# Patient Record
Sex: MALE | Race: WHITE | Employment: FULL TIME | URBAN - METROPOLITAN AREA
[De-identification: names, ages, dates, MRNs, and addresses within clinical notes are randomized per-mention and may not be internally consistent; named-entity substitution may affect disease eponyms.]

---

## 2017-03-28 ENCOUNTER — OFFICE VISIT (OUTPATIENT)
Dept: ORTHOPEDIC SURGERY | Age: 62
End: 2017-03-28

## 2017-03-28 VITALS — BODY MASS INDEX: 28.49 KG/M2 | WEIGHT: 214.95 LBS | HEIGHT: 73 IN

## 2017-03-28 DIAGNOSIS — M19.072 OSTEOARTHRITIS OF SUBTALAR JOINT, LEFT: Primary | ICD-10-CM

## 2017-03-28 PROCEDURE — 99213 OFFICE O/P EST LOW 20 MIN: CPT | Performed by: ORTHOPAEDIC SURGERY

## 2017-03-28 PROCEDURE — 20605 DRAIN/INJ JOINT/BURSA W/O US: CPT | Performed by: ORTHOPAEDIC SURGERY

## 2017-03-28 PROCEDURE — 73610 X-RAY EXAM OF ANKLE: CPT | Performed by: ORTHOPAEDIC SURGERY

## 2018-04-24 ENCOUNTER — OFFICE VISIT (OUTPATIENT)
Dept: ORTHOPEDIC SURGERY | Age: 63
End: 2018-04-24

## 2018-04-24 ENCOUNTER — TELEPHONE (OUTPATIENT)
Dept: ORTHOPEDIC SURGERY | Age: 63
End: 2018-04-24

## 2018-04-24 VITALS
SYSTOLIC BLOOD PRESSURE: 197 MMHG | HEIGHT: 73 IN | HEART RATE: 86 BPM | BODY MASS INDEX: 28.49 KG/M2 | DIASTOLIC BLOOD PRESSURE: 120 MMHG | WEIGHT: 214.95 LBS

## 2018-04-24 DIAGNOSIS — M25.572 CHRONIC PAIN OF LEFT ANKLE: Primary | ICD-10-CM

## 2018-04-24 DIAGNOSIS — G89.29 CHRONIC PAIN OF LEFT ANKLE: Primary | ICD-10-CM

## 2018-04-24 DIAGNOSIS — M19.072 ARTHRITIS OF LEFT ANKLE: ICD-10-CM

## 2018-04-24 DIAGNOSIS — M19.072 OSTEOARTHRITIS OF LEFT SUBTALAR JOINT: ICD-10-CM

## 2018-04-24 PROCEDURE — 3017F COLORECTAL CA SCREEN DOC REV: CPT | Performed by: ORTHOPAEDIC SURGERY

## 2018-04-24 PROCEDURE — 1036F TOBACCO NON-USER: CPT | Performed by: ORTHOPAEDIC SURGERY

## 2018-04-24 PROCEDURE — G8427 DOCREV CUR MEDS BY ELIG CLIN: HCPCS | Performed by: ORTHOPAEDIC SURGERY

## 2018-04-24 PROCEDURE — 99213 OFFICE O/P EST LOW 20 MIN: CPT | Performed by: ORTHOPAEDIC SURGERY

## 2018-04-24 PROCEDURE — G8419 CALC BMI OUT NRM PARAM NOF/U: HCPCS | Performed by: ORTHOPAEDIC SURGERY

## 2018-04-24 RX ORDER — LABETALOL 100 MG/1
TABLET, FILM COATED ORAL
Refills: 5 | COMMUNITY
Start: 2018-03-27 | End: 2019-11-07 | Stop reason: DRUGHIGH

## 2018-08-20 ENCOUNTER — HOSPITAL ENCOUNTER (INPATIENT)
Age: 63
LOS: 5 days | Discharge: HOME | DRG: 390 | End: 2018-08-25
Attending: EMERGENCY MEDICINE | Admitting: INTERNAL MEDICINE
Payer: COMMERCIAL

## 2018-08-20 ENCOUNTER — APPOINTMENT (OUTPATIENT)
Dept: CT IMAGING | Age: 63
DRG: 390 | End: 2018-08-20
Payer: COMMERCIAL

## 2018-08-20 DIAGNOSIS — K56.600 PARTIAL INTESTINAL OBSTRUCTION, UNSPECIFIED CAUSE (HCC): Primary | ICD-10-CM

## 2018-08-20 DIAGNOSIS — R10.31 ABDOMINAL PAIN, RIGHT LOWER QUADRANT: ICD-10-CM

## 2018-08-20 LAB
A/G RATIO: 1.6 (ref 1.1–2.2)
ALBUMIN SERPL-MCNC: 4.4 G/DL (ref 3.4–5)
ALP BLD-CCNC: 78 U/L (ref 40–129)
ALT SERPL-CCNC: 24 U/L (ref 10–40)
ANION GAP SERPL CALCULATED.3IONS-SCNC: 12 MMOL/L (ref 3–16)
AST SERPL-CCNC: 21 U/L (ref 15–37)
BASOPHILS ABSOLUTE: 0.1 K/UL (ref 0–0.2)
BASOPHILS RELATIVE PERCENT: 0.7 %
BILIRUB SERPL-MCNC: 0.7 MG/DL (ref 0–1)
BILIRUBIN URINE: NEGATIVE
BLOOD, URINE: NEGATIVE
BUN BLDV-MCNC: 19 MG/DL (ref 7–20)
CALCIUM SERPL-MCNC: 9.6 MG/DL (ref 8.3–10.6)
CHLORIDE BLD-SCNC: 102 MMOL/L (ref 99–110)
CLARITY: CLEAR
CO2: 25 MMOL/L (ref 21–32)
COLOR: YELLOW
CREAT SERPL-MCNC: 0.8 MG/DL (ref 0.8–1.3)
EOSINOPHILS ABSOLUTE: 0.2 K/UL (ref 0–0.6)
EOSINOPHILS RELATIVE PERCENT: 1.7 %
GFR AFRICAN AMERICAN: >60
GFR NON-AFRICAN AMERICAN: >60
GLOBULIN: 2.8 G/DL
GLUCOSE BLD-MCNC: 106 MG/DL (ref 70–99)
GLUCOSE URINE: NEGATIVE MG/DL
HCT VFR BLD CALC: 49.3 % (ref 40.5–52.5)
HEMOGLOBIN: 17 G/DL (ref 13.5–17.5)
KETONES, URINE: 15 MG/DL
LEUKOCYTE ESTERASE, URINE: NEGATIVE
LIPASE: 54 U/L (ref 13–60)
LYMPHOCYTES ABSOLUTE: 1.3 K/UL (ref 1–5.1)
LYMPHOCYTES RELATIVE PERCENT: 11.5 %
MCH RBC QN AUTO: 30.8 PG (ref 26–34)
MCHC RBC AUTO-ENTMCNC: 34.4 G/DL (ref 31–36)
MCV RBC AUTO: 89.4 FL (ref 80–100)
MICROSCOPIC EXAMINATION: ABNORMAL
MONOCYTES ABSOLUTE: 0.7 K/UL (ref 0–1.3)
MONOCYTES RELATIVE PERCENT: 6.5 %
NEUTROPHILS ABSOLUTE: 8.8 K/UL (ref 1.7–7.7)
NEUTROPHILS RELATIVE PERCENT: 79.6 %
NITRITE, URINE: NEGATIVE
PDW BLD-RTO: 13 % (ref 12.4–15.4)
PH UA: 5
PLATELET # BLD: 225 K/UL (ref 135–450)
PMV BLD AUTO: 9.1 FL (ref 5–10.5)
POTASSIUM SERPL-SCNC: 4.6 MMOL/L (ref 3.5–5.1)
PROTEIN UA: NEGATIVE MG/DL
RBC # BLD: 5.52 M/UL (ref 4.2–5.9)
SODIUM BLD-SCNC: 139 MMOL/L (ref 136–145)
SPECIFIC GRAVITY UA: 1.01
TOTAL PROTEIN: 7.2 G/DL (ref 6.4–8.2)
URINE TYPE: ABNORMAL
UROBILINOGEN, URINE: 0.2 E.U./DL
WBC # BLD: 11.1 K/UL (ref 4–11)

## 2018-08-20 PROCEDURE — 96376 TX/PRO/DX INJ SAME DRUG ADON: CPT

## 2018-08-20 PROCEDURE — 80053 COMPREHEN METABOLIC PANEL: CPT

## 2018-08-20 PROCEDURE — 85025 COMPLETE CBC W/AUTO DIFF WBC: CPT

## 2018-08-20 PROCEDURE — 93005 ELECTROCARDIOGRAM TRACING: CPT | Performed by: EMERGENCY MEDICINE

## 2018-08-20 PROCEDURE — 96375 TX/PRO/DX INJ NEW DRUG ADDON: CPT

## 2018-08-20 PROCEDURE — S0028 INJECTION, FAMOTIDINE, 20 MG: HCPCS | Performed by: EMERGENCY MEDICINE

## 2018-08-20 PROCEDURE — 2500000003 HC RX 250 WO HCPCS: Performed by: EMERGENCY MEDICINE

## 2018-08-20 PROCEDURE — 2580000003 HC RX 258: Performed by: EMERGENCY MEDICINE

## 2018-08-20 PROCEDURE — 99285 EMERGENCY DEPT VISIT HI MDM: CPT

## 2018-08-20 PROCEDURE — 74177 CT ABD & PELVIS W/CONTRAST: CPT

## 2018-08-20 PROCEDURE — 6360000004 HC RX CONTRAST MEDICATION: Performed by: EMERGENCY MEDICINE

## 2018-08-20 PROCEDURE — 83690 ASSAY OF LIPASE: CPT

## 2018-08-20 PROCEDURE — 6360000002 HC RX W HCPCS: Performed by: EMERGENCY MEDICINE

## 2018-08-20 PROCEDURE — 2580000003 HC RX 258: Performed by: NURSE PRACTITIONER

## 2018-08-20 PROCEDURE — 96374 THER/PROPH/DIAG INJ IV PUSH: CPT

## 2018-08-20 PROCEDURE — S0028 INJECTION, FAMOTIDINE, 20 MG: HCPCS | Performed by: NURSE PRACTITIONER

## 2018-08-20 PROCEDURE — 96361 HYDRATE IV INFUSION ADD-ON: CPT

## 2018-08-20 PROCEDURE — 81003 URINALYSIS AUTO W/O SCOPE: CPT

## 2018-08-20 PROCEDURE — 1200000000 HC SEMI PRIVATE

## 2018-08-20 PROCEDURE — 2500000003 HC RX 250 WO HCPCS: Performed by: NURSE PRACTITIONER

## 2018-08-20 RX ORDER — SODIUM CHLORIDE 0.9 % (FLUSH) 0.9 %
10 SYRINGE (ML) INJECTION PRN
Status: DISCONTINUED | OUTPATIENT
Start: 2018-08-20 | End: 2018-08-25 | Stop reason: HOSPADM

## 2018-08-20 RX ORDER — ONDANSETRON 2 MG/ML
4 INJECTION INTRAMUSCULAR; INTRAVENOUS ONCE
Status: COMPLETED | OUTPATIENT
Start: 2018-08-20 | End: 2018-08-20

## 2018-08-20 RX ORDER — NICOTINE POLACRILEX 4 MG
15 LOZENGE BUCCAL PRN
Status: DISCONTINUED | OUTPATIENT
Start: 2018-08-20 | End: 2018-08-25 | Stop reason: HOSPADM

## 2018-08-20 RX ORDER — MORPHINE SULFATE 2 MG/ML
2 INJECTION, SOLUTION INTRAMUSCULAR; INTRAVENOUS
Status: DISCONTINUED | OUTPATIENT
Start: 2018-08-20 | End: 2018-08-25 | Stop reason: HOSPADM

## 2018-08-20 RX ORDER — MORPHINE SULFATE 4 MG/ML
4 INJECTION, SOLUTION INTRAMUSCULAR; INTRAVENOUS
Status: DISCONTINUED | OUTPATIENT
Start: 2018-08-20 | End: 2018-08-25 | Stop reason: HOSPADM

## 2018-08-20 RX ORDER — ONDANSETRON 2 MG/ML
4 INJECTION INTRAMUSCULAR; INTRAVENOUS EVERY 6 HOURS PRN
Status: DISCONTINUED | OUTPATIENT
Start: 2018-08-20 | End: 2018-08-25 | Stop reason: HOSPADM

## 2018-08-20 RX ORDER — 0.9 % SODIUM CHLORIDE 0.9 %
1000 INTRAVENOUS SOLUTION INTRAVENOUS ONCE
Status: COMPLETED | OUTPATIENT
Start: 2018-08-20 | End: 2018-08-20

## 2018-08-20 RX ORDER — SODIUM CHLORIDE 0.9 % (FLUSH) 0.9 %
10 SYRINGE (ML) INJECTION EVERY 12 HOURS SCHEDULED
Status: DISCONTINUED | OUTPATIENT
Start: 2018-08-20 | End: 2018-08-25 | Stop reason: HOSPADM

## 2018-08-20 RX ORDER — 0.9 % SODIUM CHLORIDE 0.9 %
500 INTRAVENOUS SOLUTION INTRAVENOUS ONCE
Status: COMPLETED | OUTPATIENT
Start: 2018-08-20 | End: 2018-08-20

## 2018-08-20 RX ORDER — MORPHINE SULFATE 4 MG/ML
4 INJECTION, SOLUTION INTRAMUSCULAR; INTRAVENOUS ONCE
Status: COMPLETED | OUTPATIENT
Start: 2018-08-20 | End: 2018-08-20

## 2018-08-20 RX ORDER — SODIUM CHLORIDE 9 MG/ML
INJECTION, SOLUTION INTRAVENOUS CONTINUOUS
Status: DISCONTINUED | OUTPATIENT
Start: 2018-08-20 | End: 2018-08-24

## 2018-08-20 RX ORDER — DEXTROSE MONOHYDRATE 25 G/50ML
12.5 INJECTION, SOLUTION INTRAVENOUS PRN
Status: DISCONTINUED | OUTPATIENT
Start: 2018-08-20 | End: 2018-08-25 | Stop reason: HOSPADM

## 2018-08-20 RX ORDER — DEXTROSE MONOHYDRATE 50 MG/ML
100 INJECTION, SOLUTION INTRAVENOUS PRN
Status: DISCONTINUED | OUTPATIENT
Start: 2018-08-20 | End: 2018-08-25 | Stop reason: HOSPADM

## 2018-08-20 RX ADMIN — SODIUM CHLORIDE, PRESERVATIVE FREE 10 ML: 5 INJECTION INTRAVENOUS at 23:43

## 2018-08-20 RX ADMIN — HYDROMORPHONE HYDROCHLORIDE 0.5 MG: 1 INJECTION, SOLUTION INTRAMUSCULAR; INTRAVENOUS; SUBCUTANEOUS at 19:54

## 2018-08-20 RX ADMIN — HYDROMORPHONE HYDROCHLORIDE 1 MG: 1 INJECTION, SOLUTION INTRAMUSCULAR; INTRAVENOUS; SUBCUTANEOUS at 21:16

## 2018-08-20 RX ADMIN — SODIUM CHLORIDE: 9 INJECTION, SOLUTION INTRAVENOUS at 23:43

## 2018-08-20 RX ADMIN — FAMOTIDINE 20 MG: 10 INJECTION, SOLUTION INTRAVENOUS at 17:15

## 2018-08-20 RX ADMIN — SODIUM CHLORIDE 500 ML: 9 INJECTION, SOLUTION INTRAVENOUS at 19:55

## 2018-08-20 RX ADMIN — MORPHINE SULFATE 4 MG: 4 INJECTION INTRAVENOUS at 17:14

## 2018-08-20 RX ADMIN — SODIUM CHLORIDE 1000 ML: 9 INJECTION, SOLUTION INTRAVENOUS at 17:15

## 2018-08-20 RX ADMIN — FAMOTIDINE 20 MG: 10 INJECTION, SOLUTION INTRAVENOUS at 23:42

## 2018-08-20 RX ADMIN — IOPAMIDOL 100 ML: 755 INJECTION, SOLUTION INTRAVENOUS at 17:28

## 2018-08-20 RX ADMIN — ONDANSETRON 4 MG: 2 SOLUTION INTRAMUSCULAR; INTRAVENOUS at 17:14

## 2018-08-20 RX ADMIN — ONDANSETRON 4 MG: 2 SOLUTION INTRAMUSCULAR; INTRAVENOUS at 19:54

## 2018-08-20 ASSESSMENT — PAIN SCALES - GENERAL
PAINLEVEL_OUTOF10: 3
PAINLEVEL_OUTOF10: 9
PAINLEVEL_OUTOF10: 10
PAINLEVEL_OUTOF10: 7
PAINLEVEL_OUTOF10: 5

## 2018-08-20 ASSESSMENT — PAIN DESCRIPTION - LOCATION
LOCATION: ABDOMEN
LOCATION: ABDOMEN

## 2018-08-20 ASSESSMENT — PAIN DESCRIPTION - DESCRIPTORS: DESCRIPTORS: CRUSHING

## 2018-08-20 ASSESSMENT — PAIN DESCRIPTION - ORIENTATION
ORIENTATION: LOWER;MID
ORIENTATION: MID

## 2018-08-20 ASSESSMENT — PAIN DESCRIPTION - PROGRESSION: CLINICAL_PROGRESSION: NOT CHANGED

## 2018-08-20 ASSESSMENT — PAIN DESCRIPTION - PAIN TYPE
TYPE: ACUTE PAIN
TYPE: ACUTE PAIN

## 2018-08-20 ASSESSMENT — PAIN DESCRIPTION - ONSET: ONSET: ON-GOING

## 2018-08-20 ASSESSMENT — PAIN DESCRIPTION - FREQUENCY: FREQUENCY: CONTINUOUS

## 2018-08-21 ENCOUNTER — APPOINTMENT (OUTPATIENT)
Dept: GENERAL RADIOLOGY | Age: 63
DRG: 390 | End: 2018-08-21
Payer: COMMERCIAL

## 2018-08-21 PROBLEM — K57.90 DIVERTICULOSIS: Status: ACTIVE | Noted: 2018-08-21

## 2018-08-21 PROBLEM — K80.20 CHOLELITHIASIS: Status: ACTIVE | Noted: 2018-08-21

## 2018-08-21 LAB
ANION GAP SERPL CALCULATED.3IONS-SCNC: 11 MMOL/L (ref 3–16)
BUN BLDV-MCNC: 15 MG/DL (ref 7–20)
CALCIUM SERPL-MCNC: 8.7 MG/DL (ref 8.3–10.6)
CHLORIDE BLD-SCNC: 107 MMOL/L (ref 99–110)
CO2: 24 MMOL/L (ref 21–32)
CREAT SERPL-MCNC: 0.7 MG/DL (ref 0.8–1.3)
GFR AFRICAN AMERICAN: >60
GFR NON-AFRICAN AMERICAN: >60
GLUCOSE BLD-MCNC: 89 MG/DL (ref 70–99)
GLUCOSE BLD-MCNC: 96 MG/DL (ref 70–99)
GLUCOSE BLD-MCNC: 98 MG/DL (ref 70–99)
GLUCOSE BLD-MCNC: 99 MG/DL (ref 70–99)
HCT VFR BLD CALC: 46.9 % (ref 40.5–52.5)
HEMOGLOBIN: 16.2 G/DL (ref 13.5–17.5)
MCH RBC QN AUTO: 31 PG (ref 26–34)
MCHC RBC AUTO-ENTMCNC: 34.5 G/DL (ref 31–36)
MCV RBC AUTO: 90 FL (ref 80–100)
PDW BLD-RTO: 12.8 % (ref 12.4–15.4)
PERFORMED ON: NORMAL
PLATELET # BLD: 192 K/UL (ref 135–450)
PMV BLD AUTO: 9 FL (ref 5–10.5)
POTASSIUM REFLEX MAGNESIUM: 4.2 MMOL/L (ref 3.5–5.1)
RBC # BLD: 5.22 M/UL (ref 4.2–5.9)
SODIUM BLD-SCNC: 142 MMOL/L (ref 136–145)
WBC # BLD: 10.6 K/UL (ref 4–11)

## 2018-08-21 PROCEDURE — 1200000000 HC SEMI PRIVATE

## 2018-08-21 PROCEDURE — 2500000003 HC RX 250 WO HCPCS: Performed by: NURSE PRACTITIONER

## 2018-08-21 PROCEDURE — 80048 BASIC METABOLIC PNL TOTAL CA: CPT

## 2018-08-21 PROCEDURE — S0028 INJECTION, FAMOTIDINE, 20 MG: HCPCS | Performed by: NURSE PRACTITIONER

## 2018-08-21 PROCEDURE — 36415 COLL VENOUS BLD VENIPUNCTURE: CPT

## 2018-08-21 PROCEDURE — 99254 IP/OBS CNSLTJ NEW/EST MOD 60: CPT | Performed by: SURGERY

## 2018-08-21 PROCEDURE — 85027 COMPLETE CBC AUTOMATED: CPT

## 2018-08-21 PROCEDURE — 74022 RADEX COMPL AQT ABD SERIES: CPT

## 2018-08-21 PROCEDURE — 2500000003 HC RX 250 WO HCPCS: Performed by: INTERNAL MEDICINE

## 2018-08-21 PROCEDURE — 6360000002 HC RX W HCPCS: Performed by: NURSE PRACTITIONER

## 2018-08-21 PROCEDURE — 2580000003 HC RX 258: Performed by: NURSE PRACTITIONER

## 2018-08-21 PROCEDURE — 74018 RADEX ABDOMEN 1 VIEW: CPT

## 2018-08-21 RX ORDER — METOPROLOL TARTRATE 5 MG/5ML
5 INJECTION INTRAVENOUS EVERY 6 HOURS
Status: DISCONTINUED | OUTPATIENT
Start: 2018-08-21 | End: 2018-08-21

## 2018-08-21 RX ORDER — METOPROLOL TARTRATE 5 MG/5ML
2.5 INJECTION INTRAVENOUS EVERY 8 HOURS
Status: DISCONTINUED | OUTPATIENT
Start: 2018-08-21 | End: 2018-08-24

## 2018-08-21 RX ORDER — METOPROLOL TARTRATE 5 MG/5ML
5 INJECTION INTRAVENOUS EVERY 6 HOURS PRN
Status: DISCONTINUED | OUTPATIENT
Start: 2018-08-21 | End: 2018-08-25 | Stop reason: HOSPADM

## 2018-08-21 RX ADMIN — MORPHINE SULFATE 4 MG: 4 INJECTION INTRAVENOUS at 14:38

## 2018-08-21 RX ADMIN — METOPROLOL TARTRATE 5 MG: 5 INJECTION, SOLUTION INTRAVENOUS at 08:14

## 2018-08-21 RX ADMIN — MORPHINE SULFATE 4 MG: 4 INJECTION INTRAVENOUS at 10:30

## 2018-08-21 RX ADMIN — SODIUM CHLORIDE: 9 INJECTION, SOLUTION INTRAVENOUS at 08:05

## 2018-08-21 RX ADMIN — MORPHINE SULFATE 2 MG: 2 INJECTION, SOLUTION INTRAMUSCULAR; INTRAVENOUS at 08:04

## 2018-08-21 RX ADMIN — FAMOTIDINE 20 MG: 10 INJECTION, SOLUTION INTRAVENOUS at 20:48

## 2018-08-21 RX ADMIN — MORPHINE SULFATE 2 MG: 2 INJECTION, SOLUTION INTRAMUSCULAR; INTRAVENOUS at 08:23

## 2018-08-21 RX ADMIN — FAMOTIDINE 20 MG: 10 INJECTION, SOLUTION INTRAVENOUS at 08:04

## 2018-08-21 RX ADMIN — MORPHINE SULFATE 4 MG: 4 INJECTION INTRAVENOUS at 22:48

## 2018-08-21 RX ADMIN — METOPROLOL TARTRATE 2.5 MG: 5 INJECTION, SOLUTION INTRAVENOUS at 20:48

## 2018-08-21 RX ADMIN — MORPHINE SULFATE 4 MG: 4 INJECTION INTRAVENOUS at 04:47

## 2018-08-21 RX ADMIN — SODIUM CHLORIDE: 9 INJECTION, SOLUTION INTRAVENOUS at 14:41

## 2018-08-21 RX ADMIN — MORPHINE SULFATE 2 MG: 2 INJECTION, SOLUTION INTRAMUSCULAR; INTRAVENOUS at 00:19

## 2018-08-21 RX ADMIN — SODIUM CHLORIDE, PRESERVATIVE FREE 10 ML: 5 INJECTION INTRAVENOUS at 20:48

## 2018-08-21 RX ADMIN — ONDANSETRON 4 MG: 2 SOLUTION INTRAMUSCULAR; INTRAVENOUS at 04:47

## 2018-08-21 RX ADMIN — METOPROLOL TARTRATE 2.5 MG: 5 INJECTION, SOLUTION INTRAVENOUS at 15:25

## 2018-08-21 RX ADMIN — MORPHINE SULFATE 4 MG: 4 INJECTION INTRAVENOUS at 19:01

## 2018-08-21 ASSESSMENT — PAIN SCALES - GENERAL
PAINLEVEL_OUTOF10: 5
PAINLEVEL_OUTOF10: 5
PAINLEVEL_OUTOF10: 7
PAINLEVEL_OUTOF10: 0
PAINLEVEL_OUTOF10: 4
PAINLEVEL_OUTOF10: 7
PAINLEVEL_OUTOF10: 6
PAINLEVEL_OUTOF10: 4
PAINLEVEL_OUTOF10: 7
PAINLEVEL_OUTOF10: 7

## 2018-08-21 ASSESSMENT — PAIN DESCRIPTION - PAIN TYPE
TYPE: ACUTE PAIN

## 2018-08-21 ASSESSMENT — PAIN DESCRIPTION - ORIENTATION
ORIENTATION: LOWER
ORIENTATION: LOWER;MID

## 2018-08-21 ASSESSMENT — PAIN DESCRIPTION - LOCATION
LOCATION: ABDOMEN

## 2018-08-21 ASSESSMENT — PAIN DESCRIPTION - DESCRIPTORS: DESCRIPTORS: ACHING;CRAMPING

## 2018-08-21 ASSESSMENT — PAIN DESCRIPTION - FREQUENCY: FREQUENCY: CONTINUOUS

## 2018-08-21 NOTE — PROGRESS NOTES
Pt to transfer to B3. Pt spouse at is at bedside made aware of transfer. Report given to [facility], RN, who will assume care at this time. [facility] notified of transfer as well. Pt belonging collected and moved with pt.

## 2018-08-21 NOTE — CONSULTS
Department of General Surgery Consult    PATIENT NAME: Melissa De Dios OF BIRTH: 1955    ADMISSION DATE: 8/20/2018  4:49 PM      TODAY'S DATE: 8/21/2018    Reason for Consult:  Poss SBO    Chief Complaint: Abdominal pain    Requesting Physician:  Chani Morgan    HISTORY OF PRESENT ILLNESS:              The patient is a 61 y.o. male who presents with 24 hr h/o central/lower abdominal pain - began suddenly, no obvious inciting event, sharp, burning, waxing/waning in intensity. Nausea, no emesis, normal BM this AM.  No prior h/o similar sx. Seen in ED, CT showing SBO w/ possible associated mass. Currently still feels pain, but passing some flatus. Past Medical History:    History reviewed. No pertinent past medical history. Past Surgical History:        Procedure Laterality Date    CHOLECYSTECTOMY         Current Medications:   Current Facility-Administered Medications: metoprolol (LOPRESSOR) injection 5 mg, 5 mg, Intravenous, Q6H PRN  sodium chloride flush 0.9 % injection 10 mL, 10 mL, Intravenous, 2 times per day  sodium chloride flush 0.9 % injection 10 mL, 10 mL, Intravenous, PRN  magnesium hydroxide (MILK OF MAGNESIA) 400 MG/5ML suspension 30 mL, 30 mL, Oral, Daily PRN  ondansetron (ZOFRAN) injection 4 mg, 4 mg, Intravenous, Q6H PRN  0.9 % sodium chloride infusion, , Intravenous, Continuous  famotidine (PEPCID) injection 20 mg, 20 mg, Intravenous, BID  glucose (GLUTOSE) 40 % oral gel 15 g, 15 g, Oral, PRN  dextrose 50 % solution 12.5 g, 12.5 g, Intravenous, PRN  glucagon (rDNA) injection 1 mg, 1 mg, Intramuscular, PRN  dextrose 5 % solution, 100 mL/hr, Intravenous, PRN  insulin lispro (HUMALOG) injection vial 0-6 Units, 0-6 Units, Subcutaneous, 4 times per day  morphine injection 2 mg, 2 mg, Intravenous, Q2H PRN **OR** morphine (PF) injection 4 mg, 4 mg, Intravenous, Q2H PRN  Prior to Admission medications    Medication Sig Start Date End Date Taking?  Authorizing Provider   labetalol (NORMODYNE) 100 MG tablet TAKE 1 TABLET BY MOUTH TWICE A DAY 3/27/18  Yes Historical Provider, MD        Allergies:  Patient has no known allergies. Social History:   TOBACCO:   reports that he has quit smoking. His smoking use included Cigarettes. He has a 2.50 pack-year smoking history. He has never used smokeless tobacco.  ETOH:   reports that he does not drink alcohol. DRUGS:   reports that he does not use drugs. OCCUPATION:    Patient currently lives with family      Family History:    History reviewed. No pertinent family history. REVIEW OF SYSTEMS:  CONSTITUTIONAL:  positive for  fatigue, malaise and anorexia  HEENT:  negative  RESPIRATORY:  negative  CARDIOVASCULAR:  negative  GASTROINTESTINAL:  negative except for nausea and abdominal pain  GENITOURINARY:  negative  HEMATOLOGIC/LYMPHATIC:  negative  NEUROLOGICAL:  Negative  * All other ROS reviewed and negative. PHYSICAL EXAM:  VITALS:  BP (!) 169/96   Pulse 79   Temp 98.8 °F (37.1 °C)   Resp 16   Ht 6' 1\" (1.854 m)   Wt 260 lb (117.9 kg)   SpO2 94%   BMI 34.30 kg/m²   24HR INTAKE/OUTPUT:    No intake/output data recorded. No intake/output data recorded.     CONSTITUTIONAL:  alert, no apparent distress and mildly obese  EYES:  PERRL, sclera clear  ENT:  Normocepalic,atraumatic, without obvious abnormality  NECK:  supple, symmetrical, trachea midline  LUNGS: Resp effort easy and unlabored, clear to auscultation  CARDIOVASCULAR:  NO JVD, regular rate and rhythm   ABDOMEN:, hypoactive bowel sounds, soft, non-distended, non-tender, involuntary guarding absent, no masses palpated and    MUSCULOSKELETAL: No clubbing or cyanosis, 0+ pitting edema lower extremities  NEUROLOGIC:  Mental Status Exam:  Level of Alertness:   awake  PSYCHIATRIC:   person, place, time  SKIN:  normal skin color, texture, turgor    DATA:    CBC:   Recent Labs      08/20/18   1644  08/21/18   0637   WBC  11.1*  10.6   HGB  17.0  16.2   HCT  49.3  46.9   PLT  225  192

## 2018-08-21 NOTE — PROGRESS NOTES
Pt arrived to room 546 from ED. Pt A/Ox4, VSS, pt oriented to room and use of call light. Pt oriented to schedule of unit including labs, vitals and rounding. Plan of care reviewed. Will continue to monitor.

## 2018-08-21 NOTE — ED PROVIDER NOTES
CHIEF COMPLAINT  Abdominal Pain (Started about noon today, no nausea, patient states he made himself vomit to feel better, pain in the \"pit of stomach\" and feels the same as a gallbladder attack but patient states gallbladder was removed )      HISTORY OF PRESENT ILLNESS  Vijay Silveira is a 61 y.o. male with a history of cholecystectomy and hypertension who presents to the ED complaining of lower abdominal pain that started about 5 hours prior to arrival he was sitting at home. Says it's been getting worse since then. He had 2 episodes of vomiting as well. No back pain or chest pain or any shortness of breath. No numbness or tingling. He denies any headache or dizziness or any fevers or chills. He has a history of gallbladder removal but no other operations per him. He states that some pain meds but his pain is coming back at this time. He denies any other complaints. No other complaints, modifying factors or associated symptoms. I have reviewed the following from the nursing documentation. History reviewed. No pertinent past medical history. Past Surgical History:   Procedure Laterality Date    CHOLECYSTECTOMY       History reviewed. No pertinent family history. Social History     Social History    Marital status:      Spouse name: N/A    Number of children: N/A    Years of education: N/A     Occupational History    Not on file. Social History Main Topics    Smoking status: Former Smoker     Packs/day: 0.50     Years: 5.00     Types: Cigarettes    Smokeless tobacco: Never Used    Alcohol use No    Drug use: No    Sexual activity: Yes     Partners: Female     Other Topics Concern    Not on file     Social History Narrative    No narrative on file     No current facility-administered medications for this encounter.       Current Outpatient Prescriptions   Medication Sig Dispense Refill    labetalol (NORMODYNE) 100 MG tablet TAKE 1 TABLET BY MOUTH TWICE A DAY  5     No Known Allergies    REVIEW OF SYSTEMS  10 systems reviewed, pertinent positives per HPI otherwise noted to be negative. PHYSICAL EXAM  BP (!) 163/99   Pulse 82   Temp 98 °F (36.7 °C) (Oral)   Resp 16   Ht 6' 1\" (1.854 m)   Wt 260 lb (117.9 kg)   SpO2 94%   BMI 34.30 kg/m²   GENERAL APPEARANCE: Awake and alert. Cooperative. No acute distress. HEAD: Normocephalic. Atraumatic. EYES: PERRL. EOM's grossly intact. ENT: Mucous membranes are moist.   NECK: Supple. HEART: RRR. LUNGS: Respirations unlabored. CTAB. Good air exchange. Speaking comfortably in full sentences. BACK: No midline spinal tenderness or step-off. ABDOMEN: Soft. Non-distended. Mild tenderness RLQ and [periumbilical. No guarding or rebound. Normal bowel sounds. EXTREMITIES: No peripheral edema. Moves all extremities equally. All extremities neurovascularly intact. SKIN: Warm and dry. No acute rashes. NEUROLOGICAL: Alert and oriented. No gross facial drooping. Strength 5/5, sensation intact. Normal coordination. Gait normal.   PSYCHIATRIC: Normal mood and affect. LABS  I have reviewed all labs for this visit.    Results for orders placed or performed during the hospital encounter of 08/20/18   Comprehensive Metabolic Panel   Result Value Ref Range    Sodium 139 136 - 145 mmol/L    Potassium 4.6 3.5 - 5.1 mmol/L    Chloride 102 99 - 110 mmol/L    CO2 25 21 - 32 mmol/L    Anion Gap 12 3 - 16    Glucose 106 (H) 70 - 99 mg/dL    BUN 19 7 - 20 mg/dL    CREATININE 0.8 0.8 - 1.3 mg/dL    GFR Non-African American >60 >60    GFR African American >60 >60    Calcium 9.6 8.3 - 10.6 mg/dL    Total Protein 7.2 6.4 - 8.2 g/dL    Alb 4.4 3.4 - 5.0 g/dL    Albumin/Globulin Ratio 1.6 1.1 - 2.2    Total Bilirubin 0.7 0.0 - 1.0 mg/dL    Alkaline Phosphatase 78 40 - 129 U/L    ALT 24 10 - 40 U/L    AST 21 15 - 37 U/L    Globulin 2.8 g/dL   CBC Auto Differential   Result Value Ref Range    WBC 11.1 (H) 4.0 - 11.0 K/uL    RBC 5.52 4.20 - 5.90 M/uL

## 2018-08-22 ENCOUNTER — APPOINTMENT (OUTPATIENT)
Dept: CT IMAGING | Age: 63
DRG: 390 | End: 2018-08-22
Payer: COMMERCIAL

## 2018-08-22 LAB
ANION GAP SERPL CALCULATED.3IONS-SCNC: 12 MMOL/L (ref 3–16)
BUN BLDV-MCNC: 13 MG/DL (ref 7–20)
CALCIUM SERPL-MCNC: 8.3 MG/DL (ref 8.3–10.6)
CHLORIDE BLD-SCNC: 106 MMOL/L (ref 99–110)
CO2: 23 MMOL/L (ref 21–32)
CREAT SERPL-MCNC: 0.6 MG/DL (ref 0.8–1.3)
GFR AFRICAN AMERICAN: >60
GFR NON-AFRICAN AMERICAN: >60
GLUCOSE BLD-MCNC: 76 MG/DL (ref 70–99)
GLUCOSE BLD-MCNC: 88 MG/DL (ref 70–99)
GLUCOSE BLD-MCNC: 88 MG/DL (ref 70–99)
GLUCOSE BLD-MCNC: 92 MG/DL (ref 70–99)
GLUCOSE BLD-MCNC: 93 MG/DL (ref 70–99)
GLUCOSE BLD-MCNC: 94 MG/DL (ref 70–99)
PERFORMED ON: NORMAL
POTASSIUM SERPL-SCNC: 4 MMOL/L (ref 3.5–5.1)
SODIUM BLD-SCNC: 141 MMOL/L (ref 136–145)

## 2018-08-22 PROCEDURE — S0028 INJECTION, FAMOTIDINE, 20 MG: HCPCS | Performed by: NURSE PRACTITIONER

## 2018-08-22 PROCEDURE — 93010 ELECTROCARDIOGRAM REPORT: CPT | Performed by: INTERNAL MEDICINE

## 2018-08-22 PROCEDURE — 2580000003 HC RX 258

## 2018-08-22 PROCEDURE — 80048 BASIC METABOLIC PNL TOTAL CA: CPT

## 2018-08-22 PROCEDURE — 74177 CT ABD & PELVIS W/CONTRAST: CPT

## 2018-08-22 PROCEDURE — 99232 SBSQ HOSP IP/OBS MODERATE 35: CPT | Performed by: SURGERY

## 2018-08-22 PROCEDURE — 2500000003 HC RX 250 WO HCPCS: Performed by: NURSE PRACTITIONER

## 2018-08-22 PROCEDURE — 36415 COLL VENOUS BLD VENIPUNCTURE: CPT

## 2018-08-22 PROCEDURE — 6360000002 HC RX W HCPCS: Performed by: NURSE PRACTITIONER

## 2018-08-22 PROCEDURE — 2580000003 HC RX 258: Performed by: NURSE PRACTITIONER

## 2018-08-22 PROCEDURE — 6360000004 HC RX CONTRAST MEDICATION

## 2018-08-22 PROCEDURE — 2500000003 HC RX 250 WO HCPCS: Performed by: INTERNAL MEDICINE

## 2018-08-22 PROCEDURE — 1200000000 HC SEMI PRIVATE

## 2018-08-22 PROCEDURE — 6360000004 HC RX CONTRAST MEDICATION: Performed by: INTERNAL MEDICINE

## 2018-08-22 RX ORDER — DEXTROSE MONOHYDRATE 50 MG/ML
INJECTION, SOLUTION INTRAVENOUS
Status: COMPLETED
Start: 2018-08-22 | End: 2018-08-22

## 2018-08-22 RX ORDER — DEXTROSE MONOHYDRATE 50 MG/ML
INJECTION, SOLUTION INTRAVENOUS CONTINUOUS
Status: DISCONTINUED | OUTPATIENT
Start: 2018-08-22 | End: 2018-08-25 | Stop reason: HOSPADM

## 2018-08-22 RX ADMIN — METOPROLOL TARTRATE 2.5 MG: 5 INJECTION, SOLUTION INTRAVENOUS at 14:50

## 2018-08-22 RX ADMIN — METOPROLOL TARTRATE 2.5 MG: 5 INJECTION, SOLUTION INTRAVENOUS at 21:23

## 2018-08-22 RX ADMIN — SODIUM CHLORIDE: 9 INJECTION, SOLUTION INTRAVENOUS at 18:18

## 2018-08-22 RX ADMIN — FAMOTIDINE 20 MG: 10 INJECTION, SOLUTION INTRAVENOUS at 21:23

## 2018-08-22 RX ADMIN — DEXTROSE MONOHYDRATE: 50 INJECTION, SOLUTION INTRAVENOUS at 21:19

## 2018-08-22 RX ADMIN — SODIUM CHLORIDE, PRESERVATIVE FREE 10 ML: 5 INJECTION INTRAVENOUS at 08:29

## 2018-08-22 RX ADMIN — IOHEXOL 50 ML: 240 INJECTION, SOLUTION INTRATHECAL; INTRAVASCULAR; INTRAVENOUS; ORAL at 14:46

## 2018-08-22 RX ADMIN — FAMOTIDINE 20 MG: 10 INJECTION, SOLUTION INTRAVENOUS at 08:29

## 2018-08-22 RX ADMIN — SODIUM CHLORIDE: 9 INJECTION, SOLUTION INTRAVENOUS at 07:13

## 2018-08-22 RX ADMIN — ONDANSETRON 4 MG: 2 SOLUTION INTRAMUSCULAR; INTRAVENOUS at 04:55

## 2018-08-22 RX ADMIN — METOPROLOL TARTRATE 2.5 MG: 5 INJECTION, SOLUTION INTRAVENOUS at 04:55

## 2018-08-22 RX ADMIN — IOPAMIDOL 100 ML: 755 INJECTION, SOLUTION INTRAVENOUS at 16:46

## 2018-08-22 ASSESSMENT — PAIN SCALES - GENERAL
PAINLEVEL_OUTOF10: 0
PAINLEVEL_OUTOF10: 0

## 2018-08-22 NOTE — PLAN OF CARE
Problem: Falls - Risk of:  Goal: Will remain free from falls  Will remain free from falls   Pt remains free from falls, instructed to call for assistance, call light in reach, will monitor.

## 2018-08-22 NOTE — PROGRESS NOTES
Pt resting in bed, resp easy, IV patent, assessment completed, pt states he had a bowel movement this morning, pt states he is feeling much better and really wants the NG out, explained to pt he had to wait for physicians to round, stated understanding, pt denies pain or needs at this time, wife at bedside, call light in reach, will monitor.

## 2018-08-22 NOTE — PROGRESS NOTES
Pt completed all but 200ml of oral contrast through NG tube, pt states he is now having diarrhea and is unable to complete the rest at this time, CT notified, stated they would get him down shortly for his scan.

## 2018-08-22 NOTE — PLAN OF CARE
Problem: Pain:  Goal: Pain level will decrease  Pain level will decrease   Outcome: Ongoing  Pt c/o abd pain 7/10; 4mg Morphine given per MAR. Pt currently resting in bed with eyes closed. Problem: Sensory:  Goal: Pain level will decrease  Pain level will decrease   Outcome: Ongoing  Pt c/o abd pain 7/10; 4mg Morphine given per MAR. Pt currently resting in bed with eyes closed.

## 2018-08-22 NOTE — PROGRESS NOTES
Assessment complete. NG tube in patent & in place.  BP (!) 154/90   Pulse 101   Temp 97.8 °F (36.6 °C) (Oral)   Resp 17   Ht 6' 1\" (1.854 m)   Wt 260 lb (117.9 kg)   SpO2 92%   BMI 34.30 kg/m²

## 2018-08-23 ENCOUNTER — APPOINTMENT (OUTPATIENT)
Dept: GENERAL RADIOLOGY | Age: 63
DRG: 390 | End: 2018-08-23
Payer: COMMERCIAL

## 2018-08-23 LAB
ANION GAP SERPL CALCULATED.3IONS-SCNC: 10 MMOL/L (ref 3–16)
BUN BLDV-MCNC: 9 MG/DL (ref 7–20)
CALCIUM SERPL-MCNC: 8.3 MG/DL (ref 8.3–10.6)
CHLORIDE BLD-SCNC: 107 MMOL/L (ref 99–110)
CO2: 25 MMOL/L (ref 21–32)
CREAT SERPL-MCNC: 0.6 MG/DL (ref 0.8–1.3)
GFR AFRICAN AMERICAN: >60
GFR NON-AFRICAN AMERICAN: >60
GLUCOSE BLD-MCNC: 79 MG/DL (ref 70–99)
GLUCOSE BLD-MCNC: 81 MG/DL (ref 70–99)
GLUCOSE BLD-MCNC: 83 MG/DL (ref 70–99)
GLUCOSE BLD-MCNC: 95 MG/DL (ref 70–99)
GLUCOSE BLD-MCNC: 99 MG/DL (ref 70–99)
PERFORMED ON: NORMAL
POTASSIUM SERPL-SCNC: 3.8 MMOL/L (ref 3.5–5.1)
SODIUM BLD-SCNC: 142 MMOL/L (ref 136–145)

## 2018-08-23 PROCEDURE — 99232 SBSQ HOSP IP/OBS MODERATE 35: CPT | Performed by: SURGERY

## 2018-08-23 PROCEDURE — APPSS30 APP SPLIT SHARED TIME 16-30 MINUTES: Performed by: CLINICAL NURSE SPECIALIST

## 2018-08-23 PROCEDURE — S0028 INJECTION, FAMOTIDINE, 20 MG: HCPCS | Performed by: NURSE PRACTITIONER

## 2018-08-23 PROCEDURE — 2580000003 HC RX 258: Performed by: NURSE PRACTITIONER

## 2018-08-23 PROCEDURE — 6370000000 HC RX 637 (ALT 250 FOR IP): Performed by: NURSE PRACTITIONER

## 2018-08-23 PROCEDURE — 2500000003 HC RX 250 WO HCPCS: Performed by: NURSE PRACTITIONER

## 2018-08-23 PROCEDURE — 80048 BASIC METABOLIC PNL TOTAL CA: CPT

## 2018-08-23 PROCEDURE — 1200000000 HC SEMI PRIVATE

## 2018-08-23 PROCEDURE — 36415 COLL VENOUS BLD VENIPUNCTURE: CPT

## 2018-08-23 PROCEDURE — 2500000003 HC RX 250 WO HCPCS: Performed by: INTERNAL MEDICINE

## 2018-08-23 PROCEDURE — 74019 RADEX ABDOMEN 2 VIEWS: CPT

## 2018-08-23 RX ADMIN — DEXTROSE MONOHYDRATE: 50 INJECTION, SOLUTION INTRAVENOUS at 06:00

## 2018-08-23 RX ADMIN — METOPROLOL TARTRATE 2.5 MG: 5 INJECTION, SOLUTION INTRAVENOUS at 06:00

## 2018-08-23 RX ADMIN — SODIUM CHLORIDE, PRESERVATIVE FREE 10 ML: 5 INJECTION INTRAVENOUS at 20:16

## 2018-08-23 RX ADMIN — METOPROLOL TARTRATE 5 MG: 5 INJECTION, SOLUTION INTRAVENOUS at 00:57

## 2018-08-23 RX ADMIN — SODIUM CHLORIDE: 9 INJECTION, SOLUTION INTRAVENOUS at 20:16

## 2018-08-23 RX ADMIN — METOPROLOL TARTRATE 2.5 MG: 5 INJECTION, SOLUTION INTRAVENOUS at 14:40

## 2018-08-23 RX ADMIN — METOPROLOL TARTRATE 2.5 MG: 5 INJECTION, SOLUTION INTRAVENOUS at 21:28

## 2018-08-23 RX ADMIN — FAMOTIDINE 20 MG: 10 INJECTION, SOLUTION INTRAVENOUS at 20:16

## 2018-08-23 RX ADMIN — SODIUM CHLORIDE, PRESERVATIVE FREE 10 ML: 5 INJECTION INTRAVENOUS at 08:19

## 2018-08-23 RX ADMIN — CHLORASEPTIC 1 SPRAY: 1.5 LIQUID ORAL at 01:37

## 2018-08-23 RX ADMIN — SODIUM CHLORIDE: 9 INJECTION, SOLUTION INTRAVENOUS at 06:00

## 2018-08-23 RX ADMIN — FAMOTIDINE 20 MG: 10 INJECTION, SOLUTION INTRAVENOUS at 08:19

## 2018-08-23 ASSESSMENT — PAIN SCALES - GENERAL
PAINLEVEL_OUTOF10: 2
PAINLEVEL_OUTOF10: 0

## 2018-08-23 ASSESSMENT — PAIN DESCRIPTION - PAIN TYPE: TYPE: ACUTE PAIN

## 2018-08-23 ASSESSMENT — PAIN DESCRIPTION - DESCRIPTORS: DESCRIPTORS: SORE

## 2018-08-23 ASSESSMENT — PAIN DESCRIPTION - ONSET: ONSET: GRADUAL

## 2018-08-23 ASSESSMENT — PAIN DESCRIPTION - PROGRESSION: CLINICAL_PROGRESSION: GRADUALLY WORSENING

## 2018-08-23 ASSESSMENT — PAIN DESCRIPTION - LOCATION: LOCATION: THROAT

## 2018-08-23 ASSESSMENT — PAIN DESCRIPTION - FREQUENCY: FREQUENCY: CONTINUOUS

## 2018-08-23 NOTE — PROGRESS NOTES
NG tube removed as ordered, pt tolerated well, pt instructed to notify staff for any increase in pain or nausea, pt given clear liquid menu, will monitor.

## 2018-08-23 NOTE — CARE COORDINATION
Chart reviewed. Pt from home with family support, no services noted prior to admission. Pt currently has NGtube, to begin clamping today. Please notify DCP if discharge needs arise.   Mirela Morillo RN DCP

## 2018-08-23 NOTE — PROGRESS NOTES
[facility] SURGERY    PATIENT NAME: Ellen Castillo     TODAY'S DATE: 8/23/2018    CHIEF COMPLAINT: ngt discomfort    INTERVAL HISTORY/HPI:    Pt reports he continues to have loose stools and is passing flatus. No abd pain today. REVIEW OF SYSTEMS:  Pertinent positives and negatives as per interval history section    OBJECTIVE:  VITALS:  BP (!) 159/95   Pulse 84   Temp 98.1 °F (36.7 °C) (Oral)   Resp 18   Ht 6' 1\" (1.854 m)   Wt 260 lb (117.9 kg)   SpO2 95%   BMI 34.30 kg/m²     INTAKE/OUTPUT:    I/O last 3 completed shifts: In: 0   Out: 900 [Urine:300; Emesis/NG output:600]  No intake/output data recorded. CONSTITUTIONAL:  awake and alert  LUNGS:  Respirations easy and unlabored,    CARD:  RRR   ABDOMEN:  Active BS, soft, non-distended, non-tender     Data:  CBC:   Recent Labs      08/20/18   1644  08/21/18   0637   WBC  11.1*  10.6   HGB  17.0  16.2   HCT  49.3  46.9   PLT  225  192     BMP:    Recent Labs      08/21/18   0637  08/22/18   1150  08/23/18   0630   NA  142  141  142   K  4.2  4.0  3.8   CL  107  106  107   CO2  24  23  25   BUN  15  13  9   CREATININE  0.7*  0.6*  0.6*   GLUCOSE  98  88  95     Hepatic:   Recent Labs      08/20/18   1644   AST  21   ALT  24   BILITOT  0.7   ALKPHOS  78     Mag:    No results for input(s): MG in the last 72 hours. Phos:   No results for input(s): PHOS in the last 72 hours. INR: No results for input(s): INR in the last 72 hours. Radiology Review:  *Imaging personally reviewed by me. EXAMINATION:  CT OF THE ABDOMEN AND PELVIS WITH CONTRAST 8/22/2018 4:46 pm    TECHNIQUE:  CT of the abdomen and pelvis was performed with the administration of  intravenous contrast. Multiplanar reformatted images are provided for review. Dose modulation, iterative reconstruction, and/or weight based adjustment of  the mA/kV was utilized to reduce the radiation dose to as low as reasonably  achievable. COMPARISON:  None.     HISTORY:  ORDERING SYSTEM PROVIDED HISTORY: eval for site/source of SBO  TECHNOLOGIST PROVIDED HISTORY:  With oral and IV contrast - give oral contrast thru NGT and clamp NGT until  scan done  Ordering Physician Provided Reason for Exam: follow up scan for SBO  Acuity: Chronic  Type of Exam: Subsequent/Follow-up  Relevant Medical/Surgical History: cholecystectomy    FINDINGS:  Lower Chest: Atelectasis is noted at the lung bases.  No pleural effusions or  cardiomegaly is noted.  Coronary artery calcifications are noted.  Trace  pericardial fluid is seen.  Granulomatous changes are noted in the right  hilum. Organs: Liver, spleen, pancreas, and adrenals are unremarkable.  Gallbladder  is surgically absent.  Pelviectasis both kidneys is noted without evidence of  ureteric dilatation or obstruction. GI/Bowel: No free fluid or free air is noted.  Intestinal tube terminates in  the stomach.  A partial small bowel obstruction is noted with transition zone  in the right lower quadrant.  No bowel wall edema is noted.  Maximum small  bowel diameter of 3 cm. Pelvis: No evidence of appendicitis.  Report sigmoid diverticulosis is  present without diverticulitis.  No abnormal fluid collections, pelvic or  inguinal adenopathy are noted.  Both inguinal rings are dilated and fat  containing without strangulation. Peritoneum/Retroperitoneum: Aorta is normal in caliber.  No aortic aneurysm,  retroperitoneal mass, retroperitoneal or mesenteric adenopathy is present. Bones/Soft Tissues: Mild degenerative changes in the hips, SI joints and  lower lumbar region.  No worrisome lytic or blastic lesions. Estimated biologic radiation dose for this procedure:1576.47 mGy/cm2. Impression:     1.  Small-bowel obstruction with transition zone in the right lower quadrant. 2.  Sigmoid diverticulosis without diverticulitis.     3.  No cholelithiasis, appendicitis, or urinary obstruction.  Other  incidental findings as above       AXR pending this AM      ASSESSMENT AND PLAN:  SBO of unclear etiology (possible distal SB mass/lesion), improved w/ decompression     Repeat CT as above - will need to review with radiology as no mention of the mass/lesion on read    Continue with ngt to suction for now - follow AXR - will likely start ngt clamping later today given his  Improvement of sx. Obesity: BMI : 75.4 Complicating assessment and treatment. Placing patient at risk for multiple co-morbidities and complications. Annel Jaffe   [id number]    Surgery Staff    I have examined this patient and read and agree with the note by Annel Jaffe CNP from today. Clinically SBO symptoms seem to resolving nicely. Will d/c NGT this evening and trial PO liquids. If tolerated, advance diet tomorrow. Assuming he progresses to the point of D/C home soon, would likely plan for interval repeat CT in a few weeks to reassess for the possible/mentioned small bowel mass at the site of the SBO on initial CT (but not seen on yesterday's CT).      [facility]

## 2018-08-23 NOTE — PROGRESS NOTES
Hospitalist Progress Note      PCP: Xiomara Barragan MD    Date of Admission: 8/20/2018    Chief Complaint: abd pain      Subjective:     No more abdominal pain. No nausea. NG tube still in place to suction    Medications:  Reviewed    Infusion Medications    dextrose 100 mL/hr at 08/23/18 0600    sodium chloride 75 mL/hr at 08/23/18 0600    dextrose       Scheduled Medications    metoprolol  2.5 mg Intravenous Q8H    sodium chloride flush  10 mL Intravenous 2 times per day    famotidine (PEPCID) injection  20 mg Intravenous BID    insulin lispro  0-6 Units Subcutaneous 4 times per day     PRN Meds: phenol, iohexol, metoprolol, sodium chloride flush, magnesium hydroxide, ondansetron, glucose, dextrose, glucagon (rDNA), dextrose, morphine **OR** morphine      Intake/Output Summary (Last 24 hours) at 08/23/18 1427  Last data filed at 08/23/18 1222   Gross per 24 hour   Intake                0 ml   Output              900 ml   Net             -900 ml       Physical Exam Performed:    BP (!) 159/95   Pulse 84   Temp 98.1 °F (36.7 °C) (Oral)   Resp 18   Ht 6' 1\" (1.854 m)   Wt 260 lb (117.9 kg)   SpO2 95%   BMI 34.30 kg/m²     General appearance: No apparent distress, appears stated age and cooperative. NG in place-To suction  HEENT: Pupils equal, round, and reactive to light. Conjunctivae/corneas clear. Neck: Supple, with full range of motion. No jugular venous distention. Trachea midline. Respiratory:  Normal respiratory effort. Clear to auscultation, bilaterally without Rales/Wheezes/Rhonchi. Cardiovascular: Regular rate and rhythm with normal S1/S2 without murmurs, rubs or gallops. Abdomen: Soft, nontender,  non-distended with normal  bowel sounds. Musculoskeletal: No clubbing, cyanosis or edema bilaterally. Full range of motion without deformity. Skin: Skin color, texture, turgor normal.  No rashes or lesions. Neurologic:  Neurovascularly intact without any focal sensory/motor deficits.

## 2018-08-24 PROCEDURE — APPSS30 APP SPLIT SHARED TIME 16-30 MINUTES: Performed by: CLINICAL NURSE SPECIALIST

## 2018-08-24 PROCEDURE — 2580000003 HC RX 258: Performed by: NURSE PRACTITIONER

## 2018-08-24 PROCEDURE — 99232 SBSQ HOSP IP/OBS MODERATE 35: CPT | Performed by: SURGERY

## 2018-08-24 PROCEDURE — 1200000000 HC SEMI PRIVATE

## 2018-08-24 PROCEDURE — 2500000003 HC RX 250 WO HCPCS: Performed by: NURSE PRACTITIONER

## 2018-08-24 PROCEDURE — 2500000003 HC RX 250 WO HCPCS: Performed by: INTERNAL MEDICINE

## 2018-08-24 PROCEDURE — S0028 INJECTION, FAMOTIDINE, 20 MG: HCPCS | Performed by: NURSE PRACTITIONER

## 2018-08-24 PROCEDURE — 6370000000 HC RX 637 (ALT 250 FOR IP): Performed by: INTERNAL MEDICINE

## 2018-08-24 RX ORDER — LABETALOL 100 MG/1
100 TABLET, FILM COATED ORAL EVERY 12 HOURS SCHEDULED
Status: DISCONTINUED | OUTPATIENT
Start: 2018-08-24 | End: 2018-08-25 | Stop reason: HOSPADM

## 2018-08-24 RX ORDER — FAMOTIDINE 20 MG/1
20 TABLET, FILM COATED ORAL 2 TIMES DAILY
Status: DISCONTINUED | OUTPATIENT
Start: 2018-08-24 | End: 2018-08-25 | Stop reason: HOSPADM

## 2018-08-24 RX ADMIN — METOPROLOL TARTRATE 2.5 MG: 5 INJECTION, SOLUTION INTRAVENOUS at 06:35

## 2018-08-24 RX ADMIN — FAMOTIDINE 20 MG: 10 INJECTION, SOLUTION INTRAVENOUS at 08:42

## 2018-08-24 RX ADMIN — SODIUM CHLORIDE, PRESERVATIVE FREE 10 ML: 5 INJECTION INTRAVENOUS at 08:42

## 2018-08-24 RX ADMIN — METOPROLOL TARTRATE 5 MG: 5 INJECTION, SOLUTION INTRAVENOUS at 08:44

## 2018-08-24 RX ADMIN — SODIUM CHLORIDE: 9 INJECTION, SOLUTION INTRAVENOUS at 09:49

## 2018-08-24 RX ADMIN — SODIUM CHLORIDE, PRESERVATIVE FREE 10 ML: 5 INJECTION INTRAVENOUS at 20:31

## 2018-08-24 RX ADMIN — FAMOTIDINE 20 MG: 20 TABLET ORAL at 20:30

## 2018-08-24 RX ADMIN — LABETALOL HYDROCHLORIDE 100 MG: 100 TABLET, FILM COATED ORAL at 20:30

## 2018-08-24 ASSESSMENT — PAIN SCALES - GENERAL
PAINLEVEL_OUTOF10: 0

## 2018-08-24 NOTE — PROGRESS NOTES
Assessment complete. Pt denies abd pain or N/V. Resting in bed with eyes closed.  BP (!) 149/93   Pulse 84   Temp 98.1 °F (36.7 °C) (Oral)   Resp 17   Ht 6' 1\" (1.854 m)   Wt 260 lb (117.9 kg)   SpO2 92%   BMI 34.30 kg/m²

## 2018-08-24 NOTE — PLAN OF CARE
Problem: Falls - Risk of:  Goal: Will remain free from falls  Will remain free from falls   Outcome: Met This Shift  Pt remains free from fall and injury. Non-skid slippers on. Gait steady. Instructed to call for assistance as needed. Call light in reach, will monitor. Problem: Bowel/Gastric:  Goal: Bowel function will improve  Bowel function will improve   Outcome: Met This Shift  Pt tolerating diet. Denies nausea, abdominal pain. Diet advanced to low fiber. Will monitor.

## 2018-08-24 NOTE — PLAN OF CARE
Problem: Falls - Risk of:  Goal: Will remain free from falls  Will remain free from falls   Outcome: Ongoing  Pt is independent. Call light within each. Bed locked & in lowest position. 2/4 side rails up.

## 2018-08-24 NOTE — PROGRESS NOTES
Cranial nerves: II-XII intact, grossly non-focal.  Psychiatric: Alert and oriented, thought content appropriate, normal insight  Capillary Refill: Brisk,< 3 seconds   Peripheral Pulses: +2 palpable, equal bilaterally       Labs:   No results for input(s): WBC, HGB, HCT, PLT in the last 72 hours. Recent Labs      08/22/18   1150  08/23/18   0630   NA  141  142   K  4.0  3.8   CL  106  107   CO2  23  25   BUN  13  9   CREATININE  0.6*  0.6*   CALCIUM  8.3  8.3     No results for input(s): AST, ALT, BILIDIR, BILITOT, ALKPHOS in the last 72 hours. No results for input(s): INR in the last 72 hours. No results for input(s): [place] in the last 72 hours. Urinalysis:      Lab Results   Component Value Date    NITRU Negative 08/20/2018    BLOODU Negative 08/20/2018    SPECGRAV 1.010 08/20/2018    GLUCOSEU Negative 08/20/2018       Radiology:  XR ABDOMEN (2 VIEWS)   Final Result   Mild interval improvement in multiple dilated loops of small bowel in   comparison with prior radiographs from 08/21/2018. Please refer to the CT of   the abdomen and pelvis from 08/22/2018 for additional information. CT ABDOMEN PELVIS W IV CONTRAST   Final Result   1. Small-bowel obstruction with transition zone in the right lower quadrant. 2.  Sigmoid diverticulosis without diverticulitis. 3.  No cholelithiasis, appendicitis, or urinary obstruction. Other   incidental findings as above. XR ABDOMEN (KUB) (SINGLE AP VIEW)   Final Result   Satisfactory position of the gastric tube. XR Acute Abd Series Chest 1 VW   Final Result   Persistent small bowel dilation, compatible with small bowel obstruction. Overall degree of small-bowel distention is similar to slightly increased   when allowing for differences in modality. No evidence of pneumoperitoneum. CT ABDOMEN PELVIS W IV CONTRAST Additional Contrast? None   Final Result   1.  Possible stricture or mass versus adhesive band at the mid ileum right   lower quadrant resulting in chronic partial small bowel obstruction. 2.  Diverticulosis coli without CT evidence of acute diverticulitis. 3. Small left inguinal hernia contains a knuckle of sigmoid colon. 4. Prominent left extrarenal pelvis as well as bilateral parapelvic renal   cysts. 5. Mild hepatic steatosis. 6.  Mild the extrahepatic bile duct dilatation status post cholecystectomy   typical of reservoir effect. 7. Prominent posterior disc osteophyte complex L2-3 resulting in mild canal   stenosis, 10 mm AP dimension. Assessment/Plan:    Active Hospital Problems    Diagnosis Date Noted    Diverticulosis [K57.90] 08/21/2018    Partial intestinal obstruction (HCC) [K56.600]     Partial small bowel obstruction ([address] [K56.600] 08/20/2018     Abdominal pain secondary to partial small bowel obstruction: CT abdomen showed transition point in the right lower quadrant. Improved with NG suctioning and symptomatic mx. On full liquids. Dc ivf. Plan to start on regular food tomorrow. Plan to get another CT ABD in 1 month to f.u on smll bowel thickening to r.o neoplasm.       Htn: Change BB to po.          SCD's  Diet: DIET LOW FIBER;  Code Status: Full Code    PT/OT Eval Status: not needed    Dispo - inpt 1 more day     Yennifer Singleton MD

## 2018-08-24 NOTE — PROGRESS NOTES
Pt tolerated clears well for dinner, pt states has had some diarrhea after eating but denies complaints of pain, nausea, or vomiting, will monitor.

## 2018-08-25 VITALS
HEIGHT: 73 IN | SYSTOLIC BLOOD PRESSURE: 141 MMHG | WEIGHT: 260 LBS | HEART RATE: 80 BPM | TEMPERATURE: 98.1 F | DIASTOLIC BLOOD PRESSURE: 99 MMHG | BODY MASS INDEX: 34.46 KG/M2 | RESPIRATION RATE: 18 BRPM | OXYGEN SATURATION: 94 %

## 2018-08-25 PROCEDURE — 2580000003 HC RX 258: Performed by: NURSE PRACTITIONER

## 2018-08-25 PROCEDURE — 99231 SBSQ HOSP IP/OBS SF/LOW 25: CPT | Performed by: SURGERY

## 2018-08-25 PROCEDURE — 6370000000 HC RX 637 (ALT 250 FOR IP): Performed by: INTERNAL MEDICINE

## 2018-08-25 RX ADMIN — SODIUM CHLORIDE, PRESERVATIVE FREE 10 ML: 5 INJECTION INTRAVENOUS at 08:00

## 2018-08-25 RX ADMIN — FAMOTIDINE 20 MG: 20 TABLET ORAL at 08:00

## 2018-08-25 RX ADMIN — LABETALOL HYDROCHLORIDE 100 MG: 100 TABLET, FILM COATED ORAL at 08:00

## 2018-08-25 ASSESSMENT — PAIN SCALES - GENERAL: PAINLEVEL_OUTOF10: 0

## 2018-08-25 NOTE — DISCHARGE INSTR - DIET

## 2018-08-25 NOTE — PLAN OF CARE
Problem: Falls - Risk of:  Goal: Will remain free from falls  Will remain free from falls   Pt remains free from falls, instructed to call for assistance if needed, call light in reach, will monitor.

## 2018-08-25 NOTE — PROGRESS NOTES
Pt resting in bed, resp easy, IV patent, assessment completed, pt tolerating diet well, states is ready to go home, pt denies pain or needs at this time, call light in reach, will monitor.

## 2018-08-25 NOTE — DISCHARGE SUMMARY
Hospital Medicine Discharge Summary    Patient ID: Barb Winkler      Patient's PCP: Santosh Dickson MD    Admit Date: 8/20/2018     Discharge Date:   8/25/18     Admitting Physician: Cynthia Benz MD     Discharge Physician: LEANN Pedraza - CNP     Discharge Diagnoses: Active Hospital Problems    Diagnosis Date Noted    Diverticulosis [K57.90] 08/21/2018    Partial intestinal obstruction ([address] [K56.600]     Partial small bowel obstruction ([address] [K56.600] 08/20/2018       The patient was seen and examined on day of discharge and this discharge summary is in conjunction with any daily progress note from day of discharge. Hospital Course:   60 yo male with c/o abd pain on arrival found to have PSBO was admitted and teated in the following:      Abd pain in the setting of PSBO   - CT of abdomen  showed transition point in the right lower quadrant.   - GS was consulted, treated with NG and symptom mgt. Resolved Tolerating PO   - F/U with GS in 2 weeks plan for OP CT abd in 1 month to R/O neoplasm. HTN- controlled continue home med        Physical Exam Performed:     BP (!) 141/99   Pulse 80   Temp 98.1 °F (36.7 °C) (Oral)   Resp 18   Ht 6' 1\" (1.854 m)   Wt 260 lb (117.9 kg)   SpO2 94%   BMI 34.30 kg/m²       General appearance:  No apparent distress, appears stated age and cooperative. HEENT:  Normal cephalic, atraumatic without obvious deformity. Pupils equal, round, and reactive to light. Extra ocular muscles intact. Conjunctivae/corneas clear. Neck: Supple, with full range of motion. No jugular venous distention. Trachea midline. Respiratory:  Normal respiratory effort. Clear to auscultation, bilaterally without Rales/Wheezes/Rhonchi. Cardiovascular:  Regular rate and rhythm with normal S1/S2 without murmurs, rubs or gallops. Abdomen: Soft, non-tender, non-distended with normal bowel sounds. Musculoskeletal:  No clubbing, cyanosis or edema bilaterally.   Full range of motion stricture or mass versus adhesive band at the mid ileum right   lower quadrant resulting in chronic partial small bowel obstruction. 2.  Diverticulosis coli without CT evidence of acute diverticulitis. 3. Small left inguinal hernia contains a knuckle of sigmoid colon. 4. Prominent left extrarenal pelvis as well as bilateral parapelvic renal   cysts. 5. Mild hepatic steatosis. 6.  Mild the extrahepatic bile duct dilatation status post cholecystectomy   typical of reservoir effect. 7. Prominent posterior disc osteophyte complex L2-3 resulting in mild canal   stenosis, 10 mm AP dimension. Consults:     IP CONSULT TO HOSPITALIST  IP CONSULT TO GENERAL SURGERY    Disposition:  Home     Condition at Discharge: Stable    Discharge Instructions/Follow-up: PCP and GS    Code Status:  Full Code     Activity: activity as tolerated    Diet: regular diet low fiber       Discharge Medications:     Current Discharge Medication List           Details   labetalol (NORMODYNE) 100 MG tablet TAKE 1 TABLET BY MOUTH TWICE A DAY  Refills: 5             Time Spent on discharge is more than 30 minutes in the examination, evaluation, counseling and review of medications and discharge plan. Signed:    LEANN Herrera - CNP   8/25/2018      Thank you Miroslava Paz MD for the opportunity to be involved in this patient's care. If you have any questions or concerns please feel free to contact me at [phone].

## 2018-08-27 LAB
EKG ATRIAL RATE: 81 BPM
EKG DIAGNOSIS: NORMAL
EKG P AXIS: 96 DEGREES
EKG P-R INTERVAL: 160 MS
EKG Q-T INTERVAL: 364 MS
EKG QRS DURATION: 84 MS
EKG QTC CALCULATION (BAZETT): 422 MS
EKG R AXIS: -7 DEGREES
EKG T AXIS: 21 DEGREES
EKG VENTRICULAR RATE: 81 BPM

## 2018-09-11 ENCOUNTER — OFFICE VISIT (OUTPATIENT)
Dept: SURGERY | Age: 63
End: 2018-09-11

## 2018-09-11 VITALS
WEIGHT: 288 LBS | HEIGHT: 73 IN | DIASTOLIC BLOOD PRESSURE: 98 MMHG | BODY MASS INDEX: 38.17 KG/M2 | SYSTOLIC BLOOD PRESSURE: 160 MMHG

## 2018-09-11 DIAGNOSIS — K56.600 PARTIAL INTESTINAL OBSTRUCTION, UNSPECIFIED CAUSE (HCC): Primary | ICD-10-CM

## 2018-09-11 PROCEDURE — 99214 OFFICE O/P EST MOD 30 MIN: CPT | Performed by: SURGERY

## 2018-09-11 PROCEDURE — 3017F COLORECTAL CA SCREEN DOC REV: CPT | Performed by: SURGERY

## 2018-09-11 PROCEDURE — G8417 CALC BMI ABV UP PARAM F/U: HCPCS | Performed by: SURGERY

## 2018-09-11 PROCEDURE — 1111F DSCHRG MED/CURRENT MED MERGE: CPT | Performed by: SURGERY

## 2018-09-11 PROCEDURE — G8427 DOCREV CUR MEDS BY ELIG CLIN: HCPCS | Performed by: SURGERY

## 2018-09-11 PROCEDURE — 1036F TOBACCO NON-USER: CPT | Performed by: SURGERY

## 2018-09-11 RX ORDER — OMEPRAZOLE 20 MG/1
20 CAPSULE, DELAYED RELEASE ORAL DAILY
Qty: 30 CAPSULE | Refills: 3 | Status: SHIPPED | OUTPATIENT
Start: 2018-09-11 | End: 2019-11-07 | Stop reason: ALTCHOICE

## 2018-09-11 NOTE — PROGRESS NOTES
activity: Yes     Partners: Female     Other Topics Concern    Not on file     Social History Narrative    No narrative on file          Vitals:    09/11/18 1413 09/11/18 1421   BP: (!) 170/94 (!) 160/98   Weight: 288 lb (130.6 kg)    Height: 6' 1\" (1.854 m)      Body mass index is 38 kg/m². Wt Readings from Last 3 Encounters:   09/11/18 288 lb (130.6 kg)   08/20/18 260 lb (117.9 kg)   04/24/18 214 lb 15.2 oz (97.5 kg)     BP Readings from Last 3 Encounters:   09/11/18 (!) 160/98   08/25/18 (!) 141/99   04/24/18 (!) 197/120          REVIEW OF SYSTEMS:   · All other systems reviewed; please refer to HPI with pertinent positives, all other ROS are negative    PHYSICAL EXAM:    CONSTITUTIONAL:  awake, alert, no apparent distress and moderately obese  ENT:  normocepalic, without obvious abnormality  NECK:  supple, symmetrical, trachea midline   LUNGS:  clear to auscultation  CARDIOVASCULAR:     ABDOMEN:  no scars, hypoactive bowel sounds, soft, non-distended, non-tender, involuntary guarding absent, no masses palpated, no hepatosplenomegally and hernia absent  MUSCULOSKELETAL:  0+ pitting edema lower extremities  NEUROLOGIC:  Mental Status Exam:  Level of Alertness:   awake  Orientation:   person, place, time      DATA:  Radiology Review:    CT OF THE ABDOMEN AND PELVIS WITH CONTRAST 8/22/2018 4:46 pm       TECHNIQUE:   CT of the abdomen and pelvis was performed with the administration of   intravenous contrast. Multiplanar reformatted images are provided for review.    Dose modulation, iterative reconstruction, and/or weight based adjustment of   the mA/kV was utilized to reduce the radiation dose to as low as reasonably   achievable.       COMPARISON:   None.       HISTORY:   ORDERING SYSTEM PROVIDED HISTORY: eval for site/source of SBO   TECHNOLOGIST PROVIDED HISTORY:   With oral and IV contrast - give oral contrast thru NGT and clamp NGT until   scan done   Ordering Physician Provided Reason for Exam: follow up

## 2018-09-16 ENCOUNTER — APPOINTMENT (OUTPATIENT)
Dept: CT IMAGING | Age: 63
DRG: 358 | End: 2018-09-16
Payer: COMMERCIAL

## 2018-09-16 ENCOUNTER — HOSPITAL ENCOUNTER (INPATIENT)
Age: 63
LOS: 4 days | Discharge: HOME | DRG: 358 | End: 2018-09-21
Attending: EMERGENCY MEDICINE | Admitting: SURGERY
Payer: COMMERCIAL

## 2018-09-16 DIAGNOSIS — K56.609 SMALL BOWEL OBSTRUCTION (HCC): Primary | ICD-10-CM

## 2018-09-16 LAB
A/G RATIO: 1.5 (ref 1.1–2.2)
ALBUMIN SERPL-MCNC: 4.5 G/DL (ref 3.4–5)
ALP BLD-CCNC: 76 U/L (ref 40–129)
ALT SERPL-CCNC: 31 U/L (ref 10–40)
ANION GAP SERPL CALCULATED.3IONS-SCNC: 15 MMOL/L (ref 3–16)
AST SERPL-CCNC: 34 U/L (ref 15–37)
BASOPHILS ABSOLUTE: 0.1 K/UL (ref 0–0.2)
BASOPHILS RELATIVE PERCENT: 0.8 %
BILIRUB SERPL-MCNC: 1 MG/DL (ref 0–1)
BUN BLDV-MCNC: 16 MG/DL (ref 7–20)
CALCIUM SERPL-MCNC: 9.7 MG/DL (ref 8.3–10.6)
CHLORIDE BLD-SCNC: 103 MMOL/L (ref 99–110)
CO2: 21 MMOL/L (ref 21–32)
CREAT SERPL-MCNC: 0.9 MG/DL (ref 0.8–1.3)
EOSINOPHILS ABSOLUTE: 0.1 K/UL (ref 0–0.6)
EOSINOPHILS RELATIVE PERCENT: 1.3 %
GFR AFRICAN AMERICAN: >60
GFR NON-AFRICAN AMERICAN: >60
GLOBULIN: 3 G/DL
GLUCOSE BLD-MCNC: 110 MG/DL (ref 70–99)
HCT VFR BLD CALC: 48.6 % (ref 40.5–52.5)
HEMOGLOBIN: 16.8 G/DL (ref 13.5–17.5)
LACTIC ACID: 0.9 MMOL/L (ref 0.4–2)
LIPASE: 26 U/L (ref 13–60)
LYMPHOCYTES ABSOLUTE: 1.2 K/UL (ref 1–5.1)
LYMPHOCYTES RELATIVE PERCENT: 11.4 %
MCH RBC QN AUTO: 30.5 PG (ref 26–34)
MCHC RBC AUTO-ENTMCNC: 34.5 G/DL (ref 31–36)
MCV RBC AUTO: 88.3 FL (ref 80–100)
MONOCYTES ABSOLUTE: 0.6 K/UL (ref 0–1.3)
MONOCYTES RELATIVE PERCENT: 6.1 %
NEUTROPHILS ABSOLUTE: 8.1 K/UL (ref 1.7–7.7)
NEUTROPHILS RELATIVE PERCENT: 80.4 %
PDW BLD-RTO: 13 % (ref 12.4–15.4)
PLATELET # BLD: 224 K/UL (ref 135–450)
PMV BLD AUTO: 9.4 FL (ref 5–10.5)
POTASSIUM SERPL-SCNC: 4.8 MMOL/L (ref 3.5–5.1)
RBC # BLD: 5.5 M/UL (ref 4.2–5.9)
SODIUM BLD-SCNC: 139 MMOL/L (ref 136–145)
TOTAL PROTEIN: 7.5 G/DL (ref 6.4–8.2)
WBC # BLD: 10.1 K/UL (ref 4–11)

## 2018-09-16 PROCEDURE — 96374 THER/PROPH/DIAG INJ IV PUSH: CPT

## 2018-09-16 PROCEDURE — 2580000003 HC RX 258: Performed by: NURSE PRACTITIONER

## 2018-09-16 PROCEDURE — 83605 ASSAY OF LACTIC ACID: CPT

## 2018-09-16 PROCEDURE — 36415 COLL VENOUS BLD VENIPUNCTURE: CPT

## 2018-09-16 PROCEDURE — 74177 CT ABD & PELVIS W/CONTRAST: CPT

## 2018-09-16 PROCEDURE — 80053 COMPREHEN METABOLIC PANEL: CPT

## 2018-09-16 PROCEDURE — 80074 ACUTE HEPATITIS PANEL: CPT

## 2018-09-16 PROCEDURE — 93005 ELECTROCARDIOGRAM TRACING: CPT | Performed by: EMERGENCY MEDICINE

## 2018-09-16 PROCEDURE — 6360000002 HC RX W HCPCS: Performed by: NURSE PRACTITIONER

## 2018-09-16 PROCEDURE — 96375 TX/PRO/DX INJ NEW DRUG ADDON: CPT

## 2018-09-16 PROCEDURE — 83690 ASSAY OF LIPASE: CPT

## 2018-09-16 PROCEDURE — 85025 COMPLETE CBC W/AUTO DIFF WBC: CPT

## 2018-09-16 PROCEDURE — 6360000004 HC RX CONTRAST MEDICATION: Performed by: EMERGENCY MEDICINE

## 2018-09-16 PROCEDURE — 96361 HYDRATE IV INFUSION ADD-ON: CPT

## 2018-09-16 PROCEDURE — 99285 EMERGENCY DEPT VISIT HI MDM: CPT

## 2018-09-16 RX ORDER — 0.9 % SODIUM CHLORIDE 0.9 %
1000 INTRAVENOUS SOLUTION INTRAVENOUS ONCE
Status: COMPLETED | OUTPATIENT
Start: 2018-09-16 | End: 2018-09-16

## 2018-09-16 RX ORDER — ONDANSETRON 2 MG/ML
4 INJECTION INTRAMUSCULAR; INTRAVENOUS ONCE
Status: COMPLETED | OUTPATIENT
Start: 2018-09-16 | End: 2018-09-16

## 2018-09-16 RX ORDER — MORPHINE SULFATE 4 MG/ML
4 INJECTION, SOLUTION INTRAMUSCULAR; INTRAVENOUS ONCE
Status: COMPLETED | OUTPATIENT
Start: 2018-09-16 | End: 2018-09-16

## 2018-09-16 RX ADMIN — SODIUM CHLORIDE 1000 ML: 9 INJECTION, SOLUTION INTRAVENOUS at 22:51

## 2018-09-16 RX ADMIN — MORPHINE SULFATE 4 MG: 4 INJECTION, SOLUTION INTRAMUSCULAR; INTRAVENOUS at 22:51

## 2018-09-16 RX ADMIN — ONDANSETRON 4 MG: 2 INJECTION INTRAMUSCULAR; INTRAVENOUS at 22:51

## 2018-09-16 RX ADMIN — IOPAMIDOL 75 ML: 755 INJECTION, SOLUTION INTRAVENOUS at 23:41

## 2018-09-16 ASSESSMENT — PAIN DESCRIPTION - LOCATION: LOCATION: ABDOMEN

## 2018-09-16 ASSESSMENT — PAIN SCALES - GENERAL
PAINLEVEL_OUTOF10: 7
PAINLEVEL_OUTOF10: 7
PAINLEVEL_OUTOF10: 3

## 2018-09-16 ASSESSMENT — PAIN DESCRIPTION - PAIN TYPE: TYPE: ACUTE PAIN

## 2018-09-16 ASSESSMENT — PAIN DESCRIPTION - ORIENTATION: ORIENTATION: MID

## 2018-09-17 ENCOUNTER — APPOINTMENT (OUTPATIENT)
Dept: GENERAL RADIOLOGY | Age: 63
DRG: 358 | End: 2018-09-17
Payer: COMMERCIAL

## 2018-09-17 PROBLEM — K56.609 SBO (SMALL BOWEL OBSTRUCTION) (HCC): Status: ACTIVE | Noted: 2018-09-17

## 2018-09-17 PROCEDURE — 6360000002 HC RX W HCPCS: Performed by: NURSE PRACTITIONER

## 2018-09-17 PROCEDURE — 6370000000 HC RX 637 (ALT 250 FOR IP)

## 2018-09-17 PROCEDURE — 93010 ELECTROCARDIOGRAM REPORT: CPT | Performed by: INTERNAL MEDICINE

## 2018-09-17 PROCEDURE — 2500000003 HC RX 250 WO HCPCS: Performed by: SURGERY

## 2018-09-17 PROCEDURE — 74018 RADEX ABDOMEN 1 VIEW: CPT

## 2018-09-17 PROCEDURE — 6360000002 HC RX W HCPCS: Performed by: SURGERY

## 2018-09-17 PROCEDURE — 1200000000 HC SEMI PRIVATE

## 2018-09-17 PROCEDURE — 96376 TX/PRO/DX INJ SAME DRUG ADON: CPT

## 2018-09-17 PROCEDURE — 99223 1ST HOSP IP/OBS HIGH 75: CPT | Performed by: SURGERY

## 2018-09-17 PROCEDURE — 2580000003 HC RX 258: Performed by: SURGERY

## 2018-09-17 PROCEDURE — S0028 INJECTION, FAMOTIDINE, 20 MG: HCPCS | Performed by: SURGERY

## 2018-09-17 PROCEDURE — 6370000000 HC RX 637 (ALT 250 FOR IP): Performed by: SURGERY

## 2018-09-17 RX ORDER — ACETAMINOPHEN 325 MG/1
650 TABLET ORAL EVERY 4 HOURS PRN
Status: DISCONTINUED | OUTPATIENT
Start: 2018-09-17 | End: 2018-09-21 | Stop reason: HOSPADM

## 2018-09-17 RX ORDER — MORPHINE SULFATE 4 MG/ML
4 INJECTION, SOLUTION INTRAMUSCULAR; INTRAVENOUS ONCE
Status: COMPLETED | OUTPATIENT
Start: 2018-09-17 | End: 2018-09-17

## 2018-09-17 RX ORDER — SODIUM CHLORIDE 0.9 % (FLUSH) 0.9 %
10 SYRINGE (ML) INJECTION EVERY 12 HOURS SCHEDULED
Status: DISCONTINUED | OUTPATIENT
Start: 2018-09-17 | End: 2018-09-21 | Stop reason: HOSPADM

## 2018-09-17 RX ORDER — SODIUM CHLORIDE 9 MG/ML
INJECTION, SOLUTION INTRAVENOUS CONTINUOUS
Status: DISCONTINUED | OUTPATIENT
Start: 2018-09-17 | End: 2018-09-20

## 2018-09-17 RX ORDER — MORPHINE SULFATE 4 MG/ML
2 INJECTION, SOLUTION INTRAMUSCULAR; INTRAVENOUS
Status: DISCONTINUED | OUTPATIENT
Start: 2018-09-17 | End: 2018-09-21 | Stop reason: HOSPADM

## 2018-09-17 RX ORDER — HYDROMORPHONE HCL 110MG/55ML
1 PATIENT CONTROLLED ANALGESIA SYRINGE INTRAVENOUS
Status: DISCONTINUED | OUTPATIENT
Start: 2018-09-17 | End: 2018-09-17

## 2018-09-17 RX ORDER — SODIUM CHLORIDE 0.9 % (FLUSH) 0.9 %
10 SYRINGE (ML) INJECTION PRN
Status: DISCONTINUED | OUTPATIENT
Start: 2018-09-17 | End: 2018-09-21 | Stop reason: HOSPADM

## 2018-09-17 RX ORDER — LABETALOL 100 MG/1
100 TABLET, FILM COATED ORAL 2 TIMES DAILY
Status: DISCONTINUED | OUTPATIENT
Start: 2018-09-17 | End: 2018-09-21 | Stop reason: HOSPADM

## 2018-09-17 RX ORDER — ONDANSETRON 4 MG/1
4 TABLET, FILM COATED ORAL EVERY 8 HOURS PRN
Qty: 12 TABLET | Refills: 0 | Status: SHIPPED | OUTPATIENT
Start: 2018-09-17 | End: 2018-09-17 | Stop reason: CLARIF

## 2018-09-17 RX ORDER — MORPHINE SULFATE 4 MG/ML
4 INJECTION, SOLUTION INTRAMUSCULAR; INTRAVENOUS
Status: DISCONTINUED | OUTPATIENT
Start: 2018-09-17 | End: 2018-09-21 | Stop reason: HOSPADM

## 2018-09-17 RX ORDER — ONDANSETRON 2 MG/ML
4 INJECTION INTRAMUSCULAR; INTRAVENOUS EVERY 6 HOURS PRN
Status: DISCONTINUED | OUTPATIENT
Start: 2018-09-17 | End: 2018-09-21 | Stop reason: HOSPADM

## 2018-09-17 RX ADMIN — FAMOTIDINE 20 MG: 10 INJECTION, SOLUTION INTRAVENOUS at 03:34

## 2018-09-17 RX ADMIN — SODIUM CHLORIDE: 9 INJECTION, SOLUTION INTRAVENOUS at 03:34

## 2018-09-17 RX ADMIN — LIDOCAINE HYDROCHLORIDE: 20 JELLY TOPICAL at 01:05

## 2018-09-17 RX ADMIN — FAMOTIDINE 20 MG: 10 INJECTION, SOLUTION INTRAVENOUS at 21:00

## 2018-09-17 RX ADMIN — MORPHINE SULFATE 2 MG: 4 INJECTION INTRAVENOUS at 16:48

## 2018-09-17 RX ADMIN — HYDROMORPHONE HYDROCHLORIDE 1 MG: 2 INJECTION, SOLUTION INTRAMUSCULAR; INTRAVENOUS; SUBCUTANEOUS at 07:59

## 2018-09-17 RX ADMIN — HYDROMORPHONE HYDROCHLORIDE 1 MG: 2 INJECTION, SOLUTION INTRAMUSCULAR; INTRAVENOUS; SUBCUTANEOUS at 11:34

## 2018-09-17 RX ADMIN — SODIUM CHLORIDE: 9 INJECTION, SOLUTION INTRAVENOUS at 21:01

## 2018-09-17 RX ADMIN — LABETALOL HYDROCHLORIDE 100 MG: 100 TABLET, FILM COATED ORAL at 21:00

## 2018-09-17 RX ADMIN — SODIUM CHLORIDE: 9 INJECTION, SOLUTION INTRAVENOUS at 13:04

## 2018-09-17 RX ADMIN — LABETALOL HYDROCHLORIDE 100 MG: 100 TABLET, FILM COATED ORAL at 09:57

## 2018-09-17 RX ADMIN — FAMOTIDINE 20 MG: 10 INJECTION, SOLUTION INTRAVENOUS at 09:57

## 2018-09-17 RX ADMIN — Medication 10 ML: at 21:02

## 2018-09-17 RX ADMIN — ONDANSETRON 4 MG: 2 INJECTION INTRAMUSCULAR; INTRAVENOUS at 17:00

## 2018-09-17 RX ADMIN — MORPHINE SULFATE 4 MG: 4 INJECTION, SOLUTION INTRAMUSCULAR; INTRAVENOUS at 01:25

## 2018-09-17 ASSESSMENT — PAIN SCALES - GENERAL
PAINLEVEL_OUTOF10: 6
PAINLEVEL_OUTOF10: 3
PAINLEVEL_OUTOF10: 6
PAINLEVEL_OUTOF10: 6
PAINLEVEL_OUTOF10: 5

## 2018-09-17 ASSESSMENT — PAIN DESCRIPTION - PAIN TYPE: TYPE: ACUTE PAIN

## 2018-09-17 ASSESSMENT — PAIN DESCRIPTION - FREQUENCY: FREQUENCY: CONTINUOUS

## 2018-09-17 ASSESSMENT — PAIN DESCRIPTION - PROGRESSION: CLINICAL_PROGRESSION: GRADUALLY WORSENING

## 2018-09-17 ASSESSMENT — PAIN DESCRIPTION - ONSET: ONSET: GRADUAL

## 2018-09-17 ASSESSMENT — PAIN DESCRIPTION - DESCRIPTORS: DESCRIPTORS: SHARP

## 2018-09-17 ASSESSMENT — PAIN DESCRIPTION - ORIENTATION: ORIENTATION: MID

## 2018-09-17 ASSESSMENT — PAIN DESCRIPTION - LOCATION: LOCATION: ABDOMEN

## 2018-09-17 NOTE — PROGRESS NOTES
MD PAGE via [employer]: Pt here with SBO. NGT in place - NPO. May pt have IV pain medication ? Thank you!

## 2018-09-17 NOTE — ED PROVIDER NOTES
[facility] Emergency Department    CHIEF COMPLAINT  Abdominal Pain (hx of bowel obstruction. States pain is the same. )      HISTORY OF PRESENT ILLNESS  Елена Leonard is a 61 y.o. male who presents to the ED complaining of mid lower abdominal pain worsening tonight while at work. Patient reports that he broke out into a sweat with severe pain. Patient reports nausea and vomiting. Patient denies any diarrhea. No dizziness or lightheadedness. No chest pain or shortness of breath. No numbness or tingling in extremities. No unilateral weakness. No known fever or chills. No dysuria, hematuria, urinary urgency, frequency, retention. Patient reports that he was recently diagnosed with a bowel obstruction on 8/20/18 and was admitted. Patient reports follow-up with Dr. Calin Rodriguez last week. Patient reports that pain has not completely went away since being diagnosed with bowel obstruction or pain increased tonight. No other complaints, modifying factors or associated symptoms. Nursing notes reviewed. Past Medical History:   Diagnosis Date    Hypertension     SBO (small bowel obstruction) (HCC)      Past Surgical History:   Procedure Laterality Date    CHOLECYSTECTOMY      HIP SURGERY      PROSTATE SURGERY       Family History   Problem Relation Age of Onset    Heart Disease Mother     Seizures Mother     High Blood Pressure Father     Kidney Disease Father     Cancer Father         colon,bone     Social History     Social History    Marital status:      Spouse name: N/A    Number of children: N/A    Years of education: N/A     Occupational History    Not on file.      Social History Main Topics    Smoking status: Former Smoker     Packs/day: 0.50     Years: 5.00     Types: Cigarettes    Smokeless tobacco: Never Used    Alcohol use No    Drug use: No    Sexual activity: Yes     Partners: Female     Other Topics Concern    Not on file     Social History Narrative    No narrative on file     No current facility-administered medications for this encounter. Current Outpatient Prescriptions   Medication Sig Dispense Refill    omeprazole (PRILOSEC) 20 MG delayed release capsule Take 1 capsule by mouth daily 30 capsule 3    labetalol (NORMODYNE) 100 MG tablet TAKE 1 TABLET BY MOUTH TWICE A DAY  5     No Known Allergies    REVIEW OF SYSTEMS  10 systems reviewed, pertinent positives per HPI otherwise noted to be negative    PHYSICAL EXAM  BP (!) 156/109   Pulse 102   Temp 98.1 °F (36.7 °C) (Oral)   Resp 18   Ht 6' (1.829 m)   Wt 260 lb (117.9 kg)   SpO2 94%   BMI 35.26 kg/m²   GENERAL APPEARANCE: Awake and alert. Cooperative. No acute distress. Non Toxic in appearance. HEAD: Normocephalic. Atraumatic. EYES: PERRL. EOM's grossly intact. ENT: Mucous membranes are pink and moist. Nares unobstructed, TMs intact clear bilaterally. Uvula midline. NECK: Supple. No midline bony tenderness. No step-off or crepitus. HEART: RRR. No murmurs. No chest wall tenderness. LUNGS: Respirations unlabored. CTAB. Good air exchange. Speaking comfortably in full sentences. No wheezes, rhonchi, rales or crackles. ABDOMEN: Soft. Non-distended. Mid abdominal tenderness. No guarding or rebound. Negative rigidity. No midline pulsatile masses. Positive bowel sounds present ×4 quadrants. No masses. No organomegaly. EXTREMITIES: No peripheral edema. Moves all extremities equally. All extremities neurovascularly intact. Brisk cap Refill. Pulses palpable   SKIN: Warm and dry. No acute rashes, lesions or ecchymosis. NEUROLOGICAL: Alert and oriented. CN's 2-12 intact. No gross facial drooping. Strength 5/5, sensation intact. No focal/motor deficits. PSYCHIATRIC: Normal mood and affect.     RADIOLOGY  Ct Abdomen Pelvis W Iv Contrast Additional Contrast? None    Result Date: 9/16/2018  EXAMINATION: CT OF THE ABDOMEN AND PELVIS WITH CONTRAST 9/16/2018 11:41 pm TECHNIQUE: CT of the abdomen and pelvis was performed with the administration of intravenous contrast. Multiplanar reformatted images are provided for review. Dose modulation, iterative reconstruction, and/or weight based adjustment of the mA/kV was utilized to reduce the radiation dose to as low as reasonably achievable. COMPARISON: 08/22/2018. HISTORY: ORDERING SYSTEM PROVIDED HISTORY: pain, r/o obstruction TECHNOLOGIST PROVIDED HISTORY: Additional Contrast?->None Ordering Physician Provided Reason for Exam: GENERALIZED ABDOMINAL PAIN--WAS SEEN A COUPLE OF WEEKS AGO FOR SAME-WAS ADMITTED BUT NOT DX WITH ANYTHING Acuity: Acute Type of Exam: Initial Relevant Medical/Surgical History: personal hx of HTN; PROSTATE SX; CHOLECYSTECTOMY; SBO; FINDINGS: Lower Chest: There is minimal bibasilar segmental atelectasis versus scar. Organs: Postsurgical changes of cholecystectomy are present. The liver, pancreas, spleen, kidneys and adrenals are unremarkable aside from stable chronic left UPJ obstruction. Danitza Mason GI/Bowel: There are multiple mildly dilated loops of proximal small bowel with transition to decompressed bowel of the right lower quadrant. There is no evidence of bowel wall thickening or pneumatosis. The colon is decompressed. Pelvis: The bladder and prostate are unremarkable. Peritoneum/Retroperitoneum: A trace amount of free fluid is noted within the dependent pelvis. There is no free air or adenopathy. Bones/Soft Tissues: There is no fracture or aggressive osseous lesion. High-grade mid small bowel obstruction. ED COURSE   I have evaluated this patient in collaboration with      Vital signs stable. Patient received morphine, Zofran, normal saline bolus for pain/nausea, with good relief. CBC revealed no leukocytosis. CMP unremarkable. Lipase normal at 26. Lactic acid 0.9.  CT abdomen and pelvis performed which revealed High-grade mild small bowel obstruction per radiologist. Consult was performed per myself with general surgeon  Mai Tracy. We thoroughly discussed the history, physical exam, laboratory and imaging studies, as well as, emergency department course. Based upon that discussion, we've decided to admit Lauryn Sumner for further observation and evaluation of Rama Murrieta's abdominal pain. As I have deemed necessary from their history, physical and studies, I have considered and evaluated Lauryn Sumner for the following diagnoses:     FINAL IMPRESSION  1. Small bowel obstruction (HCC)        Vitals:  Blood pressure (!) 151/85, pulse 96, temperature 98.1 °F (36.7 °C), temperature source Oral, resp. rate 14, height 6' (1.829 m), weight 260 lb (117.9 kg), SpO2 95 %. DISPOSITION  Patient was admitted in stable condition.           Ulysses Pugh, LEANN - CNP  09/17/18 9254

## 2018-09-17 NOTE — CARE COORDINATION
Case Management Assessment  Initial Evaluation    Date/Time of Evaluation: 9/17/2018 2:12 PM  Assessment Completed by: Kurtis Osuna    Patient Name: Burak Ospina  YOB: 1955  Diagnosis: SBO (small bowel obstruction) ([facility] [J54.644]  SBO (small bowel obstruction) ([facility] [H68.929]  Date / Time: 9/16/2018  9:58 PM  Admission status/Date: Inpatient 9/17/2018  Chart Reviewed: No      Patient Interviewed: Yes   Family Interviewed:  Yes - spouse      Hospitalization in the last 30 days:  No    Contacts  :   Jacob Le  Relationship to Patient: spouse  Phone Number:   [phone]  Alternate Contact:     Relationship to Patient:     Phone Number:      Current PCP- N. Jerlean Riedel, MD    [facility]    ADLS  Support Systems: Family Members, Children, Spouse/Significant Other  Transportation: self    Meal Preparation: self    Housing  Home Environment: Lives w/ spouse  Steps: NA  Plans to Return to Present Housing: Yes  Other Identified Issues: NA    Home Care Information  Currently active with [address] : No  Type of Home Care Services: None  Passport/Waiver : No  Passport/Waiver Services: NA    Durable Medical Equipment   DME Provider: YAHAIRA  Equipment: NA    Has Home O2 in place on admit:  No  Informed of need to bring portable home O2 tank on day of discharge for nursing to connect prior to leaving:   Not Indicated  Verbalized agreement/Understanding:   Not Indicated    Community Service Affiliation  Dialysis:  No  Outpatient PT/OT: No    [facility]: No     CHF Clinic: No     Pulmonary Rehab: No  Pain Clinic: No  Community Mental Health: No    Wound Clinic: No     Other: NA    DISCHARGE PLAN: Chart reviewed. Met with pt and spouse at bedside and explained the role of the CM. IPTA. Denies any HC or DME needs.  Will return home with spouse

## 2018-09-17 NOTE — PROGRESS NOTES
Arrival to room 206-1 safely and in stable condition. VSS - afebrile. Pt is alert and oriented x 4 with no history of falls. Assessment completed as charted. Bed is in lowest position with 2/4 bed rails raised, wheels locked and call light within reach - patient wearing non-skid socks and verbalizes understanding to call out for assistance. No further requests at this time. Will continue to monitor.      Vitals:    09/17/18 0144   BP: (!) 165/88   Pulse: 95   Resp: 18   Temp: 98.3 °F (36.8 °C)   SpO2: 94%

## 2018-09-18 ENCOUNTER — ANESTHESIA EVENT (OUTPATIENT)
Dept: OPERATING ROOM | Age: 63
DRG: 358 | End: 2018-09-18
Payer: COMMERCIAL

## 2018-09-18 ENCOUNTER — ANESTHESIA (OUTPATIENT)
Dept: OPERATING ROOM | Age: 63
DRG: 358 | End: 2018-09-18
Payer: COMMERCIAL

## 2018-09-18 VITALS — DIASTOLIC BLOOD PRESSURE: 67 MMHG | OXYGEN SATURATION: 91 % | SYSTOLIC BLOOD PRESSURE: 125 MMHG | TEMPERATURE: 98.6 F

## 2018-09-18 LAB
ANION GAP SERPL CALCULATED.3IONS-SCNC: 13 MMOL/L (ref 3–16)
BASOPHILS ABSOLUTE: 0 K/UL (ref 0–0.2)
BASOPHILS RELATIVE PERCENT: 0.3 %
BUN BLDV-MCNC: 11 MG/DL (ref 7–20)
CALCIUM SERPL-MCNC: 8.4 MG/DL (ref 8.3–10.6)
CHLORIDE BLD-SCNC: 106 MMOL/L (ref 99–110)
CO2: 23 MMOL/L (ref 21–32)
CREAT SERPL-MCNC: 0.6 MG/DL (ref 0.8–1.3)
EOSINOPHILS ABSOLUTE: 0.2 K/UL (ref 0–0.6)
EOSINOPHILS RELATIVE PERCENT: 2.9 %
GFR AFRICAN AMERICAN: >60
GFR NON-AFRICAN AMERICAN: >60
GLUCOSE BLD-MCNC: 74 MG/DL (ref 70–99)
HCT VFR BLD CALC: 43 % (ref 40.5–52.5)
HEMOGLOBIN: 14.8 G/DL (ref 13.5–17.5)
LYMPHOCYTES ABSOLUTE: 1 K/UL (ref 1–5.1)
LYMPHOCYTES RELATIVE PERCENT: 13.2 %
MCH RBC QN AUTO: 31 PG (ref 26–34)
MCHC RBC AUTO-ENTMCNC: 34.4 G/DL (ref 31–36)
MCV RBC AUTO: 90.2 FL (ref 80–100)
MONOCYTES ABSOLUTE: 0.5 K/UL (ref 0–1.3)
MONOCYTES RELATIVE PERCENT: 7 %
NEUTROPHILS ABSOLUTE: 5.8 K/UL (ref 1.7–7.7)
NEUTROPHILS RELATIVE PERCENT: 76.6 %
PDW BLD-RTO: 13.3 % (ref 12.4–15.4)
PLATELET # BLD: 181 K/UL (ref 135–450)
PMV BLD AUTO: 9.9 FL (ref 5–10.5)
POTASSIUM REFLEX MAGNESIUM: 3.8 MMOL/L (ref 3.5–5.1)
RBC # BLD: 4.77 M/UL (ref 4.2–5.9)
SODIUM BLD-SCNC: 142 MMOL/L (ref 136–145)
WBC # BLD: 7.5 K/UL (ref 4–11)

## 2018-09-18 PROCEDURE — 2500000003 HC RX 250 WO HCPCS: Performed by: SURGERY

## 2018-09-18 PROCEDURE — 6370000000 HC RX 637 (ALT 250 FOR IP): Performed by: SURGERY

## 2018-09-18 PROCEDURE — S0028 INJECTION, FAMOTIDINE, 20 MG: HCPCS | Performed by: SURGERY

## 2018-09-18 PROCEDURE — 2580000003 HC RX 258: Performed by: SURGERY

## 2018-09-18 PROCEDURE — 7100000001 HC PACU RECOVERY - ADDTL 15 MIN: Performed by: SURGERY

## 2018-09-18 PROCEDURE — 87046 STOOL CULTR AEROBIC BACT EA: CPT

## 2018-09-18 PROCEDURE — 2500000003 HC RX 250 WO HCPCS: Performed by: NURSE ANESTHETIST, CERTIFIED REGISTERED

## 2018-09-18 PROCEDURE — 99024 POSTOP FOLLOW-UP VISIT: CPT | Performed by: SURGERY

## 2018-09-18 PROCEDURE — 3700000000 HC ANESTHESIA ATTENDED CARE: Performed by: SURGERY

## 2018-09-18 PROCEDURE — 1200000000 HC SEMI PRIVATE

## 2018-09-18 PROCEDURE — 87505 NFCT AGENT DETECTION GI: CPT

## 2018-09-18 PROCEDURE — 6360000002 HC RX W HCPCS: Performed by: SURGERY

## 2018-09-18 PROCEDURE — 36415 COLL VENOUS BLD VENIPUNCTURE: CPT

## 2018-09-18 PROCEDURE — 7100000000 HC PACU RECOVERY - FIRST 15 MIN: Performed by: SURGERY

## 2018-09-18 PROCEDURE — 3700000001 HC ADD 15 MINUTES (ANESTHESIA): Performed by: SURGERY

## 2018-09-18 PROCEDURE — 6360000002 HC RX W HCPCS: Performed by: NURSE ANESTHETIST, CERTIFIED REGISTERED

## 2018-09-18 PROCEDURE — 2500000003 HC RX 250 WO HCPCS: Performed by: ANESTHESIOLOGY

## 2018-09-18 PROCEDURE — 80048 BASIC METABOLIC PNL TOTAL CA: CPT

## 2018-09-18 PROCEDURE — 87086 URINE CULTURE/COLONY COUNT: CPT

## 2018-09-18 PROCEDURE — 2580000003 HC RX 258: Performed by: NURSE ANESTHETIST, CERTIFIED REGISTERED

## 2018-09-18 PROCEDURE — 49320 DIAG LAPARO SEPARATE PROC: CPT | Performed by: SURGERY

## 2018-09-18 PROCEDURE — 2709999900 HC NON-CHARGEABLE SUPPLY: Performed by: SURGERY

## 2018-09-18 PROCEDURE — 0WJP4ZZ INSPECTION OF GASTROINTESTINAL TRACT, PERCUTANEOUS ENDOSCOPIC APPROACH: ICD-10-PCS | Performed by: SURGERY

## 2018-09-18 PROCEDURE — 3600000014 HC SURGERY LEVEL 4 ADDTL 15MIN: Performed by: SURGERY

## 2018-09-18 PROCEDURE — 3600000004 HC SURGERY LEVEL 4 BASE: Performed by: SURGERY

## 2018-09-18 PROCEDURE — 85025 COMPLETE CBC W/AUTO DIFF WBC: CPT

## 2018-09-18 RX ORDER — SODIUM CHLORIDE 9 MG/ML
INJECTION, SOLUTION INTRAVENOUS CONTINUOUS PRN
Status: DISCONTINUED | OUTPATIENT
Start: 2018-09-18 | End: 2018-09-18 | Stop reason: SDUPTHER

## 2018-09-18 RX ORDER — LABETALOL HYDROCHLORIDE 5 MG/ML
5 INJECTION, SOLUTION INTRAVENOUS
Status: DISCONTINUED | OUTPATIENT
Start: 2018-09-18 | End: 2018-09-18 | Stop reason: HOSPADM

## 2018-09-18 RX ORDER — ONDANSETRON 2 MG/ML
INJECTION INTRAMUSCULAR; INTRAVENOUS PRN
Status: DISCONTINUED | OUTPATIENT
Start: 2018-09-18 | End: 2018-09-18 | Stop reason: SDUPTHER

## 2018-09-18 RX ORDER — BUPIVACAINE HYDROCHLORIDE AND EPINEPHRINE 5; 5 MG/ML; UG/ML
INJECTION, SOLUTION PERINEURAL PRN
Status: DISCONTINUED | OUTPATIENT
Start: 2018-09-18 | End: 2018-09-18 | Stop reason: HOSPADM

## 2018-09-18 RX ORDER — PROPOFOL 10 MG/ML
INJECTION, EMULSION INTRAVENOUS PRN
Status: DISCONTINUED | OUTPATIENT
Start: 2018-09-18 | End: 2018-09-18 | Stop reason: SDUPTHER

## 2018-09-18 RX ORDER — ONDANSETRON 2 MG/ML
4 INJECTION INTRAMUSCULAR; INTRAVENOUS EVERY 30 MIN PRN
Status: DISCONTINUED | OUTPATIENT
Start: 2018-09-18 | End: 2018-09-18 | Stop reason: HOSPADM

## 2018-09-18 RX ORDER — LIDOCAINE HYDROCHLORIDE 20 MG/ML
INJECTION, SOLUTION INFILTRATION; PERINEURAL PRN
Status: DISCONTINUED | OUTPATIENT
Start: 2018-09-18 | End: 2018-09-18 | Stop reason: SDUPTHER

## 2018-09-18 RX ORDER — VECURONIUM BROMIDE 1 MG/ML
INJECTION, POWDER, LYOPHILIZED, FOR SOLUTION INTRAVENOUS PRN
Status: DISCONTINUED | OUTPATIENT
Start: 2018-09-18 | End: 2018-09-18 | Stop reason: SDUPTHER

## 2018-09-18 RX ORDER — SUCCINYLCHOLINE/SOD CL,ISO/PF 100 MG/5ML
SYRINGE (ML) INTRAVENOUS PRN
Status: DISCONTINUED | OUTPATIENT
Start: 2018-09-18 | End: 2018-09-18 | Stop reason: SDUPTHER

## 2018-09-18 RX ORDER — DIPHENHYDRAMINE HYDROCHLORIDE 50 MG/ML
6.25 INJECTION INTRAMUSCULAR; INTRAVENOUS
Status: DISCONTINUED | OUTPATIENT
Start: 2018-09-18 | End: 2018-09-18 | Stop reason: HOSPADM

## 2018-09-18 RX ORDER — MEPERIDINE HYDROCHLORIDE 50 MG/ML
12.5 INJECTION INTRAMUSCULAR; INTRAVENOUS; SUBCUTANEOUS EVERY 5 MIN PRN
Status: DISCONTINUED | OUTPATIENT
Start: 2018-09-18 | End: 2018-09-18 | Stop reason: HOSPADM

## 2018-09-18 RX ORDER — OXYCODONE HYDROCHLORIDE AND ACETAMINOPHEN 5; 325 MG/1; MG/1
1 TABLET ORAL PRN
Status: DISCONTINUED | OUTPATIENT
Start: 2018-09-18 | End: 2018-09-18 | Stop reason: HOSPADM

## 2018-09-18 RX ORDER — DEXAMETHASONE SODIUM PHOSPHATE 4 MG/ML
INJECTION, SOLUTION INTRA-ARTICULAR; INTRALESIONAL; INTRAMUSCULAR; INTRAVENOUS; SOFT TISSUE PRN
Status: DISCONTINUED | OUTPATIENT
Start: 2018-09-18 | End: 2018-09-18 | Stop reason: SDUPTHER

## 2018-09-18 RX ORDER — ROCURONIUM BROMIDE 10 MG/ML
INJECTION, SOLUTION INTRAVENOUS PRN
Status: DISCONTINUED | OUTPATIENT
Start: 2018-09-18 | End: 2018-09-18 | Stop reason: SDUPTHER

## 2018-09-18 RX ORDER — MIDAZOLAM HYDROCHLORIDE 1 MG/ML
INJECTION INTRAMUSCULAR; INTRAVENOUS PRN
Status: DISCONTINUED | OUTPATIENT
Start: 2018-09-18 | End: 2018-09-18 | Stop reason: SDUPTHER

## 2018-09-18 RX ORDER — OXYCODONE HYDROCHLORIDE AND ACETAMINOPHEN 5; 325 MG/1; MG/1
2 TABLET ORAL PRN
Status: DISCONTINUED | OUTPATIENT
Start: 2018-09-18 | End: 2018-09-18 | Stop reason: HOSPADM

## 2018-09-18 RX ORDER — FENTANYL CITRATE 50 UG/ML
INJECTION, SOLUTION INTRAMUSCULAR; INTRAVENOUS PRN
Status: DISCONTINUED | OUTPATIENT
Start: 2018-09-18 | End: 2018-09-18 | Stop reason: SDUPTHER

## 2018-09-18 RX ORDER — HYDRALAZINE HYDROCHLORIDE 20 MG/ML
5 INJECTION INTRAMUSCULAR; INTRAVENOUS EVERY 30 MIN PRN
Status: DISCONTINUED | OUTPATIENT
Start: 2018-09-18 | End: 2018-09-18 | Stop reason: HOSPADM

## 2018-09-18 RX ORDER — CEFAZOLIN SODIUM 1 G/3ML
INJECTION, POWDER, FOR SOLUTION INTRAMUSCULAR; INTRAVENOUS PRN
Status: DISCONTINUED | OUTPATIENT
Start: 2018-09-18 | End: 2018-09-18 | Stop reason: SDUPTHER

## 2018-09-18 RX ADMIN — Medication 140 MG: at 15:19

## 2018-09-18 RX ADMIN — LABETALOL HYDROCHLORIDE 5 MG: 5 INJECTION INTRAVENOUS at 17:12

## 2018-09-18 RX ADMIN — PROPOFOL 200 MG: 10 INJECTION, EMULSION INTRAVENOUS at 15:19

## 2018-09-18 RX ADMIN — VECURONIUM BROMIDE 4 MG: 10 INJECTION, POWDER, LYOPHILIZED, FOR SOLUTION INTRAVENOUS at 15:37

## 2018-09-18 RX ADMIN — FAMOTIDINE 20 MG: 10 INJECTION, SOLUTION INTRAVENOUS at 09:07

## 2018-09-18 RX ADMIN — SODIUM CHLORIDE: 9 INJECTION, SOLUTION INTRAVENOUS at 20:04

## 2018-09-18 RX ADMIN — Medication 10 ML: at 20:26

## 2018-09-18 RX ADMIN — FENTANYL CITRATE 150 MCG: 50 INJECTION INTRAMUSCULAR; INTRAVENOUS at 15:19

## 2018-09-18 RX ADMIN — MIDAZOLAM HYDROCHLORIDE 2 MG: 2 INJECTION, SOLUTION INTRAMUSCULAR; INTRAVENOUS at 15:17

## 2018-09-18 RX ADMIN — CEFAZOLIN 3000 MG: 1 INJECTION, POWDER, FOR SOLUTION INTRAMUSCULAR; INTRAVENOUS at 15:17

## 2018-09-18 RX ADMIN — LABETALOL HYDROCHLORIDE 100 MG: 100 TABLET, FILM COATED ORAL at 20:26

## 2018-09-18 RX ADMIN — SODIUM CHLORIDE: 9 INJECTION, SOLUTION INTRAVENOUS at 15:19

## 2018-09-18 RX ADMIN — LABETALOL HYDROCHLORIDE 100 MG: 100 TABLET, FILM COATED ORAL at 09:07

## 2018-09-18 RX ADMIN — ROCURONIUM BROMIDE 5 MG: 10 SOLUTION INTRAVENOUS at 15:19

## 2018-09-18 RX ADMIN — SODIUM CHLORIDE: 9 INJECTION, SOLUTION INTRAVENOUS at 14:30

## 2018-09-18 RX ADMIN — FAMOTIDINE 20 MG: 10 INJECTION, SOLUTION INTRAVENOUS at 20:26

## 2018-09-18 RX ADMIN — DEXAMETHASONE SODIUM PHOSPHATE 8 MG: 4 INJECTION, SOLUTION INTRAMUSCULAR; INTRAVENOUS at 15:38

## 2018-09-18 RX ADMIN — MORPHINE SULFATE 2 MG: 4 INJECTION INTRAVENOUS at 20:25

## 2018-09-18 RX ADMIN — FENTANYL CITRATE 100 MCG: 50 INJECTION INTRAMUSCULAR; INTRAVENOUS at 15:37

## 2018-09-18 RX ADMIN — VECURONIUM BROMIDE 6 MG: 10 INJECTION, POWDER, LYOPHILIZED, FOR SOLUTION INTRAVENOUS at 15:28

## 2018-09-18 RX ADMIN — ONDANSETRON 4 MG: 2 INJECTION INTRAMUSCULAR; INTRAVENOUS at 15:38

## 2018-09-18 RX ADMIN — SODIUM CHLORIDE: 9 INJECTION, SOLUTION INTRAVENOUS at 05:34

## 2018-09-18 RX ADMIN — LIDOCAINE HYDROCHLORIDE 40 MG: 20 INJECTION, SOLUTION INFILTRATION; PERINEURAL at 15:19

## 2018-09-18 RX ADMIN — SUGAMMADEX 200 MG: 100 INJECTION, SOLUTION INTRAVENOUS at 16:05

## 2018-09-18 ASSESSMENT — PULMONARY FUNCTION TESTS
PIF_VALUE: 26
PIF_VALUE: 1
PIF_VALUE: 23
PIF_VALUE: 27
PIF_VALUE: 21
PIF_VALUE: 21
PIF_VALUE: 18
PIF_VALUE: 29
PIF_VALUE: 21
PIF_VALUE: 26
PIF_VALUE: 23
PIF_VALUE: 18
PIF_VALUE: 20
PIF_VALUE: 18
PIF_VALUE: 26
PIF_VALUE: 19
PIF_VALUE: 19
PIF_VALUE: 21
PIF_VALUE: 20
PIF_VALUE: 20
PIF_VALUE: 1
PIF_VALUE: 20
PIF_VALUE: 18
PIF_VALUE: 1
PIF_VALUE: 15
PIF_VALUE: 1
PIF_VALUE: 27
PIF_VALUE: 2
PIF_VALUE: 30
PIF_VALUE: 30
PIF_VALUE: 26
PIF_VALUE: 18
PIF_VALUE: 20
PIF_VALUE: 26
PIF_VALUE: 18
PIF_VALUE: 30
PIF_VALUE: 20
PIF_VALUE: 1
PIF_VALUE: 26
PIF_VALUE: 26
PIF_VALUE: 19
PIF_VALUE: 1
PIF_VALUE: 26
PIF_VALUE: 20
PIF_VALUE: 25
PIF_VALUE: 21
PIF_VALUE: 2
PIF_VALUE: 1
PIF_VALUE: 27
PIF_VALUE: 26
PIF_VALUE: 1
PIF_VALUE: 19
PIF_VALUE: 1
PIF_VALUE: 27
PIF_VALUE: 19
PIF_VALUE: 1
PIF_VALUE: 26
PIF_VALUE: 20
PIF_VALUE: 18
PIF_VALUE: 18
PIF_VALUE: 28
PIF_VALUE: 21

## 2018-09-18 ASSESSMENT — PAIN SCALES - GENERAL
PAINLEVEL_OUTOF10: 0
PAINLEVEL_OUTOF10: 4
PAINLEVEL_OUTOF10: 0

## 2018-09-18 ASSESSMENT — PAIN DESCRIPTION - PROGRESSION: CLINICAL_PROGRESSION: GRADUALLY WORSENING

## 2018-09-18 NOTE — PROGRESS NOTES
Patient came to [facility],  Vitals stable, BP slightly elevated. Bedside report received from OR nurse and CRNA.

## 2018-09-19 LAB
HAV IGM SER IA-ACNC: NORMAL
HEPATITIS B CORE IGM ANTIBODY: NORMAL
HEPATITIS B SURFACE ANTIGEN INTERPRETATION: NORMAL
HEPATITIS C ANTIBODY INTERPRETATION: NORMAL

## 2018-09-19 PROCEDURE — 1200000000 HC SEMI PRIVATE

## 2018-09-19 PROCEDURE — APPSS30 APP SPLIT SHARED TIME 16-30 MINUTES: Performed by: CLINICAL NURSE SPECIALIST

## 2018-09-19 PROCEDURE — 6370000000 HC RX 637 (ALT 250 FOR IP): Performed by: SURGERY

## 2018-09-19 PROCEDURE — 6360000002 HC RX W HCPCS: Performed by: SURGERY

## 2018-09-19 PROCEDURE — 99024 POSTOP FOLLOW-UP VISIT: CPT | Performed by: SURGERY

## 2018-09-19 PROCEDURE — 2580000003 HC RX 258: Performed by: SURGERY

## 2018-09-19 PROCEDURE — 2500000003 HC RX 250 WO HCPCS: Performed by: SURGERY

## 2018-09-19 PROCEDURE — S0028 INJECTION, FAMOTIDINE, 20 MG: HCPCS | Performed by: SURGERY

## 2018-09-19 RX ADMIN — LABETALOL HYDROCHLORIDE 100 MG: 100 TABLET, FILM COATED ORAL at 21:29

## 2018-09-19 RX ADMIN — SODIUM CHLORIDE: 9 INJECTION, SOLUTION INTRAVENOUS at 11:43

## 2018-09-19 RX ADMIN — FAMOTIDINE 20 MG: 10 INJECTION, SOLUTION INTRAVENOUS at 21:29

## 2018-09-19 RX ADMIN — Medication 10 ML: at 21:30

## 2018-09-19 RX ADMIN — LABETALOL HYDROCHLORIDE 100 MG: 100 TABLET, FILM COATED ORAL at 09:36

## 2018-09-19 RX ADMIN — ENOXAPARIN SODIUM 40 MG: 40 INJECTION SUBCUTANEOUS at 09:35

## 2018-09-19 RX ADMIN — SODIUM CHLORIDE: 9 INJECTION, SOLUTION INTRAVENOUS at 03:58

## 2018-09-19 RX ADMIN — FAMOTIDINE 20 MG: 10 INJECTION, SOLUTION INTRAVENOUS at 09:35

## 2018-09-19 RX ADMIN — SODIUM CHLORIDE: 9 INJECTION, SOLUTION INTRAVENOUS at 22:23

## 2018-09-19 RX ADMIN — Medication 10 ML: at 09:36

## 2018-09-19 ASSESSMENT — PAIN SCALES - GENERAL
PAINLEVEL_OUTOF10: 0
PAINLEVEL_OUTOF10: 0

## 2018-09-19 NOTE — PROGRESS NOTES
related to an infectious or functional problem. Will also consult GI for their opinion.     Raúl William MD

## 2018-09-19 NOTE — PROGRESS NOTES
End of shift report given to JAMAAL Genao at bedside. Patient alert and oriented. Bed in lowest position with wheels locked. Call light within reach. No further needs at this time.

## 2018-09-20 ENCOUNTER — APPOINTMENT (OUTPATIENT)
Dept: MRI IMAGING | Age: 63
DRG: 358 | End: 2018-09-20
Payer: COMMERCIAL

## 2018-09-20 LAB
C-REACTIVE PROTEIN: 20.1 MG/L (ref 0–5.1)
GI BACTERIAL PATHOGENS BY PCR: NORMAL
SEDIMENTATION RATE, ERYTHROCYTE: 11 MM/HR (ref 0–20)
URINE CULTURE, ROUTINE: NORMAL

## 2018-09-20 PROCEDURE — 36415 COLL VENOUS BLD VENIPUNCTURE: CPT

## 2018-09-20 PROCEDURE — 6370000000 HC RX 637 (ALT 250 FOR IP): Performed by: SURGERY

## 2018-09-20 PROCEDURE — S0028 INJECTION, FAMOTIDINE, 20 MG: HCPCS | Performed by: SURGERY

## 2018-09-20 PROCEDURE — 99024 POSTOP FOLLOW-UP VISIT: CPT | Performed by: SURGERY

## 2018-09-20 PROCEDURE — 2500000003 HC RX 250 WO HCPCS: Performed by: SURGERY

## 2018-09-20 PROCEDURE — 2580000003 HC RX 258: Performed by: SURGERY

## 2018-09-20 PROCEDURE — 74183 MRI ABD W/O CNTR FLWD CNTR: CPT

## 2018-09-20 PROCEDURE — 85652 RBC SED RATE AUTOMATED: CPT

## 2018-09-20 PROCEDURE — 1200000000 HC SEMI PRIVATE

## 2018-09-20 PROCEDURE — 6360000004 HC RX CONTRAST MEDICATION: Performed by: SURGERY

## 2018-09-20 PROCEDURE — A9579 GAD-BASE MR CONTRAST NOS,1ML: HCPCS | Performed by: SURGERY

## 2018-09-20 PROCEDURE — 86140 C-REACTIVE PROTEIN: CPT

## 2018-09-20 RX ADMIN — FAMOTIDINE 20 MG: 10 INJECTION, SOLUTION INTRAVENOUS at 11:09

## 2018-09-20 RX ADMIN — FAMOTIDINE 20 MG: 10 INJECTION, SOLUTION INTRAVENOUS at 20:10

## 2018-09-20 RX ADMIN — GADOTERIDOL 25 ML: 279.3 INJECTION, SOLUTION INTRAVENOUS at 10:23

## 2018-09-20 RX ADMIN — Medication 10 ML: at 20:10

## 2018-09-20 RX ADMIN — Medication 10 ML: at 11:08

## 2018-09-20 RX ADMIN — LABETALOL HYDROCHLORIDE 100 MG: 100 TABLET, FILM COATED ORAL at 20:10

## 2018-09-20 RX ADMIN — BARIUM SULFATE 1350 ML: 1 SUSPENSION ORAL at 10:24

## 2018-09-20 RX ADMIN — LABETALOL HYDROCHLORIDE 100 MG: 100 TABLET, FILM COATED ORAL at 11:08

## 2018-09-20 RX ADMIN — SODIUM CHLORIDE: 9 INJECTION, SOLUTION INTRAVENOUS at 06:53

## 2018-09-20 ASSESSMENT — PAIN DESCRIPTION - PAIN TYPE: TYPE: ACUTE PAIN

## 2018-09-20 ASSESSMENT — PAIN DESCRIPTION - PROGRESSION
CLINICAL_PROGRESSION: GRADUALLY WORSENING
CLINICAL_PROGRESSION: GRADUALLY WORSENING

## 2018-09-20 ASSESSMENT — PAIN SCALES - GENERAL
PAINLEVEL_OUTOF10: 0

## 2018-09-20 ASSESSMENT — PAIN DESCRIPTION - LOCATION: LOCATION: ABDOMEN

## 2018-09-20 NOTE — PROGRESS NOTES
of SBO. MRI w/o sx of IBD. Not sure how to explain his relapsing sx of pain and CT findings suggestive of SBO. - trial soft diet   - defer to GI for further input regarding etiology of his symptoms.   C-scope/EGD - with this admission or later as outpatient?   - spinal lesion - will need further w/u as outpatient w/ PCP     [address] Emergency Department

## 2018-09-20 NOTE — PROGRESS NOTES
Heart: Normal rate. Regular rhythm. S1 normal and S2 normal.    Abdomen: Abdomen is soft. Bowel sounds are normal.   There is no abdominal tenderness. Assessment & Plan  62 yo w HTN admitted in 8/2018 for SBO on CT with mid abd pain, N/V, resolved after a few days of NGT suctioning and went home, but came back w similar Sx and CT 9/2018 showed high grade mid SI obstruction. Laparoscopy was non revealing though. 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 His last colonoscopy was reportedly 15 yrs ago.     Plan:   1. Agree w stool tests  2. Needs CRP, ESR, fecal Calprotectin and MR-Enterography to evaluate for possible CD  3. Consider EGD and outpatient colonoscopy  4.  Will follow     Cory Thacker MD       (O) [phone]    Cory Thacker MD  9/20/2018

## 2018-09-20 NOTE — ANESTHESIA PRE PROCEDURE
HGB 14.8 09/18/2018    HCT 43.0 09/18/2018    MCV 90.2 09/18/2018    RDW 13.3 09/18/2018     09/18/2018       CMP:   Lab Results   Component Value Date     09/18/2018    K 3.8 09/18/2018     09/18/2018    CO2 23 09/18/2018    BUN 11 09/18/2018    CREATININE 0.6 09/18/2018    GFRAA >60 09/18/2018    AGRATIO 1.5 09/16/2018    LABGLOM >60 09/18/2018    GLUCOSE 74 09/18/2018    PROT 7.5 09/16/2018    CALCIUM 8.4 09/18/2018    BILITOT 1.0 09/16/2018    ALKPHOS 76 09/16/2018    AST 34 09/16/2018    ALT 31 09/16/2018       POC Tests: No results for input(s): POCGLU, POCNA, POCK, POCCL, POCBUN, POCHEMO, POCHCT in the last 72 hours. Coags: No results found for: PROTIME, INR, APTT    HCG (If Applicable): No results found for: PREGTESTUR, PREGSERUM, HCG, HCGQUANT     ABGs: No results found for: PHART, PO2ART, ONU4PPA, KLK3SRY, BEART, U9RINHKX     Type & Screen (If Applicable):  No results found for: LABABO, LABRH    Anesthesia Evaluation    Airway: Mallampati: III  TM distance: >3 FB   Neck ROM: full  Mouth opening: > = 3 FB Dental:          Pulmonary:                              Cardiovascular:    (+) hypertension:,                   Neuro/Psych:               GI/Hepatic/Renal:             Endo/Other:                     Abdominal:           Vascular:                                        Anesthesia Plan      general     ASA 3     (I discussed with the patient the risks and benefits of PIV, general anesthesia, IV Narcotics, PACU. All questions were answered the patient agrees with the plan.)  Induction: intravenous. Anesthetic plan and risks discussed with patient. Plan discussed with CRNA.                   Janice King MD   9/20/2018

## 2018-09-20 NOTE — ANESTHESIA POSTPROCEDURE EVALUATION
Department of Anesthesiology  Postprocedure Note    Patient: Rupert Mcbride  MRN: [id number]  YOB: 1955  Date of evaluation: 9/20/2018  Time:  10:07 AM     Procedure Summary     Date:  09/18/18 Room / Location:  [facility] 06 / [facility] OR    Anesthesia Start:  1697 Anesthesia Stop:  0927    Procedure:  DIAGNOSTIC LAPAROSCOPY (N/A ) Diagnosis:  (LAPAROSCOPY POSSIBLE LAPAROTOMY)    Surgeon:  Toney Jacob MD Responsible Provider:  Quoc Ferguson MD    Anesthesia Type:  general ASA Status:  3          Anesthesia Type: No value filed. Javid Phase I: Javid Score: 9    Javid Phase II:      Last vitals: Reviewed and per EMR flowsheets.        Anesthesia Post Evaluation    Patient location during evaluation: PACU  Level of consciousness: awake and alert  Airway patency: patent  Nausea & Vomiting: no nausea and no vomiting  Complications: no  Cardiovascular status: blood pressure returned to baseline  Respiratory status: acceptable  Hydration status: euvolemic  Comments: Postoperative Anesthesia Note    Name:    Rupert Mcbride  MRN:      [id number]    Patient Vitals in the past 12 hrs:  09/20/18 0748, BP:(!) 161/88, Temp:98.3 °F (36.8 °C), Temp src:Oral, Pulse:69, Resp:18, SpO2:94 %  09/19/18 2339, BP:(!) 168/90, Temp:98.2 °F (36.8 °C), Temp src:Oral, Pulse:79, Resp:18, SpO2:95 %     LABS:    CBC  Lab Results       Component                Value               Date/Time                  WBC                      7.5                 09/18/2018 08:01 AM        HGB                      14.8                09/18/2018 08:01 AM        HCT                      43.0                09/18/2018 08:01 AM        PLT                      181                 09/18/2018 08:01 AM   RENAL  Lab Results       Component                Value               Date/Time                  NA                       142                 09/18/2018 08:01 AM        K                        3.8                 09/18/2018 08:01 AM        CL                       106                 09/18/2018 08:01 AM        CO2                      23                  09/18/2018 08:01 AM        BUN                      11                  09/18/2018 08:01 AM        CREATININE               0.6 (L)             09/18/2018 08:01 AM        GLUCOSE                  74                  09/18/2018 08:01 AM   COAGS  No results found for: PROTIME, INR, APTT    Intake & Output:  @24HRIO@    Nausea & Vomiting:  No    Level of Consciousness:  Awake    Pain Assessment:  Adequate analgesia    Anesthesia Complications:  No apparent anesthetic complications    SUMMARY      Vital signs stable  OK to discharge from Stage I post anesthesia care.   Care transferred from Anesthesiology department on discharge from perioperative area

## 2018-09-21 VITALS
HEIGHT: 72 IN | SYSTOLIC BLOOD PRESSURE: 163 MMHG | TEMPERATURE: 98.4 F | DIASTOLIC BLOOD PRESSURE: 94 MMHG | BODY MASS INDEX: 38.67 KG/M2 | HEART RATE: 80 BPM | OXYGEN SATURATION: 93 % | WEIGHT: 285.5 LBS | RESPIRATION RATE: 16 BRPM

## 2018-09-21 PROCEDURE — APPNB30 APP NON BILLABLE TIME 0-30 MINS: Performed by: CLINICAL NURSE SPECIALIST

## 2018-09-21 PROCEDURE — 6360000002 HC RX W HCPCS: Performed by: SURGERY

## 2018-09-21 PROCEDURE — 6370000000 HC RX 637 (ALT 250 FOR IP): Performed by: SURGERY

## 2018-09-21 PROCEDURE — 6370000000 HC RX 637 (ALT 250 FOR IP): Performed by: INTERNAL MEDICINE

## 2018-09-21 PROCEDURE — 2500000003 HC RX 250 WO HCPCS: Performed by: SURGERY

## 2018-09-21 PROCEDURE — 2580000003 HC RX 258: Performed by: SURGERY

## 2018-09-21 PROCEDURE — S0028 INJECTION, FAMOTIDINE, 20 MG: HCPCS | Performed by: SURGERY

## 2018-09-21 RX ORDER — PREDNISONE 20 MG/1
TABLET ORAL
Qty: 200 TABLET | Refills: 0 | Status: SHIPPED | OUTPATIENT
Start: 2018-09-21 | End: 2019-11-07 | Stop reason: ALTCHOICE

## 2018-09-21 RX ORDER — BUDESONIDE 3 MG/1
9 CAPSULE, COATED PELLETS ORAL DAILY
Status: DISCONTINUED | OUTPATIENT
Start: 2018-09-21 | End: 2018-09-21 | Stop reason: HOSPADM

## 2018-09-21 RX ADMIN — Medication 10 ML: at 08:21

## 2018-09-21 RX ADMIN — FAMOTIDINE 20 MG: 10 INJECTION, SOLUTION INTRAVENOUS at 08:22

## 2018-09-21 RX ADMIN — BUDESONIDE 9 MG: 3 CAPSULE ORAL at 08:21

## 2018-09-21 RX ADMIN — LABETALOL HYDROCHLORIDE 100 MG: 100 TABLET, FILM COATED ORAL at 08:22

## 2018-09-21 NOTE — PROGRESS NOTES
Emily Rachel is a 61 y.o. male patient. Current Facility-Administered Medications   Medication Dose Route Frequency Provider Last Rate Last Dose    labetalol (NORMODYNE) tablet 100 mg  100 mg Oral BID Davin Garcia MD   100 mg at 09/20/18 2010    sodium chloride flush 0.9 % injection 10 mL  10 mL Intravenous 2 times per day Davin Garcia MD   10 mL at 09/20/18 2010    sodium chloride flush 0.9 % injection 10 mL  10 mL Intravenous PRN Davin Garcia MD        acetaminophen (TYLENOL) tablet 650 mg  650 mg Oral Q4H PRN Davin Garcia MD        ondansetron [facility]) injection 4 mg  4 mg Intravenous Q6H PRN Davin Garcia MD   4 mg at 09/17/18 1700    enoxaparin (LOVENOX) injection 40 mg  40 mg Subcutaneous Daily Davin Garcia MD   40 mg at 09/19/18 0935    famotidine (PEPCID) injection 20 mg  20 mg Intravenous BID Davin Garcia MD   20 mg at 09/20/18 2010    morphine (PF) injection 2 mg  2 mg Intravenous Q2H PRN Davin Garcia MD   2 mg at 09/18/18 2025    Or    morphine (PF) injection 4 mg  4 mg Intravenous Q2H PRN Davin Garcia MD         No Known Allergies  Active Problems:    Small bowel obstruction (HCC)  Resolved Problems:    * No resolved hospital problems. *    Blood pressure (!) 182/106, pulse 71, temperature 97.9 °F (36.6 °C), temperature source Oral, resp. rate 16, height 6' (1.829 m), weight 285 lb 8 oz (129.5 kg), SpO2 95 %. Subjective:  Symptoms:  Improved. Pain:  He reports no pain. Objective:  General Appearance: In no acute distress and not in pain. Vital signs: (most recent): Blood pressure (!) 182/106, pulse 71, temperature 97.9 °F (36.6 °C), temperature source Oral, resp. rate 16, height 6' (1.829 m), weight 285 lb 8 oz (129.5 kg), SpO2 95 %. Heart: Normal rate. Regular rhythm. S1 normal and S2 normal.    Abdomen: Abdomen is soft.   Bowel sounds are normal.   There is no abdominal tenderness. Assessment & Plan  62 yo w HTN admitted in 8/2018 for SBO on CT with mid abd pain, N/V, resolved after a few days of NGT suctioning and went home, but came back w similar Sx and CT 9/2018 showed high grade mid SI obstruction. Laparoscopy was non revealing though. ESR 11 but CRP is 20. MR-E revealed mild narrowing in SB in the RLQ. 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 His last colonoscopy was reportedly 15 yrs ago.     Plan:   1. Agree w stool tests  2. Awaiting fecal Calprotectin   3. Will start Budesonide for possible CD and OK to D/C home  4. Needs outpatient EGD/colonoscopy and possible Capsule Endoscopy  5.  Will follow     Alden Rader MD       (O) [phone]    Alden Rader MD  9/21/2018

## 2018-09-21 NOTE — PROGRESS NOTES
[facility] GENERAL SURGERY    Surgery Progress Note           POD # 3    PATIENT NAME: James Cota     TODAY'S DATE: 9/21/2018    INTERVAL HISTORY:    Pt denies pain, passing flatus, no nausea. Eating. Anxious to go home. OBJECTIVE:   VITALS:  BP (!) 173/91   Pulse 73   Temp 97.9 °F (36.6 °C) (Oral)   Resp 16   Ht 6' (1.829 m)   Wt 285 lb 8 oz (129.5 kg)   SpO2 95%   BMI 38.72 kg/m²     INTAKE/OUTPUT:    I/O last 3 completed shifts: In: 480 [P.O.:480]  Out: 300 [Urine:300]  No intake/output data recorded. CONSTITUTIONAL:  awake and alert     ABDOMEN:   normal bowel sounds, soft, non-distended, non-tender   INCISION: clean, dry, no drainage, healing    Data:  CBC:   No results for input(s): WBC, HGB, HCT, PLT in the last 72 hours. BMP:    No results for input(s): NA, K, CL, CO2, BUN, CREATININE, GLUCOSE in the last 72 hours. Hepatic: No results for input(s): AST, ALT, ALB, BILITOT, ALKPHOS in the last 72 hours. Mag:    No results for input(s): MG in the last 72 hours. Phos:   No results for input(s): PHOS in the last 72 hours. INR: No results for input(s): INR in the last 72 hours. Radiology Review:  MRI-Enterography  EXAMINATION:  MRI OF THE ENTEROGRAPHY WITHOUT AND WITH CONTRAST, 9/20/2018 10:25 am    TECHNIQUE:  Multiplanar multisequence MRI of the abdomen and pelvis  was performed  without and with the administration of intravenous contrast utilizing the MR  enterography protocol. COMPARISON:  CT 09/16/2018, 08/22/2018 and 08/20/2018    HISTORY:  ORDERING SYSTEM PROVIDED HISTORY: SBO HIGH-GRADE SUSPECT  TECHNOLOGIST PROVIDED HISTORY:  Ordering Physician Provided Reason for Exam: SBO high-grade suspect. Additional history includes diagnostic laparoscopy 09/18/2018. FINDINGS:  The visualized lung bases reveal no signal abnormality.     Mildly dilated, fluid-filled loops of small bowel notably in the mid and  right lower abdomen are noted measuring up to 3.6 cm.  No focal transition  point is identified.  Fluid is also present within nondilated loops of small  bowel distally.  No mesenteric edema, significant wall thickening, or  abnormal enhancement is demonstrated.  The colon is decompressed.  Mild  gastric distention with fluid. The liver is normal in signal intensity and contour.  The gallbladder is  surgically absent.  No biliary dilatation.  No choledocholithiasis.  No  inflammatory change identified in the pancreas. The spleen and adrenals are within normal limits.  Parapelvic cysts are noted  bilaterally.  No evidence for hydronephrosis. No ascites.  No lymphadenopathy. The aorta is normal in caliber.  The visceral branches are patent. The visualized osseous structures reveal a subtle area of fat density without  enhancement in the T11 level.  There is a focal area of enhancement in the  left lateral aspect of the L1 vertebral body.  This corresponds to a subtle  area of sclerosis on CT. Impression:     Persistent (although less prominent) mild small bowel dilatation,  predominantly in the inferior abdomen and right lower quadrant.  No focal  transition point identified.  No mesenteric edema or significant wall  thickening identified.  This may represent functional versus mechanical  partial obstructing process. Incidental finding of enhancing bone lesion at the L1 level.  This  corresponds to a subtle area of sclerosis on CT imaging.  Osseous metastatic  disease can have this finding.  Further evaluation with a bone scan is  recommended, when clinically appropriate. ASSESSMENT AND PLAN:  61 y.o. male status post laparoscopy, no sx of SBO. MRI w/o sx of IBD. Entercort per GI  Discharge today, follow up with GI and PCP regarding HTN. Discussed at length with pt and family.      Papi Powell

## 2018-09-21 NOTE — PLAN OF CARE
Problem: Safety:  Goal: Free from accidental physical injury  Free from accidental physical injury   Outcome: Ongoing  Patient will remain free from accidental injury. Patient alert and oriented. Appropriately calls out. Will continue to monitor.

## 2018-09-21 NOTE — CARE COORDINATION
Discharge to home, has denied need for services at home at discharge. No needs from case management.

## 2018-09-21 NOTE — DISCHARGE SUMMARY
and he was able to tolerate a diet. GI was consulted for their opinion regarding any further testing as the pt has an occupational history that could be contributing to his symptoms. He was discharged to home in good condition to have GI follow up. For a detailed hospital course, please refer to the detailed daily progress notes.         [address] [place] [address]

## 2018-09-21 NOTE — PROGRESS NOTES
Pt d/c'd home. Removed IV and stopped bleeding. Catheter intact. Pt tolerated well. No redness noted at site. Reviewed d/c instructions, home meds, and  f/u information utilizing teach-back method. Script for Budesonide sent to [facility] at the [facility]. Patient verbalized understanding.

## 2018-09-26 LAB
EKG ATRIAL RATE: 103 BPM
EKG DIAGNOSIS: NORMAL
EKG P AXIS: 27 DEGREES
EKG P-R INTERVAL: 150 MS
EKG Q-T INTERVAL: 318 MS
EKG QRS DURATION: 84 MS
EKG QTC CALCULATION (BAZETT): 416 MS
EKG R AXIS: -13 DEGREES
EKG T AXIS: 20 DEGREES
EKG VENTRICULAR RATE: 103 BPM

## 2018-09-28 ENCOUNTER — HOSPITAL ENCOUNTER (OUTPATIENT)
Dept: NUCLEAR MEDICINE | Age: 63
Discharge: HOME | End: 2018-09-28
Payer: COMMERCIAL

## 2018-09-28 DIAGNOSIS — R93.89 ABNORMAL RADIOLOGICAL FINDINGS IN SKIN AND SUBCUTANEOUS TISSUE: ICD-10-CM

## 2018-09-28 PROCEDURE — 3430000000 HC RX DIAGNOSTIC RADIOPHARMACEUTICAL: Performed by: FAMILY MEDICINE

## 2018-09-28 PROCEDURE — A9503 TC99M MEDRONATE: HCPCS | Performed by: FAMILY MEDICINE

## 2018-09-28 PROCEDURE — 78306 BONE IMAGING WHOLE BODY: CPT

## 2018-09-28 RX ORDER — TC 99M MEDRONATE 20 MG/10ML
24.3 INJECTION, POWDER, LYOPHILIZED, FOR SOLUTION INTRAVENOUS
Status: COMPLETED | OUTPATIENT
Start: 2018-09-28 | End: 2018-09-28

## 2018-09-28 RX ADMIN — Medication 24.3 MILLICURIE: at 10:32

## 2018-11-02 ENCOUNTER — HOSPITAL ENCOUNTER (OUTPATIENT)
Dept: CT IMAGING | Age: 63
Discharge: HOME | End: 2018-11-02
Payer: COMMERCIAL

## 2018-11-02 ENCOUNTER — HOSPITAL ENCOUNTER (OUTPATIENT)
Age: 63
Discharge: HOME | End: 2018-11-02
Payer: COMMERCIAL

## 2018-11-02 DIAGNOSIS — R10.84 ABDOMINAL PAIN, GENERALIZED: ICD-10-CM

## 2018-11-02 LAB
A/G RATIO: 1.6 (ref 1.1–2.2)
ALBUMIN SERPL-MCNC: 4 G/DL (ref 3.4–5)
ALP BLD-CCNC: 64 U/L (ref 40–129)
ALT SERPL-CCNC: 26 U/L (ref 10–40)
ANION GAP SERPL CALCULATED.3IONS-SCNC: 8 MMOL/L (ref 3–16)
AST SERPL-CCNC: 22 U/L (ref 15–37)
BILIRUB SERPL-MCNC: 0.6 MG/DL (ref 0–1)
BUN BLDV-MCNC: 14 MG/DL (ref 7–20)
CALCIUM SERPL-MCNC: 9 MG/DL (ref 8.3–10.6)
CHLORIDE BLD-SCNC: 99 MMOL/L (ref 99–110)
CO2: 30 MMOL/L (ref 21–32)
CREAT SERPL-MCNC: 0.9 MG/DL (ref 0.8–1.3)
GFR AFRICAN AMERICAN: >60
GFR NON-AFRICAN AMERICAN: >60
GLOBULIN: 2.5 G/DL
GLUCOSE BLD-MCNC: 80 MG/DL (ref 70–99)
POTASSIUM SERPL-SCNC: 4.5 MMOL/L (ref 3.5–5.1)
SODIUM BLD-SCNC: 137 MMOL/L (ref 136–145)
TOTAL PROTEIN: 6.5 G/DL (ref 6.4–8.2)

## 2018-11-02 PROCEDURE — 6360000004 HC RX CONTRAST MEDICATION: Performed by: INTERNAL MEDICINE

## 2018-11-02 PROCEDURE — 74175 CTA ABDOMEN W/CONTRAST: CPT

## 2018-11-02 PROCEDURE — 93005 ELECTROCARDIOGRAM TRACING: CPT | Performed by: ORTHOPAEDIC SURGERY

## 2018-11-02 PROCEDURE — 80053 COMPREHEN METABOLIC PANEL: CPT

## 2018-11-02 PROCEDURE — 36415 COLL VENOUS BLD VENIPUNCTURE: CPT

## 2018-11-02 RX ADMIN — IOPAMIDOL 75 ML: 755 INJECTION, SOLUTION INTRAVENOUS at 18:09

## 2018-11-03 LAB
EKG ATRIAL RATE: 60 BPM
EKG DIAGNOSIS: NORMAL
EKG P AXIS: 36 DEGREES
EKG P-R INTERVAL: 174 MS
EKG Q-T INTERVAL: 442 MS
EKG QRS DURATION: 90 MS
EKG QTC CALCULATION (BAZETT): 442 MS
EKG R AXIS: 0 DEGREES
EKG T AXIS: 8 DEGREES
EKG VENTRICULAR RATE: 60 BPM

## 2018-11-03 PROCEDURE — 93010 ELECTROCARDIOGRAM REPORT: CPT | Performed by: INTERNAL MEDICINE

## 2019-08-13 ENCOUNTER — OFFICE VISIT (OUTPATIENT)
Dept: ORTHOPEDIC SURGERY | Age: 64
End: 2019-08-13
Payer: COMMERCIAL

## 2019-08-13 VITALS — BODY MASS INDEX: 38.67 KG/M2 | WEIGHT: 285.5 LBS | HEIGHT: 72 IN

## 2019-08-13 DIAGNOSIS — S46.912A STRAIN OF LEFT SHOULDER, INITIAL ENCOUNTER: Primary | ICD-10-CM

## 2019-08-13 DIAGNOSIS — M25.512 LEFT SHOULDER PAIN, UNSPECIFIED CHRONICITY: ICD-10-CM

## 2019-08-13 PROCEDURE — 99243 OFF/OP CNSLTJ NEW/EST LOW 30: CPT | Performed by: ORTHOPAEDIC SURGERY

## 2019-08-13 RX ORDER — AMLODIPINE BESYLATE 2.5 MG/1
2.5 TABLET ORAL 2 TIMES DAILY
COMMUNITY

## 2019-08-13 RX ORDER — LABETALOL 200 MG/1
TABLET, FILM COATED ORAL
COMMUNITY
Start: 2019-05-07

## 2019-08-21 ENCOUNTER — HOSPITAL ENCOUNTER (OUTPATIENT)
Dept: MRI IMAGING | Age: 64
Discharge: HOME | End: 2019-08-21
Payer: COMMERCIAL

## 2019-08-21 DIAGNOSIS — S46.912A STRAIN OF LEFT SHOULDER, INITIAL ENCOUNTER: ICD-10-CM

## 2019-08-21 PROCEDURE — 73221 MRI JOINT UPR EXTREM W/O DYE: CPT

## 2019-08-22 ENCOUNTER — OFFICE VISIT (OUTPATIENT)
Dept: ORTHOPEDIC SURGERY | Age: 64
End: 2019-08-22
Payer: COMMERCIAL

## 2019-08-22 VITALS — BODY MASS INDEX: 34.46 KG/M2 | HEIGHT: 73 IN | WEIGHT: 260 LBS

## 2019-08-22 DIAGNOSIS — S46.012D TRAUMATIC COMPLETE TEAR OF LEFT ROTATOR CUFF, SUBSEQUENT ENCOUNTER: Primary | ICD-10-CM

## 2019-08-22 PROCEDURE — 99214 OFFICE O/P EST MOD 30 MIN: CPT | Performed by: ORTHOPAEDIC SURGERY

## 2019-09-03 DIAGNOSIS — G89.29 CHRONIC PAIN OF LEFT ANKLE: Primary | ICD-10-CM

## 2019-09-03 DIAGNOSIS — M25.572 CHRONIC PAIN OF LEFT ANKLE: Primary | ICD-10-CM

## 2019-09-06 ENCOUNTER — TELEPHONE (OUTPATIENT)
Dept: ORTHOPEDIC SURGERY | Age: 64
End: 2019-09-06

## 2019-09-06 ENCOUNTER — ORTHOTIC/BRACE ENCOUNTER (OUTPATIENT)
Dept: ORTHOPEDIC SURGERY | Age: 64
End: 2019-09-06

## 2019-11-05 ENCOUNTER — TELEPHONE (OUTPATIENT)
Dept: ORTHOPEDIC SURGERY | Age: 64
End: 2019-11-05

## 2019-11-07 DIAGNOSIS — S46.012D TRAUMATIC COMPLETE TEAR OF LEFT ROTATOR CUFF, SUBSEQUENT ENCOUNTER: Primary | ICD-10-CM

## 2019-11-07 DIAGNOSIS — M25.512 LEFT SHOULDER PAIN, UNSPECIFIED CHRONICITY: ICD-10-CM

## 2019-11-11 DIAGNOSIS — M25.512 LEFT SHOULDER PAIN, UNSPECIFIED CHRONICITY: ICD-10-CM

## 2019-11-11 DIAGNOSIS — S46.012D TRAUMATIC COMPLETE TEAR OF LEFT ROTATOR CUFF, SUBSEQUENT ENCOUNTER: ICD-10-CM

## 2019-11-11 PROCEDURE — L3670 SO ACRO/CLAV CAN WEB PRE OTS: HCPCS | Performed by: ORTHOPAEDIC SURGERY

## 2019-11-12 ENCOUNTER — ANESTHESIA EVENT (OUTPATIENT)
Dept: OPERATING ROOM | Age: 64
End: 2019-11-12
Payer: COMMERCIAL

## 2019-11-13 ENCOUNTER — ANESTHESIA (OUTPATIENT)
Dept: OPERATING ROOM | Age: 64
End: 2019-11-13
Payer: COMMERCIAL

## 2019-11-13 ENCOUNTER — HOSPITAL ENCOUNTER (OUTPATIENT)
Age: 64
Setting detail: OUTPATIENT SURGERY
Discharge: HOME | End: 2019-11-13
Attending: ORTHOPAEDIC SURGERY | Admitting: ORTHOPAEDIC SURGERY
Payer: COMMERCIAL

## 2019-11-13 VITALS
WEIGHT: 270 LBS | SYSTOLIC BLOOD PRESSURE: 138 MMHG | DIASTOLIC BLOOD PRESSURE: 76 MMHG | BODY MASS INDEX: 35.78 KG/M2 | RESPIRATION RATE: 16 BRPM | OXYGEN SATURATION: 95 % | HEART RATE: 71 BPM | HEIGHT: 73 IN | TEMPERATURE: 97.4 F

## 2019-11-13 VITALS
SYSTOLIC BLOOD PRESSURE: 97 MMHG | RESPIRATION RATE: 6 BRPM | OXYGEN SATURATION: 98 % | DIASTOLIC BLOOD PRESSURE: 64 MMHG

## 2019-11-13 DIAGNOSIS — S46.012D TRAUMATIC COMPLETE TEAR OF LEFT ROTATOR CUFF, SUBSEQUENT ENCOUNTER: Primary | ICD-10-CM

## 2019-11-13 PROCEDURE — MISCCOLD COLD THERAPY UNIT AND PAD: Performed by: ORTHOPAEDIC SURGERY

## 2019-11-13 PROCEDURE — 7100000001 HC PACU RECOVERY - ADDTL 15 MIN: Performed by: ORTHOPAEDIC SURGERY

## 2019-11-13 PROCEDURE — 7100000011 HC PHASE II RECOVERY - ADDTL 15 MIN: Performed by: ORTHOPAEDIC SURGERY

## 2019-11-13 PROCEDURE — 3600000014 HC SURGERY LEVEL 4 ADDTL 15MIN: Performed by: ORTHOPAEDIC SURGERY

## 2019-11-13 PROCEDURE — 7100000000 HC PACU RECOVERY - FIRST 15 MIN: Performed by: ORTHOPAEDIC SURGERY

## 2019-11-13 PROCEDURE — 2580000003 HC RX 258: Performed by: ANESTHESIOLOGY

## 2019-11-13 PROCEDURE — 6360000002 HC RX W HCPCS: Performed by: ORTHOPAEDIC SURGERY

## 2019-11-13 PROCEDURE — 2720000010 HC SURG SUPPLY STERILE: Performed by: ORTHOPAEDIC SURGERY

## 2019-11-13 PROCEDURE — 2500000003 HC RX 250 WO HCPCS: Performed by: NURSE ANESTHETIST, CERTIFIED REGISTERED

## 2019-11-13 PROCEDURE — 3600000004 HC SURGERY LEVEL 4 BASE: Performed by: ORTHOPAEDIC SURGERY

## 2019-11-13 PROCEDURE — 2709999900 HC NON-CHARGEABLE SUPPLY: Performed by: ORTHOPAEDIC SURGERY

## 2019-11-13 PROCEDURE — 3700000001 HC ADD 15 MINUTES (ANESTHESIA): Performed by: ORTHOPAEDIC SURGERY

## 2019-11-13 PROCEDURE — 64415 NJX AA&/STRD BRCH PLXS IMG: CPT | Performed by: ANESTHESIOLOGY

## 2019-11-13 PROCEDURE — 7100000010 HC PHASE II RECOVERY - FIRST 15 MIN: Performed by: ORTHOPAEDIC SURGERY

## 2019-11-13 PROCEDURE — 2580000003 HC RX 258: Performed by: ORTHOPAEDIC SURGERY

## 2019-11-13 PROCEDURE — 6360000002 HC RX W HCPCS: Performed by: ANESTHESIOLOGY

## 2019-11-13 PROCEDURE — 2500000003 HC RX 250 WO HCPCS: Performed by: ANESTHESIOLOGY

## 2019-11-13 PROCEDURE — 6360000002 HC RX W HCPCS: Performed by: NURSE ANESTHETIST, CERTIFIED REGISTERED

## 2019-11-13 PROCEDURE — 3700000000 HC ANESTHESIA ATTENDED CARE: Performed by: ORTHOPAEDIC SURGERY

## 2019-11-13 RX ORDER — MORPHINE SULFATE 2 MG/ML
1 INJECTION, SOLUTION INTRAMUSCULAR; INTRAVENOUS EVERY 5 MIN PRN
Status: DISCONTINUED | OUTPATIENT
Start: 2019-11-13 | End: 2019-11-13 | Stop reason: HOSPADM

## 2019-11-13 RX ORDER — ONDANSETRON 2 MG/ML
4 INJECTION INTRAMUSCULAR; INTRAVENOUS PRN
Status: DISCONTINUED | OUTPATIENT
Start: 2019-11-13 | End: 2019-11-13 | Stop reason: HOSPADM

## 2019-11-13 RX ORDER — PROPOFOL 10 MG/ML
INJECTION, EMULSION INTRAVENOUS PRN
Status: DISCONTINUED | OUTPATIENT
Start: 2019-11-13 | End: 2019-11-13 | Stop reason: SDUPTHER

## 2019-11-13 RX ORDER — BUPIVACAINE HYDROCHLORIDE 5 MG/ML
INJECTION, SOLUTION EPIDURAL; INTRACAUDAL
Status: COMPLETED | OUTPATIENT
Start: 2019-11-13 | End: 2019-11-13

## 2019-11-13 RX ORDER — MEPERIDINE HYDROCHLORIDE 50 MG/ML
12.5 INJECTION INTRAMUSCULAR; INTRAVENOUS; SUBCUTANEOUS EVERY 5 MIN PRN
Status: DISCONTINUED | OUTPATIENT
Start: 2019-11-13 | End: 2019-11-13 | Stop reason: HOSPADM

## 2019-11-13 RX ORDER — OXYCODONE HYDROCHLORIDE AND ACETAMINOPHEN 5; 325 MG/1; MG/1
1 TABLET ORAL PRN
Status: DISCONTINUED | OUTPATIENT
Start: 2019-11-13 | End: 2019-11-13 | Stop reason: HOSPADM

## 2019-11-13 RX ORDER — PROMETHAZINE HYDROCHLORIDE 25 MG/ML
6.25 INJECTION, SOLUTION INTRAMUSCULAR; INTRAVENOUS
Status: DISCONTINUED | OUTPATIENT
Start: 2019-11-13 | End: 2019-11-13 | Stop reason: HOSPADM

## 2019-11-13 RX ORDER — ONDANSETRON 2 MG/ML
INJECTION INTRAMUSCULAR; INTRAVENOUS PRN
Status: DISCONTINUED | OUTPATIENT
Start: 2019-11-13 | End: 2019-11-13 | Stop reason: SDUPTHER

## 2019-11-13 RX ORDER — SODIUM CHLORIDE 0.9 % (FLUSH) 0.9 %
10 SYRINGE (ML) INJECTION PRN
Status: DISCONTINUED | OUTPATIENT
Start: 2019-11-13 | End: 2019-11-13 | Stop reason: HOSPADM

## 2019-11-13 RX ORDER — PHENYLEPHRINE HCL IN 0.9% NACL 1 MG/10 ML
SYRINGE (ML) INTRAVENOUS PRN
Status: DISCONTINUED | OUTPATIENT
Start: 2019-11-13 | End: 2019-11-13 | Stop reason: SDUPTHER

## 2019-11-13 RX ORDER — ROCURONIUM BROMIDE 10 MG/ML
INJECTION, SOLUTION INTRAVENOUS PRN
Status: DISCONTINUED | OUTPATIENT
Start: 2019-11-13 | End: 2019-11-13 | Stop reason: SDUPTHER

## 2019-11-13 RX ORDER — SODIUM CHLORIDE, SODIUM LACTATE, POTASSIUM CHLORIDE, AND CALCIUM CHLORIDE .6; .31; .03; .02 G/100ML; G/100ML; G/100ML; G/100ML
IRRIGANT IRRIGATION PRN
Status: DISCONTINUED | OUTPATIENT
Start: 2019-11-13 | End: 2019-11-13 | Stop reason: ALTCHOICE

## 2019-11-13 RX ORDER — OXYCODONE HYDROCHLORIDE AND ACETAMINOPHEN 5; 325 MG/1; MG/1
1 TABLET ORAL EVERY 6 HOURS PRN
Qty: 28 TABLET | Refills: 0 | Status: SHIPPED | OUTPATIENT
Start: 2019-11-13 | End: 2019-11-20

## 2019-11-13 RX ORDER — MORPHINE SULFATE 2 MG/ML
2 INJECTION, SOLUTION INTRAMUSCULAR; INTRAVENOUS EVERY 5 MIN PRN
Status: DISCONTINUED | OUTPATIENT
Start: 2019-11-13 | End: 2019-11-13 | Stop reason: HOSPADM

## 2019-11-13 RX ORDER — ACETAMINOPHEN 10 MG/ML
1000 INJECTION, SOLUTION INTRAVENOUS ONCE
Status: COMPLETED | OUTPATIENT
Start: 2019-11-13 | End: 2019-11-13

## 2019-11-13 RX ORDER — SODIUM CHLORIDE 0.9 % (FLUSH) 0.9 %
10 SYRINGE (ML) INJECTION EVERY 12 HOURS SCHEDULED
Status: DISCONTINUED | OUTPATIENT
Start: 2019-11-13 | End: 2019-11-13 | Stop reason: HOSPADM

## 2019-11-13 RX ORDER — DIPHENHYDRAMINE HYDROCHLORIDE 50 MG/ML
12.5 INJECTION INTRAMUSCULAR; INTRAVENOUS
Status: DISCONTINUED | OUTPATIENT
Start: 2019-11-13 | End: 2019-11-13 | Stop reason: HOSPADM

## 2019-11-13 RX ORDER — OXYCODONE HYDROCHLORIDE AND ACETAMINOPHEN 5; 325 MG/1; MG/1
2 TABLET ORAL PRN
Status: DISCONTINUED | OUTPATIENT
Start: 2019-11-13 | End: 2019-11-13 | Stop reason: HOSPADM

## 2019-11-13 RX ORDER — MIDAZOLAM HYDROCHLORIDE 1 MG/ML
INJECTION INTRAMUSCULAR; INTRAVENOUS PRN
Status: DISCONTINUED | OUTPATIENT
Start: 2019-11-13 | End: 2019-11-13 | Stop reason: SDUPTHER

## 2019-11-13 RX ORDER — MIDAZOLAM HYDROCHLORIDE 1 MG/ML
INJECTION INTRAMUSCULAR; INTRAVENOUS
Status: COMPLETED
Start: 2019-11-13 | End: 2019-11-13

## 2019-11-13 RX ORDER — LABETALOL HYDROCHLORIDE 5 MG/ML
5 INJECTION, SOLUTION INTRAVENOUS EVERY 10 MIN PRN
Status: DISCONTINUED | OUTPATIENT
Start: 2019-11-13 | End: 2019-11-13 | Stop reason: HOSPADM

## 2019-11-13 RX ORDER — EPINEPHRINE 1 MG/ML
INJECTION, SOLUTION, CONCENTRATE INTRAVENOUS PRN
Status: DISCONTINUED | OUTPATIENT
Start: 2019-11-13 | End: 2019-11-13 | Stop reason: ALTCHOICE

## 2019-11-13 RX ORDER — HYDRALAZINE HYDROCHLORIDE 20 MG/ML
5 INJECTION INTRAMUSCULAR; INTRAVENOUS EVERY 10 MIN PRN
Status: DISCONTINUED | OUTPATIENT
Start: 2019-11-13 | End: 2019-11-13 | Stop reason: HOSPADM

## 2019-11-13 RX ORDER — SODIUM CHLORIDE, SODIUM LACTATE, POTASSIUM CHLORIDE, CALCIUM CHLORIDE 600; 310; 30; 20 MG/100ML; MG/100ML; MG/100ML; MG/100ML
INJECTION, SOLUTION INTRAVENOUS CONTINUOUS
Status: DISCONTINUED | OUTPATIENT
Start: 2019-11-13 | End: 2019-11-13 | Stop reason: HOSPADM

## 2019-11-13 RX ADMIN — FAMOTIDINE 20 MG: 10 INJECTION, SOLUTION INTRAVENOUS at 10:15

## 2019-11-13 RX ADMIN — ROCURONIUM BROMIDE 80 MG: 10 SOLUTION INTRAVENOUS at 12:03

## 2019-11-13 RX ADMIN — SODIUM CHLORIDE, POTASSIUM CHLORIDE, SODIUM LACTATE AND CALCIUM CHLORIDE: 600; 310; 30; 20 INJECTION, SOLUTION INTRAVENOUS at 09:56

## 2019-11-13 RX ADMIN — Medication 100 MCG: at 12:37

## 2019-11-13 RX ADMIN — ACETAMINOPHEN 1000 MG: 10 INJECTION, SOLUTION INTRAVENOUS at 10:14

## 2019-11-13 RX ADMIN — BUPIVACAINE HYDROCHLORIDE 30 ML: 5 INJECTION, SOLUTION EPIDURAL; INTRACAUDAL; PERINEURAL at 10:40

## 2019-11-13 RX ADMIN — SUGAMMADEX 400 MG: 100 INJECTION, SOLUTION INTRAVENOUS at 12:51

## 2019-11-13 RX ADMIN — ONDANSETRON 4 MG: 2 INJECTION INTRAMUSCULAR; INTRAVENOUS at 12:15

## 2019-11-13 RX ADMIN — Medication 3 G: at 12:05

## 2019-11-13 RX ADMIN — MIDAZOLAM HYDROCHLORIDE 2 MG: 2 INJECTION, SOLUTION INTRAMUSCULAR; INTRAVENOUS at 10:23

## 2019-11-13 RX ADMIN — PROPOFOL 300 MG: 10 INJECTION, EMULSION INTRAVENOUS at 12:03

## 2019-11-13 RX ADMIN — SODIUM CHLORIDE, POTASSIUM CHLORIDE, SODIUM LACTATE AND CALCIUM CHLORIDE: 600; 310; 30; 20 INJECTION, SOLUTION INTRAVENOUS at 11:56

## 2019-11-13 ASSESSMENT — PULMONARY FUNCTION TESTS
PIF_VALUE: 23
PIF_VALUE: 19
PIF_VALUE: 18
PIF_VALUE: 19
PIF_VALUE: 1
PIF_VALUE: 19
PIF_VALUE: 18
PIF_VALUE: 19
PIF_VALUE: 18
PIF_VALUE: 1
PIF_VALUE: 19
PIF_VALUE: 21
PIF_VALUE: 2
PIF_VALUE: 1
PIF_VALUE: 22
PIF_VALUE: 19
PIF_VALUE: 19
PIF_VALUE: 5
PIF_VALUE: 19
PIF_VALUE: 18
PIF_VALUE: 19
PIF_VALUE: 20
PIF_VALUE: 17
PIF_VALUE: 1
PIF_VALUE: 19
PIF_VALUE: 20
PIF_VALUE: 22
PIF_VALUE: 18
PIF_VALUE: 21
PIF_VALUE: 22
PIF_VALUE: 19
PIF_VALUE: 17
PIF_VALUE: 17
PIF_VALUE: 19
PIF_VALUE: 4
PIF_VALUE: 1
PIF_VALUE: 21
PIF_VALUE: 21
PIF_VALUE: 1
PIF_VALUE: 19
PIF_VALUE: 18
PIF_VALUE: 6
PIF_VALUE: 21
PIF_VALUE: 18
PIF_VALUE: 19
PIF_VALUE: 19

## 2019-11-13 ASSESSMENT — PAIN - FUNCTIONAL ASSESSMENT: PAIN_FUNCTIONAL_ASSESSMENT: 0-10

## 2019-11-23 NOTE — PLAN OF CARE
-- DO NOT REPLY / DO NOT REPLY ALL --  -- Message is from the [facility]--    Provider paged via Flypad Documentation - The below message was copied and pasted from a Smart Balloon page:    Initiated Date/Time  11/23/2019 12:40 pm  Message Sent Date/Time  11/23/2019 12:42 pm  Source  [facility]  Department  ACC  Method  Secure Text  Contacted  Zane Chris Timothy J  Details  Patient  Message  [phone] ACC CALLER NAME: FRANKLIN SULLIVAN RE: FRANKLIN SULLIVAN PATIENT 1953 PATIENT PCP: RHODA DAVIS PATIENT IS CALLING BACK REGARDING HIS TRIGEMINAL NEURALGIA AND HAS EXCRUCIATING PAIN IN HIS JAW. MEDICATION IS NOT HELPFUL AND WILL LIKE TO KNOW IF HE CAN BE PRESCRIBED SOMETHING STRONGER OR IF PATIENT CAN TAKE TWO PILLS THREE TIMES A DAY?   Problem: Pain:  Goal: Control of acute pain  Control of acute pain   Outcome: Ongoing  Pt to notify RN of any acute pain.

## 2019-11-26 ENCOUNTER — OFFICE VISIT (OUTPATIENT)
Dept: ORTHOPEDIC SURGERY | Age: 64
End: 2019-11-26

## 2019-11-26 VITALS — WEIGHT: 270.06 LBS | BODY MASS INDEX: 35.79 KG/M2 | HEIGHT: 73 IN

## 2019-11-26 DIAGNOSIS — Z98.890 STATUS POST ROTATOR CUFF REPAIR: Primary | ICD-10-CM

## 2019-11-26 PROCEDURE — 99024 POSTOP FOLLOW-UP VISIT: CPT | Performed by: ORTHOPAEDIC SURGERY

## 2019-12-16 ENCOUNTER — OFFICE VISIT (OUTPATIENT)
Dept: ORTHOPEDIC SURGERY | Age: 64
End: 2019-12-16

## 2019-12-16 VITALS — HEIGHT: 73 IN | BODY MASS INDEX: 35.79 KG/M2 | WEIGHT: 270.06 LBS

## 2019-12-16 DIAGNOSIS — Z98.890 STATUS POST ROTATOR CUFF REPAIR: Primary | ICD-10-CM

## 2019-12-16 PROCEDURE — 99024 POSTOP FOLLOW-UP VISIT: CPT | Performed by: ORTHOPAEDIC SURGERY

## 2019-12-18 ENCOUNTER — TELEPHONE (OUTPATIENT)
Dept: ORTHOPEDIC SURGERY | Age: 64
End: 2019-12-18

## 2019-12-18 NOTE — TELEPHONE ENCOUNTER
Flora Elke was not completed at the time of the office visit 12/16/19 and is now being requested by Radames stroud/ [employer]. Notified Lalita stroud/Dr. Lazaro bellamy    Once notified the [employer] has been completed I will forward to [address]

## 2019-12-24 ENCOUNTER — HOSPITAL ENCOUNTER (OUTPATIENT)
Dept: PHYSICAL THERAPY | Age: 64
Setting detail: THERAPIES SERIES
Discharge: HOME | End: 2019-12-24
Payer: COMMERCIAL

## 2019-12-24 PROCEDURE — 97161 PT EVAL LOW COMPLEX 20 MIN: CPT

## 2019-12-24 PROCEDURE — 97110 THERAPEUTIC EXERCISES: CPT

## 2019-12-31 ENCOUNTER — HOSPITAL ENCOUNTER (OUTPATIENT)
Dept: PHYSICAL THERAPY | Age: 64
Setting detail: THERAPIES SERIES
Discharge: HOME | End: 2019-12-31
Payer: COMMERCIAL

## 2019-12-31 PROCEDURE — 97110 THERAPEUTIC EXERCISES: CPT

## 2019-12-31 PROCEDURE — 97140 MANUAL THERAPY 1/> REGIONS: CPT

## 2019-12-31 NOTE — FLOWSHEET NOTE
Physical Therapy Daily Treatment Note    Date:  2019    Patient Name:  Rashmi Rose    :  1955  MRN: [id number]  Restrictions/Precautions:    Medical/Treatment Diagnosis Information:  Diagnosis: L shoulder Rotator Cuff Repair  Insurance/Certification information:  PT Insurance Information: Primary: [employer] Secondary: [facility]  Physician Information:  Referring Practitioner: Anahi Sandy  Plan of care signed (Y/N):  Y  Visit# / total visits:       G-Code (if applicable): Quick Dash: Sum of Responses: 37, Symptom Score: 59%    Medicare Cap (if applicable):  N/A = total amount used, updated 2019    Time in:   1:30     Timed Treatment: 33 Total Treatment Time:  33  Time out: 2:33  ________________________________________________________________________________________    Pain Level:    0/10  SUBJECTIVE:  Pt reports that he has a lot of soreness in his biceps that started. Pt reported that he does not know anything that could have caused it. Pt has been performing passive ER home exercise actively. OBJECTIVE: Pt continued over-use of LUE during functional transfers. Pt performed passive ER actively and has been doing this at home. Exercise/Equipment Resistance/Repetitions Other comments    AROM wrist and elbow      Elbow Flexion/Extension      Wrist Flexion/Extension   x20  x20   HEP  HEP     Pendulums     Forward/bakward     Side/side     Circles    x15  x15  x15   HEP  HEP  HEP     Passive ER   Passive Flexion x15  x15 HEP  HEP     Table slides  x15 Pain free                                                                                                                 Other Therapeutic Activities:       Manual Treatments:         Modalities:      Test/Measurements:         ASSESSMENT: Pt tolerated treatment well as pt performed all of today`s exercises with minimal increase in pain. Pt has more pain with passive flexion> passive ER within appropriate ranges.  PT reviewed HEP

## 2020-01-01 ENCOUNTER — HOSPITAL ENCOUNTER (OUTPATIENT)
Dept: PHYSICAL THERAPY | Age: 65
Setting detail: THERAPIES SERIES
Discharge: HOME | End: 2020-08-24
Payer: COMMERCIAL

## 2020-01-01 ENCOUNTER — OFFICE VISIT (OUTPATIENT)
Dept: ORTHOPEDIC SURGERY | Age: 65
End: 2020-01-01
Payer: COMMERCIAL

## 2020-01-01 ENCOUNTER — APPOINTMENT (OUTPATIENT)
Dept: PHYSICAL THERAPY | Age: 65
End: 2020-01-01
Payer: COMMERCIAL

## 2020-01-01 ENCOUNTER — HOSPITAL ENCOUNTER (OUTPATIENT)
Dept: PHYSICAL THERAPY | Age: 65
Setting detail: THERAPIES SERIES
Discharge: HOME | End: 2020-08-17
Payer: COMMERCIAL

## 2020-01-01 ENCOUNTER — TELEPHONE (OUTPATIENT)
Dept: ORTHOPEDIC SURGERY | Age: 65
End: 2020-01-01

## 2020-01-01 ENCOUNTER — HOSPITAL ENCOUNTER (OUTPATIENT)
Dept: PHYSICAL THERAPY | Age: 65
Setting detail: THERAPIES SERIES
Discharge: HOME | End: 2020-08-13
Payer: COMMERCIAL

## 2020-01-01 ENCOUNTER — HOSPITAL ENCOUNTER (OUTPATIENT)
Dept: PHYSICAL THERAPY | Age: 65
Setting detail: THERAPIES SERIES
Discharge: HOME | End: 2020-09-03
Payer: COMMERCIAL

## 2020-01-01 ENCOUNTER — HOSPITAL ENCOUNTER (INPATIENT)
Age: 65
LOS: 2 days | Discharge: HOME | DRG: 493 | End: 2020-10-07
Attending: ORTHOPAEDIC SURGERY | Admitting: ORTHOPAEDIC SURGERY
Payer: COMMERCIAL

## 2020-01-01 ENCOUNTER — INITIAL CONSULT (OUTPATIENT)
Dept: SURGERY | Age: 65
End: 2020-01-01
Payer: COMMERCIAL

## 2020-01-01 ENCOUNTER — HOSPITAL ENCOUNTER (OUTPATIENT)
Dept: PHYSICAL THERAPY | Age: 65
Setting detail: THERAPIES SERIES
Discharge: HOME | End: 2020-09-18
Payer: COMMERCIAL

## 2020-01-01 ENCOUNTER — OFFICE VISIT (OUTPATIENT)
Dept: PRIMARY CARE CLINIC | Age: 65
End: 2020-01-01
Payer: COMMERCIAL

## 2020-01-01 ENCOUNTER — HOSPITAL ENCOUNTER (OUTPATIENT)
Dept: PHYSICAL THERAPY | Age: 65
Setting detail: THERAPIES SERIES
Discharge: HOME | End: 2020-09-28
Payer: COMMERCIAL

## 2020-01-01 ENCOUNTER — HOSPITAL ENCOUNTER (OUTPATIENT)
Dept: CT IMAGING | Age: 65
Discharge: HOME | End: 2020-07-02
Payer: COMMERCIAL

## 2020-01-01 ENCOUNTER — HOSPITAL ENCOUNTER (OUTPATIENT)
Dept: PHYSICAL THERAPY | Age: 65
Setting detail: THERAPIES SERIES
Discharge: HOME | End: 2020-08-31
Payer: COMMERCIAL

## 2020-01-01 ENCOUNTER — ANESTHESIA EVENT (OUTPATIENT)
Dept: OPERATING ROOM | Age: 65
DRG: 493 | End: 2020-01-01
Payer: COMMERCIAL

## 2020-01-01 ENCOUNTER — APPOINTMENT (OUTPATIENT)
Dept: GENERAL RADIOLOGY | Age: 65
DRG: 483 | End: 2020-01-01
Attending: ORTHOPAEDIC SURGERY
Payer: COMMERCIAL

## 2020-01-01 ENCOUNTER — HOSPITAL ENCOUNTER (OUTPATIENT)
Dept: PHYSICAL THERAPY | Age: 65
Setting detail: THERAPIES SERIES
Discharge: HOME | End: 2020-08-03
Payer: COMMERCIAL

## 2020-01-01 ENCOUNTER — HOSPITAL ENCOUNTER (OUTPATIENT)
Dept: PHYSICAL THERAPY | Age: 65
Setting detail: THERAPIES SERIES
Discharge: HOME | End: 2020-07-27
Payer: COMMERCIAL

## 2020-01-01 ENCOUNTER — APPOINTMENT (OUTPATIENT)
Dept: GENERAL RADIOLOGY | Age: 65
DRG: 389 | End: 2020-01-01
Payer: COMMERCIAL

## 2020-01-01 ENCOUNTER — HOSPITAL ENCOUNTER (OUTPATIENT)
Dept: PHYSICAL THERAPY | Age: 65
Setting detail: THERAPIES SERIES
Discharge: HOME | End: 2020-08-10
Payer: COMMERCIAL

## 2020-01-01 ENCOUNTER — HOSPITAL ENCOUNTER (INPATIENT)
Age: 65
LOS: 4 days | Discharge: HOME | DRG: 389 | End: 2020-05-31
Attending: EMERGENCY MEDICINE | Admitting: INTERNAL MEDICINE
Payer: COMMERCIAL

## 2020-01-01 ENCOUNTER — HOSPITAL ENCOUNTER (OUTPATIENT)
Dept: PHYSICAL THERAPY | Age: 65
Setting detail: THERAPIES SERIES
Discharge: HOME | End: 2020-09-25
Payer: COMMERCIAL

## 2020-01-01 ENCOUNTER — HOSPITAL ENCOUNTER (OUTPATIENT)
Dept: PHYSICAL THERAPY | Age: 65
Setting detail: THERAPIES SERIES
Discharge: HOME | End: 2020-08-27
Payer: COMMERCIAL

## 2020-01-01 ENCOUNTER — HOSPITAL ENCOUNTER (OUTPATIENT)
Dept: PHYSICAL THERAPY | Age: 65
Setting detail: THERAPIES SERIES
Discharge: HOME | End: 2020-09-11
Payer: COMMERCIAL

## 2020-01-01 ENCOUNTER — HOSPITAL ENCOUNTER (OUTPATIENT)
Dept: PHYSICAL THERAPY | Age: 65
Setting detail: THERAPIES SERIES
Discharge: HOME | End: 2020-08-06
Payer: COMMERCIAL

## 2020-01-01 ENCOUNTER — OFFICE VISIT (OUTPATIENT)
Dept: ORTHOPEDIC SURGERY | Age: 65
End: 2020-01-01

## 2020-01-01 ENCOUNTER — ANESTHESIA (OUTPATIENT)
Dept: OPERATING ROOM | Age: 65
DRG: 493 | End: 2020-01-01
Payer: COMMERCIAL

## 2020-01-01 ENCOUNTER — HOSPITAL ENCOUNTER (OUTPATIENT)
Dept: PHYSICAL THERAPY | Age: 65
Setting detail: THERAPIES SERIES
Discharge: HOME | End: 2020-08-20
Payer: COMMERCIAL

## 2020-01-01 ENCOUNTER — HOSPITAL ENCOUNTER (OUTPATIENT)
Dept: PHYSICAL THERAPY | Age: 65
Setting detail: THERAPIES SERIES
Discharge: HOME | End: 2020-07-24
Payer: COMMERCIAL

## 2020-01-01 ENCOUNTER — HOSPITAL ENCOUNTER (OUTPATIENT)
Dept: PHYSICAL THERAPY | Age: 65
Setting detail: THERAPIES SERIES
Discharge: HOME | End: 2020-09-08
Payer: COMMERCIAL

## 2020-01-01 ENCOUNTER — ANESTHESIA EVENT (OUTPATIENT)
Dept: OPERATING ROOM | Age: 65
DRG: 483 | End: 2020-01-01
Payer: COMMERCIAL

## 2020-01-01 ENCOUNTER — APPOINTMENT (OUTPATIENT)
Dept: CT IMAGING | Age: 65
DRG: 389 | End: 2020-01-01
Payer: COMMERCIAL

## 2020-01-01 ENCOUNTER — HOSPITAL ENCOUNTER (INPATIENT)
Age: 65
LOS: 1 days | Discharge: HOME | DRG: 483 | End: 2020-07-10
Attending: ORTHOPAEDIC SURGERY | Admitting: ORTHOPAEDIC SURGERY
Payer: COMMERCIAL

## 2020-01-01 ENCOUNTER — HOSPITAL ENCOUNTER (OUTPATIENT)
Dept: PHYSICAL THERAPY | Age: 65
Setting detail: THERAPIES SERIES
Discharge: HOME | End: 2020-07-31
Payer: COMMERCIAL

## 2020-01-01 ENCOUNTER — HOSPITAL ENCOUNTER (OUTPATIENT)
Dept: PHYSICAL THERAPY | Age: 65
Setting detail: THERAPIES SERIES
Discharge: HOME | End: 2020-09-21
Payer: COMMERCIAL

## 2020-01-01 ENCOUNTER — ANESTHESIA (OUTPATIENT)
Dept: OPERATING ROOM | Age: 65
DRG: 483 | End: 2020-01-01
Payer: COMMERCIAL

## 2020-01-01 ENCOUNTER — HOSPITAL ENCOUNTER (OUTPATIENT)
Dept: PHYSICAL THERAPY | Age: 65
Setting detail: THERAPIES SERIES
Discharge: HOME | End: 2020-10-02
Payer: COMMERCIAL

## 2020-01-01 ENCOUNTER — HOSPITAL ENCOUNTER (OUTPATIENT)
Dept: PHYSICAL THERAPY | Age: 65
Setting detail: THERAPIES SERIES
Discharge: HOME | End: 2020-09-14
Payer: COMMERCIAL

## 2020-01-01 VITALS — WEIGHT: 315 LBS | HEIGHT: 72 IN | BODY MASS INDEX: 42.66 KG/M2

## 2020-01-01 VITALS
HEART RATE: 82 BPM | HEIGHT: 72 IN | DIASTOLIC BLOOD PRESSURE: 84 MMHG | WEIGHT: 315 LBS | BODY MASS INDEX: 42.66 KG/M2 | TEMPERATURE: 97.6 F | SYSTOLIC BLOOD PRESSURE: 116 MMHG

## 2020-01-01 VITALS — BODY MASS INDEX: 35.79 KG/M2 | HEIGHT: 73 IN | WEIGHT: 270.06 LBS

## 2020-01-01 VITALS — WEIGHT: 315 LBS | BODY MASS INDEX: 42.66 KG/M2 | HEIGHT: 72 IN

## 2020-01-01 VITALS — BODY MASS INDEX: 42.66 KG/M2 | WEIGHT: 315 LBS | HEIGHT: 72 IN

## 2020-01-01 VITALS — HEIGHT: 72 IN | WEIGHT: 315 LBS | BODY MASS INDEX: 42.66 KG/M2

## 2020-01-01 VITALS
SYSTOLIC BLOOD PRESSURE: 118 MMHG | HEIGHT: 72 IN | HEART RATE: 75 BPM | WEIGHT: 315 LBS | DIASTOLIC BLOOD PRESSURE: 73 MMHG | BODY MASS INDEX: 42.66 KG/M2 | OXYGEN SATURATION: 93 % | TEMPERATURE: 98.4 F | RESPIRATION RATE: 18 BRPM

## 2020-01-01 VITALS
DIASTOLIC BLOOD PRESSURE: 92 MMHG | WEIGHT: 315 LBS | SYSTOLIC BLOOD PRESSURE: 150 MMHG | RESPIRATION RATE: 18 BRPM | OXYGEN SATURATION: 94 % | HEART RATE: 75 BPM | HEIGHT: 73 IN | BODY MASS INDEX: 41.75 KG/M2 | TEMPERATURE: 98.6 F

## 2020-01-01 VITALS
DIASTOLIC BLOOD PRESSURE: 78 MMHG | OXYGEN SATURATION: 93 % | TEMPERATURE: 98.5 F | WEIGHT: 315 LBS | SYSTOLIC BLOOD PRESSURE: 154 MMHG | RESPIRATION RATE: 17 BRPM | BODY MASS INDEX: 41.75 KG/M2 | HEIGHT: 73 IN | HEART RATE: 78 BPM

## 2020-01-01 VITALS — WEIGHT: 315 LBS | HEIGHT: 73 IN | BODY MASS INDEX: 41.75 KG/M2

## 2020-01-01 VITALS — HEIGHT: 73 IN | WEIGHT: 250 LBS | BODY MASS INDEX: 33.13 KG/M2

## 2020-01-01 VITALS
RESPIRATION RATE: 1 BRPM | OXYGEN SATURATION: 90 % | SYSTOLIC BLOOD PRESSURE: 126 MMHG | TEMPERATURE: 98.6 F | DIASTOLIC BLOOD PRESSURE: 81 MMHG

## 2020-01-01 VITALS — BODY MASS INDEX: 35.79 KG/M2 | WEIGHT: 270.06 LBS | HEIGHT: 73 IN

## 2020-01-01 VITALS — BODY MASS INDEX: 41.75 KG/M2 | WEIGHT: 315 LBS | HEIGHT: 73 IN

## 2020-01-01 VITALS — BODY MASS INDEX: 35.65 KG/M2 | HEIGHT: 73 IN | WEIGHT: 268.96 LBS

## 2020-01-01 VITALS
OXYGEN SATURATION: 97 % | SYSTOLIC BLOOD PRESSURE: 150 MMHG | DIASTOLIC BLOOD PRESSURE: 74 MMHG | RESPIRATION RATE: 22 BRPM

## 2020-01-01 VITALS — HEIGHT: 73 IN | BODY MASS INDEX: 35.79 KG/M2 | WEIGHT: 270.06 LBS

## 2020-01-01 VITALS — HEIGHT: 72 IN | BODY MASS INDEX: 42.66 KG/M2 | WEIGHT: 315 LBS

## 2020-01-01 LAB
A/G RATIO: 1.4 (ref 1.1–2.2)
A/G RATIO: 1.5 (ref 1.1–2.2)
A/G RATIO: 1.6 (ref 1.1–2.2)
A/G RATIO: 1.7 (ref 1.1–2.2)
ABO/RH: NORMAL
ALBUMIN SERPL-MCNC: 3.7 G/DL (ref 3.4–5)
ALBUMIN SERPL-MCNC: 3.7 G/DL (ref 3.4–5)
ALBUMIN SERPL-MCNC: 3.8 G/DL (ref 3.4–5)
ALBUMIN SERPL-MCNC: 3.8 G/DL (ref 3.4–5)
ALBUMIN SERPL-MCNC: 4 G/DL (ref 3.4–5)
ALBUMIN SERPL-MCNC: 4.5 G/DL (ref 3.4–5)
ALP BLD-CCNC: 101 U/L (ref 40–129)
ALP BLD-CCNC: 110 U/L (ref 40–129)
ALP BLD-CCNC: 85 U/L (ref 40–129)
ALP BLD-CCNC: 85 U/L (ref 40–129)
ALP BLD-CCNC: 90 U/L (ref 40–129)
ALP BLD-CCNC: 94 U/L (ref 40–129)
ALT SERPL-CCNC: 19 U/L (ref 10–40)
ALT SERPL-CCNC: 23 U/L (ref 10–40)
ALT SERPL-CCNC: 27 U/L (ref 10–40)
ALT SERPL-CCNC: 27 U/L (ref 10–40)
ALT SERPL-CCNC: 28 U/L (ref 10–40)
ALT SERPL-CCNC: 39 U/L (ref 10–40)
ANION GAP SERPL CALCULATED.3IONS-SCNC: 11 MMOL/L (ref 3–16)
ANION GAP SERPL CALCULATED.3IONS-SCNC: 11 MMOL/L (ref 3–16)
ANION GAP SERPL CALCULATED.3IONS-SCNC: 12 MMOL/L (ref 3–16)
ANION GAP SERPL CALCULATED.3IONS-SCNC: 15 MMOL/L (ref 3–16)
ANION GAP SERPL CALCULATED.3IONS-SCNC: 8 MMOL/L (ref 3–16)
ANTIBODY SCREEN: NORMAL
AST SERPL-CCNC: 19 U/L (ref 15–37)
AST SERPL-CCNC: 20 U/L (ref 15–37)
AST SERPL-CCNC: 25 U/L (ref 15–37)
AST SERPL-CCNC: 27 U/L (ref 15–37)
AST SERPL-CCNC: 29 U/L (ref 15–37)
AST SERPL-CCNC: 31 U/L (ref 15–37)
BASOPHILS ABSOLUTE: 0 K/UL (ref 0–0.2)
BASOPHILS ABSOLUTE: 0.1 K/UL (ref 0–0.2)
BASOPHILS RELATIVE PERCENT: 0.4 %
BASOPHILS RELATIVE PERCENT: 0.5 %
BASOPHILS RELATIVE PERCENT: 0.6 %
BASOPHILS RELATIVE PERCENT: 0.6 %
BASOPHILS RELATIVE PERCENT: 0.7 %
BASOPHILS RELATIVE PERCENT: 0.8 %
BASOPHILS RELATIVE PERCENT: 0.9 %
BILIRUB SERPL-MCNC: 0.6 MG/DL (ref 0–1)
BILIRUB SERPL-MCNC: 1 MG/DL (ref 0–1)
BILIRUB SERPL-MCNC: 1.1 MG/DL (ref 0–1)
BILIRUB SERPL-MCNC: 1.1 MG/DL (ref 0–1)
BILIRUB SERPL-MCNC: 1.4 MG/DL (ref 0–1)
BILIRUB SERPL-MCNC: 1.5 MG/DL (ref 0–1)
BILIRUBIN URINE: ABNORMAL
BLOOD, URINE: NEGATIVE
BUN BLDV-MCNC: 11 MG/DL (ref 7–20)
BUN BLDV-MCNC: 14 MG/DL (ref 7–20)
BUN BLDV-MCNC: 15 MG/DL (ref 7–20)
BUN BLDV-MCNC: 17 MG/DL (ref 7–20)
BUN BLDV-MCNC: 18 MG/DL (ref 7–20)
BUN BLDV-MCNC: 8 MG/DL (ref 7–20)
BUN BLDV-MCNC: 8 MG/DL (ref 7–20)
CALCIUM SERPL-MCNC: 8.6 MG/DL (ref 8.3–10.6)
CALCIUM SERPL-MCNC: 8.7 MG/DL (ref 8.3–10.6)
CALCIUM SERPL-MCNC: 8.7 MG/DL (ref 8.3–10.6)
CALCIUM SERPL-MCNC: 8.9 MG/DL (ref 8.3–10.6)
CALCIUM SERPL-MCNC: 9 MG/DL (ref 8.3–10.6)
CALCIUM SERPL-MCNC: 9.1 MG/DL (ref 8.3–10.6)
CALCIUM SERPL-MCNC: 9.7 MG/DL (ref 8.3–10.6)
CHLORIDE BLD-SCNC: 102 MMOL/L (ref 99–110)
CHLORIDE BLD-SCNC: 103 MMOL/L (ref 99–110)
CHLORIDE BLD-SCNC: 103 MMOL/L (ref 99–110)
CHLORIDE BLD-SCNC: 104 MMOL/L (ref 99–110)
CHLORIDE BLD-SCNC: 104 MMOL/L (ref 99–110)
CHLORIDE BLD-SCNC: 107 MMOL/L (ref 99–110)
CHLORIDE BLD-SCNC: 97 MMOL/L (ref 99–110)
CLARITY: CLEAR
CO2: 24 MMOL/L (ref 21–32)
CO2: 25 MMOL/L (ref 21–32)
CO2: 26 MMOL/L (ref 21–32)
CO2: 26 MMOL/L (ref 21–32)
CO2: 27 MMOL/L (ref 21–32)
CO2: 29 MMOL/L (ref 21–32)
CO2: 31 MMOL/L (ref 21–32)
COLOR: YELLOW
CREAT SERPL-MCNC: 0.7 MG/DL (ref 0.8–1.3)
CREAT SERPL-MCNC: 0.8 MG/DL (ref 0.8–1.3)
CREAT SERPL-MCNC: 0.9 MG/DL (ref 0.8–1.3)
CREAT SERPL-MCNC: 0.9 MG/DL (ref 0.8–1.3)
EKG ATRIAL RATE: 72 BPM
EKG ATRIAL RATE: 75 BPM
EKG DIAGNOSIS: NORMAL
EKG DIAGNOSIS: NORMAL
EKG P AXIS: -15 DEGREES
EKG P AXIS: -21 DEGREES
EKG P-R INTERVAL: 146 MS
EKG P-R INTERVAL: 162 MS
EKG Q-T INTERVAL: 370 MS
EKG Q-T INTERVAL: 388 MS
EKG QRS DURATION: 84 MS
EKG QRS DURATION: 86 MS
EKG QTC CALCULATION (BAZETT): 405 MS
EKG QTC CALCULATION (BAZETT): 433 MS
EKG R AXIS: -11 DEGREES
EKG R AXIS: -8 DEGREES
EKG T AXIS: 20 DEGREES
EKG T AXIS: 21 DEGREES
EKG VENTRICULAR RATE: 72 BPM
EKG VENTRICULAR RATE: 75 BPM
EOSINOPHILS ABSOLUTE: 0 K/UL (ref 0–0.6)
EOSINOPHILS ABSOLUTE: 0.1 K/UL (ref 0–0.6)
EOSINOPHILS ABSOLUTE: 0.2 K/UL (ref 0–0.6)
EOSINOPHILS ABSOLUTE: 0.2 K/UL (ref 0–0.6)
EOSINOPHILS ABSOLUTE: 0.3 K/UL (ref 0–0.6)
EOSINOPHILS RELATIVE PERCENT: 0.1 %
EOSINOPHILS RELATIVE PERCENT: 1 %
EOSINOPHILS RELATIVE PERCENT: 1.7 %
EOSINOPHILS RELATIVE PERCENT: 2.4 %
EOSINOPHILS RELATIVE PERCENT: 4.3 %
EOSINOPHILS RELATIVE PERCENT: 4.6 %
EOSINOPHILS RELATIVE PERCENT: 4.7 %
GFR AFRICAN AMERICAN: >60
GFR NON-AFRICAN AMERICAN: >60
GLOBULIN: 2.4 G/DL
GLOBULIN: 2.4 G/DL
GLOBULIN: 2.5 G/DL
GLOBULIN: 2.5 G/DL
GLOBULIN: 2.6 G/DL
GLOBULIN: 2.9 G/DL
GLUCOSE BLD-MCNC: 100 MG/DL (ref 70–99)
GLUCOSE BLD-MCNC: 104 MG/DL (ref 70–99)
GLUCOSE BLD-MCNC: 110 MG/DL (ref 70–99)
GLUCOSE BLD-MCNC: 125 MG/DL (ref 70–99)
GLUCOSE BLD-MCNC: 77 MG/DL (ref 70–99)
GLUCOSE BLD-MCNC: 84 MG/DL (ref 70–99)
GLUCOSE BLD-MCNC: 97 MG/DL (ref 70–99)
GLUCOSE URINE: NEGATIVE MG/DL
HCT VFR BLD CALC: 41.3 % (ref 40.5–52.5)
HCT VFR BLD CALC: 42.9 % (ref 40.5–52.5)
HCT VFR BLD CALC: 44.9 % (ref 40.5–52.5)
HCT VFR BLD CALC: 45.6 % (ref 40.5–52.5)
HCT VFR BLD CALC: 45.7 % (ref 40.5–52.5)
HCT VFR BLD CALC: 46.9 % (ref 40.5–52.5)
HCT VFR BLD CALC: 48.6 % (ref 40.5–52.5)
HCT VFR BLD CALC: 53.6 % (ref 40.5–52.5)
HEMOGLOBIN: 14.2 G/DL (ref 13.5–17.5)
HEMOGLOBIN: 14.5 G/DL (ref 13.5–17.5)
HEMOGLOBIN: 15.5 G/DL (ref 13.5–17.5)
HEMOGLOBIN: 15.7 G/DL (ref 13.5–17.5)
HEMOGLOBIN: 15.7 G/DL (ref 13.5–17.5)
HEMOGLOBIN: 16.1 G/DL (ref 13.5–17.5)
HEMOGLOBIN: 16.6 G/DL (ref 13.5–17.5)
HEMOGLOBIN: 18.1 G/DL (ref 13.5–17.5)
KETONES, URINE: 15 MG/DL
LEUKOCYTE ESTERASE, URINE: NEGATIVE
LIPASE: 17 U/L (ref 13–60)
LYMPHOCYTES ABSOLUTE: 0.6 K/UL (ref 1–5.1)
LYMPHOCYTES ABSOLUTE: 0.9 K/UL (ref 1–5.1)
LYMPHOCYTES ABSOLUTE: 1 K/UL (ref 1–5.1)
LYMPHOCYTES ABSOLUTE: 1.1 K/UL (ref 1–5.1)
LYMPHOCYTES ABSOLUTE: 1.1 K/UL (ref 1–5.1)
LYMPHOCYTES ABSOLUTE: 1.2 K/UL (ref 1–5.1)
LYMPHOCYTES ABSOLUTE: 1.2 K/UL (ref 1–5.1)
LYMPHOCYTES RELATIVE PERCENT: 10.6 %
LYMPHOCYTES RELATIVE PERCENT: 10.9 %
LYMPHOCYTES RELATIVE PERCENT: 14.4 %
LYMPHOCYTES RELATIVE PERCENT: 15.8 %
LYMPHOCYTES RELATIVE PERCENT: 18.3 %
LYMPHOCYTES RELATIVE PERCENT: 5.4 %
LYMPHOCYTES RELATIVE PERCENT: 7.1 %
MAGNESIUM: 1.9 MG/DL (ref 1.8–2.4)
MAGNESIUM: 1.9 MG/DL (ref 1.8–2.4)
MAGNESIUM: 2 MG/DL (ref 1.8–2.4)
MCH RBC QN AUTO: 30.7 PG (ref 26–34)
MCH RBC QN AUTO: 31 PG (ref 26–34)
MCH RBC QN AUTO: 31.4 PG (ref 26–34)
MCH RBC QN AUTO: 31.4 PG (ref 26–34)
MCH RBC QN AUTO: 31.5 PG (ref 26–34)
MCH RBC QN AUTO: 31.8 PG (ref 26–34)
MCHC RBC AUTO-ENTMCNC: 33.7 G/DL (ref 31–36)
MCHC RBC AUTO-ENTMCNC: 33.9 G/DL (ref 31–36)
MCHC RBC AUTO-ENTMCNC: 34.2 G/DL (ref 31–36)
MCHC RBC AUTO-ENTMCNC: 34.3 G/DL (ref 31–36)
MCHC RBC AUTO-ENTMCNC: 34.3 G/DL (ref 31–36)
MCHC RBC AUTO-ENTMCNC: 34.4 G/DL (ref 31–36)
MCHC RBC AUTO-ENTMCNC: 34.4 G/DL (ref 31–36)
MCHC RBC AUTO-ENTMCNC: 34.5 G/DL (ref 31–36)
MCV RBC AUTO: 90.3 FL (ref 80–100)
MCV RBC AUTO: 90.4 FL (ref 80–100)
MCV RBC AUTO: 90.8 FL (ref 80–100)
MCV RBC AUTO: 91.1 FL (ref 80–100)
MCV RBC AUTO: 91.7 FL (ref 80–100)
MCV RBC AUTO: 91.9 FL (ref 80–100)
MCV RBC AUTO: 92 FL (ref 80–100)
MCV RBC AUTO: 92.5 FL (ref 80–100)
MICROSCOPIC EXAMINATION: ABNORMAL
MONOCYTES ABSOLUTE: 0.5 K/UL (ref 0–1.3)
MONOCYTES ABSOLUTE: 0.6 K/UL (ref 0–1.3)
MONOCYTES ABSOLUTE: 0.6 K/UL (ref 0–1.3)
MONOCYTES ABSOLUTE: 0.7 K/UL (ref 0–1.3)
MONOCYTES ABSOLUTE: 0.8 K/UL (ref 0–1.3)
MONOCYTES ABSOLUTE: 0.8 K/UL (ref 0–1.3)
MONOCYTES ABSOLUTE: 1.1 K/UL (ref 0–1.3)
MONOCYTES RELATIVE PERCENT: 10 %
MONOCYTES RELATIVE PERCENT: 6.1 %
MONOCYTES RELATIVE PERCENT: 6.8 %
MONOCYTES RELATIVE PERCENT: 7.7 %
MONOCYTES RELATIVE PERCENT: 7.7 %
MONOCYTES RELATIVE PERCENT: 8.6 %
MONOCYTES RELATIVE PERCENT: 8.8 %
NEUTROPHILS ABSOLUTE: 10.2 K/UL (ref 1.7–7.7)
NEUTROPHILS ABSOLUTE: 11.2 K/UL (ref 1.7–7.7)
NEUTROPHILS ABSOLUTE: 4 K/UL (ref 1.7–7.7)
NEUTROPHILS ABSOLUTE: 5 K/UL (ref 1.7–7.7)
NEUTROPHILS ABSOLUTE: 5.9 K/UL (ref 1.7–7.7)
NEUTROPHILS ABSOLUTE: 7.4 K/UL (ref 1.7–7.7)
NEUTROPHILS ABSOLUTE: 8.9 K/UL (ref 1.7–7.7)
NEUTROPHILS RELATIVE PERCENT: 67.3 %
NEUTROPHILS RELATIVE PERCENT: 70.2 %
NEUTROPHILS RELATIVE PERCENT: 73 %
NEUTROPHILS RELATIVE PERCENT: 78.5 %
NEUTROPHILS RELATIVE PERCENT: 78.9 %
NEUTROPHILS RELATIVE PERCENT: 84.4 %
NEUTROPHILS RELATIVE PERCENT: 86.2 %
NITRITE, URINE: NEGATIVE
PDW BLD-RTO: 13 % (ref 12.4–15.4)
PDW BLD-RTO: 13 % (ref 12.4–15.4)
PDW BLD-RTO: 13.1 % (ref 12.4–15.4)
PDW BLD-RTO: 13.2 % (ref 12.4–15.4)
PDW BLD-RTO: 13.2 % (ref 12.4–15.4)
PDW BLD-RTO: 13.3 % (ref 12.4–15.4)
PH UA: 5.5 (ref 5–8)
PLATELET # BLD: 173 K/UL (ref 135–450)
PLATELET # BLD: 177 K/UL (ref 135–450)
PLATELET # BLD: 178 K/UL (ref 135–450)
PLATELET # BLD: 178 K/UL (ref 135–450)
PLATELET # BLD: 180 K/UL (ref 135–450)
PLATELET # BLD: 191 K/UL (ref 135–450)
PLATELET # BLD: 207 K/UL (ref 135–450)
PLATELET # BLD: 213 K/UL (ref 135–450)
PMV BLD AUTO: 10.1 FL (ref 5–10.5)
PMV BLD AUTO: 9.3 FL (ref 5–10.5)
PMV BLD AUTO: 9.3 FL (ref 5–10.5)
PMV BLD AUTO: 9.4 FL (ref 5–10.5)
PMV BLD AUTO: 9.5 FL (ref 5–10.5)
PMV BLD AUTO: 9.5 FL (ref 5–10.5)
PMV BLD AUTO: 9.6 FL (ref 5–10.5)
PMV BLD AUTO: 9.7 FL (ref 5–10.5)
POTASSIUM SERPL-SCNC: 3.9 MMOL/L (ref 3.5–5.1)
POTASSIUM SERPL-SCNC: 3.9 MMOL/L (ref 3.5–5.1)
POTASSIUM SERPL-SCNC: 4 MMOL/L (ref 3.5–5.1)
POTASSIUM SERPL-SCNC: 4.3 MMOL/L (ref 3.5–5.1)
POTASSIUM SERPL-SCNC: 4.3 MMOL/L (ref 3.5–5.1)
POTASSIUM SERPL-SCNC: 4.5 MMOL/L (ref 3.5–5.1)
POTASSIUM SERPL-SCNC: 4.5 MMOL/L (ref 3.5–5.1)
PROTEIN UA: NEGATIVE MG/DL
RBC # BLD: 4.57 M/UL (ref 4.2–5.9)
RBC # BLD: 4.72 M/UL (ref 4.2–5.9)
RBC # BLD: 4.93 M/UL (ref 4.2–5.9)
RBC # BLD: 4.97 M/UL (ref 4.2–5.9)
RBC # BLD: 5.05 M/UL (ref 4.2–5.9)
RBC # BLD: 5.07 M/UL (ref 4.2–5.9)
RBC # BLD: 5.3 M/UL (ref 4.2–5.9)
RBC # BLD: 5.83 M/UL (ref 4.2–5.9)
SARS-COV-2, NAA: NOT DETECTED
SARS-COV-2, NAAT: NOT DETECTED
SARS-COV-2: NOT DETECTED
SODIUM BLD-SCNC: 134 MMOL/L (ref 136–145)
SODIUM BLD-SCNC: 138 MMOL/L (ref 136–145)
SODIUM BLD-SCNC: 140 MMOL/L (ref 136–145)
SODIUM BLD-SCNC: 141 MMOL/L (ref 136–145)
SODIUM BLD-SCNC: 141 MMOL/L (ref 136–145)
SODIUM BLD-SCNC: 143 MMOL/L (ref 136–145)
SODIUM BLD-SCNC: 144 MMOL/L (ref 136–145)
SOURCE: NORMAL
SPECIFIC GRAVITY UA: >=1.03 (ref 1–1.03)
TOTAL PROTEIN: 6.1 G/DL (ref 6.4–8.2)
TOTAL PROTEIN: 6.3 G/DL (ref 6.4–8.2)
TOTAL PROTEIN: 6.4 G/DL (ref 6.4–8.2)
TOTAL PROTEIN: 7.4 G/DL (ref 6.4–8.2)
URINE TYPE: ABNORMAL
UROBILINOGEN, URINE: 0.2 E.U./DL
WBC # BLD: 11.3 K/UL (ref 4–11)
WBC # BLD: 11.8 K/UL (ref 4–11)
WBC # BLD: 13.3 K/UL (ref 4–11)
WBC # BLD: 5.9 K/UL (ref 4–11)
WBC # BLD: 7.2 K/UL (ref 4–11)
WBC # BLD: 8.1 K/UL (ref 4–11)
WBC # BLD: 9.2 K/UL (ref 4–11)
WBC # BLD: 9.4 K/UL (ref 4–11)

## 2020-01-01 PROCEDURE — 6360000002 HC RX W HCPCS: Performed by: ORTHOPAEDIC SURGERY

## 2020-01-01 PROCEDURE — 2500000003 HC RX 250 WO HCPCS: Performed by: ORTHOPAEDIC SURGERY

## 2020-01-01 PROCEDURE — 6370000000 HC RX 637 (ALT 250 FOR IP): Performed by: ORTHOPAEDIC SURGERY

## 2020-01-01 PROCEDURE — 2720000010 HC SURG SUPPLY STERILE: Performed by: ORTHOPAEDIC SURGERY

## 2020-01-01 PROCEDURE — 97110 THERAPEUTIC EXERCISES: CPT | Performed by: PHYSICAL THERAPIST

## 2020-01-01 PROCEDURE — 64415 NJX AA&/STRD BRCH PLXS IMG: CPT | Performed by: ANESTHESIOLOGY

## 2020-01-01 PROCEDURE — 2580000003 HC RX 258: Performed by: ANESTHESIOLOGY

## 2020-01-01 PROCEDURE — 2580000003 HC RX 258: Performed by: ORTHOPAEDIC SURGERY

## 2020-01-01 PROCEDURE — 83735 ASSAY OF MAGNESIUM: CPT

## 2020-01-01 PROCEDURE — 1036F TOBACCO NON-USER: CPT | Performed by: PHYSICIAN ASSISTANT

## 2020-01-01 PROCEDURE — G8417 CALC BMI ABV UP PARAM F/U: HCPCS | Performed by: ORTHOPAEDIC SURGERY

## 2020-01-01 PROCEDURE — 85025 COMPLETE CBC W/AUTO DIFF WBC: CPT

## 2020-01-01 PROCEDURE — 1200000000 HC SEMI PRIVATE

## 2020-01-01 PROCEDURE — 3017F COLORECTAL CA SCREEN DOC REV: CPT | Performed by: ORTHOPAEDIC SURGERY

## 2020-01-01 PROCEDURE — 36415 COLL VENOUS BLD VENIPUNCTURE: CPT

## 2020-01-01 PROCEDURE — 80053 COMPREHEN METABOLIC PANEL: CPT

## 2020-01-01 PROCEDURE — 86850 RBC ANTIBODY SCREEN: CPT

## 2020-01-01 PROCEDURE — G8484 FLU IMMUNIZE NO ADMIN: HCPCS | Performed by: ORTHOPAEDIC SURGERY

## 2020-01-01 PROCEDURE — 97530 THERAPEUTIC ACTIVITIES: CPT

## 2020-01-01 PROCEDURE — 6360000002 HC RX W HCPCS: Performed by: ANESTHESIOLOGY

## 2020-01-01 PROCEDURE — 80048 BASIC METABOLIC PNL TOTAL CA: CPT

## 2020-01-01 PROCEDURE — 97112 NEUROMUSCULAR REEDUCATION: CPT | Performed by: PHYSICAL THERAPIST

## 2020-01-01 PROCEDURE — 97140 MANUAL THERAPY 1/> REGIONS: CPT | Performed by: PHYSICAL THERAPIST

## 2020-01-01 PROCEDURE — 96375 TX/PRO/DX INJ NEW DRUG ADDON: CPT

## 2020-01-01 PROCEDURE — 97164 PT RE-EVAL EST PLAN CARE: CPT | Performed by: PHYSICAL THERAPIST

## 2020-01-01 PROCEDURE — G8427 DOCREV CUR MEDS BY ELIG CLIN: HCPCS | Performed by: ORTHOPAEDIC SURGERY

## 2020-01-01 PROCEDURE — G8428 CUR MEDS NOT DOCUMENT: HCPCS | Performed by: NURSE PRACTITIONER

## 2020-01-01 PROCEDURE — 1111F DSCHRG MED/CURRENT MED MERGE: CPT | Performed by: ORTHOPAEDIC SURGERY

## 2020-01-01 PROCEDURE — 99213 OFFICE O/P EST LOW 20 MIN: CPT | Performed by: ORTHOPAEDIC SURGERY

## 2020-01-01 PROCEDURE — 2500000003 HC RX 250 WO HCPCS: Performed by: INTERNAL MEDICINE

## 2020-01-01 PROCEDURE — 93005 ELECTROCARDIOGRAM TRACING: CPT | Performed by: ANESTHESIOLOGY

## 2020-01-01 PROCEDURE — G8417 CALC BMI ABV UP PARAM F/U: HCPCS | Performed by: SURGERY

## 2020-01-01 PROCEDURE — 73200 CT UPPER EXTREMITY W/O DYE: CPT

## 2020-01-01 PROCEDURE — 2580000003 HC RX 258: Performed by: REGISTERED NURSE

## 2020-01-01 PROCEDURE — 6360000002 HC RX W HCPCS: Performed by: NURSE ANESTHETIST, CERTIFIED REGISTERED

## 2020-01-01 PROCEDURE — L1812 KO ELASTIC W/JOINTS PRE OTS: HCPCS | Performed by: ORTHOPAEDIC SURGERY

## 2020-01-01 PROCEDURE — 6370000000 HC RX 637 (ALT 250 FOR IP): Performed by: NURSE PRACTITIONER

## 2020-01-01 PROCEDURE — 99212 OFFICE O/P EST SF 10 MIN: CPT | Performed by: ORTHOPAEDIC SURGERY

## 2020-01-01 PROCEDURE — 6360000002 HC RX W HCPCS: Performed by: INTERNAL MEDICINE

## 2020-01-01 PROCEDURE — G8417 CALC BMI ABV UP PARAM F/U: HCPCS | Performed by: NURSE PRACTITIONER

## 2020-01-01 PROCEDURE — 1123F ACP DISCUSS/DSCN MKR DOCD: CPT | Performed by: ORTHOPAEDIC SURGERY

## 2020-01-01 PROCEDURE — 2709999900 HC NON-CHARGEABLE SUPPLY: Performed by: ORTHOPAEDIC SURGERY

## 2020-01-01 PROCEDURE — 3017F COLORECTAL CA SCREEN DOC REV: CPT | Performed by: PHYSICIAN ASSISTANT

## 2020-01-01 PROCEDURE — 99232 SBSQ HOSP IP/OBS MODERATE 35: CPT | Performed by: SURGERY

## 2020-01-01 PROCEDURE — 97162 PT EVAL MOD COMPLEX 30 MIN: CPT

## 2020-01-01 PROCEDURE — 1036F TOBACCO NON-USER: CPT | Performed by: ORTHOPAEDIC SURGERY

## 2020-01-01 PROCEDURE — 74018 RADEX ABDOMEN 1 VIEW: CPT

## 2020-01-01 PROCEDURE — 74177 CT ABD & PELVIS W/CONTRAST: CPT

## 2020-01-01 PROCEDURE — APPNB30 APP NON BILLABLE TIME 0-30 MINS: Performed by: PHYSICIAN ASSISTANT

## 2020-01-01 PROCEDURE — 6360000002 HC RX W HCPCS: Performed by: NURSE PRACTITIONER

## 2020-01-01 PROCEDURE — 6360000004 HC RX CONTRAST MEDICATION: Performed by: EMERGENCY MEDICINE

## 2020-01-01 PROCEDURE — 93010 ELECTROCARDIOGRAM REPORT: CPT | Performed by: INTERNAL MEDICINE

## 2020-01-01 PROCEDURE — 97166 OT EVAL MOD COMPLEX 45 MIN: CPT

## 2020-01-01 PROCEDURE — 3700000001 HC ADD 15 MINUTES (ANESTHESIA): Performed by: ORTHOPAEDIC SURGERY

## 2020-01-01 PROCEDURE — 6370000000 HC RX 637 (ALT 250 FOR IP): Performed by: INTERNAL MEDICINE

## 2020-01-01 PROCEDURE — 97110 THERAPEUTIC EXERCISES: CPT

## 2020-01-01 PROCEDURE — 99222 1ST HOSP IP/OBS MODERATE 55: CPT | Performed by: SURGERY

## 2020-01-01 PROCEDURE — 0RRK00Z REPLACEMENT OF LEFT SHOULDER JOINT WITH REVERSE BALL AND SOCKET SYNTHETIC SUBSTITUTE, OPEN APPROACH: ICD-10-PCS | Performed by: ORTHOPAEDIC SURGERY

## 2020-01-01 PROCEDURE — 4040F PNEUMOC VAC/ADMIN/RCVD: CPT | Performed by: ORTHOPAEDIC SURGERY

## 2020-01-01 PROCEDURE — 6360000002 HC RX W HCPCS: Performed by: SURGERY

## 2020-01-01 PROCEDURE — 99214 OFFICE O/P EST MOD 30 MIN: CPT | Performed by: ORTHOPAEDIC SURGERY

## 2020-01-01 PROCEDURE — 99024 POSTOP FOLLOW-UP VISIT: CPT | Performed by: ORTHOPAEDIC SURGERY

## 2020-01-01 PROCEDURE — 2700000000 HC OXYGEN THERAPY PER DAY

## 2020-01-01 PROCEDURE — 6360000002 HC RX W HCPCS: Performed by: PHYSICIAN ASSISTANT

## 2020-01-01 PROCEDURE — 36592 COLLECT BLOOD FROM PICC: CPT

## 2020-01-01 PROCEDURE — L4361 PNEUMA/VAC WALK BOOT PRE OTS: HCPCS | Performed by: ORTHOPAEDIC SURGERY

## 2020-01-01 PROCEDURE — 97161 PT EVAL LOW COMPLEX 20 MIN: CPT | Performed by: PHYSICAL THERAPIST

## 2020-01-01 PROCEDURE — 96374 THER/PROPH/DIAG INJ IV PUSH: CPT

## 2020-01-01 PROCEDURE — C1713 ANCHOR/SCREW BN/BN,TIS/BN: HCPCS | Performed by: ORTHOPAEDIC SURGERY

## 2020-01-01 PROCEDURE — G8427 DOCREV CUR MEDS BY ELIG CLIN: HCPCS | Performed by: SURGERY

## 2020-01-01 PROCEDURE — 99238 HOSP IP/OBS DSCHRG MGMT 30/<: CPT | Performed by: SURGERY

## 2020-01-01 PROCEDURE — 2580000003 HC RX 258: Performed by: INTERNAL MEDICINE

## 2020-01-01 PROCEDURE — 2500000003 HC RX 250 WO HCPCS: Performed by: NURSE ANESTHETIST, CERTIFIED REGISTERED

## 2020-01-01 PROCEDURE — G8417 CALC BMI ABV UP PARAM F/U: HCPCS | Performed by: PHYSICIAN ASSISTANT

## 2020-01-01 PROCEDURE — 99285 EMERGENCY DEPT VISIT HI MDM: CPT

## 2020-01-01 PROCEDURE — 7100000000 HC PACU RECOVERY - FIRST 15 MIN: Performed by: ORTHOPAEDIC SURGERY

## 2020-01-01 PROCEDURE — 99213 OFFICE O/P EST LOW 20 MIN: CPT | Performed by: SURGERY

## 2020-01-01 PROCEDURE — 4040F PNEUMOC VAC/ADMIN/RCVD: CPT | Performed by: SURGERY

## 2020-01-01 PROCEDURE — 7100000001 HC PACU RECOVERY - ADDTL 15 MIN: Performed by: ORTHOPAEDIC SURGERY

## 2020-01-01 PROCEDURE — 85027 COMPLETE CBC AUTOMATED: CPT

## 2020-01-01 PROCEDURE — 3600000014 HC SURGERY LEVEL 4 ADDTL 15MIN: Performed by: ORTHOPAEDIC SURGERY

## 2020-01-01 PROCEDURE — C1776 JOINT DEVICE (IMPLANTABLE): HCPCS | Performed by: ORTHOPAEDIC SURGERY

## 2020-01-01 PROCEDURE — 97535 SELF CARE MNGMENT TRAINING: CPT

## 2020-01-01 PROCEDURE — 3600000004 HC SURGERY LEVEL 4 BASE: Performed by: ORTHOPAEDIC SURGERY

## 2020-01-01 PROCEDURE — 6370000000 HC RX 637 (ALT 250 FOR IP): Performed by: ANESTHESIOLOGY

## 2020-01-01 PROCEDURE — 64445 NJX AA&/STRD SCIATIC NRV IMG: CPT | Performed by: ANESTHESIOLOGY

## 2020-01-01 PROCEDURE — 7100000010 HC PHASE II RECOVERY - FIRST 15 MIN: Performed by: ORTHOPAEDIC SURGERY

## 2020-01-01 PROCEDURE — 99211 OFF/OP EST MAY X REQ PHY/QHP: CPT | Performed by: NURSE PRACTITIONER

## 2020-01-01 PROCEDURE — 6360000002 HC RX W HCPCS: Performed by: EMERGENCY MEDICINE

## 2020-01-01 PROCEDURE — 3700000000 HC ANESTHESIA ATTENDED CARE: Performed by: ORTHOPAEDIC SURGERY

## 2020-01-01 PROCEDURE — 1123F ACP DISCUSS/DSCN MKR DOCD: CPT | Performed by: PHYSICIAN ASSISTANT

## 2020-01-01 PROCEDURE — 1123F ACP DISCUSS/DSCN MKR DOCD: CPT | Performed by: SURGERY

## 2020-01-01 PROCEDURE — 7100000011 HC PHASE II RECOVERY - ADDTL 15 MIN: Performed by: ORTHOPAEDIC SURGERY

## 2020-01-01 PROCEDURE — 6370000000 HC RX 637 (ALT 250 FOR IP): Performed by: PHYSICIAN ASSISTANT

## 2020-01-01 PROCEDURE — 6360000004 HC RX CONTRAST MEDICATION

## 2020-01-01 PROCEDURE — 29405 APPL SHORT LEG CAST: CPT | Performed by: ORTHOPAEDIC SURGERY

## 2020-01-01 PROCEDURE — 0QBK0ZZ EXCISION OF LEFT FIBULA, OPEN APPROACH: ICD-10-PCS | Performed by: ORTHOPAEDIC SURGERY

## 2020-01-01 PROCEDURE — 3017F COLORECTAL CA SCREEN DOC REV: CPT | Performed by: SURGERY

## 2020-01-01 PROCEDURE — 86901 BLOOD TYPING SEROLOGIC RH(D): CPT

## 2020-01-01 PROCEDURE — 74019 RADEX ABDOMEN 2 VIEWS: CPT

## 2020-01-01 PROCEDURE — C1769 GUIDE WIRE: HCPCS | Performed by: ORTHOPAEDIC SURGERY

## 2020-01-01 PROCEDURE — 94761 N-INVAS EAR/PLS OXIMETRY MLT: CPT

## 2020-01-01 PROCEDURE — U0002 COVID-19 LAB TEST NON-CDC: HCPCS

## 2020-01-01 PROCEDURE — 2580000003 HC RX 258: Performed by: EMERGENCY MEDICINE

## 2020-01-01 PROCEDURE — 81003 URINALYSIS AUTO W/O SCOPE: CPT

## 2020-01-01 PROCEDURE — 1036F TOBACCO NON-USER: CPT | Performed by: SURGERY

## 2020-01-01 PROCEDURE — G8484 FLU IMMUNIZE NO ADMIN: HCPCS | Performed by: PHYSICIAN ASSISTANT

## 2020-01-01 PROCEDURE — 83690 ASSAY OF LIPASE: CPT

## 2020-01-01 PROCEDURE — 74250 X-RAY XM SM INT 1CNTRST STD: CPT

## 2020-01-01 PROCEDURE — 20610 DRAIN/INJ JOINT/BURSA W/O US: CPT | Performed by: ORTHOPAEDIC SURGERY

## 2020-01-01 PROCEDURE — 86900 BLOOD TYPING SEROLOGIC ABO: CPT

## 2020-01-01 PROCEDURE — C1734 ORTH/DEVIC/DRUG BN/BN,TIS/BN: HCPCS | Performed by: ORTHOPAEDIC SURGERY

## 2020-01-01 PROCEDURE — 73020 X-RAY EXAM OF SHOULDER: CPT

## 2020-01-01 PROCEDURE — 4040F PNEUMOC VAC/ADMIN/RCVD: CPT | Performed by: PHYSICIAN ASSISTANT

## 2020-01-01 PROCEDURE — 0SGG0JZ FUSION OF LEFT ANKLE JOINT WITH SYNTHETIC SUBSTITUTE, OPEN APPROACH: ICD-10-PCS | Performed by: ORTHOPAEDIC SURGERY

## 2020-01-01 PROCEDURE — G8427 DOCREV CUR MEDS BY ELIG CLIN: HCPCS | Performed by: PHYSICIAN ASSISTANT

## 2020-01-01 PROCEDURE — 99213 OFFICE O/P EST LOW 20 MIN: CPT | Performed by: PHYSICIAN ASSISTANT

## 2020-01-01 PROCEDURE — 1111F DSCHRG MED/CURRENT MED MERGE: CPT | Performed by: SURGERY

## 2020-01-01 PROCEDURE — 93005 ELECTROCARDIOGRAM TRACING: CPT | Performed by: NURSE PRACTITIONER

## 2020-01-01 PROCEDURE — 97163 PT EVAL HIGH COMPLEX 45 MIN: CPT

## 2020-01-01 DEVICE — SCREW BONE L30MM DIA5MM TI ST FULL THRD PERIPH FOR GLEN: Type: IMPLANTABLE DEVICE | Status: FUNCTIONAL

## 2020-01-01 DEVICE — SCREW BONE L22MM DIA5MM TI ST FULL THRD PERIPH FOR GLEN: Type: IMPLANTABLE DEVICE | Status: FUNCTIONAL

## 2020-01-01 DEVICE — TIM-GRAFT BONE AUGMENT 3ML INJ: Type: IMPLANTABLE DEVICE | Site: ANKLE | Status: FUNCTIONAL

## 2020-01-01 DEVICE — IMPLANTABLE DEVICE: Type: IMPLANTABLE DEVICE | Status: FUNCTIONAL

## 2020-01-01 DEVICE — IMPLANTABLE DEVICE: Type: IMPLANTABLE DEVICE | Site: TIBIA | Status: FUNCTIONAL

## 2020-01-01 DEVICE — BASEPLATE GLEN DIA25MM STD REVERSED AEQUALIS PERFORM: Type: IMPLANTABLE DEVICE | Status: FUNCTIONAL

## 2020-01-01 DEVICE — SCREW BONE L34MM DIA5MM TI ST FULL THRD PERIPH FOR GLEN: Type: IMPLANTABLE DEVICE | Status: FUNCTIONAL

## 2020-01-01 DEVICE — ROD IM DIA5MM COMPR FOR ARTH NAIL SYS PANTA 2: Type: IMPLANTABLE DEVICE | Site: ANKLE | Status: FUNCTIONAL

## 2020-01-01 DEVICE — SPHERE GLEN DIA39MM +3MM LAT OFFSET REVERSED AEQUALIS: Type: IMPLANTABLE DEVICE | Status: FUNCTIONAL

## 2020-01-01 DEVICE — GRAFT BONE TIB INJ 1.5CC ALLOGRFT AUGMENT: Type: IMPLANTABLE DEVICE | Site: ANKLE | Status: FUNCTIONAL

## 2020-01-01 DEVICE — SCREW BONE L35MM DIA6.5MM TI ST FULL THRD CTRL FOR GLEN: Type: IMPLANTABLE DEVICE | Status: FUNCTIONAL

## 2020-01-01 DEVICE — IMPLANTABLE DEVICE: Type: IMPLANTABLE DEVICE | Site: ANKLE | Status: FUNCTIONAL

## 2020-01-01 DEVICE — TRAY HUM THK+0MM 3.5MM OFFSET SHLDR HI REVERSED AEQUALIS: Type: IMPLANTABLE DEVICE | Status: FUNCTIONAL

## 2020-01-01 RX ORDER — GABAPENTIN 300 MG/1
300 CAPSULE ORAL ONCE
Status: DISCONTINUED | OUTPATIENT
Start: 2020-01-01 | End: 2020-01-01 | Stop reason: SDUPTHER

## 2020-01-01 RX ORDER — DIPHENHYDRAMINE HYDROCHLORIDE 50 MG/ML
6.25 INJECTION INTRAMUSCULAR; INTRAVENOUS
Status: DISCONTINUED | OUTPATIENT
Start: 2020-01-01 | End: 2020-01-01 | Stop reason: HOSPADM

## 2020-01-01 RX ORDER — MORPHINE SULFATE 2 MG/ML
1 INJECTION, SOLUTION INTRAMUSCULAR; INTRAVENOUS EVERY 5 MIN PRN
Status: DISCONTINUED | OUTPATIENT
Start: 2020-01-01 | End: 2020-01-01 | Stop reason: HOSPADM

## 2020-01-01 RX ORDER — CELECOXIB 100 MG/1
200 CAPSULE ORAL ONCE
Status: COMPLETED | OUTPATIENT
Start: 2020-01-01 | End: 2020-01-01

## 2020-01-01 RX ORDER — ONDANSETRON 2 MG/ML
INJECTION INTRAMUSCULAR; INTRAVENOUS PRN
Status: DISCONTINUED | OUTPATIENT
Start: 2020-01-01 | End: 2020-01-01 | Stop reason: SDUPTHER

## 2020-01-01 RX ORDER — AMLODIPINE BESYLATE 2.5 MG/1
2.5 TABLET ORAL EVERY EVENING
Status: DISCONTINUED | OUTPATIENT
Start: 2020-01-01 | End: 2020-01-01 | Stop reason: HOSPADM

## 2020-01-01 RX ORDER — MORPHINE SULFATE 2 MG/ML
2 INJECTION, SOLUTION INTRAMUSCULAR; INTRAVENOUS
Status: DISCONTINUED | OUTPATIENT
Start: 2020-01-01 | End: 2020-01-01

## 2020-01-01 RX ORDER — DEXTROSE, SODIUM CHLORIDE, AND POTASSIUM CHLORIDE 5; .45; .15 G/100ML; G/100ML; G/100ML
INJECTION INTRAVENOUS CONTINUOUS
Status: DISCONTINUED | OUTPATIENT
Start: 2020-01-01 | End: 2020-01-01 | Stop reason: HOSPADM

## 2020-01-01 RX ORDER — HYDRALAZINE HYDROCHLORIDE 20 MG/ML
10 INJECTION INTRAMUSCULAR; INTRAVENOUS EVERY 4 HOURS PRN
Status: DISCONTINUED | OUTPATIENT
Start: 2020-01-01 | End: 2020-01-01 | Stop reason: HOSPADM

## 2020-01-01 RX ORDER — MORPHINE SULFATE 4 MG/ML
4 INJECTION, SOLUTION INTRAMUSCULAR; INTRAVENOUS
Status: DISCONTINUED | OUTPATIENT
Start: 2020-01-01 | End: 2020-01-01

## 2020-01-01 RX ORDER — SODIUM CHLORIDE 0.9 % (FLUSH) 0.9 %
10 SYRINGE (ML) INJECTION EVERY 12 HOURS SCHEDULED
Status: DISCONTINUED | OUTPATIENT
Start: 2020-01-01 | End: 2020-01-01 | Stop reason: HOSPADM

## 2020-01-01 RX ORDER — PROMETHAZINE HYDROCHLORIDE 25 MG/ML
12.5 INJECTION, SOLUTION INTRAMUSCULAR; INTRAVENOUS EVERY 4 HOURS PRN
Status: DISCONTINUED | OUTPATIENT
Start: 2020-01-01 | End: 2020-01-01

## 2020-01-01 RX ORDER — ACETAMINOPHEN 650 MG/1
650 SUPPOSITORY RECTAL EVERY 4 HOURS PRN
Status: DISCONTINUED | OUTPATIENT
Start: 2020-01-01 | End: 2020-01-01 | Stop reason: HOSPADM

## 2020-01-01 RX ORDER — OXYCODONE HYDROCHLORIDE 5 MG/1
5 TABLET ORAL EVERY 6 HOURS PRN
Qty: 28 TABLET | Refills: 0 | Status: SHIPPED | OUTPATIENT
Start: 2020-01-01 | End: 2020-01-01

## 2020-01-01 RX ORDER — SODIUM CHLORIDE 0.9 % (FLUSH) 0.9 %
10 SYRINGE (ML) INJECTION PRN
Status: DISCONTINUED | OUTPATIENT
Start: 2020-01-01 | End: 2020-01-01 | Stop reason: HOSPADM

## 2020-01-01 RX ORDER — SODIUM CHLORIDE 450 MG/100ML
INJECTION, SOLUTION INTRAVENOUS CONTINUOUS
Status: DISCONTINUED | OUTPATIENT
Start: 2020-01-01 | End: 2020-01-01

## 2020-01-01 RX ORDER — OXYCODONE HYDROCHLORIDE 5 MG/1
5 TABLET ORAL EVERY 4 HOURS PRN
Status: DISCONTINUED | OUTPATIENT
Start: 2020-01-01 | End: 2020-01-01 | Stop reason: HOSPADM

## 2020-01-01 RX ORDER — OXYCODONE HYDROCHLORIDE AND ACETAMINOPHEN 5; 325 MG/1; MG/1
1 TABLET ORAL PRN
Status: DISCONTINUED | OUTPATIENT
Start: 2020-01-01 | End: 2020-01-01 | Stop reason: HOSPADM

## 2020-01-01 RX ORDER — ACETAMINOPHEN 325 MG/1
650 TABLET ORAL EVERY 4 HOURS PRN
Status: DISCONTINUED | OUTPATIENT
Start: 2020-01-01 | End: 2020-01-01

## 2020-01-01 RX ORDER — MORPHINE SULFATE 2 MG/ML
2 INJECTION, SOLUTION INTRAMUSCULAR; INTRAVENOUS
Status: DISCONTINUED | OUTPATIENT
Start: 2020-01-01 | End: 2020-01-01 | Stop reason: HOSPADM

## 2020-01-01 RX ORDER — FENTANYL CITRATE 50 UG/ML
INJECTION, SOLUTION INTRAMUSCULAR; INTRAVENOUS PRN
Status: DISCONTINUED | OUTPATIENT
Start: 2020-01-01 | End: 2020-01-01 | Stop reason: SDUPTHER

## 2020-01-01 RX ORDER — LABETALOL 100 MG/1
200 TABLET, FILM COATED ORAL 2 TIMES DAILY
Status: DISCONTINUED | OUTPATIENT
Start: 2020-01-01 | End: 2020-01-01 | Stop reason: HOSPADM

## 2020-01-01 RX ORDER — ACETAMINOPHEN 325 MG/1
650 TABLET ORAL EVERY 6 HOURS
Status: DISCONTINUED | OUTPATIENT
Start: 2020-01-01 | End: 2020-01-01 | Stop reason: HOSPADM

## 2020-01-01 RX ORDER — TOBRAMYCIN 1.2 G/30ML
INJECTION, POWDER, LYOPHILIZED, FOR SOLUTION INTRAVENOUS PRN
Status: DISCONTINUED | OUTPATIENT
Start: 2020-01-01 | End: 2020-01-01 | Stop reason: HOSPADM

## 2020-01-01 RX ORDER — AMLODIPINE BESYLATE 2.5 MG/1
2.5 TABLET ORAL 2 TIMES DAILY
Status: DISCONTINUED | OUTPATIENT
Start: 2020-01-01 | End: 2020-01-01 | Stop reason: HOSPADM

## 2020-01-01 RX ORDER — CELECOXIB 100 MG/1
100 CAPSULE ORAL 2 TIMES DAILY
Status: DISCONTINUED | OUTPATIENT
Start: 2020-01-01 | End: 2020-01-01 | Stop reason: HOSPADM

## 2020-01-01 RX ORDER — HYDRALAZINE HYDROCHLORIDE 20 MG/ML
5 INJECTION INTRAMUSCULAR; INTRAVENOUS EVERY 10 MIN PRN
Status: DISCONTINUED | OUTPATIENT
Start: 2020-01-01 | End: 2020-01-01 | Stop reason: HOSPADM

## 2020-01-01 RX ORDER — LABETALOL HYDROCHLORIDE 5 MG/ML
10 INJECTION, SOLUTION INTRAVENOUS EVERY 8 HOURS SCHEDULED
Status: DISCONTINUED | OUTPATIENT
Start: 2020-01-01 | End: 2020-01-01

## 2020-01-01 RX ORDER — PROMETHAZINE HYDROCHLORIDE 25 MG/1
12.5 TABLET ORAL EVERY 6 HOURS PRN
Status: DISCONTINUED | OUTPATIENT
Start: 2020-01-01 | End: 2020-01-01 | Stop reason: HOSPADM

## 2020-01-01 RX ORDER — LIDOCAINE HYDROCHLORIDE 10 MG/ML
0.3 INJECTION, SOLUTION EPIDURAL; INFILTRATION; INTRACAUDAL; PERINEURAL
Status: DISCONTINUED | OUTPATIENT
Start: 2020-01-01 | End: 2020-01-01 | Stop reason: HOSPADM

## 2020-01-01 RX ORDER — MAGNESIUM SULFATE 1 G/100ML
1 INJECTION INTRAVENOUS PRN
Status: DISCONTINUED | OUTPATIENT
Start: 2020-01-01 | End: 2020-01-01

## 2020-01-01 RX ORDER — LABETALOL HYDROCHLORIDE 5 MG/ML
5 INJECTION, SOLUTION INTRAVENOUS EVERY 10 MIN PRN
Status: DISCONTINUED | OUTPATIENT
Start: 2020-01-01 | End: 2020-01-01 | Stop reason: HOSPADM

## 2020-01-01 RX ORDER — LIDOCAINE HYDROCHLORIDE 20 MG/ML
INJECTION, SOLUTION INFILTRATION; PERINEURAL PRN
Status: DISCONTINUED | OUTPATIENT
Start: 2020-01-01 | End: 2020-01-01 | Stop reason: SDUPTHER

## 2020-01-01 RX ORDER — DIPHENHYDRAMINE HYDROCHLORIDE 50 MG/ML
12.5 INJECTION INTRAMUSCULAR; INTRAVENOUS
Status: DISCONTINUED | OUTPATIENT
Start: 2020-01-01 | End: 2020-01-01 | Stop reason: HOSPADM

## 2020-01-01 RX ORDER — AMLODIPINE BESYLATE 2.5 MG/1
2.5 TABLET ORAL DAILY
Status: DISCONTINUED | OUTPATIENT
Start: 2020-01-01 | End: 2020-01-01 | Stop reason: HOSPADM

## 2020-01-01 RX ORDER — OXYCODONE HYDROCHLORIDE 5 MG/1
5-10 TABLET ORAL EVERY 4 HOURS PRN
Qty: 40 TABLET | Refills: 0 | Status: SHIPPED | OUTPATIENT
Start: 2020-01-01 | End: 2020-01-01

## 2020-01-01 RX ORDER — POLYETHYLENE GLYCOL 3350 17 G/17G
17 POWDER, FOR SOLUTION ORAL DAILY PRN
Status: DISCONTINUED | OUTPATIENT
Start: 2020-01-01 | End: 2020-01-01 | Stop reason: HOSPADM

## 2020-01-01 RX ORDER — OXYCODONE HYDROCHLORIDE 5 MG/1
10 TABLET ORAL EVERY 4 HOURS PRN
Status: DISCONTINUED | OUTPATIENT
Start: 2020-01-01 | End: 2020-01-01 | Stop reason: HOSPADM

## 2020-01-01 RX ORDER — 0.9 % SODIUM CHLORIDE 0.9 %
1000 INTRAVENOUS SOLUTION INTRAVENOUS ONCE
Status: COMPLETED | OUTPATIENT
Start: 2020-01-01 | End: 2020-01-01

## 2020-01-01 RX ORDER — GABAPENTIN 300 MG/1
300 CAPSULE ORAL 3 TIMES DAILY
Status: DISCONTINUED | OUTPATIENT
Start: 2020-01-01 | End: 2020-01-01 | Stop reason: HOSPADM

## 2020-01-01 RX ORDER — PROPOFOL 10 MG/ML
INJECTION, EMULSION INTRAVENOUS PRN
Status: DISCONTINUED | OUTPATIENT
Start: 2020-01-01 | End: 2020-01-01 | Stop reason: SDUPTHER

## 2020-01-01 RX ORDER — DEXAMETHASONE SODIUM PHOSPHATE 10 MG/ML
INJECTION INTRAMUSCULAR; INTRAVENOUS PRN
Status: DISCONTINUED | OUTPATIENT
Start: 2020-01-01 | End: 2020-01-01 | Stop reason: SDUPTHER

## 2020-01-01 RX ORDER — MEPERIDINE HYDROCHLORIDE 50 MG/ML
12.5 INJECTION INTRAMUSCULAR; INTRAVENOUS; SUBCUTANEOUS EVERY 5 MIN PRN
Status: DISCONTINUED | OUTPATIENT
Start: 2020-01-01 | End: 2020-01-01 | Stop reason: HOSPADM

## 2020-01-01 RX ORDER — BENZOYL PEROXIDE 50 MG/ML
LIQUID TOPICAL
Qty: 1 BOTTLE | Refills: 0 | Status: ON HOLD | OUTPATIENT
Start: 2020-01-01 | End: 2020-01-01 | Stop reason: HOSPADM

## 2020-01-01 RX ORDER — PROMETHAZINE HYDROCHLORIDE 25 MG/ML
6.25 INJECTION, SOLUTION INTRAMUSCULAR; INTRAVENOUS
Status: DISCONTINUED | OUTPATIENT
Start: 2020-01-01 | End: 2020-01-01 | Stop reason: HOSPADM

## 2020-01-01 RX ORDER — ASPIRIN 325 MG
325 TABLET, DELAYED RELEASE (ENTERIC COATED) ORAL DAILY
Qty: 30 TABLET | Refills: 0 | Status: SHIPPED | OUTPATIENT
Start: 2020-01-01 | End: 2020-01-01

## 2020-01-01 RX ORDER — DEXAMETHASONE SODIUM PHOSPHATE 4 MG/ML
8 INJECTION, SOLUTION INTRA-ARTICULAR; INTRALESIONAL; INTRAMUSCULAR; INTRAVENOUS; SOFT TISSUE ONCE
Status: DISCONTINUED | OUTPATIENT
Start: 2020-01-01 | End: 2020-01-01 | Stop reason: HOSPADM

## 2020-01-01 RX ORDER — ACETAMINOPHEN 500 MG
1000 TABLET ORAL ONCE
Status: DISCONTINUED | OUTPATIENT
Start: 2020-01-01 | End: 2020-01-01 | Stop reason: SDUPTHER

## 2020-01-01 RX ORDER — SODIUM CHLORIDE, SODIUM LACTATE, POTASSIUM CHLORIDE, CALCIUM CHLORIDE 600; 310; 30; 20 MG/100ML; MG/100ML; MG/100ML; MG/100ML
INJECTION, SOLUTION INTRAVENOUS CONTINUOUS
Status: DISCONTINUED | OUTPATIENT
Start: 2020-01-01 | End: 2020-01-01

## 2020-01-01 RX ORDER — ACETAMINOPHEN 325 MG/1
650 TABLET ORAL EVERY 6 HOURS PRN
Qty: 120 TABLET | Refills: 0
Start: 2020-01-01

## 2020-01-01 RX ORDER — MORPHINE SULFATE 4 MG/ML
4 INJECTION, SOLUTION INTRAMUSCULAR; INTRAVENOUS ONCE
Status: COMPLETED | OUTPATIENT
Start: 2020-01-01 | End: 2020-01-01

## 2020-01-01 RX ORDER — DEXAMETHASONE SODIUM PHOSPHATE 4 MG/ML
INJECTION, SOLUTION INTRA-ARTICULAR; INTRALESIONAL; INTRAMUSCULAR; INTRAVENOUS; SOFT TISSUE PRN
Status: DISCONTINUED | OUTPATIENT
Start: 2020-01-01 | End: 2020-01-01 | Stop reason: SDUPTHER

## 2020-01-01 RX ORDER — MORPHINE SULFATE 4 MG/ML
4 INJECTION, SOLUTION INTRAMUSCULAR; INTRAVENOUS
Status: DISCONTINUED | OUTPATIENT
Start: 2020-01-01 | End: 2020-01-01 | Stop reason: HOSPADM

## 2020-01-01 RX ORDER — SENNA AND DOCUSATE SODIUM 50; 8.6 MG/1; MG/1
1 TABLET, FILM COATED ORAL 2 TIMES DAILY
Status: DISCONTINUED | OUTPATIENT
Start: 2020-01-01 | End: 2020-01-01 | Stop reason: HOSPADM

## 2020-01-01 RX ORDER — PROMETHAZINE HYDROCHLORIDE 6.25 MG/5ML
12.5 SYRUP ORAL EVERY 4 HOURS PRN
Status: DISCONTINUED | OUTPATIENT
Start: 2020-01-01 | End: 2020-01-01

## 2020-01-01 RX ORDER — MORPHINE SULFATE 2 MG/ML
2 INJECTION, SOLUTION INTRAMUSCULAR; INTRAVENOUS EVERY 5 MIN PRN
Status: DISCONTINUED | OUTPATIENT
Start: 2020-01-01 | End: 2020-01-01 | Stop reason: HOSPADM

## 2020-01-01 RX ORDER — LABETALOL HYDROCHLORIDE 5 MG/ML
5 INJECTION, SOLUTION INTRAVENOUS
Status: DISCONTINUED | OUTPATIENT
Start: 2020-01-01 | End: 2020-01-01 | Stop reason: HOSPADM

## 2020-01-01 RX ORDER — CLINDAMYCIN HYDROCHLORIDE 300 MG/1
300 CAPSULE ORAL 4 TIMES DAILY
Qty: 4 CAPSULE | Refills: 0 | Status: SHIPPED | OUTPATIENT
Start: 2020-01-01 | End: 2020-01-01

## 2020-01-01 RX ORDER — ACETAMINOPHEN 160 MG/5ML
650 SOLUTION ORAL EVERY 4 HOURS PRN
Status: DISCONTINUED | OUTPATIENT
Start: 2020-01-01 | End: 2020-01-01

## 2020-01-01 RX ORDER — ACETAMINOPHEN 500 MG
1000 TABLET ORAL ONCE
Status: DISCONTINUED | OUTPATIENT
Start: 2020-01-01 | End: 2020-01-01 | Stop reason: HOSPADM

## 2020-01-01 RX ORDER — OXYCODONE HYDROCHLORIDE AND ACETAMINOPHEN 5; 325 MG/1; MG/1
2 TABLET ORAL PRN
Status: DISCONTINUED | OUTPATIENT
Start: 2020-01-01 | End: 2020-01-01 | Stop reason: HOSPADM

## 2020-01-01 RX ORDER — ONDANSETRON 2 MG/ML
4 INJECTION INTRAMUSCULAR; INTRAVENOUS ONCE
Status: COMPLETED | OUTPATIENT
Start: 2020-01-01 | End: 2020-01-01

## 2020-01-01 RX ORDER — ONDANSETRON 2 MG/ML
4 INJECTION INTRAMUSCULAR; INTRAVENOUS EVERY 4 HOURS PRN
Status: DISCONTINUED | OUTPATIENT
Start: 2020-01-01 | End: 2020-01-01 | Stop reason: HOSPADM

## 2020-01-01 RX ORDER — PROMETHAZINE HYDROCHLORIDE 25 MG/1
12.5 TABLET ORAL EVERY 4 HOURS PRN
Status: DISCONTINUED | OUTPATIENT
Start: 2020-01-01 | End: 2020-01-01

## 2020-01-01 RX ORDER — ACETAMINOPHEN 10 MG/ML
1000 INJECTION, SOLUTION INTRAVENOUS
Status: COMPLETED | OUTPATIENT
Start: 2020-01-01 | End: 2020-01-01

## 2020-01-01 RX ORDER — ACETAMINOPHEN 325 MG/1
650 TABLET ORAL EVERY 4 HOURS PRN
Status: DISCONTINUED | OUTPATIENT
Start: 2020-01-01 | End: 2020-01-01 | Stop reason: HOSPADM

## 2020-01-01 RX ORDER — POTASSIUM CHLORIDE 7.45 MG/ML
10 INJECTION INTRAVENOUS PRN
Status: DISCONTINUED | OUTPATIENT
Start: 2020-01-01 | End: 2020-01-01

## 2020-01-01 RX ORDER — MIDAZOLAM HYDROCHLORIDE 1 MG/ML
INJECTION INTRAMUSCULAR; INTRAVENOUS PRN
Status: DISCONTINUED | OUTPATIENT
Start: 2020-01-01 | End: 2020-01-01 | Stop reason: SDUPTHER

## 2020-01-01 RX ORDER — ONDANSETRON 2 MG/ML
4 INJECTION INTRAMUSCULAR; INTRAVENOUS EVERY 30 MIN PRN
Status: DISCONTINUED | OUTPATIENT
Start: 2020-01-01 | End: 2020-01-01 | Stop reason: HOSPADM

## 2020-01-01 RX ORDER — ONDANSETRON 4 MG/1
4 TABLET, ORALLY DISINTEGRATING ORAL EVERY 4 HOURS PRN
Status: DISCONTINUED | OUTPATIENT
Start: 2020-01-01 | End: 2020-01-01

## 2020-01-01 RX ORDER — SODIUM CHLORIDE, SODIUM LACTATE, POTASSIUM CHLORIDE, CALCIUM CHLORIDE 600; 310; 30; 20 MG/100ML; MG/100ML; MG/100ML; MG/100ML
2000 INJECTION, SOLUTION INTRAVENOUS ONCE
Status: DISCONTINUED | OUTPATIENT
Start: 2020-01-01 | End: 2020-01-01

## 2020-01-01 RX ORDER — GABAPENTIN 300 MG/1
300 CAPSULE ORAL ONCE
Status: COMPLETED | OUTPATIENT
Start: 2020-01-01 | End: 2020-01-01

## 2020-01-01 RX ORDER — ONDANSETRON 2 MG/ML
4 INJECTION INTRAMUSCULAR; INTRAVENOUS PRN
Status: DISCONTINUED | OUTPATIENT
Start: 2020-01-01 | End: 2020-01-01 | Stop reason: HOSPADM

## 2020-01-01 RX ORDER — MELOXICAM 7.5 MG/1
7.5 TABLET ORAL DAILY
Qty: 30 TABLET | Refills: 0 | Status: SHIPPED | OUTPATIENT
Start: 2020-01-01 | End: 2021-01-01

## 2020-01-01 RX ORDER — ONDANSETRON 2 MG/ML
4 INJECTION INTRAMUSCULAR; INTRAVENOUS EVERY 6 HOURS PRN
Status: DISCONTINUED | OUTPATIENT
Start: 2020-01-01 | End: 2020-01-01 | Stop reason: HOSPADM

## 2020-01-01 RX ORDER — TRIAMCINOLONE ACETONIDE 40 MG/ML
80 INJECTION, SUSPENSION INTRA-ARTICULAR; INTRAMUSCULAR ONCE
Status: COMPLETED | OUTPATIENT
Start: 2020-01-01 | End: 2020-01-01

## 2020-01-01 RX ORDER — ROCURONIUM BROMIDE 10 MG/ML
INJECTION, SOLUTION INTRAVENOUS PRN
Status: DISCONTINUED | OUTPATIENT
Start: 2020-01-01 | End: 2020-01-01 | Stop reason: SDUPTHER

## 2020-01-01 RX ORDER — POTASSIUM CHLORIDE 20 MEQ/1
40 TABLET, EXTENDED RELEASE ORAL PRN
Status: DISCONTINUED | OUTPATIENT
Start: 2020-01-01 | End: 2020-01-01

## 2020-01-01 RX ORDER — MAGNESIUM HYDROXIDE 1200 MG/15ML
LIQUID ORAL CONTINUOUS PRN
Status: COMPLETED | OUTPATIENT
Start: 2020-01-01 | End: 2020-01-01

## 2020-01-01 RX ORDER — AMLODIPINE BESYLATE 2.5 MG/1
2.5 TABLET ORAL EVERY EVENING
Status: DISCONTINUED | OUTPATIENT
Start: 2020-01-01 | End: 2020-01-01

## 2020-01-01 RX ORDER — ASPIRIN 325 MG
325 TABLET, DELAYED RELEASE (ENTERIC COATED) ORAL 2 TIMES DAILY
Qty: 14 TABLET | Refills: 0 | Status: SHIPPED | OUTPATIENT
Start: 2020-01-01 | End: 2020-01-01

## 2020-01-01 RX ORDER — KETOROLAC TROMETHAMINE 30 MG/ML
15 INJECTION, SOLUTION INTRAMUSCULAR; INTRAVENOUS EVERY 6 HOURS PRN
Status: DISCONTINUED | OUTPATIENT
Start: 2020-01-01 | End: 2020-01-01 | Stop reason: HOSPADM

## 2020-01-01 RX ORDER — SUCCINYLCHOLINE CHLORIDE 20 MG/ML
INJECTION INTRAMUSCULAR; INTRAVENOUS PRN
Status: DISCONTINUED | OUTPATIENT
Start: 2020-01-01 | End: 2020-01-01 | Stop reason: SDUPTHER

## 2020-01-01 RX ORDER — HEPARIN SODIUM 5000 [USP'U]/ML
5000 INJECTION, SOLUTION INTRAVENOUS; SUBCUTANEOUS EVERY 8 HOURS SCHEDULED
Status: DISCONTINUED | OUTPATIENT
Start: 2020-01-01 | End: 2020-01-01 | Stop reason: HOSPADM

## 2020-01-01 RX ORDER — PHENYLEPHRINE HCL IN 0.9% NACL 1 MG/10 ML
SYRINGE (ML) INTRAVENOUS PRN
Status: DISCONTINUED | OUTPATIENT
Start: 2020-01-01 | End: 2020-01-01 | Stop reason: SDUPTHER

## 2020-01-01 RX ORDER — HYDRALAZINE HYDROCHLORIDE 20 MG/ML
5 INJECTION INTRAMUSCULAR; INTRAVENOUS EVERY 30 MIN PRN
Status: DISCONTINUED | OUTPATIENT
Start: 2020-01-01 | End: 2020-01-01 | Stop reason: HOSPADM

## 2020-01-01 RX ORDER — LABETALOL 200 MG/1
200 TABLET, FILM COATED ORAL 2 TIMES DAILY
Status: DISCONTINUED | OUTPATIENT
Start: 2020-01-01 | End: 2020-01-01 | Stop reason: HOSPADM

## 2020-01-01 RX ORDER — CELECOXIB 100 MG/1
100 CAPSULE ORAL ONCE
Status: DISCONTINUED | OUTPATIENT
Start: 2020-01-01 | End: 2020-01-01 | Stop reason: SDUPTHER

## 2020-01-01 RX ADMIN — ONDANSETRON 4 MG: 2 INJECTION INTRAMUSCULAR; INTRAVENOUS at 10:15

## 2020-01-01 RX ADMIN — MORPHINE SULFATE 4 MG: 4 INJECTION, SOLUTION INTRAMUSCULAR; INTRAVENOUS at 21:53

## 2020-01-01 RX ADMIN — AMLODIPINE BESYLATE 2.5 MG: 2.5 TABLET ORAL at 09:02

## 2020-01-01 RX ADMIN — ACETAMINOPHEN 650 MG: 325 TABLET ORAL at 20:36

## 2020-01-01 RX ADMIN — GABAPENTIN 300 MG: 300 CAPSULE ORAL at 09:49

## 2020-01-01 RX ADMIN — LABETALOL HYDROCHLORIDE 200 MG: 100 TABLET, FILM COATED ORAL at 08:31

## 2020-01-01 RX ADMIN — ONDANSETRON 4 MG: 2 INJECTION INTRAMUSCULAR; INTRAVENOUS at 15:28

## 2020-01-01 RX ADMIN — KETOROLAC TROMETHAMINE 15 MG: 30 INJECTION, SOLUTION INTRAMUSCULAR at 14:48

## 2020-01-01 RX ADMIN — ONDANSETRON 4 MG: 2 INJECTION INTRAMUSCULAR; INTRAVENOUS at 23:24

## 2020-01-01 RX ADMIN — SUGAMMADEX 200 MG: 100 INJECTION, SOLUTION INTRAVENOUS at 13:47

## 2020-01-01 RX ADMIN — SODIUM CHLORIDE, POTASSIUM CHLORIDE, SODIUM LACTATE AND CALCIUM CHLORIDE: 600; 310; 30; 20 INJECTION, SOLUTION INTRAVENOUS at 10:07

## 2020-01-01 RX ADMIN — CELECOXIB 200 MG: 100 CAPSULE ORAL at 09:35

## 2020-01-01 RX ADMIN — LABETALOL HYDROCHLORIDE 200 MG: 100 TABLET, FILM COATED ORAL at 19:59

## 2020-01-01 RX ADMIN — Medication 3 G: at 12:19

## 2020-01-01 RX ADMIN — LABETALOL HYDROCHLORIDE 10 MG: 5 INJECTION INTRAVENOUS at 15:28

## 2020-01-01 RX ADMIN — FAMOTIDINE 20 MG: 10 INJECTION INTRAVENOUS at 08:33

## 2020-01-01 RX ADMIN — SODIUM CHLORIDE: 4.5 INJECTION, SOLUTION INTRAVENOUS at 21:41

## 2020-01-01 RX ADMIN — LABETALOL HYDROCHLORIDE 10 MG: 5 INJECTION INTRAVENOUS at 14:38

## 2020-01-01 RX ADMIN — ONDANSETRON 4 MG: 2 INJECTION INTRAMUSCULAR; INTRAVENOUS at 03:48

## 2020-01-01 RX ADMIN — Medication 10 ML: at 21:40

## 2020-01-01 RX ADMIN — IOHEXOL 50 ML: 240 INJECTION, SOLUTION INTRATHECAL; INTRAVASCULAR; INTRAVENOUS; ORAL at 23:39

## 2020-01-01 RX ADMIN — OXYCODONE 10 MG: 5 TABLET ORAL at 22:53

## 2020-01-01 RX ADMIN — Medication 10 ML: at 09:57

## 2020-01-01 RX ADMIN — SODIUM CHLORIDE, POTASSIUM CHLORIDE, SODIUM LACTATE AND CALCIUM CHLORIDE: 600; 310; 30; 20 INJECTION, SOLUTION INTRAVENOUS at 07:57

## 2020-01-01 RX ADMIN — ASPIRIN 325 MG: 325 TABLET, COATED ORAL at 09:59

## 2020-01-01 RX ADMIN — MORPHINE SULFATE 4 MG: 4 INJECTION, SOLUTION INTRAMUSCULAR; INTRAVENOUS at 20:07

## 2020-01-01 RX ADMIN — CEFAZOLIN SODIUM 2 G: 10 INJECTION, POWDER, FOR SOLUTION INTRAVENOUS at 17:20

## 2020-01-01 RX ADMIN — ACETAMINOPHEN 650 MG: 325 TABLET ORAL at 17:10

## 2020-01-01 RX ADMIN — DEXAMETHASONE SODIUM PHOSPHATE 10 MG: 10 INJECTION INTRAMUSCULAR; INTRAVENOUS at 09:20

## 2020-01-01 RX ADMIN — CELECOXIB 100 MG: 100 CAPSULE ORAL at 09:49

## 2020-01-01 RX ADMIN — SODIUM CHLORIDE 1000 ML: 9 INJECTION, SOLUTION INTRAVENOUS at 23:24

## 2020-01-01 RX ADMIN — FENTANYL CITRATE 100 MCG: 50 INJECTION INTRAMUSCULAR; INTRAVENOUS at 11:05

## 2020-01-01 RX ADMIN — OXYCODONE 10 MG: 5 TABLET ORAL at 13:11

## 2020-01-01 RX ADMIN — OXYCODONE 10 MG: 5 TABLET ORAL at 01:16

## 2020-01-01 RX ADMIN — LIDOCAINE HYDROCHLORIDE 60 MG: 20 INJECTION, SOLUTION INFILTRATION; PERINEURAL at 09:14

## 2020-01-01 RX ADMIN — MORPHINE SULFATE 4 MG: 4 INJECTION, SOLUTION INTRAMUSCULAR; INTRAVENOUS at 00:09

## 2020-01-01 RX ADMIN — ROCURONIUM BROMIDE 50 MG: 10 SOLUTION INTRAVENOUS at 12:30

## 2020-01-01 RX ADMIN — LABETALOL HYDROCHLORIDE 10 MG: 5 INJECTION INTRAVENOUS at 22:32

## 2020-01-01 RX ADMIN — Medication 10 ML: at 00:09

## 2020-01-01 RX ADMIN — LABETALOL HYDROCHLORIDE 200 MG: 200 TABLET, FILM COATED ORAL at 21:00

## 2020-01-01 RX ADMIN — LABETALOL HYDROCHLORIDE 10 MG: 5 INJECTION INTRAVENOUS at 16:37

## 2020-01-01 RX ADMIN — ROCURONIUM BROMIDE 70 MG: 10 SOLUTION INTRAVENOUS at 09:14

## 2020-01-01 RX ADMIN — FAMOTIDINE 20 MG: 10 INJECTION INTRAVENOUS at 08:53

## 2020-01-01 RX ADMIN — HEPARIN SODIUM 5000 UNITS: 5000 INJECTION INTRAVENOUS; SUBCUTANEOUS at 16:32

## 2020-01-01 RX ADMIN — MIDAZOLAM HYDROCHLORIDE 2 MG: 2 INJECTION, SOLUTION INTRAMUSCULAR; INTRAVENOUS at 09:14

## 2020-01-01 RX ADMIN — AMLODIPINE BESYLATE 2.5 MG: 2.5 TABLET ORAL at 10:48

## 2020-01-01 RX ADMIN — PROPOFOL 25 MG: 10 INJECTION, EMULSION INTRAVENOUS at 13:38

## 2020-01-01 RX ADMIN — DEXAMETHASONE SODIUM PHOSPHATE 8 MG: 4 INJECTION, SOLUTION INTRAMUSCULAR; INTRAVENOUS at 12:31

## 2020-01-01 RX ADMIN — ONDANSETRON 4 MG: 2 INJECTION INTRAMUSCULAR; INTRAVENOUS at 10:30

## 2020-01-01 RX ADMIN — ENOXAPARIN SODIUM 40 MG: 40 INJECTION SUBCUTANEOUS at 08:53

## 2020-01-01 RX ADMIN — Medication 10 ML: at 08:33

## 2020-01-01 RX ADMIN — LABETALOL HYDROCHLORIDE 200 MG: 200 TABLET, FILM COATED ORAL at 20:35

## 2020-01-01 RX ADMIN — ACETAMINOPHEN 1000 MG: 10 INJECTION, SOLUTION INTRAVENOUS at 10:08

## 2020-01-01 RX ADMIN — MORPHINE SULFATE 4 MG: 4 INJECTION, SOLUTION INTRAMUSCULAR; INTRAVENOUS at 15:28

## 2020-01-01 RX ADMIN — IOPAMIDOL 75 ML: 755 INJECTION, SOLUTION INTRAVENOUS at 00:49

## 2020-01-01 RX ADMIN — SODIUM CHLORIDE: 4.5 INJECTION, SOLUTION INTRAVENOUS at 10:39

## 2020-01-01 RX ADMIN — LABETALOL HYDROCHLORIDE 200 MG: 200 TABLET, FILM COATED ORAL at 21:28

## 2020-01-01 RX ADMIN — MORPHINE SULFATE 4 MG: 4 INJECTION, SOLUTION INTRAMUSCULAR; INTRAVENOUS at 03:03

## 2020-01-01 RX ADMIN — ENOXAPARIN SODIUM 40 MG: 40 INJECTION SUBCUTANEOUS at 10:48

## 2020-01-01 RX ADMIN — Medication 100 MCG: at 13:01

## 2020-01-01 RX ADMIN — LIDOCAINE HYDROCHLORIDE 100 MG: 20 INJECTION, SOLUTION INFILTRATION; PERINEURAL at 12:24

## 2020-01-01 RX ADMIN — SODIUM CHLORIDE, POTASSIUM CHLORIDE, SODIUM LACTATE AND CALCIUM CHLORIDE: 600; 310; 30; 20 INJECTION, SOLUTION INTRAVENOUS at 13:56

## 2020-01-01 RX ADMIN — AMLODIPINE BESYLATE 2.5 MG: 2.5 TABLET ORAL at 18:43

## 2020-01-01 RX ADMIN — CEFAZOLIN SODIUM 2 G: 10 INJECTION, POWDER, FOR SOLUTION INTRAVENOUS at 01:16

## 2020-01-01 RX ADMIN — SENNA AND DOCUSATE SODIUM 1 TABLET: 50; 8.6 TABLET, FILM COATED ORAL at 20:35

## 2020-01-01 RX ADMIN — FAMOTIDINE 20 MG: 10 INJECTION INTRAVENOUS at 11:15

## 2020-01-01 RX ADMIN — LABETALOL HYDROCHLORIDE 10 MG: 5 INJECTION INTRAVENOUS at 06:02

## 2020-01-01 RX ADMIN — SODIUM CHLORIDE, POTASSIUM CHLORIDE, SODIUM LACTATE AND CALCIUM CHLORIDE 2000 ML: 600; 310; 30; 20 INJECTION, SOLUTION INTRAVENOUS at 03:30

## 2020-01-01 RX ADMIN — Medication 10 ML: at 20:06

## 2020-01-01 RX ADMIN — ONDANSETRON 4 MG: 2 INJECTION INTRAMUSCULAR; INTRAVENOUS at 12:05

## 2020-01-01 RX ADMIN — ROCURONIUM BROMIDE 20 MG: 10 SOLUTION INTRAVENOUS at 11:19

## 2020-01-01 RX ADMIN — ACETAMINOPHEN 650 MG: 325 TABLET ORAL at 09:49

## 2020-01-01 RX ADMIN — OXYCODONE 10 MG: 5 TABLET ORAL at 21:28

## 2020-01-01 RX ADMIN — FAMOTIDINE 20 MG: 10 INJECTION INTRAVENOUS at 11:18

## 2020-01-01 RX ADMIN — MORPHINE SULFATE 4 MG: 4 INJECTION, SOLUTION INTRAMUSCULAR; INTRAVENOUS at 04:21

## 2020-01-01 RX ADMIN — GABAPENTIN 300 MG: 300 CAPSULE ORAL at 20:35

## 2020-01-01 RX ADMIN — FAMOTIDINE 20 MG: 10 INJECTION INTRAVENOUS at 22:32

## 2020-01-01 RX ADMIN — MORPHINE SULFATE 2 MG: 2 INJECTION, SOLUTION INTRAMUSCULAR; INTRAVENOUS at 22:31

## 2020-01-01 RX ADMIN — ONDANSETRON 4 MG: 2 INJECTION INTRAMUSCULAR; INTRAVENOUS at 20:07

## 2020-01-01 RX ADMIN — AMLODIPINE BESYLATE 2.5 MG: 2.5 TABLET ORAL at 17:20

## 2020-01-01 RX ADMIN — POLYETHYLENE GLYCOL 3350 17 G: 17 POWDER, FOR SOLUTION ORAL at 10:49

## 2020-01-01 RX ADMIN — DEXTROSE MONOHYDRATE, SODIUM CHLORIDE, AND POTASSIUM CHLORIDE: 50; 4.5; 1.49 INJECTION, SOLUTION INTRAVENOUS at 17:17

## 2020-01-01 RX ADMIN — OXYCODONE 10 MG: 5 TABLET ORAL at 07:11

## 2020-01-01 RX ADMIN — ENOXAPARIN SODIUM 40 MG: 40 INJECTION SUBCUTANEOUS at 09:02

## 2020-01-01 RX ADMIN — MORPHINE SULFATE 4 MG: 4 INJECTION, SOLUTION INTRAMUSCULAR; INTRAVENOUS at 06:53

## 2020-01-01 RX ADMIN — FAMOTIDINE 20 MG: 10 INJECTION INTRAVENOUS at 10:14

## 2020-01-01 RX ADMIN — DEXTROSE MONOHYDRATE, SODIUM CHLORIDE, AND POTASSIUM CHLORIDE: 50; 4.5; 1.49 INJECTION, SOLUTION INTRAVENOUS at 09:56

## 2020-01-01 RX ADMIN — FAMOTIDINE 20 MG: 10 INJECTION INTRAVENOUS at 19:59

## 2020-01-01 RX ADMIN — AMLODIPINE BESYLATE 2.5 MG: 2.5 TABLET ORAL at 21:00

## 2020-01-01 RX ADMIN — CEFAZOLIN SODIUM 2 G: 10 INJECTION, POWDER, FOR SOLUTION INTRAVENOUS at 18:42

## 2020-01-01 RX ADMIN — OXYCODONE 10 MG: 5 TABLET ORAL at 11:28

## 2020-01-01 RX ADMIN — SODIUM CHLORIDE, POTASSIUM CHLORIDE, SODIUM LACTATE AND CALCIUM CHLORIDE: 600; 310; 30; 20 INJECTION, SOLUTION INTRAVENOUS at 12:19

## 2020-01-01 RX ADMIN — MORPHINE SULFATE 4 MG: 4 INJECTION, SOLUTION INTRAMUSCULAR; INTRAVENOUS at 10:30

## 2020-01-01 RX ADMIN — Medication 3 G: at 09:09

## 2020-01-01 RX ADMIN — Medication 100 MCG: at 13:10

## 2020-01-01 RX ADMIN — SODIUM CHLORIDE, PRESERVATIVE FREE 10 ML: 5 INJECTION INTRAVENOUS at 21:00

## 2020-01-01 RX ADMIN — LABETALOL HYDROCHLORIDE 10 MG: 5 INJECTION INTRAVENOUS at 21:38

## 2020-01-01 RX ADMIN — SUCCINYLCHOLINE CHLORIDE 160 MG: 20 INJECTION, SOLUTION INTRAMUSCULAR; INTRAVENOUS at 12:25

## 2020-01-01 RX ADMIN — MORPHINE SULFATE 4 MG: 4 INJECTION, SOLUTION INTRAMUSCULAR; INTRAVENOUS at 23:23

## 2020-01-01 RX ADMIN — PROPOFOL 25 MG: 10 INJECTION, EMULSION INTRAVENOUS at 13:41

## 2020-01-01 RX ADMIN — SENNA AND DOCUSATE SODIUM 1 TABLET: 50; 8.6 TABLET, FILM COATED ORAL at 09:48

## 2020-01-01 RX ADMIN — HYDROMORPHONE HYDROCHLORIDE 0.5 MG: 1 INJECTION, SOLUTION INTRAMUSCULAR; INTRAVENOUS; SUBCUTANEOUS at 12:59

## 2020-01-01 RX ADMIN — MORPHINE SULFATE 4 MG: 4 INJECTION, SOLUTION INTRAMUSCULAR; INTRAVENOUS at 03:48

## 2020-01-01 RX ADMIN — LABETALOL HYDROCHLORIDE 10 MG: 5 INJECTION INTRAVENOUS at 05:54

## 2020-01-01 RX ADMIN — LABETALOL HYDROCHLORIDE 200 MG: 200 TABLET, FILM COATED ORAL at 09:02

## 2020-01-01 RX ADMIN — KETOROLAC TROMETHAMINE 15 MG: 30 INJECTION, SOLUTION INTRAMUSCULAR at 06:04

## 2020-01-01 RX ADMIN — DEXTROSE MONOHYDRATE, SODIUM CHLORIDE, AND POTASSIUM CHLORIDE: 50; 4.5; 1.49 INJECTION, SOLUTION INTRAVENOUS at 20:06

## 2020-01-01 RX ADMIN — LABETALOL HYDROCHLORIDE 200 MG: 200 TABLET, FILM COATED ORAL at 09:49

## 2020-01-01 RX ADMIN — MORPHINE SULFATE 4 MG: 4 INJECTION, SOLUTION INTRAMUSCULAR; INTRAVENOUS at 10:14

## 2020-01-01 RX ADMIN — CEFAZOLIN SODIUM 2 G: 10 INJECTION, POWDER, FOR SOLUTION INTRAVENOUS at 10:49

## 2020-01-01 RX ADMIN — Medication 3 G: at 20:11

## 2020-01-01 RX ADMIN — SODIUM CHLORIDE, PRESERVATIVE FREE 10 ML: 5 INJECTION INTRAVENOUS at 10:45

## 2020-01-01 RX ADMIN — MORPHINE SULFATE 4 MG: 4 INJECTION, SOLUTION INTRAMUSCULAR; INTRAVENOUS at 17:52

## 2020-01-01 RX ADMIN — SUGAMMADEX 200 MG: 100 INJECTION, SOLUTION INTRAVENOUS at 11:55

## 2020-01-01 RX ADMIN — OXYCODONE 10 MG: 5 TABLET ORAL at 04:24

## 2020-01-01 RX ADMIN — FENTANYL CITRATE 50 MCG: 50 INJECTION INTRAMUSCULAR; INTRAVENOUS at 12:24

## 2020-01-01 RX ADMIN — ONDANSETRON 4 MG: 2 INJECTION INTRAMUSCULAR; INTRAVENOUS at 09:20

## 2020-01-01 RX ADMIN — Medication 10 ML: at 19:59

## 2020-01-01 RX ADMIN — PROPOFOL 250 MG: 10 INJECTION, EMULSION INTRAVENOUS at 12:25

## 2020-01-01 RX ADMIN — PROPOFOL 200 MG: 10 INJECTION, EMULSION INTRAVENOUS at 09:14

## 2020-01-01 RX ADMIN — Medication 3 G: at 03:48

## 2020-01-01 RX ADMIN — ACETAMINOPHEN 650 MG: 325 TABLET ORAL at 03:48

## 2020-01-01 RX ADMIN — CELECOXIB 100 MG: 100 CAPSULE ORAL at 20:35

## 2020-01-01 RX ADMIN — LABETALOL HYDROCHLORIDE 10 MG: 5 INJECTION INTRAVENOUS at 06:05

## 2020-01-01 RX ADMIN — AMLODIPINE BESYLATE 2.5 MG: 2.5 TABLET ORAL at 20:40

## 2020-01-01 RX ADMIN — FENTANYL CITRATE 100 MCG: 50 INJECTION INTRAMUSCULAR; INTRAVENOUS at 09:14

## 2020-01-01 RX ADMIN — Medication 100 MCG: at 12:35

## 2020-01-01 RX ADMIN — LABETALOL HYDROCHLORIDE 10 MG: 5 INJECTION INTRAVENOUS at 21:48

## 2020-01-01 RX ADMIN — TRIAMCINOLONE ACETONIDE 80 MG: 40 INJECTION, SUSPENSION INTRA-ARTICULAR; INTRAMUSCULAR at 11:07

## 2020-01-01 RX ADMIN — ONDANSETRON 4 MG: 2 INJECTION INTRAMUSCULAR; INTRAVENOUS at 03:03

## 2020-01-01 RX ADMIN — ONDANSETRON 4 MG: 2 INJECTION INTRAMUSCULAR; INTRAVENOUS at 00:09

## 2020-01-01 RX ADMIN — FAMOTIDINE 20 MG: 10 INJECTION INTRAVENOUS at 21:41

## 2020-01-01 RX ADMIN — GABAPENTIN 300 MG: 300 CAPSULE ORAL at 09:34

## 2020-01-01 RX ADMIN — ONDANSETRON 4 MG: 2 INJECTION INTRAMUSCULAR; INTRAVENOUS at 04:21

## 2020-01-01 RX ADMIN — LABETALOL HYDROCHLORIDE 10 MG: 5 INJECTION INTRAVENOUS at 16:32

## 2020-01-01 RX ADMIN — HYDROMORPHONE HYDROCHLORIDE 0.5 MG: 1 INJECTION, SOLUTION INTRAMUSCULAR; INTRAVENOUS; SUBCUTANEOUS at 12:50

## 2020-01-01 RX ADMIN — OXYCODONE 10 MG: 5 TABLET ORAL at 17:20

## 2020-01-01 RX ADMIN — AMLODIPINE BESYLATE 2.5 MG: 2.5 TABLET ORAL at 08:33

## 2020-01-01 RX ADMIN — LABETALOL HYDROCHLORIDE 200 MG: 200 TABLET, FILM COATED ORAL at 10:48

## 2020-01-01 RX ADMIN — LABETALOL HYDROCHLORIDE 10 MG: 5 INJECTION INTRAVENOUS at 07:04

## 2020-01-01 RX ADMIN — SODIUM CHLORIDE: 4.5 INJECTION, SOLUTION INTRAVENOUS at 22:29

## 2020-01-01 RX ADMIN — FAMOTIDINE 20 MG: 10 INJECTION INTRAVENOUS at 21:47

## 2020-01-01 RX ADMIN — SODIUM CHLORIDE, PRESERVATIVE FREE 10 ML: 5 INJECTION INTRAVENOUS at 09:03

## 2020-01-01 ASSESSMENT — PULMONARY FUNCTION TESTS
PIF_VALUE: 27
PIF_VALUE: 20
PIF_VALUE: 1
PIF_VALUE: 28
PIF_VALUE: 28
PIF_VALUE: 25
PIF_VALUE: 0
PIF_VALUE: 19
PIF_VALUE: 28
PIF_VALUE: 29
PIF_VALUE: 30
PIF_VALUE: 28
PIF_VALUE: 33
PIF_VALUE: 28
PIF_VALUE: 19
PIF_VALUE: 20
PIF_VALUE: 19
PIF_VALUE: 28
PIF_VALUE: 18
PIF_VALUE: 30
PIF_VALUE: 28
PIF_VALUE: 20
PIF_VALUE: 20
PIF_VALUE: 19
PIF_VALUE: 28
PIF_VALUE: 24
PIF_VALUE: 29
PIF_VALUE: 27
PIF_VALUE: 20
PIF_VALUE: 29
PIF_VALUE: 20
PIF_VALUE: 28
PIF_VALUE: 6
PIF_VALUE: 28
PIF_VALUE: 19
PIF_VALUE: 19
PIF_VALUE: 28
PIF_VALUE: 32
PIF_VALUE: 24
PIF_VALUE: 30
PIF_VALUE: 1
PIF_VALUE: 26
PIF_VALUE: 28
PIF_VALUE: 23
PIF_VALUE: 28
PIF_VALUE: 32
PIF_VALUE: 30
PIF_VALUE: 34
PIF_VALUE: 2
PIF_VALUE: 29
PIF_VALUE: 28
PIF_VALUE: 27
PIF_VALUE: 30
PIF_VALUE: 28
PIF_VALUE: 25
PIF_VALUE: 19
PIF_VALUE: 28
PIF_VALUE: 6
PIF_VALUE: 30
PIF_VALUE: 3
PIF_VALUE: 28
PIF_VALUE: 19
PIF_VALUE: 30
PIF_VALUE: 28
PIF_VALUE: 19
PIF_VALUE: 28
PIF_VALUE: 1
PIF_VALUE: 32
PIF_VALUE: 30
PIF_VALUE: 17
PIF_VALUE: 28
PIF_VALUE: 19
PIF_VALUE: 19
PIF_VALUE: 16
PIF_VALUE: 20
PIF_VALUE: 18
PIF_VALUE: 28
PIF_VALUE: 20
PIF_VALUE: 28
PIF_VALUE: 19
PIF_VALUE: 28
PIF_VALUE: 2
PIF_VALUE: 28
PIF_VALUE: 28
PIF_VALUE: 19
PIF_VALUE: 28
PIF_VALUE: 19
PIF_VALUE: 23
PIF_VALUE: 26
PIF_VALUE: 27
PIF_VALUE: 4
PIF_VALUE: 28
PIF_VALUE: 25
PIF_VALUE: 28
PIF_VALUE: 20
PIF_VALUE: 2
PIF_VALUE: 28
PIF_VALUE: 20
PIF_VALUE: 21
PIF_VALUE: 28
PIF_VALUE: 28
PIF_VALUE: 1
PIF_VALUE: 28
PIF_VALUE: 32
PIF_VALUE: 26
PIF_VALUE: 19
PIF_VALUE: 19
PIF_VALUE: 26
PIF_VALUE: 1
PIF_VALUE: 25
PIF_VALUE: 30
PIF_VALUE: 25
PIF_VALUE: 28
PIF_VALUE: 29
PIF_VALUE: 1
PIF_VALUE: 19
PIF_VALUE: 28
PIF_VALUE: 28
PIF_VALUE: 19
PIF_VALUE: 20
PIF_VALUE: 28
PIF_VALUE: 20
PIF_VALUE: 24
PIF_VALUE: 20
PIF_VALUE: 32
PIF_VALUE: 20
PIF_VALUE: 1
PIF_VALUE: 28
PIF_VALUE: 2
PIF_VALUE: 0
PIF_VALUE: 28
PIF_VALUE: 28
PIF_VALUE: 19
PIF_VALUE: 28
PIF_VALUE: 28
PIF_VALUE: 33
PIF_VALUE: 18
PIF_VALUE: 28
PIF_VALUE: 20
PIF_VALUE: 20
PIF_VALUE: 28
PIF_VALUE: 31
PIF_VALUE: 28
PIF_VALUE: 19
PIF_VALUE: 28
PIF_VALUE: 29
PIF_VALUE: 30
PIF_VALUE: 30
PIF_VALUE: 19
PIF_VALUE: 30
PIF_VALUE: 19
PIF_VALUE: 30
PIF_VALUE: 14
PIF_VALUE: 28
PIF_VALUE: 30
PIF_VALUE: 28
PIF_VALUE: 32
PIF_VALUE: 28
PIF_VALUE: 3
PIF_VALUE: 38
PIF_VALUE: 28
PIF_VALUE: 30
PIF_VALUE: 1
PIF_VALUE: 17
PIF_VALUE: 28
PIF_VALUE: 27
PIF_VALUE: 28
PIF_VALUE: 26
PIF_VALUE: 28
PIF_VALUE: 29
PIF_VALUE: 20
PIF_VALUE: 28
PIF_VALUE: 20
PIF_VALUE: 28
PIF_VALUE: 28
PIF_VALUE: 20
PIF_VALUE: 1
PIF_VALUE: 32
PIF_VALUE: 0
PIF_VALUE: 28
PIF_VALUE: 16
PIF_VALUE: 30
PIF_VALUE: 19
PIF_VALUE: 1
PIF_VALUE: 1
PIF_VALUE: 28
PIF_VALUE: 28
PIF_VALUE: 24
PIF_VALUE: 30
PIF_VALUE: 19
PIF_VALUE: 28
PIF_VALUE: 20
PIF_VALUE: 26
PIF_VALUE: 19
PIF_VALUE: 20
PIF_VALUE: 2
PIF_VALUE: 19
PIF_VALUE: 28
PIF_VALUE: 20
PIF_VALUE: 1
PIF_VALUE: 23
PIF_VALUE: 30
PIF_VALUE: 21
PIF_VALUE: 28
PIF_VALUE: 20
PIF_VALUE: 28
PIF_VALUE: 29
PIF_VALUE: 28
PIF_VALUE: 1
PIF_VALUE: 9
PIF_VALUE: 31
PIF_VALUE: 26
PIF_VALUE: 30
PIF_VALUE: 28
PIF_VALUE: 28
PIF_VALUE: 20
PIF_VALUE: 20
PIF_VALUE: 28
PIF_VALUE: 12
PIF_VALUE: 26
PIF_VALUE: 25
PIF_VALUE: 28
PIF_VALUE: 20
PIF_VALUE: 28
PIF_VALUE: 29
PIF_VALUE: 28
PIF_VALUE: 21
PIF_VALUE: 28
PIF_VALUE: 19
PIF_VALUE: 31
PIF_VALUE: 30
PIF_VALUE: 29
PIF_VALUE: 20
PIF_VALUE: 20
PIF_VALUE: 26
PIF_VALUE: 23
PIF_VALUE: 28
PIF_VALUE: 26
PIF_VALUE: 19

## 2020-01-01 ASSESSMENT — PAIN SCALES - GENERAL
PAINLEVEL_OUTOF10: 5
PAINLEVEL_OUTOF10: 7
PAINLEVEL_OUTOF10: 8
PAINLEVEL_OUTOF10: 7
PAINLEVEL_OUTOF10: 5
PAINLEVEL_OUTOF10: 7
PAINLEVEL_OUTOF10: 4
PAINLEVEL_OUTOF10: 7
PAINLEVEL_OUTOF10: 4
PAINLEVEL_OUTOF10: 9
PAINLEVEL_OUTOF10: 7
PAINLEVEL_OUTOF10: 7
PAINLEVEL_OUTOF10: 10
PAINLEVEL_OUTOF10: 0
PAINLEVEL_OUTOF10: 4
PAINLEVEL_OUTOF10: 7
PAINLEVEL_OUTOF10: 4
PAINLEVEL_OUTOF10: 8
PAINLEVEL_OUTOF10: 0
PAINLEVEL_OUTOF10: 7
PAINLEVEL_OUTOF10: 7
PAINLEVEL_OUTOF10: 8
PAINLEVEL_OUTOF10: 0
PAINLEVEL_OUTOF10: 10
PAINLEVEL_OUTOF10: 7
PAINLEVEL_OUTOF10: 0
PAINLEVEL_OUTOF10: 6
PAINLEVEL_OUTOF10: 0
PAINLEVEL_OUTOF10: 3
PAINLEVEL_OUTOF10: 4
PAINLEVEL_OUTOF10: 7
PAINLEVEL_OUTOF10: 5
PAINLEVEL_OUTOF10: 7
PAINLEVEL_OUTOF10: 7
PAINLEVEL_OUTOF10: 3
PAINLEVEL_OUTOF10: 3
PAINLEVEL_OUTOF10: 5
PAINLEVEL_OUTOF10: 10
PAINLEVEL_OUTOF10: 0
PAINLEVEL_OUTOF10: 6
PAINLEVEL_OUTOF10: 7
PAINLEVEL_OUTOF10: 10
PAINLEVEL_OUTOF10: 10
PAINLEVEL_OUTOF10: 7

## 2020-01-01 ASSESSMENT — PAIN DESCRIPTION - DESCRIPTORS
DESCRIPTORS: DISCOMFORT
DESCRIPTORS: ACHING

## 2020-01-01 ASSESSMENT — PAIN DESCRIPTION - PROGRESSION: CLINICAL_PROGRESSION: NOT CHANGED

## 2020-01-01 ASSESSMENT — PAIN DESCRIPTION - PAIN TYPE
TYPE: ACUTE PAIN
TYPE: ACUTE PAIN
TYPE: SURGICAL PAIN
TYPE: SURGICAL PAIN
TYPE: ACUTE PAIN

## 2020-01-01 ASSESSMENT — ENCOUNTER SYMPTOMS
VOMITING: 1
NAUSEA: 1
SHORTNESS OF BREATH: 0
ABDOMINAL PAIN: 1
COUGH: 0
COLOR CHANGE: 0
BLOOD IN STOOL: 0

## 2020-01-01 ASSESSMENT — PAIN DESCRIPTION - LOCATION
LOCATION: ABDOMEN
LOCATION: ABDOMEN
LOCATION: ANKLE
LOCATION: ANKLE
LOCATION: ABDOMEN
LOCATION: ABDOMEN
LOCATION: ANKLE
LOCATION: ANKLE

## 2020-01-01 ASSESSMENT — PAIN - FUNCTIONAL ASSESSMENT
PAIN_FUNCTIONAL_ASSESSMENT: ACTIVITIES ARE NOT PREVENTED
PAIN_FUNCTIONAL_ASSESSMENT: 0-10
PAIN_FUNCTIONAL_ASSESSMENT: 0-10

## 2020-01-01 ASSESSMENT — PAIN DESCRIPTION - ORIENTATION
ORIENTATION: LEFT
ORIENTATION: RIGHT;LOWER
ORIENTATION: LOWER
ORIENTATION: LEFT
ORIENTATION: LEFT

## 2020-01-01 ASSESSMENT — PAIN DESCRIPTION - ONSET: ONSET: ON-GOING

## 2020-01-01 ASSESSMENT — PAIN DESCRIPTION - FREQUENCY: FREQUENCY: CONTINUOUS

## 2020-01-02 ENCOUNTER — HOSPITAL ENCOUNTER (OUTPATIENT)
Dept: PHYSICAL THERAPY | Age: 65
Setting detail: THERAPIES SERIES
Discharge: HOME | End: 2020-01-02
Payer: COMMERCIAL

## 2020-01-02 PROCEDURE — 97110 THERAPEUTIC EXERCISES: CPT

## 2020-01-02 PROCEDURE — 97140 MANUAL THERAPY 1/> REGIONS: CPT

## 2020-01-02 NOTE — FLOWSHEET NOTE
ASSESSMENT: Pt tolerated treatment well as pt performed all of today`s exercises with minimal increase in pain. Pt has more pain with passive flexion> passive ER within appropriate ranges. PT reviewed HEP with pt. PT continued to stress importance of limited active movement of LUE due to extent of injury. PT stressed importance of limiting motion and pain during both HEP and day-to-day activities. Scapular activities initiated this date. Pt BP was measured to be 161/112 after performing supine>sit. PT recommended pt call doctor after session to get BP under control. Treatment/Activity Tolerance:   []Patient tolerated treatment well [] Patient limited by fatique  [x]Patient limited by pain [] Patient limited by other medical complications  [] Other:     Goals:        Long term goals  Time Frame for Long term goals : 6 weeks  Long term goal 1: Pt will be independent with HEP  Long term goal 2: Pt will be able to achieve 130 degrees of L GHJ flexion  Long term goal 3: Pt will be able to achieve 60 degrees of L GHJ ER  Long term goal 4: Pt will report 75% decrease in pain during LUE movment  Long term goal 5: Pt will improve quick dash score by 15 points in order to demonstrate improved function. Plan: [] Continue per plan of care [] Alter current plan (see comments)   [] Plan of care initiated [] Hold pending MD visit [] Discharge      Plan for Next Session:      Re-Certification Due Date:         Signature:   Francisca Vizcaino , PT

## 2020-01-06 ENCOUNTER — HOSPITAL ENCOUNTER (OUTPATIENT)
Dept: PHYSICAL THERAPY | Age: 65
Setting detail: THERAPIES SERIES
Discharge: HOME | End: 2020-01-06
Payer: COMMERCIAL

## 2020-01-06 PROCEDURE — 97140 MANUAL THERAPY 1/> REGIONS: CPT

## 2020-01-06 PROCEDURE — 97110 THERAPEUTIC EXERCISES: CPT

## 2020-01-06 NOTE — FLOWSHEET NOTE
treatment well as pt performed all of today`s exercises with minimal increase in pain. Pt achieved 70 degrees of passive ER at 45 degrees of abduction and 130 degrees of shoulder flexion. . PT reviewed HEP with pt. PT continued to stress importance of limited active movement of LUE due to extent of injury. PT stressed importance of limiting motion and pain during both HEP and day-to-day activities. SONYA initiated this date. PT recommending calling doctor about blood pressure issues after session. Treatment/Activity Tolerance:   []Patient tolerated treatment well [] Patient limited by fatique  [x]Patient limited by pain [] Patient limited by other medical complications  [] Other:     Goals:        Long term goals  Time Frame for Long term goals : 6 weeks  Long term goal 1: Pt will be independent with HEP  Long term goal 2: Pt will be able to achieve 130 degrees of L GHJ flexion  Long term goal 3: Pt will be able to achieve 60 degrees of L GHJ ER  Long term goal 4: Pt will report 75% decrease in pain during LUE movment  Long term goal 5: Pt will improve quick dash score by 15 points in order to demonstrate improved function. Plan: [] Continue per plan of care [] Alter current plan (see comments)   [] Plan of care initiated [] Hold pending MD visit [] Discharge      Plan for Next Session:      Re-Certification Due Date:         Signature:   Winsome Noble , PT

## 2020-01-09 ENCOUNTER — HOSPITAL ENCOUNTER (OUTPATIENT)
Dept: PHYSICAL THERAPY | Age: 65
Setting detail: THERAPIES SERIES
Discharge: HOME | End: 2020-01-09
Payer: COMMERCIAL

## 2020-01-09 PROCEDURE — 97140 MANUAL THERAPY 1/> REGIONS: CPT

## 2020-01-09 PROCEDURE — 97110 THERAPEUTIC EXERCISES: CPT

## 2020-01-09 NOTE — FLOWSHEET NOTE
Physical Therapy Daily Treatment Note    Date:  2020    Patient Name:  rOin Cantu    :  1955  MRN: [id number]  Restrictions/Precautions:     Medical/Treatment Diagnosis Information:  Diagnosis: L shoulder Rotator Cuff Repair  Insurance/Certification information:  PT Insurance Information: Primary: [employer] Secondary: [facility]- Approved 18 visits until   Physician Information:  Referring Practitioner: Pepito Miller  Plan of care signed (Y/N):  Y  Visit# / total visits:       G-Code (if applicable): Quick Dash: Sum of Responses: 37, Symptom Score: 59%    Medicare Cap (if applicable):  N/A = total amount used, updated 2020    Time in:   10:00   Timed Treatment: 33 Total Treatment Time:  35  Time out: 9:33  ________________________________________________________________________________________    Pain Level:    0/10    SUBJECTIVE:  Pt reported that he has been pretty sore. Pt has felt tightness up in his neck recently. Pt has tried icing his neck and reports that it has helped a little bit. OBJECTIVE:  Pt experienced dizziness after performing supine<>sit.       Exercise/Equipment Resistance/Repetitions Other comments    AROM wrist and elbow      Elbow Flexion/Extension      Wrist Flexion/Extension      HEP  HEP     Pendulums     Forward/bakward     Side/side     Circles    x15  x15  x15   HEP  HEP  HEP     Passive ER   Passive Flexion   x15 HEP  HEP     Table slides   Pain free     Shoulder shrugs    Scapular retraction x20  x20             AAROM with ball       Flexion   x15      Isometric strengthening         Flexion         IR         ER          Extension   5\"x10  5\"x10  5\"x10  5\"x10   HEP  HEP  HEP  HEP                                                                                     Other Therapeutic Activities:       Manual Treatments:       Passive flexion stretching  Passive ER stretch at 45 degrees\  GHJ mobs grade 1-2 for pain    Total manual- 18 minutes     Modalities:      Test/Measurements:         ASSESSMENT: Pt tolerated treatment well as pt performed all of today`s exercises with minimal increase in pain. Pt achieved 70 degrees of passive ER at 45 degrees of abduction, 40 degrees of passive ER at 90 degrees, and 130 degrees of shoulder flexion. . PT continued to stress importance of limited active movement of LUE due to extent of injury. PT stressed importance of limiting motion and pain during both HEP and day-to-day activities. Isometric Strengthening initiated this date. PT continued to stress recommendation of calling doctor about blood pressure issues after session. Treatment/Activity Tolerance:   []Patient tolerated treatment well [] Patient limited by fatique  [x]Patient limited by pain [] Patient limited by other medical complications  [] Other:     Goals:        Long term goals  Time Frame for Long term goals : 6 weeks  Long term goal 1: Pt will be independent with HEP  Long term goal 2: Pt will be able to achieve 130 degrees of L GHJ flexion  Long term goal 3: Pt will be able to achieve 60 degrees of L GHJ ER  Long term goal 4: Pt will report 75% decrease in pain during LUE movment  Long term goal 5: Pt will improve quick dash score by 15 points in order to demonstrate improved function. Plan: [] Continue per plan of care [] Alter current plan (see comments)   [] Plan of care initiated [] Hold pending MD visit [] Discharge      Plan for Next Session:      Re-Certification Due Date:         Signature:   Karli Alexandre PT

## 2020-01-13 ENCOUNTER — HOSPITAL ENCOUNTER (OUTPATIENT)
Dept: PHYSICAL THERAPY | Age: 65
Setting detail: THERAPIES SERIES
Discharge: HOME | End: 2020-01-13
Payer: COMMERCIAL

## 2020-01-13 ENCOUNTER — OFFICE VISIT (OUTPATIENT)
Dept: ORTHOPEDIC SURGERY | Age: 65
End: 2020-01-13

## 2020-01-13 VITALS — WEIGHT: 270.06 LBS | BODY MASS INDEX: 35.79 KG/M2 | HEIGHT: 73 IN

## 2020-01-13 PROCEDURE — 97110 THERAPEUTIC EXERCISES: CPT

## 2020-01-13 PROCEDURE — 99024 POSTOP FOLLOW-UP VISIT: CPT | Performed by: ORTHOPAEDIC SURGERY

## 2020-01-13 PROCEDURE — 97140 MANUAL THERAPY 1/> REGIONS: CPT

## 2020-01-13 NOTE — FLOWSHEET NOTE
Other Therapeutic Activities:       Manual Treatments:       Passive flexion stretching  Passive ER stretch at 45 degrees\  GHJ mobs grade 1-2 for pain    Total manual- 18 minutes     Modalities:      Test/Measurements:         ASSESSMENT: Pt tolerated treatment well as pt performed all of today`s exercises with minimal increase in pain. Pt achieved , 60 degrees of passive ER at 90 degrees and 130 degrees of shoulder flexion. . PT continued to stress importance of limited active movement of LUE due to extent of injury. PT stressed importance of limiting motion and pain during both HEP and day-to-day activities. AAROM progressed this date. PT continued to stress recommendation of calling doctor about blood pressure issues after session. Treatment/Activity Tolerance:   []Patient tolerated treatment well [] Patient limited by fatique  [x]Patient limited by pain [] Patient limited by other medical complications  [] Other:     Goals:        Long term goals  Time Frame for Long term goals : 6 weeks  Long term goal 1: Pt will be independent with HEP  Long term goal 2: Pt will be able to achieve 130 degrees of L GHJ flexion  Long term goal 3: Pt will be able to achieve 60 degrees of L GHJ ER  Long term goal 4: Pt will report 75% decrease in pain during LUE movment  Long term goal 5: Pt will improve quick dash score by 15 points in order to demonstrate improved function. Plan: [] Continue per plan of care [] Alter current plan (see comments)   [] Plan of care initiated [] Hold pending MD visit [] Discharge      Plan for Next Session:      Re-Certification Due Date:         Signature:   Milli Mao , PT

## 2020-01-13 NOTE — PROGRESS NOTES
Gerson Zapata PT    If you have any questions or concerns, please don't hesitate to call.   Thank you for your referral.    Physician Signature:________________________________Date:__________________  By signing above, therapists plan is approved by physician

## 2020-01-13 NOTE — PROGRESS NOTES
Surgery: An arthroscopic anterior posterior cuff repair with irreparable supraspinatus    Post-Op Week:  9    Chief Complaint:  Follow-up ([facility] - LEFT SHOULDER )      History of Present of Illness: Tina Doty is now 2 months out from the above-mentioned surgery. 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 Notably he is having some additional musculoskeletal complaints. He is having some cervicalgia and pain with neck movement. Moderate knee pain. He notes some increasing varus of his ankle. I spent in a very delayed physical therapy protocol but very pleased with his work with Lana Joseph January 13      Review of Systems  Pertinent items are noted in HPI  Denies fever, chills, confusion, bowel/bladder active change. Review of systems reviewed from Patient History Form dated on January 13 on exam today he does appear well at rest.  Portals well-healed. He is a reasonably firm belly press. His external rotation strength is perhaps 4 out of 5 with no lag sign. And available in the patient's chart under the Media tab. Examination:  However he does have some pain with gentle isometric resistance. He cannot actively forward elevate above 75 degrees at this point he continues to show significant scapular compensation. Radiology:     None       an attempt at repair of a traumatic rotator cuff injury in the setting of significant preexistent chronic rotator cuff damage. No orders of the defined types were placed in this encounter. Impression:  As above      Treatment Plan:  I do believe at this point we need to keep him off of work. Continue with her therapy. Emphasized his general health and wellness as well as some exercise program.  I think will have a reasonable assessment at his next visit if he is regaining some anterior posterior force couple of the rotator cuff.   Clearly in the long run full heavy lifting above shoulder height is not a

## 2020-01-16 ENCOUNTER — HOSPITAL ENCOUNTER (OUTPATIENT)
Dept: PHYSICAL THERAPY | Age: 65
Setting detail: THERAPIES SERIES
Discharge: HOME | End: 2020-01-16
Payer: COMMERCIAL

## 2020-01-16 PROCEDURE — 97140 MANUAL THERAPY 1/> REGIONS: CPT

## 2020-01-16 PROCEDURE — 97110 THERAPEUTIC EXERCISES: CPT

## 2020-01-20 ENCOUNTER — APPOINTMENT (OUTPATIENT)
Dept: PHYSICAL THERAPY | Age: 65
End: 2020-01-20
Payer: COMMERCIAL

## 2020-01-21 ENCOUNTER — HOSPITAL ENCOUNTER (OUTPATIENT)
Dept: PHYSICAL THERAPY | Age: 65
Setting detail: THERAPIES SERIES
Discharge: HOME | End: 2020-01-21
Payer: COMMERCIAL

## 2020-01-21 PROCEDURE — 97140 MANUAL THERAPY 1/> REGIONS: CPT

## 2020-01-21 PROCEDURE — 97110 THERAPEUTIC EXERCISES: CPT

## 2020-01-21 NOTE — FLOWSHEET NOTE
Physical Therapy Daily Treatment Note    Date:  2020    Patient Name:  Rashmi Rose    :  1955  MRN: [id number]  Restrictions/Precautions:     Medical/Treatment Diagnosis Information:  Diagnosis: L shoulder Rotator Cuff Repair  Insurance/Certification information:  PT Insurance Information: Primary: [employer] Secondary: [facility]- Approved 18 visits until   Physician Information:  Referring Practitioner: Anahi Sandy  Plan of care signed (Y/N):  Y  Visit# / total visits:       G-Code (if applicable): Quick Dash: Sum of Responses: 37, Symptom Score: 59%    Medicare Cap (if applicable):  N/A = total amount used, updated 2020    Time in:   1:30  Timed Treatment: 30 Total Treatment Time:  30  Time out: 2:00  ________________________________________________________________________________________    Pain Level:    0/10    SUBJECTIVE:  Pt stated that he continues have LUE soreness. Pt reported that his soreness has been consistent over the weekend. Pt has more pain with moving arm. OBJECTIVE:  Pt experienced dizziness after performing supine<>sit. 75 degrees of passive ER at 45 degrees of abduction  75 degrees of passive ER at 90 degrees of abduction  125 degrees of shoulder flexion.     Exercise/Equipment Resistance/Repetitions Other comments    AROM wrist and elbow      Elbow Flexion/Extension      Wrist Flexion/Extension      HEP  HEP     Pendulums     Forward/bakward     Side/side     Circles    x15  x15  x15   HEP  HEP  HEP     Passive ER   Passive Flexion   2x15 HEP  HEP     Table slides   Pain free     Shoulder shrugs    Scapular retraction   x20           AAROM with ball       Flexion      Scaption   2x30  2x30      Isometric strengthening         Flexion         IR         ER          Extension   5\"x10     HEP  HEP  HEP  HEP     Pulleys                                                                                Other Therapeutic Activities:       Manual Treatments:       Passive flexion stretching  Passive ER stretch at 45 degrees  GHJ mobs grade 1-2 for pain    Total manual- 15 minutes     Modalities:      Test/Measurements:         ASSESSMENT: Pt had slightly increased pain with manual therapy this date. Pt achieved 75 degrees of passive ER at 90 degrees and 125 degrees of shoulder flexion. Pt had increased pain with passive flexion. Pt has hard time relaxing resulting in more active movement and therefore, increased pain. Pt has been trying to move LUE actively. PT continued to stress importance of limited active movement of LUE due to extent of injury and per protocol. PT stressed importance of limiting motion and pain during both HEP and day-to-day activities. HEP reviewed with pt. PT adjusted ER isometric strengthening exercise in order to decrease pain and ensure appropriate form. PT will progress per protocol and as pt can tolerate. Treatment/Activity Tolerance:   []Patient tolerated treatment well [] Patient limited by fatique  [x]Patient limited by pain [] Patient limited by other medical complications  [] Other:     Goals:        Long term goals  Time Frame for Long term goals : 6 weeks  Long term goal 1: Pt will be independent with HEP  Long term goal 2: Pt will be able to achieve 130 degrees of L GHJ flexion  Long term goal 3: Pt will be able to achieve 60 degrees of L GHJ ER  Long term goal 4: Pt will report 75% decrease in pain during LUE movment  Long term goal 5: Pt will improve quick dash score by 15 points in order to demonstrate improved function. Plan: [] Continue per plan of care [] Alter current plan (see comments)   [] Plan of care initiated [] Hold pending MD visit [] Discharge      Plan for Next Session:      Re-Certification Due Date:         Signature:   Francisca Vizcaino PT

## 2020-01-23 ENCOUNTER — HOSPITAL ENCOUNTER (OUTPATIENT)
Dept: PHYSICAL THERAPY | Age: 65
Setting detail: THERAPIES SERIES
Discharge: HOME | End: 2020-01-23
Payer: COMMERCIAL

## 2020-01-23 PROCEDURE — 97110 THERAPEUTIC EXERCISES: CPT

## 2020-01-23 PROCEDURE — 97140 MANUAL THERAPY 1/> REGIONS: CPT

## 2020-01-23 NOTE — FLOWSHEET NOTE
Physical Therapy Daily Treatment Note    Date:  2020    Patient Name:  Judson Shone    :  1955  MRN: [id number]  Restrictions/Precautions:     Medical/Treatment Diagnosis Information:  Diagnosis: L shoulder Rotator Cuff Repair  Insurance/Certification information:  PT Insurance Information: Primary: [employer] Secondary: [facility]- Approved 18 visits until   Physician Information:  Referring Practitioner: Danielle Scruggs  Plan of care signed (Y/N):  Y  Visit# / total visits:       G-Code (if applicable): Quick Dash: Sum of Responses: 37, Symptom Score: 59%    Medicare Cap (if applicable):  N/A = total amount used, updated 2020    Time in:   10:25  Timed Treatment: 30 Total Treatment Time:  30  Time out: 2:00  ________________________________________________________________________________________    Pain Level:    0/10    SUBJECTIVE:  Pt reported that he is pretty sore this date. Pt stated isometric with hand goes better than isometric with towel. Pt stated he has been trying to actively move his arm (I.e put towel on rack) and it is hard. (therapist stressed importance of continuing to limit active movement)    OBJECTIVE:  Pt experienced dizziness after performing supine<>sit. 75 degrees of passive ER at 45 degrees of abduction  75 degrees of passive ER at 90 degrees of abduction  125 degrees of shoulder flexion.     Exercise/Equipment Resistance/Repetitions Other comments    AROM wrist and elbow      Elbow Flexion/Extension      Wrist Flexion/Extension      HEP  HEP     Pendulums     Forward/bakward     Side/side     Circles    x15  x15  x15   HEP  HEP  HEP     Passive ER   Passive Flexion   2x15 HEP  HEP     Table slides   Pain free     Shoulder shrugs    Scapular retraction   x20           AAROM with ball       Flexion      Scaption   2x30  2x30      Isometric strengthening         Flexion         IR         ER          Extension   5\"x10     HEP  HEP  HEP  HEP
HEP  HEP  HEP  HEP     Pulleys                                                                                Other Therapeutic Activities:       Manual Treatments:       Passive flexion stretching  Passive ER stretch at 45 degrees  GHJ mobs grade 1-2 for pain    Total manual- 25 minutes     Modalities:      Test/Measurements:         ASSESSMENT: Pt had slightly increased pain with manual therapy this date. Pt achieved 75 degrees of passive ER at 90 degrees and 135 degrees of shoulder flexion. Pt had increased pain with passive flexion> passive ER. Pt has hard time relaxing resulting in more active movement and therefore, increased pain during passive stretching. Pt reported has been trying to move LUE actively. PT continued to stress importance of limited active movement of LUE due to extent of injury and per protocol. PT stressed importance of limiting motion and pain during both HEP and day-to-day activities. HEP reviewed with pt. . PT will progress per protocol and as pt can tolerate. Treatment/Activity Tolerance:   []Patient tolerated treatment well [] Patient limited by fatique  [x]Patient limited by pain [] Patient limited by other medical complications  [] Other:     Goals:        Long term goals  Time Frame for Long term goals : 6 weeks  Long term goal 1: Pt will be independent with HEP  Long term goal 2: Pt will be able to achieve 130 degrees of L GHJ flexion  Long term goal 3: Pt will be able to achieve 60 degrees of L GHJ ER  Long term goal 4: Pt will report 75% decrease in pain during LUE movment  Long term goal 5: Pt will improve quick dash score by 15 points in order to demonstrate improved function. Plan: [] Continue per plan of care [] Alter current plan (see comments)   [] Plan of care initiated [] Hold pending MD visit [] Discharge      Plan for Next Session:      Re-Certification Due Date:         Signature:   Gerson Zapata PT

## 2020-01-27 ENCOUNTER — HOSPITAL ENCOUNTER (OUTPATIENT)
Dept: PHYSICAL THERAPY | Age: 65
Setting detail: THERAPIES SERIES
Discharge: HOME | End: 2020-01-27
Payer: COMMERCIAL

## 2020-01-27 PROCEDURE — 97110 THERAPEUTIC EXERCISES: CPT

## 2020-01-27 PROCEDURE — 97140 MANUAL THERAPY 1/> REGIONS: CPT

## 2020-01-30 ENCOUNTER — HOSPITAL ENCOUNTER (OUTPATIENT)
Dept: PHYSICAL THERAPY | Age: 65
Setting detail: THERAPIES SERIES
Discharge: HOME | End: 2020-01-30
Payer: COMMERCIAL

## 2020-01-30 PROCEDURE — 97140 MANUAL THERAPY 1/> REGIONS: CPT

## 2020-01-30 PROCEDURE — 97110 THERAPEUTIC EXERCISES: CPT

## 2020-01-30 NOTE — PROGRESS NOTES
Physical Therapy Daily Treatment/Progress Note    Date:  2020    Patient Name:  Andres Brush    :  1955  MRN: [id number]  Restrictions/Precautions:     Medical/Treatment Diagnosis Information:  Diagnosis: L shoulder Rotator Cuff Repair  Insurance/Certification information:  PT Insurance Information: Primary: [employer] Secondary: [facility]- Approved 18 visits until   Physician Information:  Referring Practitioner: Kennedi Orozco  Plan of care signed (Y/N):  Y  Visit# / total visits:       G-Code (if applicable): Quick Dash: Sum of Responses: 37, Symptom Score: 59% - initial Evaluation    Quick Dash: Sum of Responses: 43, Symptom Score: 73% 2020    Medicare Cap (if applicable):  N/A = total amount used, updated 2020    Time in:   10:20  Timed Treatment: 45 Total Treatment Time:  60  Time out: 11:20  ________________________________________________________________________________________    Pain Level:    4/10    SUBJECTIVE:  Pt pt stated that he is still sore. Pt reported that he started to get a slight increase in pain in the shoulder last night when watching basketball. Pt stated isometric ER has not been causing him pain. However, IR isometric is more difficult to keep elbow at side. Pt stated he has continued to try do something things actively like hang a coat on a rack or lift his arm up on the door while driving. OBJECTIVE:  Pt experienced dizziness after performing supine<>sit. 80 degrees of passive ER at 45 degrees of abduction  80 degrees of passive ER at 90 degrees of abduction  140 degrees of shoulder flexion.     Exercise/Equipment Resistance/Repetitions Other comments    AROM wrist and elbow      Elbow Flexion/Extension      Wrist Flexion/Extension      HEP  HEP     Pendulums     Forward/bakward     Side/side     Circles    x15  x15  x15   HEP  HEP  HEP     Passive ER   Passive Flexion   2x15 HEP  HEP     Table slides   Pain free     Shoulder

## 2020-02-10 NOTE — PROGRESS NOTES
Surgery: Arthroscopic repair of the anterior and posterior rotator cuff with a massive irreparable tear of the atrophied and retracted superior rotator cuff    Post-Op Week:  12    Chief Complaint:  Pain (SX 11/13/2019 Cant use the shoulder the way he should. He has no ROM and cant even turn on his turn signal.)      History of Present of Illness: Anatoly Villanueva is a 59-year-old gentleman who had no antecedent problems with his left shoulder. He had a traumatic event with a true pseudoparalysis. His x-rays and MRI suggested very subtle proximal migration. His MRI demonstrated significant atrophy of the supraspinatus. However at the time I felt that some of the subscapularis and infraspinatus was acute and traumatic. We had detailed discussions about conservative care. 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 We also discussed reverse shoulder arthroplasty however again given his traumatic injury and desire to return to work this was felt to be high risk. At the time of surgery are limited goals were to try to restore the anterior posterior force couple, allow this to heal and rehabilitate him    Unfortunately, at this 12-week visit it does not appear that he has had a significant success in this regard. He is actually had somewhat worsening of pain which has been described with his underlying arthritis. He is demonstrated minimal improvement with neuromuscular coordination recapturing active forward elevation and abduction. He is having some difficulty with actives of daily living. This is exacerbated by difficulties he has with a posttraumatic deformity of the left lower leg. He has an upcoming appointment with Dr. Noreen Vernon to discuss this. Review of Systems  Pertinent items are noted in HPI  Denies fever, chills, confusion, bowel/bladder active change.   Review of systems reviewed from Patient History Form dated on

## 2020-02-10 NOTE — LETTER
[address] [facility]   Surgery Precert & Billing Form:    DEMOGRAPHICS:                                                                                                       Patient Name:  Nona Kraft  Patient :  1955   Patient SS#:      Patient Phone:  [phone] (home) [phone] (work) Alt. Patient Phone:    Patient Address:  [address] [place] [address]    PCP:  Chrystal Dawn MD  Insurance: Payor: Tito Ruiz / Plan: Tito Ruiz / Product Type: *No Product type* /     DIAGNOSIS & PROCEDURE:                                                                                      Diagnosis:   1. Status post rotator cuff repair    2. Traumatic complete tear of left rotator cuff, subsequent encounter    3.  Rotator cuff arthropathy of left shoulder      Operation: left    SURGERY  INFORMATION  Date of Surgery:   TBD  Location:  [facility]    Type:    Inpatient  23 hour hold:  No  Surgeon:              Guillaume Cuadra MD      2/10/20     BILLING INFORMATION:                                                                                                Physician Procedure                                            CPT Codes        Left reverse total shoulder arthroplasty  88988                      PA, or Fellow Procedure                                      CPT Codes                     Precert information:

## 2020-02-13 NOTE — PROGRESS NOTES
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 He states that since I saw him last in April 2018 he had his left shoulder rotator cuff repaired by Dr. Lashay Dias. This was a Workmen's Comp. injury. He subsequently has reinjured this and \"tore the shoulder apart\". They are thinking he is going to need a shoulder replacement. He has severe pain in the left ankle for which bracing him did not help at all. He is here with his wife  Objective: Physical exam shows he is alert and oriented x3. Has 20 degrees of left ankle dorsiflexion 30 degrees of plantarflexion his hindfoot is in varus and he is walking on the lateral side of his foot. There is no skin disruption. No evidence of DVT he has swelling from the mid calf all the way to the toes that is minimal and moderate to severe swelling above that to the knee. No evidence of cellulitis. Strength is 4-/5 through the available range with increased pain  Imaging: 3 views of the left ankle and 2 views of the left foot show that he has AVN through the talus with collapse of the ankle and subtalar joint  Assessment and plan: This patient's operative option would be a tibial talocalcaneal fusion. We discussed the implications of this in light of his bad shoulder and he has problems with hips and back. He is going to wait and see if the total shoulder gets approved by [employer]. if over the next 2 months this does not get covered he is going to proceed with his normal insurance and have the fusion done on his ankle and subtalar joint risks of surgery include but are not limited to the possibility of; Infection  Wound healing problems  Nerve Tendon or Vessel Damage  Incomplete pain relief  Need for further surgery in the future  Nonunion, or malunion.   Reflex Sympathetic Dystrophy  DVT or PE (blood clots)  Amputation  Death

## 2020-03-09 NOTE — PROGRESS NOTES
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 It had quite significant job requirements although he states he never really performed these very physical requirements. He was recently seen with a traumatic injury to the infraspinatus. Previous and significant damage to the supraspinatus. We understood preoperatively would likely only be able to repair the acute and traumatic portion and not the chronic degenerative portion which demonstrated quite severe retraction and atrophy. Nonetheless he previously seemed to have had captured glenohumeral kinematics and a reasonable anterior posterior force couple. He seemed to do quite well for the first 4 to 6 weeks after surgery but then has seen a rapid and severe decline. He is subsequently developed a significant pain and near pseudoparalysis with regard to forward elevation or abduction. At his last visit we felt that the surgical rotator cuff repair had failed and that he was now considered a candidate for reverse shoulder arthroplasty. In the meantime he has been seeing Dr. 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 He is considered a candidate for tibiocalcaneal fusion. March 9      Objective:     @IPVITALS(24)@    Review of Systems  Pertinent items are noted in HPI  Denies fever, chills, confusion, bowel/bladder active change. Review of systems reviewed from Patient History Form dated on March 9 and available in the patient's chart under the Media tab. Pain Assessment  Location of Pain: Shoulder  Location Modifiers: Left  Severity of Pain: 6  Quality of Pain: Throbbing, Sharp, Dull, Aching  Duration of Pain: Persistent  Frequency of Pain: Constant    Exam: On exam today he has a positive drop arm.

## 2020-04-09 NOTE — PROGRESS NOTES
Department of Orthopedic Surgery   Left shoulder progress Note      Follow-Up: Rotator cuff arthropathy, left shoulder    Subjective: Macarena Cisneros is a 63-year-old male that I know well. He presented with significant preexistent damage but then a traumatic tear which uncoupled his anterior posterior rotator cuff and created significant pseudoparalysis. He underwent an attempt at partial repair which seems to have clinically failed. He is developed progressive swelling, proximal migration of the humerus and pseudoparalysis. As such he is remained off work and awaiting approval for reverse shoulder arthroplasty      Objective:     @IPVITALS(24)@    Review of Systems  Pertinent items are noted in HPI  Denies fever, chills, confusion, bowel/bladder active change. Review of systems reviewed from Patient History Form dated on April 9 and available in the patient's chart under the Media tab. Exam: Telephone consultation today, no exam performed      Imaging: None today    Office Procedures:      Assessment:     Progressive rotator cuff arthropathy of the left shoulder. Plan:      1: Telephone visit was carried out today, 15 to 20-minute visit. He continues to be symptomatic but working well with anti-inflammatories topicals and his ice machine. He works at waist level only for light household activities. His health is remained stable. For the time being he will remain off of work. Royston Gowers is a 72 y.o. male evaluated via telephone on 4/9/2020. Consent:  He and/or health care decision maker is aware that that he may receive a bill for this telephone service, depending on his insurance coverage, and has provided verbal consent to proceed: Yes      Documentation:  I communicated with the patient and/or health care decision maker about left shoulder.    Details of this discussion including any medical advice provided: Continued supportive care, scapular stabilization gentle assisted

## 2020-05-14 NOTE — LETTER
Surgery Scheduling Form   [x]  [address]   [] [facility] NMN#[id number]    Scheduled Date: 2020      Scheduled Time: 12:45pm      Time Needed: 90min        Patient Information:      Name: Ramirez Grey      YOB: 1955      SSN:         Gender:  male                            Address: [address] [place] [address]   Phone Number: [phone] (home) [phone] (work)    Insurance:  Payor: Magy Edelmira / Plan: Magy Edelmira / Product Type: *No Product type* /     Primary Care Physician:  Marylin Tolbert MD    H&P To Be Completed By: PCP     Cardiac Clearance Needed? [] Yes [x] No        Pacemaker/Cardiac Implant? [] Yes [x] No            Surgical Procedure: Left reverse total shoulder arthroplasty  CPT [id number]    Pre- Op Diagnosis:   1. Traumatic complete tear of left rotator cuff, subsequent encounter            Diagnosis Code: M12.812    Rep: [facility]      Notified:  [x] Yes [] No        Special Equipment: n/a     [] Mini C-ARM   [] Large  C-ARM       Patient Status:   [] Outpatient         [x] Same Day Admission      [] In- Patient              Anesthesia Type:    [x] General         [] MAC       [] IV Regional          [] Local          [x] Interscalene block    [] Axillary Block             [] Popliteal Block     Allergies: Allergies   Allergen Reactions    Ciprofloxacin Itching         Latex: [] Yes [x] No      Pre-Admission Testing  Total Joint Orders for Familia Mccauley M.D     NAME  Ramirez Grey               1955      Vitals:    20 0835   Weight: 250 lb (113.4 kg)   Height: 6' 1\" (1.854 m)            [address] ALL OTHER ORDERS WILL BE AUTOMATICALLY INITIATED. 1. [x]  Attend PAT 1-3 weeks prior to OR date            [x]  Attend Joint Class prior to surgery     2.  PT/ OT evaluation

## 2020-05-27 PROBLEM — K56.609 SBO (SMALL BOWEL OBSTRUCTION) (HCC): Status: ACTIVE | Noted: 2020-01-01

## 2020-05-27 PROBLEM — E66.01 OBESITY, CLASS III, BMI 40-49.9 (MORBID OBESITY) (HCC): Chronic | Status: ACTIVE | Noted: 2020-01-01

## 2020-05-27 PROBLEM — I10 ESSENTIAL HYPERTENSION: Chronic | Status: ACTIVE | Noted: 2020-01-01

## 2020-05-27 PROBLEM — G47.33 OSA (OBSTRUCTIVE SLEEP APNEA): Chronic | Status: ACTIVE | Noted: 2020-01-01

## 2020-05-27 NOTE — CONSULTS
Father         colon,bone       REVIEW OF SYSTEMS:  CONSTITUTIONAL:  positive for  fatigue  HEENT:  negative  RESPIRATORY:  negative  CARDIOVASCULAR:  negative  GASTROINTESTINAL:  negative except for nausea, vomiting and abdominal pain  GENITOURINARY:  negative  HEMATOLOGIC/LYMPHATIC:  negative  NEUROLOGICAL:  Negative  * All other ROS reviewed and negative. PHYSICAL EXAM:  VITALS:  BP (!) 163/82   Pulse 93   Temp 98 °F (36.7 °C) (Oral)   Resp 16   Ht 6' 1\" (1.854 m)   Wt (!) 328 lb 11.3 oz (149.1 kg)   SpO2 94%   BMI 43.37 kg/m²   24HR INTAKE/OUTPUT:    I/O last 3 completed shifts: In: 3071 [I.V.:775; IV Piggyback:1000]  Out: 1270 [Urine:550; Emesis/NG output:720]  No intake/output data recorded. CONSTITUTIONAL:  alert, no apparent distress and morbidly obese  EYES:  PERRL, sclera clear  ENT:  Normocephalic,atraumatic, without obvious abnormality  NECK:  supple, symmetrical, trachea midline  LUNGS: Resp effort easy and unlabored,    CARDIOVASCULAR:  NO JVD,   and    ABDOMEN:   , hypoactive bowel sounds, soft, non-distended,  , involuntary guarding absent,   and    MUSCULOSKELETAL: No clubbing or cyanosis, 0+ pitting edema lower extremities  NEUROLOGIC:  Mental Status Exam:  Level of Alertness:   awake  PSYCHIATRIC:   person, place, time  SKIN:  normal skin color, texture, turgor     DATA:    CBC:   Recent Labs     05/26/20 2208 05/27/20  0637   WBC 13.3* 11.8*   HGB 18.1* 16.6   HCT 53.6* 48.6    191     BMP:    Recent Labs     05/26/20 2208 05/27/20  0637    144   K 4.5 4.3    107   CO2 24 26   BUN 17 15   CREATININE 0.9 0.8   GLUCOSE 125* 110*     Hepatic:   Recent Labs     05/26/20 2208 05/27/20  0637   AST 20 19   ALT 23 19   BILITOT 1.0 1.1*   ALKPHOS 110 101     Mag:      Recent Labs     05/27/20  0637   MG 1.90      Phos:   No results for input(s): PHOS in the last 72 hours. INR: No results for input(s): INR in the last 72 hours.     Radiology Review: Images personally reviewed by me. CT OF THE ABDOMEN AND PELVIS WITH CONTRAST 5/27/2020 12:49 am       TECHNIQUE:   CT of the abdomen and pelvis was performed with the administration of   intravenous contrast. Multiplanar reformatted images are provided for review. Dose modulation, iterative reconstruction, and/or weight based adjustment of   the mA/kV was utilized to reduce the radiation dose to as low as reasonably   achievable.       COMPARISON:   CT abdomen pelvis 11/02/2018, 09/16/2018       HISTORY:   ORDERING SYSTEM PROVIDED HISTORY: h/o partial SBO; starting vomiting around   noon; abdominal pain   TECHNOLOGIST PROVIDED HISTORY:   Reason for exam:->h/o partial SBO; starting vomiting around noon; abdominal   pain   Additional Contrast?->Oral   Reason for Exam: Abdominal pains, nausea and vomiting; History of SBO   Acuity: Acute   Type of Exam: Initial       FINDINGS:   LOWER CHEST       Lung bases: Dependent and basilar atelectasis.  Normal included heart and   pericardium.       ABDOMEN       Liver: Normal liver size, contour and parenchymal attenuation.       Gallbladder: Surgically absent.       Biliary: No intra or extrahepatic bile duct dilatation.       Pancreas: Normal.       Spleen: Normal.       Adrenals: Normal.       Kidneys: Kidneys are symmetric in size and parenchymal enhancement. Bilateral parapelvic cysts, unchanged.  No hydronephrosis or nephrolithiasis.       GI Tract: Normal distal esophagus, stomach and duodenal sweep. Multiple loops   of mildly distended small bowel with air-fluid levels.  Gradual transition 2   decompressed small bowel along the right ventral hemiabdomen.  Normal small   caliber appendix.  Mild scattered colonic diverticulosis.       Peritoneum/Retroperitoneum: No enlarged lymph nodes, free air or free fluid.       Vascular: Normal caliber abdominal aorta and IVC. Patent portal, superior   mesenteric and splenic veins.       PELVIS       Genitourinary: Normal urinary bladder.  Normal

## 2020-05-27 NOTE — ED NOTES
Patient resting in bed drinking contrast for scan.       [facility]erson[facility]  05/26/20 7544

## 2020-05-27 NOTE — ED PROVIDER NOTES
generalized abdominal tenderness. There is guarding. There is no rebound. Musculoskeletal:         General: No deformity. Right lower leg: No edema. Left lower leg: No edema. Skin:     General: Skin is warm and dry. Findings: No erythema or rash. Neurological:      Mental Status: He is alert and oriented to person, place, and time. Cranial Nerves: No dysarthria or facial asymmetry. Psychiatric:         Behavior: Behavior normal. Behavior is cooperative.          MDM/ED Course  ED Medication Orders (From admission, onward)    Start Ordered     Status Ordering Provider    05/27/20 0025 05/27/20 0025  iopamidol (ISOVUE-370) 76 % injection 75 mL  IMG ONCE PRN      Last MAR action:  [facility] [address]     05/26/20 2333 05/26/20 2333  iohexol (OMNIPAQUE 240) 240 MG/ML injection     Note to Pharmacy:  Farhan Art: cabinet override    Last MAR action:  Given - by Marry Kawasaki on 05/26/20 at 2339     05/26/20 2329 05/26/20 2329  iohexol (OMNIPAQUE 180) injection 20 mL  IMG ONCE PRN      Acknowledged Cyndee Renaldo     05/26/20 2245 05/26/20 2240  ondansetron (ZOFRAN) injection 4 mg  ONCE      Last MAR action:  Given - by Marry Kawasaki on 05/26/20 at [address] [facility] [address]     05/26/20 2245 05/26/20 2240  morphine (PF) injection 4 mg  ONCE      Last MAR action:  Given - by Marry Kawasaki on 05/26/20 at [facility]    05/26/20 2245 05/26/20 2240  0.9 % sodium chloride bolus  ONCE      Last MAR action:  Stopped - by Marry Kawasaki on 05/27/20 at [facility]          Radiology  Xr Abdomen (kub) (single Ap View)    Result Date: 5/27/2020  EXAMINATION: ONE SUPINE XRAY VIEW(S) OF THE ABDOMEN 5/27/2020 3:24 am COMPARISON: CT abdomen pelvis 05/27/2020 HISTORY: ORDERING SYSTEM PROVIDED HISTORY: NGT placement TECHNOLOGIST PROVIDED HISTORY: Reason for exam:->NGT placement Reason for Exam: ng tube placement FINDINGS: Mike Murrieta's partial SBO. We also agreed not to call general surgery consult in the middle of the night as that would not . Hospitalist team will call the consult in the morning. As I have deemed necessary from their history, physical and studies, I have considered and evaluated Karlene Sick for the following diagnoses:  ACUTE APPENDICITIS, BOWEL OBSTRUCTION, CHOLECYSTITIS, DIVERTICULITIS, INCARCERATED HERNIA, PANCREATITIS, or PERFORATED BOWEL or ULCER. Clinical Impression:  1. Partial intestinal obstruction, unspecified cause ([facility] [address]        Disposition:  Admit to med/surg floor in guarded condition. Blood pressure (!) 163/82, pulse 93, temperature 98 °F (36.7 °C), temperature source Oral, resp. rate 16, height 6' 1\" (1.854 m), weight (!) 328 lb 11.3 oz (149.1 kg), SpO2 94 %. Total critical care time is 35 minutes, which excludes separately billable procedures and updating family. Time spent is specifically for management of the presenting complaint and symptoms initially, direct bedside care, reevaluation, review of records, and consultation. There was a high probability of clinically significant life-threatening deterioration in the patient's condition, which required my urgent intervention. This chart was generated in part by using Dragon Dictation system and may contain errors related to that system including errors in grammar, punctuation, and spelling, as well as words and phrases that may be inappropriate. If there are any questions or concerns please feel free to contact the dictating provider for clarification.      Elroy Green MD  15 [facility] Jordan Phipps MD  05/27/20 3315

## 2020-05-28 NOTE — PLAN OF CARE
Pt a/o, rates pain appropriately using 0-10 pain scale; pt calls out as needed for pain intervention; will continue to monitor and administer intervention as ordered and requested. Josey Vincent
Ongoing  Goal: Ability to achieve adequate nutritional intake will improve  Description: Ability to achieve adequate nutritional intake will improve  Outcome: Ongoing     Problem: Infection:  Goal: Will remain free from infection  Description: Will remain free from infection  Outcome: Ongoing     Problem: Safety:  Goal: Free from accidental physical injury  Description: Free from accidental physical injury  Outcome: Ongoing  Goal: Free from intentional harm  Description: Free from intentional harm  Outcome: Ongoing     Problem: Daily Care:  Goal: Daily care needs are met  Description: Daily care needs are met  Outcome: Ongoing

## 2020-05-30 NOTE — PROGRESS NOTES
Hospitalist Progress Note      PCP: Pillo Melo MD    Date of Admission: 5/26/2020    Chief Complaint:   Chief Complaint   Patient presents with    Abdominal Pain     ABD PAIN STARTING NOON, LOWER ABD, N/V, NORMAL BM    Nausea     Hospital Course: [place] is a 72 y.o. male. He presents to the ER from home via private vehicle for cramping mid abdominal discomfort and vomiting. He began to vomit around noon on 5/27/20. He has had prior bowel obstructions and his symptoms seemed familiar so sought urgent medical attention. He had one bowel obstruction 9/2018 then another about then another about 2 months later. Exploratory laparoscopy was undertaken for the second obstruction, but patient was told no cause for the obstruction could be found (I.e. adhesions). In follow up he also underwent capsule endoscopy, but states the capsule malfunctioned in transit through his gut. The capsule did pass through into his stool though.   Since onset of his symptoms he has continued to have bowel movements and. He had three stools throughout the day including once before and two following onset of vomiting. He also believes he is continuing to pass flatus. Subjective:     Pending SBFT, +BM    DC plan:  Home    No new medical concerns  Bedside rounding with nursing completed.     Medications:  Reviewed    Infusion Medications    sodium chloride 100 mL/hr at 05/28/20 2229     Scheduled Medications    heparin (porcine)  5,000 Units Subcutaneous 3 times per day    famotidine (PEPCID) injection  20 mg Intravenous BID    labetalol  10 mg Intravenous 3 times per day     PRN Meds: phenol, morphine **OR** morphine, sodium chloride flush, acetaminophen, ondansetron, hydrALAZINE, iohexol      Intake/Output Summary (Last 24 hours) at 5/29/2020 0846  Last data filed at 5/29/2020 0351  Gross per 24 hour   Intake 0 ml   Output 2700 ml   Net -2700 ml       Physical Exam Performed:    BP (!) 157/93   Pulse 97
Patient is alert and oriented;  VSS;  Shift assessment completed; Bed locked and in lowest position. Bedside table and call light within reach; Pt denies further needs. Will continue to monitor.
Pt had run SVT. Asymptomatic. Vitals unchanged\stable.   Notified NP.
Pt is a/o x4. VSS. Assessment as charted.      - Pt has limited movement in L shoulder, L ankle, and R knee r/t injury- there is +4 edema in LLE but pulses are palpable w/ good cap refill.   - Pts NGT to CLWS w/ decreased dark bile output. Pt has rotund abd w/ hypoactive bowel (increased in comparison to yesterday) sounds and PRN Morphine was given per MD order for 8/10 pain. PRN Zofran was given per MD order for nausea.      Pt is currently resting in his bed that is locked and in its lowest position w/ his call light within reach, non-skid socks, and bed alarm on. Pt denies any other needs at this time. Will continue to monitor.    - Report was given to [facility] at approx [phone].
tolerated  3.   Capsule endoscopy as outpatient       Electronically signed by Erik Johnson MD     [id number]

## 2020-05-30 NOTE — CONSULTS
Past Surgical History:   Procedure Laterality Date    CHOLECYSTECTOMY      COLONOSCOPY      HIP SURGERY Left 1968    due to dislocation    OH LAP,DIAGNOSTIC ABDOMEN N/A 2018    DIAGNOSTIC LAPAROSCOPY performed by Ayush [facility]jivar [facility] at [facility]; for possible SBO    PROSTATE SURGERY      TURP    SHOULDER ARTHROSCOPY Left 2019    VIDEO ARTHROSCOPY LEFT SHOULDER, removal loose body, debridment -BLOCK- -SLEEP APNEA- performed by Alicia Gifford MD at [facility]:   Social History     Tobacco Use    Smoking status: Former Smoker     Packs/day: 0.50     Years: 5.00     Pack years: 2.50     Types: Cigarettes     Start date:      Last attempt to quit: 1987     Years since quittin.0    Smokeless tobacco: Never Used   Substance Use Topics    Alcohol use: No     Alcohol/week: 0.0 standard drinks     Comment: rarely     Family:   Family History   Problem Relation Age of Onset    Heart Disease Mother     Seizures Mother     High Blood Pressure Father     Kidney Disease Father     Cancer Father         colon,bone       ROS: Pertinent items are noted in HPI.     Objective:   Vital Signs:  Temp (24hrs), Av.1 °F (36.7 °C), Min:97.9 °F (36.6 °C), Max:98.1 °F (95.2 °C)     Systolic (38HVV), PYR:834 , Min:146 , HKN:900      Diastolic (64NAI), XZJ:35, Min:77, Max:98     Pulse  Av.8  Min: 76  Max: 91  BP (!) 146/87   Pulse 83   Temp 97.9 °F (36.6 °C) (Oral)   Resp 16   Ht 6' 1\" (1.854 m)   Wt (!) 323 lb 11.2 oz (146.8 kg)   SpO2 92%   BMI 42.71 kg/m²      Physical Exam:   BP (!) 146/87   Pulse 83   Temp 97.9 °F (36.6 °C) (Oral)   Resp 16   Ht 6' 1\" (1.854 m)   Wt (!) 323 lb 11.2 oz (146.8 kg)   SpO2 92%   BMI 42.71 kg/m²   General appearance: alert, appears stated age and cooperative  Lungs: clear to auscultation bilaterally  Chest wall: no tenderness  Heart: regular rate and rhythm, S1, S2 normal, no murmur, click, rub or gallop  Abdomen: soft,

## 2020-05-31 NOTE — DISCHARGE SUMMARY
Surgery Discharge Summary    Patient Identification  Keegan Diaz is a 72 y.o. male. :  1955  Admit Date:  2020    Discharge date:   No discharge date for patient encounter. Disposition: home    Discharge Diagnoses: Active Problems:    SBO (small bowel obstruction) (HCC)    Essential hypertension    BELEN (obstructive sleep apnea)    Obesity, Class III, BMI 40-49.9 (morbid obesity) ([facility] [place] [address]  Resolved Problems:    * No resolved hospital problems. *      Discharge condition: good    Discharge Medications:     Current Discharge Medication List      CONTINUE these medications which have NOT CHANGED    Details   Naproxen Sodium (ALEVE PO) Take by mouth as needed      amLODIPine (NORVASC) 2.5 MG tablet Take 2.5 mg by mouth daily      labetalol (NORMODYNE) 200 MG tablet TAKE 1 TABLET BY MOUTH TWICE A DAY                Current Discharge Medication List            Most Recent Labs:    CBC:   Recent Labs     20  0635 20  0615 20  0605   WBC 9.4 7.2 5.9   HGB 15.7 15.5 15.7   HCT 45.7 44.9 45.6    178 207     BMP:    Recent Labs     20  0635 20  0615 20  0605    141 138   K 3.9 4.5 3.9    102 103   CO2 25 31 27   BUN 11 8 8   CREATININE 0.7* 0.8 0.8   GLUCOSE 77 97 104*     Hepatic:   Recent Labs     20  0635 20  0615 20  0605   AST 25 27 31   ALT 27 28 39   BILITOT 1.4* 1.1* 0.6   ALKPHOS 90 85 85     PT/INR:  No results for input(s): INR in the last 72 hours. Consults: GI and general surgery    Surgery: none    Patient Instructions: Activity: no restrictions  Diet: As tolerated  Follow-up with GI    The patient and/or family/patient representatives, were provided education regarding discharge instructions, ongoing treatment and follow-up. Details of information given to the patient may be found in the discharge instructions located in the EMR.        HPI and Hospital Course:   Patient admitted

## 2020-06-23 NOTE — PROGRESS NOTES
Department of Orthopedic Surgery   Progress Note      Follow-Up: Rotator cuff arthropathy left shoulder    Subjective: [facility] recently hospitalized with a small bowel obstruction versus ileus. Resolved and now doing well. Is really struggling with his foot and ankle. Unable to walk without his brace and boot. Shoulder symptoms persist with pseudoparalysis and pain      Objective:     @IPVITALS(24)@    Review of Systems  Pertinent items are noted in HPI  Denies fever, chills, confusion, bowel/bladder active change. Review of systems reviewed from Patient History Form dated on June 23 and available in the patient's chart under the Media tab. Pain Assessment  Location of Pain: Shoulder  Location Modifiers: Left  Severity of Pain: 8  Quality of Pain: Sharp, Dull, Aching  Duration of Pain: Persistent  Frequency of Pain: Constant  Work-Related Injury: Yes    Exam: He has subdeltoid effusion. Positive drop arm X rotation lag. Skin good condition      Imaging: Reviewed    Office Procedures:      Assessment:     Rotator cuff arthropathy left shoulder. Plan:      1: We had a good preoperative discussion today. We reviewed the procedure and recovery process in detail. Reviewed some of the available literature on outcomes given his young age. Discussed his desire to pursue reconstructive foot and ankle surgery in the future. Follow-Up Visit: Postoperatively            [facility] ([facility])  Shoulder and Sports Medicine Surgery      This dictation was performed with a verbal recognition program (DRAGON) and it was checked for errors. It is possible that there are still dictated errors within this office note. If so, please bring any errors to my attention for an addendum. All efforts were made to ensure that this office note is accurate.

## 2020-06-29 NOTE — PROGRESS NOTES
amLODIPine (NORVASC) 2.5 MG tablet Take 2.5 mg by mouth daily   Yes Historical Provider, MD   labetalol (NORMODYNE) 200 MG tablet TAKE 1 TABLET BY MOUTH TWICE A DAY 5/7/19  Yes Historical Provider, MD       Allergies:  Ciprofloxacin    Social History:   TOBACCO: quit  ETOH: no    Family History:       Problem Relation Age of Onset    Heart Disease Mother     Seizures Mother     High Blood Pressure Father     Kidney Disease Father     Cancer Father         colon,bone       REVIEW OF SYSTEMS:    CONSTITUTIONAL:  negative  HEENT:  Negative  RESPIRATORY:  negative  CARDIOVASCULAR:  negative  GASTROINTESTINAL:  positive for abdominal pain  GENITOURINARY:  negative  HEMATOLOGIC/LYMPHATIC:  negative  ENDOCRINE:  Negative  NEUROLOGICAL:  Negative  * All other ROS reviewed and negative. PHYSICAL EXAM:    VITALS:  /84   Pulse 82   Temp 97.6 °F (36.4 °C)   Ht 6' (1.829 m)   Wt (!) 323 lb (146.5 kg)   BMI 43.81 kg/m²   INTAKE/OUTPUT:   [unfilled]  [unfilled]  CONSTITUTIONAL:  awake, alert, no apparent distress and morbidly obese  ENT:  normocephalic, without obvious abnormality  NECK:  supple, symmetrical, trachea midline   LUNGS:  no crackles or wheezing  CARDIOVASCULAR:  regular rate  ABDOMEN:  , normal bowel sounds, soft, non-distended, non-tender, voluntary guarding absent,  MUSCULOSKELETAL:  1+ pitting edema lower extremities  NEUROLOGIC:  Mental Status Exam:  Level of Alertness:   awake  Orientation:   person, place, time  SKIN:  no rashes      DATA:  CBC: No results for input(s): WBC, HGB, HCT, PLT in the last 72 hours. BMP:  No results for input(s): NA, K, CL, CO2, BUN, CREATININE, GLUCOSE in the last 72 hours. Hepatic: No results for input(s): AST, ALT, ALB, BILITOT, ALKPHOS in the last 72 hours. Mag:    No results for input(s): MG in the last 72 hours. Phos:   No results for input(s): PHOS in the last 72 hours. INR: No results for input(s): INR in the last 72 hours.     Radiology

## 2020-07-06 NOTE — PROGRESS NOTES
Kathy Batres received a viral test for COVID-19. They were educated on isolation and quarantine as appropriate. For any symptoms, they were directed to seek care from their PCP, given contact information to establish with a doctor, directed to an urgent care or the emergency room.

## 2020-07-08 NOTE — TELEPHONE ENCOUNTER
RN did not speak with pt prior to surgery. COVID:7/6/2020    Orthopedic Nurse Navigator Summary      -  Patient Name: Rubin Felix  Anticipated Date of Surgery:7/9/2020  Using [employer]? Yes, Are they Registered: Yes  Attended Pre-Op Education Class: No  If No, why not? PCP:  Phone #:  Date of PCP Visit for H&P: 06/29/2020  Any Noted Concerns from PCP prior to surgery: No  If Yes, what concerns?:  Is the Patient in a Pain Management Program?: No  Review of Past Medical History Reveals History of:  -  Critical Lab Values:  - Hemoglobin (g/dL): Date Value 15.7  - Hematocrit (%): Date Value  - HgbA1C : Date Value 0.0  - Albumin : Date Value 3.7  - BUN (mg/dL) : Date Value 8.0  - CREATININE (mg/dL) : Date Value 0.8  - BMI (kg/m2) : Date Value  -  Coronary Artery Disease/HTN/CHF History: Yes  Cardiologist: HTN managed by PCP and medsn  Cardiac Clearance Necessary: No  Date of Cardiac Clearance Appt: On Plavix? No  If YES, when will they stop taking? Final Cardiac Recommendations: On any anticoagulation: No  -  Diabetes History: No  Most Recent HgbA1C: 0.0  PCP or Endocrine Recommendations:  Nutritionist/Dietician Consult Scheduled:  Final Plan For Diabetic Control:  Pulmonary: COPD/Emphysema/ Use of home oxygen:  Alcohol use: Non-Drinker  -  DVT Risk Stratification: Low Risk  Vascular Consult Ordered:  Date of Vascular Appt:  Hematology/Oncology Consult Ordered:  Date of Hematology/Oncology Appt:  Final Recommendation For DVT Prophylaxis:  Smoking history: Non-Smoker  Use of Estrogen:  -  BMI Greater than 40 at time of scheduling?: No  Has Surgeon been notified of BMI concern? No  Weight Loss Clinic Consult Ordered No  Date of Wt Loss Clinic Appt:  BMI at time of surgery (if went through [facility]):  -  Additional Medical Concerns: Additional Recommendations for above concerns:  Attended Pre-Hab Program: No  Anticipated Discharge Disposition:  Who will be with patient at home following discharge?   Equipment patient already has:  Bedroom on first or second floor:  Bathroom on first or second floor:  Weight bearing status:  Pre-op ambulatory status:  Number of entry steps: ZERO  Caregiver assistance:

## 2020-07-09 PROBLEM — S42.92XA SHOULDER FRACTURE, LEFT, CLOSED, INITIAL ENCOUNTER: Status: ACTIVE | Noted: 2020-01-01

## 2020-07-09 NOTE — ANESTHESIA PROCEDURE NOTES
Peripheral Block    Patient location during procedure: pre-op  Start time: 7/9/2020 11:08 AM  End time: 7/9/2020 11:09 AM  Staffing  Anesthesiologist: Tiburcio Klinefelter, MD  Performed: anesthesiologist   Preanesthetic Checklist  Completed: patient identified, site marked, surgical consent, pre-op evaluation, timeout performed, IV checked, risks and benefits discussed, monitors and equipment checked, anesthesia consent given, oxygen available and patient being monitored  Peripheral Block  Patient position: sitting  Prep: ChloraPrep  Patient monitoring: cardiac monitor, continuous pulse ox, frequent blood pressure checks and IV access  Block type: Brachial plexus  Laterality: left  Injection technique: single-shot  Procedures: ultrasound guided  Local infiltration: lidocaine  Infiltration strength: 1 %  Dose: 3 mL  Interscalene  Provider prep: mask and sterile gloves  Local infiltration: lidocaine  Needle  Needle gauge: 21 G  Needle length: 10 cm  Needle localization: ultrasound guidance  Assessment  Injection assessment: negative aspiration for heme, no paresthesia on injection and local visualized surrounding nerve on ultrasound  Paresthesia pain: none  Slow fractionated injection: yes  Hemodynamics: stable  Additional Notes  Immediately prior to procedure a \"time out\" was called to verify the correct patient, allergies, laterality, procedure and equipment. Time out performed with  RN    Local Anesthetic: 0.5 %  Bupivacaine   Amount: 20 ml  in 5 ml increments after negative aspiration each time. Anterior scalene and middle scalene muscles, upper, middle and lower trunks of the brachial plexus are identified, the tip of the needle and the spread of the local anesthetic around the brachial plexus are visualized. The Brachial plexus appeared to be anatomically normal and there were no abnormal pathologically findings seen.      Versed 2mg  Reason for block: post-op pain management and at surgeon's request

## 2020-07-09 NOTE — ANESTHESIA PRE PROCEDURE
Department of Anesthesiology  Preprocedure Note       Name:  Sarah Humphries   Age:  72 y.o.  :  1955                                          MRN:  [id number]         Date:  2020      Surgeon: Mik Lovett):  Larry Sharp MD    Procedure: Procedure(s):  LEFT REVERSE TOTAL SHOULDER ARTHROPLASTY    Medications prior to admission:   Prior to Admission medications    Medication Sig Start Date End Date Taking? Authorizing Provider   mupirocin (BACTROBAN) 2 % ointment Apply small amount to each nostril twice daily starting 5 days prior to surgery. See surgery instructions for more information. 20   Larry Sharp MD   benzoyl peroxide (BENZOYL PEROXIDE) 5 % external liquid Wash neck, shoulder and armpit daily starting 2 days before surgery and day of surgery. See surgery instructions. 20   Larry Sharp MD   Naproxen Sodium (ALEVE PO) Take by mouth as needed    Historical Provider, MD   amLODIPine (NORVASC) 2.5 MG tablet Take 2.5 mg by mouth every evening     Historical Provider, MD   labetalol (NORMODYNE) 200 MG tablet TAKE 1 TABLET BY MOUTH TWICE A DAY 19   Historical Provider, MD       Current medications:    No current facility-administered medications for this encounter. Current Outpatient Medications   Medication Sig Dispense Refill    mupirocin (BACTROBAN) 2 % ointment Apply small amount to each nostril twice daily starting 5 days prior to surgery. See surgery instructions for more information. 1 Tube 0    benzoyl peroxide (BENZOYL PEROXIDE) 5 % external liquid Wash neck, shoulder and armpit daily starting 2 days before surgery and day of surgery. See surgery instructions. 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    Partial small bowel obstruction (HCC) K56.600    Diverticulosis K57.90    Partial intestinal obstruction (HCC) K56.600    Small bowel obstruction ([facility] [address] K56.609    SBO (small bowel obstruction) ([facility] [address] K56.609    Essential hypertension I10    BELEN (obstructive sleep apnea) G47.33    Obesity, Class III, BMI 40-49.9 (morbid obesity) ([facility] [address] E66.01       Past Medical History:        Diagnosis Date    Arthritis     Hypertension     Knee pain     PONV (postoperative nausea and vomiting)     Sleep apnea     did not tolerate CPAP       Past Surgical History:        Procedure Laterality Date    CHOLECYSTECTOMY      COLONOSCOPY      HIP SURGERY Left 1968    due to dislocation    SD LAP,DIAGNOSTIC ABDOMEN N/A 2018    DIAGNOSTIC LAPAROSCOPY performed by Gemini Ratliff MD at [facility]; for possible SBO    PROSTATE SURGERY      TURP    SHOULDER ARTHROSCOPY Left 2019    VIDEO ARTHROSCOPY LEFT SHOULDER, removal loose body, debridment -BLOCK- -SLEEP APNEA- performed by Gerry Espinosa MD at [place] [address] History:    Social History     Tobacco Use    Smoking status: Former Smoker     Packs/day: 0.50     Years: 5.00     Pack years: 2.50     Types: Cigarettes     Start date:      Last attempt to quit: 1987     Years since quittin.1    Smokeless tobacco: Never Used   Substance Use Topics    Alcohol use: No     Alcohol/week: 0.0 standard drinks     Comment: rarely                                Counseling given: Not Answered      Vital Signs (Current):   Vitals:    20 1003   Weight: (!) 323 lb (146.5 kg)   Height: 6' (1.829 m)                                              BP Readings from Last 3 Encounters:   20 116/84   20 (!) 150/92   19 97/64       NPO Status: BMI:   Wt Readings from Last 3 Encounters:   06/29/20 (!) 323 lb (146.5 kg)   06/23/20 (!) 323 lb 10.2 oz (146.8 kg)   05/30/20 (!) 323 lb 11.2 oz (146.8 kg)     Body mass index is 43.81 kg/m². CBC:   Lab Results   Component Value Date    WBC 5.9 05/31/2020    RBC 5.05 05/31/2020    HGB 15.7 05/31/2020    HCT 45.6 05/31/2020    MCV 90.3 05/31/2020    RDW 13.0 05/31/2020     05/31/2020       CMP:   Lab Results   Component Value Date     05/31/2020    K 3.9 05/31/2020    K 3.8 09/18/2018     05/31/2020    CO2 27 05/31/2020    BUN 8 05/31/2020    CREATININE 0.8 05/31/2020    GFRAA >60 05/31/2020    AGRATIO 1.4 05/31/2020    LABGLOM >60 05/31/2020    GLUCOSE 104 05/31/2020    PROT 6.3 05/31/2020    CALCIUM 9.1 05/31/2020    BILITOT 0.6 05/31/2020    ALKPHOS 85 05/31/2020    AST 31 05/31/2020    ALT 39 05/31/2020       POC Tests: No results for input(s): POCGLU, POCNA, POCK, POCCL, POCBUN, POCHEMO, POCHCT in the last 72 hours. Coags: No results found for: PROTIME, INR, APTT    HCG (If Applicable): No results found for: PREGTESTUR, PREGSERUM, HCG, HCGQUANT     ABGs: No results found for: PHART, PO2ART, TKP1UTJ, ION2FPP, BEART, S1VBBZQP     Type & Screen (If Applicable):  No results found for: LABABO, LABRH    Drug/Infectious Status (If Applicable):  No results found for: HIV, HEPCAB    COVID-19 Screening (If Applicable): No results found for: COVID19      Anesthesia Evaluation  Patient summary reviewed and Nursing notes reviewed   history of anesthetic complications: PONV.   Airway: Mallampati: II     Neck ROM: full   Dental:          Pulmonary:Negative Pulmonary ROS and normal exam    (+) sleep apnea:                             Cardiovascular:Negative CV ROS    (+) hypertension:,                   Neuro/Psych:   Negative Neuro/Psych ROS              GI/Hepatic/Renal: Neg GI/Hepatic/Renal ROS       (-) hiatal hernia and GERD Endo/Other: Negative Endo/Other ROS   (+) : arthritis:., .                 Abdominal:           Vascular:                                      Anesthesia Plan      general     ASA 3     (I discussed with the patient the risks and benefits of regional anesthesia (inlcuding infection, bleeding, damage to nerves and surrounding structures) PIV, General, IV Narcotics, PACU. All questions were answered the patient agrees with the plan and wishes to proceed.)  Induction: intravenous. Pre-Operative Diagnosis: Left rotator cuff tear arthropathy [M75.102, M12.812]    72 y.o.   BMI:  Body mass index is 43.54 kg/m².      Vitals:    20 1003 20 0924   BP:  (!) 157/78   Pulse:  71   Resp:  16   Temp:  99.1 °F (37.3 °C)   TempSrc:  Temporal   SpO2:  96%   Weight: (!) 323 lb (146.5 kg) (!) 321 lb (145.6 kg)   Height: 6' (1.829 m)        Allergies   Allergen Reactions    Ciprofloxacin Itching    Lisinopril      cough       Social History     Tobacco Use    Smoking status: Former Smoker     Packs/day: 0.50     Years: 5.00     Pack years: 2.50     Types: Cigarettes     Start date:      Last attempt to quit: 1987     Years since quittin.1    Smokeless tobacco: Never Used   Substance Use Topics    Alcohol use: No     Alcohol/week: 0.0 standard drinks     Comment: rarely       LABS:    CBC  Lab Results   Component Value Date/Time    WBC 5.9 2020 06:05 AM    HGB 15.7 2020 06:05 AM    HCT 45.6 2020 06:05 AM     2020 06:05 AM     RENAL  Lab Results   Component Value Date/Time     2020 06:05 AM    K 3.9 2020 06:05 AM    K 3.8 2018 08:01 AM     2020 06:05 AM    CO2 27 2020 06:05 AM    BUN 8 2020 06:05 AM    CREATININE 0.8 2020 06:05 AM    GLUCOSE 104 (H) 2020 06:05 AM     COAGS  No results found for: PROTIME, INR, APTT      Magan Coates MD   2020

## 2020-07-09 NOTE — ANESTHESIA POSTPROCEDURE EVALUATION
Department of Anesthesiology  Postprocedure Note    Patient: Brendon Gonzalez  MRN: [id number]  YOB: 1955  Date of evaluation: 7/9/2020  Time:  6:32 PM     Procedure Summary     Date:  07/09/20 Room / Location:  [address] [place] [address]    Anesthesia Start:  1219 Anesthesia Stop:  6739    Procedure:  LEFT REVERSE TOTAL SHOULDER ARTHROPLASTY (Left Shoulder) Diagnosis:       Left rotator cuff tear arthropathy      (TRAUMATIC COMPLETE TEAR OF LEFT ROTATOR CUFF, SUBSEQUENT ENCOUNTER  U91.816)    Surgeon:  Davide Mora MD Responsible Provider:  Leonardo Lindsay MD    Anesthesia Type:  general ASA Status:  3          Anesthesia Type: general    Javid Phase I: Javid Score: 10    Javid Phase II:      Last vitals: Reviewed and per EMR flowsheets.        Anesthesia Post Evaluation    Comments: Postoperative Anesthesia Note    Name:    Brendon Gonzalez  MRN:      [id number]    Patient Vitals in the past 12 hrs:  07/09/20 1657, BP:123/77, Temp:98.3 °F (36.8 °C), Temp src:Oral, Pulse:76, Resp:16, SpO2:93 %  07/09/20 1542, BP:(!) 176/80, Temp:97.5 °F (36.4 °C), Temp src:Oral, Pulse:73, Resp:16, SpO2:92 %, Height:6' (1.829 m), Weight:(!) 321 lb (145.6 kg)  07/09/20 1500, BP:(!) 157/87, Pulse:65, Resp:14, SpO2:92 %  07/09/20 1445, BP:(!) 160/92, Pulse:66, Resp:16, SpO2:92 %  07/09/20 1430, BP:(!) 162/85, Pulse:66, Resp:14, SpO2:92 %  07/09/20 1425, BP:(!) 157/89, Pulse:65, Resp:14, SpO2:93 %  07/09/20 1420, BP:(!) 153/90, Pulse:65, Resp:15, SpO2:94 %  07/09/20 1415, Pulse:64, Resp:14, SpO2:95 %  07/09/20 1410, BP:(!) 153/101, Pulse:65, Resp:14, SpO2:96 %  07/09/20 1405, BP:(!) 146/78, Pulse:64, Resp:14, SpO2:96 %  07/09/20 1400, BP:(!) 146/77, Pulse:68, Resp:14, SpO2:94 %  07/09/20 1355, BP:(!) 142/72, Temp:97.8 °F (36.6 °C), Temp src:Temporal, Pulse:63, Resp:16, SpO2:95 %  07/09/20 1107, BP:(!) 160/83, Pulse:74, Resp:16, SpO2:99 %  07/09/20 1103, BP:(!) 160/82, Pulse:72, Resp:16, SpO2:97 %  07/09/20 1100, BP:(!) 176/101, Pulse:73, SpO2:99 %  07/09/20 0924, BP:(!) 157/78, Temp:99.1 °F (37.3 °C), Temp src:Temporal, Pulse:71, Resp:16, SpO2:96 %, Weight:(!) 321 lb (145.6 kg)     LABS:    CBC  Lab Results       Component                Value               Date/Time                  WBC                      5.9                 05/31/2020 06:05 AM        HGB                      15.7                05/31/2020 06:05 AM        HCT                      45.6                05/31/2020 06:05 AM        PLT                      207                 05/31/2020 06:05 AM   RENAL  Lab Results       Component                Value               Date/Time                  NA                       138                 05/31/2020 06:05 AM        K                        3.9                 05/31/2020 06:05 AM        K                        3.8                 09/18/2018 08:01 AM        CL                       103                 05/31/2020 06:05 AM        CO2                      27                  05/31/2020 06:05 AM        BUN                      8                   05/31/2020 06:05 AM        CREATININE               0.8                 05/31/2020 06:05 AM        GLUCOSE                  104 (H)             05/31/2020 06:05 AM   COAGS  No results found for: PROTIME, INR, APTT    Intake & Output: In: 6390 (P.O.:240; I.V.:1000)  Out: -     Nausea & Vomiting:  No    Level of Consciousness:  Awake    Pain Assessment:  Adequate analgesia    Anesthesia Complications:  No apparent anesthetic complications    SUMMARY      Vital signs stable  OK to discharge from Stage I post anesthesia care.   Care transferred from Anesthesiology department on discharge from perioperative area

## 2020-07-09 NOTE — H&P
I have reviewed the History & Physical and examined the patient and find no relevant changes. I have reviewed with the patient and/or family the risks, benefits, and alternatives to the procedure(s). All questions and concerns were addressed. Consent is on the chart. Surgical site has been marked by Dr Isela Amaya and confirmed by the patient.     Tracey Sommers

## 2020-07-09 NOTE — BRIEF OP NOTE
Brief Postoperative Note      Patient: Denita Chambers  YOB: 1955  MRN: [id number]    Date of Procedure: 7/9/2020    Pre-Op Diagnosis: TRAUMATIC COMPLETE TEAR OF LEFT ROTATOR CUFF, SUBSEQUENT ENCOUNTER  M12.812    Post-Op Diagnosis: Same       Procedure(s):  LEFT REVERSE TOTAL SHOULDER ARTHROPLASTY    Surgeon(s):  Melissa Nuñez MD    Assistant:  Surgical Assistant: Meghan Mix    Anesthesia: General    Estimated Blood Loss (mL): 208     Complications: None    Specimens:   * No specimens in log *    Implants:  Implant Name Type Inv.  Item Serial No.  Lot No. LRB No. Used Action   IMPL SHOULDER BASE STD PERFM REV 25MM Shoulder/Arm/Wrist/Hand IMPL SHOULDER BASE STD PERFM REV 25MM  [employer] [id number] Left 1 Implanted   IMPL SHOULDER GLENOSPHERE 39MM +3MM Shoulder/Arm/Wrist/Hand IMPL SHOULDER GLENOSPHERE 39MM +3MM  [employer] [id number] Left 1 Implanted   SCREW PERFORM REV CENTRAL 6.5X35MM Screw/Plate/Nail/Pee SCREW PERFORM REV CENTRAL 6.5X35MM  [employer]  Left 1 Implanted   SCREW PERFORM REV PERIPHERAL 30MM Screw/Plate/Nail/Pee SCREW PERFORM REV PERIPHERAL 30MM  [employer]  Left 1 Implanted   SCREW PERF REVRSED PERIPHERAL 22MM Screw/Plate/Nail/Pee SCREW PERF REVRSED PERIPHERAL 22MM  [employer]  Left 1 Implanted   SCREW PERFORM REV PERIPHERAL 34MM Screw/Plate/Nail/Pee SCREW PERFORM REV PERIPHERAL 34MM  [employer]  Left 1 Implanted   IMPL SYSTEM SHLD GUIDE REVERSE 12.5X39MM Shoulder/Arm/Wrist/Hand IMPL SYSTEM SHLD GUIDE REVERSE 12.5X39MM  [employer] [id number] Left 1 Implanted   IMPL STEM HUM ASCEND FLEX STD PTC 78MM Shoulder/Arm/Wrist/Hand IMPL STEM HUM ASCEND FLEX STD PTC 78MM  [employer] [id number] Left 1 Implanted   IMPL TRAY REVERSED ASCEND FLX ECCENT 0MM Shoulder/Arm/Wrist/Hand IMPL TRAY REVERSED ASCEND FLX ECCENT 0MM  [employer] [id number] Left 1 Implanted         Drains: * No LDAs found *    Findings: please see op note    Electronically

## 2020-07-10 NOTE — PROGRESS NOTES
Physical Therapy    Facility/Department: [place] [address] - MED SURG/ORTHO  Initial Assessment    NAME: Evan Lino  : 1955  MRN: [id number]    Date of Service: 7/10/2020    Discharge Recommendations:  24 hour supervision or assist, Outpatient PT   PT Equipment Recommendations  Equipment Needed: No    Assessment   Body structures, Functions, Activity limitations: Decreased functional mobility ; Decreased strength;Decreased balance; Increased pain;Decreased endurance  Assessment: Pt functioning below baseline following L reverse TSA. Pt wtih good pain control but limited mobility due to chronic L ankle deformity limiting ambulate at baseline. Pt unsteady due to weight bearing on the lateral aspect of L foot. david walker improved balance. Pt required assist to don/doff immobilizer. Pt ambulate short distance in room. Pt atempted stairs and completed 2 consecutive steps and 1 curb step needing Min A. Pt very unsteady and very fatigued with activity. Pt and family educated on safety at home. Reviewed car transfer as well. Pt and wife distracted during education but adknowledged information. HEP and precautions provided in handout. Treatment Diagnosis: decreased mobility  Prognosis: Excellent  Decision Making: High Complexity  PT Education: Goals;PT Role;Plan of Care;Precautions; Equipment;Transfer Training;General Safety;Gait Training;Weight-bearing Education; Family Education;Home Exercise Program  Patient Education: Pt expressed understanding   Barriers to Learning: none  REQUIRES PT FOLLOW UP: Yes  Activity Tolerance  Activity Tolerance: Patient Tolerated treatment well       Patient Diagnosis(es): There were no encounter diagnoses. has a past medical history of Arthritis, Hypertension, Knee pain, PONV (postoperative nausea and vomiting), and Sleep apnea.    has a past surgical history that includes Cholecystectomy; Prostate surgery; hip surgery (Left, 1968); pr lap,diagnostic abdomen (N/A, 2018); Colonoscopy; Shoulder arthroscopy (Left, 11/13/2019); and shoulder surgery (Left, 7/9/2020). Restrictions  Restrictions/Precautions  Restrictions/Precautions: General Precautions, Fall Risk  Required Braces or Orthoses?: Yes  Required Braces or Orthoses  Right Lower Extremity Brace: (Right ankle boot)  Left Lower Extremity Brace: (soft ankle brace & ankle boots)  Left Upper Extremity Brace/Splint: Left shoulder Immobilizer  Position Activity Restriction  Other position/activity restrictions: Left ankle deformity - long standing  Vision/Hearing        Subjective  General  Chart Reviewed: Yes  Patient assessed for rehabilitation services?: Yes  Response To Previous Treatment: Not applicable  Family / Caregiver Present: No  Referring Practitioner: Stan Curry MD  Referral Date : 07/10/20  Diagnosis: LEFT REVERSE TOTAL SHOULDER ARTHROPLASTY  Follows Commands: Within Functional Limits  General Comment  Comments: cleared by nursing  Subjective  Subjective: pt resting in bed.  Pt denies pain   Pain Screening  Patient Currently in Pain: Denies          Orientation  Orientation  Overall Orientation Status: Within Normal Limits  Social/Functional History  Social/Functional History  Lives With: Family(wife, 12 & 25 y.o kids,)  Type of Home: House  Home Layout: Able to Live on Main level with bedroom/bathroom, Multi-level  Home Access: Stairs to enter with rails  Entrance Stairs - Number of Steps: 6  Entrance Stairs - Rails: Left  Bathroom Shower/Tub: Tub/Shower unit, Shower chair with back  Bathroom Toilet: Handicap height(uses vanity)  Bathroom Equipment: Grab bars in shower  Home Equipment: Cane(david-walker)  ADL Assistance: Independent  Ambulation Assistance: Independent(without AD)  Transfer Assistance: Independent  Active : Yes  Mode of Transportation: Truck  Occupation: Workers comp  Type of occupation: maintainance  Leisure & Hobbies: spend time with family  Additional Comments: sleeps in recliner; per pt & wife to have assist from grown son, daughter-in-law & teenage kids to assist with getting into house. wife to obtain ramp & w/c    Cognition        Objective     Observation/Palpation  Body Mechanics: L ankle deformity    PROM RLE (degrees)  RLE PROM: WFL  AROM RLE (degrees)  RLE AROM: WFL  PROM LLE (degrees)  LLE PROM: WFL  LLE General PROM: other than ankle due to supination deformity  AROM LLE (degrees)  LLE AROM : WFL  Strength RLE  Strength RLE: WFL  Strength LLE  Strength LLE: WFL  Tone RLE  RLE Tone: Normotonic  Tone LLE  LLE Tone: Normotonic  Sensation  Overall Sensation Status: Impaired(decreased sensation in L UE)  Bed mobility  Supine to Sit: Supervision(elevated HOB)     Ambulation  Ambulation?: Yes  More Ambulation?: No  Ambulation 1  Surface: level tile  Device: Hemiwalker  Assistance: Contact guard assistance;Minimal assistance  Quality of Gait: decreased stance on L   Distance: 15' x 2  Stairs/Curb  Stairs?: Yes  Stairs  # Steps : 2  Stairs Height: 6\"  Rails: Left ascending  Curbs: 6\"  Assistance: Minimal assistance     Balance  Posture: Good  Sitting - Static: Good  Sitting - Dynamic: Good  Standing - Static: Fair  Standing - Dynamic: Fair;-  Exercises  Comments: TSA exercises per protocol x 10 on the L     Plan   Plan  Times per week: BID  Times per day: Twice a day  Current Treatment Recommendations: Strengthening, ROM, Balance Training, Functional Mobility Training, Endurance Training, Transfer Training, Gait Training, Stair training, Home Exercise Program  Safety Devices  Type of devices:  All fall risk precautions in place, Call light within reach, Gait belt, Patient at risk for falls, Left in bed, Nurse notified  Restraints  Initially in place: No      AM-PAC Score  AM-PAC Inpatient Mobility Raw Score : 16 (07/10/20 1017)  AM-PAC Inpatient T-Scale Score : 40.78 (07/10/20 1017)  Mobility Inpatient CMS 0-100% Score: 54.16 (07/10/20 1017)  Mobility Inpatient CMS G-Code Modifier : CK (07/10/20 1017)          Goals  Short term goals  Time Frame for Short term goals: 1 session  Short term goal 1: Pt will complete transfers with supervision - met  Short term goal 2: Pt will ambulate x 13' with LRAD with CGA - met  Short term goal 3: Pt will complete curb step with min A - met  Short term goal 4: Pt will don/doff sling with min A - met  Patient Goals   Patient goals : to be able to walk in the house - met       Therapy Time   Individual Concurrent Group Co-treatment   Time In 0824         Time Out 0935         Minutes 71         Timed Code Treatment Minutes: [facility], PT    If pt is unable to be seen after this session, please let this note serve as discharge summary. Please see case management note for discharge disposition. Thank you.

## 2020-07-10 NOTE — PROGRESS NOTES
Department of Orthopedic Surgery  Physician Assistant   Progress Note    Subjective:     Post-Operative Day: 1 Status Post left Reverse Total Shoulder Arthroplasty  Systemic or Specific Complaints:No Complaints per patient today. Pain controlled this am, still feeling somewhat numb to left thumb. Able to participate with therapy this am. Wife at bedside. Objective:     Patient Vitals for the past 24 hrs:   BP Temp Temp src Pulse Resp SpO2 Height Weight   07/10/20 0756 118/73 98.4 °F (36.9 °C) Oral 75 18 93 % -- --   07/09/20 1957 (!) 144/87 98.3 °F (36.8 °C) Axillary 83 17 -- -- --   07/09/20 1657 123/77 98.3 °F (36.8 °C) Oral 76 16 93 % -- --   07/09/20 1542 (!) 176/80 97.5 °F (36.4 °C) Oral 73 16 92 % 6' (1.829 m) (!) 321 lb (145.6 kg)   07/09/20 1500 (!) 157/87 -- -- 65 14 92 % -- --   07/09/20 1445 (!) 160/92 -- -- 66 16 92 % -- --   07/09/20 1430 (!) 162/85 -- -- 66 14 92 % -- --   07/09/20 1425 (!) 157/89 -- -- 65 14 93 % -- --   07/09/20 1420 (!) 153/90 -- -- 65 15 94 % -- --   07/09/20 1415 -- -- -- 64 14 95 % -- --   07/09/20 1410 (!) 153/101 -- -- 65 14 96 % -- --   07/09/20 1405 (!) 146/78 -- -- 64 14 96 % -- --   07/09/20 1400 (!) 146/77 -- -- 68 14 94 % -- --   07/09/20 1355 (!) 142/72 97.8 °F (36.6 °C) Temporal 63 16 95 % -- --   07/09/20 1107 (!) 160/83 -- -- 74 16 99 % -- --   07/09/20 1103 (!) 160/82 -- -- 72 16 97 % -- --   07/09/20 1100 (!) 176/101 -- -- 73 -- 99 % -- --       General: alert, appears stated age, cooperative and no distress   Wound: Wound clean and dry no evidence of infection. Motion: Painful range of Motion in affected shoulder. DVT Exam: No evidence of DVT seen on physical exam.   Upper Extremity Sensory: Neurovascularly Intact to gross sensation, moving hand and wrist without much problem. Thumb numb to sensation.    Vascular:  Good Distal pulses in upper extremity      Brace/Sling: Intact as per post operative instructions    Data Review  CBC:   Lab Results Component Value Date    WBC 5.9 05/31/2020    RBC 5.05 05/31/2020    HGB 15.7 05/31/2020    HCT 45.6 05/31/2020     05/31/2020       Assessment:     Status Post left Reverse Total Shoulder Arthroplasty. Doing well postoperatively. Plan:      1: Continues current post-op course, imaging reviewed and stable post op. Reviewed with patient and wife. 2:  Continue Deep venous thrombosis prophylaxis- ASA 325mg   3:  Continue physical therapy and OT  4:  Continue Pain Control PRN  5:  D/c planning for home with outpatient therapy. Pt has hemiwalker and pt ordering gait belt for home. DCP updated  6:  Prescriptions have been filled through the Med[facility] to [facility] and Outpatient Pharm[facility].     May Almonte PA-C

## 2020-07-10 NOTE — PROGRESS NOTES
Pt's IV line removed without complications. Discussed d/c instructions with patient, given opportunity to ask questions, and provided new medication education with side effects. Follow up appointment information included in d/c instructions. Pt verbalized understanding of d/c instructions. Patient was discharged to home with all belongings and taken outside via wheelchair.     Alon Bingham

## 2020-07-10 NOTE — DISCHARGE SUMMARY
their stay. The patients pain was initially controlled with IV medications but we were able to transition to oral pain medications soon after arrival to the floor and their pain remained under good control through their hospital stay. From a medical standpoint the patient remained stable and continued to have the medicine team follow throughout their stay. The patients dressing was changed/incison was checked on day of d/c. The patient will be discharged at this time to Home  with their current diet restrictions and will continue to follow the total shoulder precautions outlined to them by us and PT/OT. They will remain in the sling until cleared by us at follow up. Condition on Discharge: Stable    Plan  Return visit in 1 week. .  Patient was instructed on the use of pain medications, the signs and symptoms of infection, and was given our number to call should they have any questions or concerns following discharge. For opioid prescriptions given at discharge the following statement is provided for compliance with [employer] rules. Patient being given increased dosage/quantity of opoid pain medication in excess of [employer] guidelines which noted a 30 MED daily of opioids due to the fact that he/she has undergone major orthopaedic surgery as outlined in rule 4731-11-13. Dosages and further duration of the pain medication will be re-evaluated at her post op visit in 2 weeks. Patient was educated on dosing expectations and limits of prescribing as a result of the new [facility] Board rules enacted August 31, 2017. Please also note that this is not the initial opoid prescription issued to this patient but a continuation of medication utilized during the hospital admission as noted in the medical record. [place]ARRS report has also been utilized to screen for any abuse history or suspicious activity as outlined in [place].   All efforts have been taken to prevent abuse potential and misuse of opioid medications including education, screening, and close clinical follow up.

## 2020-07-10 NOTE — PROGRESS NOTES
Occupational Therapy   Occupational Therapy Initial/discharge Assessment  Treatment    Date: 7/10/2020   Patient Name: Mraio Tyson  MRN: [id number]     : 1955    Date of Service: 7/10/2020    Discharge Recommendations:  24 hour supervision or assist       Assessment      OT Education: OT Role;IADL Safety;Precautions; Family Education  No Skilled OT: (pt to be discharged home this pm)  REQUIRES OT FOLLOW UP: No  Activity Tolerance  Activity Tolerance: Patient Tolerated treatment well  Safety Devices  Safety Devices in place: Yes  Type of devices: Call light within reach; Left in bed;Nurse notified           Patient Diagnosis(es): There were no encounter diagnoses. has a past medical history of Arthritis, Hypertension, Knee pain, PONV (postoperative nausea and vomiting), and Sleep apnea. has a past surgical history that includes Cholecystectomy; Prostate surgery; hip surgery (Left, 1968); pr lap,diagnostic abdomen (N/A, 2018); Colonoscopy; Shoulder arthroscopy (Left, 2019); and shoulder surgery (Left, 2020).            Restrictions    Restrictions/Precautions: General Precautions, Fall Risk  Required Braces or Orthoses?: Yes  Right Lower Extremity Brace: (Right ankle boot)  Left Lower Extremity Brace: (soft ankle brace & ankle boots)    Left Upper Extremity Brace/Splint: Left shoulder Immobilizer    Position Activity Restriction  Other position/activity restrictions: Left ankle deformity - long standing    Subjective   General  Chart Reviewed: Yes  Patient assessed for rehabilitation services?: Yes  Family / Caregiver Present: Yes(wife)  Referring Practitioner: Dr. Trinidad Sher  Diagnosis: [address] [facility]; s/p LEFT REVERSE TOTAL SHOULDER ARTHROPLASTY 20  Subjective  Subjective: \"I am going home today, no matter what\"  General Comment  Comments: RN cleared pt for OT eval; pt resting in bed,agreeable to therapy  Patient Currently in Pain: left ankle braces & tying ankle boots, min assist with shorts due to decreased reach to Left)  Toileting: Supervision     Tone RUE  RUE Tone: Normotonic        Transfers  Sit to stand: Contact guard assistance  Stand to sit: Contact guard assistance     Vision - Basic Assessment  Prior Vision: Wears glasses only for reading     Cognition  Overall Cognitive Status: WFL        Sensation  Overall Sensation Status: Impaired(decreased sensation in L UE)          RUE AROM : WFL  Gross RUE Strength: WFL        Plan   Plan  Plan Comment: OT omkaral, 1 handed dressing, safety with transfers/bathroom mobility    AM-PAC Score        AM-[facility] Inpatient Daily Activity Raw Score: 15 (07/10/20 1019)  AM-PAC Inpatient ADL T-Scale Score : 34.69 (07/10/20 1019)  ADL Inpatient CMS 0-100% Score: 56.46 (07/10/20 1019)  ADL Inpatient CMS G-Code Modifier : CK (07/10/20 1019)    Goals  Patient Goals   Patient goals : \"do everything\"       Therapy Time   Individual Concurrent Group Co-treatment   Time In 0825         Time Out 0915         Minutes [address] [place] [address]

## 2020-07-10 NOTE — PLAN OF CARE
Problem: Falls - Risk of:  Goal: Will remain free from falls  Description: Will remain free from falls  Outcome: Ongoing  Note: Pt encouraged to call out when in need. Call light and bedside table within reach . Bed rails up 3/4 wheels locked with bed in lowest position. Non skids on with bed alarm engaged. Pt verbalizes understanding , will continue To monitor.

## 2020-07-15 NOTE — TELEPHONE ENCOUNTER
Spoke with pt, doing pretty well. Incision status: No drainage or redness    Edema/Swelling/Teds: Not too swollen, using ice machine. Pain level and status: Managing well. Use of pain medications: Using pain meds at night, doesn't need during the day. Blood thinner: ASA- no issues. Bowels: Moving fine. Home Care Agency active: NA    Outpatient therapy: NA    Do you have all of your medications: Yes    Changes in medications: no    Ortho Vitals:      Follow up appointments:    Future Appointments   Date Time Provider Beltran Kimi   7/21/2020 11:30 AM MD ANDRAE Davis [facility]

## 2020-07-21 NOTE — PROGRESS NOTES
Surgery: Reverse shoulder arthroplasty    Post-Op Week:  2    Chief Complaint:  Post-Op Check (LEFT RTSA 7/9/2020)      History of Present of Illness: Mike's been doing very well postoperatively. Minimal to no pain. He is done his home exercises and nicely with no difficulty. No complications. H he has built the modifications to his of stairs, with a ramp into his house into his recliner chair. Review of Systems  Pertinent items are noted in HPI  Denies fever, chills, confusion, bowel/bladder active change. Review of systems reviewed from Patient History Form dated on July 21 and available in the patient's chart under the Media tab. Examination:  His incisions healing nicely. He has a mild bit of ecchymosis around the brachium. No significant swelling. He has a normal neurovascular exam of the hand. He has a remarkably good early range of movement with a forward elevation with assistance easily to 110 degrees and external rotation to 45 degrees. He fires the deltoid normally    Radiology:     X-rays obtained reviewed the office today include 2 views left shoulder demonstrating normal alignment of the prosthesis. No evidence of loosening complication. Orders Placed This Encounter   Procedures    XR SHOULDER LEFT (MIN 2 VIEWS)   [facility] PT [facility] Physical Therapy     Referral Priority:   Routine     Referral Type:   Eval and Treat     Referral Reason:   Specialty Services Required     Requested Specialty:   Physical Therapy     Number of Visits Requested:   1       Impression:  Good early result after reverse shoulder prosthesis      Treatment Plan: We have discussed initiating his physical therapy. We discussed appropriate restrictions on internal rotation behind the back and axial weightbearing. He is interested in having his tibiotalar fusion in September which I think may be a reasonable goal at this point given his good early start.           [address]  A [facility] and Sports Medicine Surgery     This dictation was performed with a verbal recognition program (DRAGON) and it was checked for errors. It is possible that there are still dictated errors within this office note. If so, please bring any errors to my attention for an addendum. All efforts were made to ensure that this office note is accurate.

## 2020-07-24 NOTE — FLOWSHEET NOTE
Manual Intervention ([address] [facility])      PROM, STM to biceps 10 min                                    NMR re-education (76121)   CUES NEEDED                                                         Therapeutic Activity (23712)                                                Therapeutic Exercise and NMR EXR  [x] (17084) Provided verbal/tactile cueing for activities related to strengthening, flexibility, endurance, ROM  for improvements in scapular, scapulothoracic and UE control with self care, reaching, carrying, lifting, house/yardwork, driving/computer work. [x] (70438) Provided verbal/tactile cueing for activities related to improving balance, coordination, kinesthetic sense, posture, motor skill, proprioception  to assist with  scapular, scapulothoracic and UE control with self care, reaching, carrying, lifting, house/yardwork, driving/computer work. Therapeutic Activities:    [x] (60897 or 78286) Provided verbal/tactile cueing for activities related to improving balance, coordination, kinesthetic sense, posture, motor skill, proprioception and motor activation to allow for proper function of scapular, scapulothoracic and UE control with self care, carrying, lifting, driving/computer work.      Home Exercise Program:    [x] (77975) Reviewed/Progressed HEP activities related to strengthening, flexibility, endurance, ROM of scapular, scapulothoracic and UE control with self care, reaching, carrying, lifting, house/yardwork, driving/computer work  [] (71170) Reviewed/Progressed HEP activities related to improving balance, coordination, kinesthetic sense, posture, motor skill, proprioception of scapular, scapulothoracic and UE control with self care, reaching, carrying, lifting, house/yardwork, driving/computer work      Manual Treatments:  PROM / STM / Oscillations-Mobs:  G-I, II, III, IV (PA's, Inf., Post.)  [x] (75906) Provided manual therapy to mobilize soft tissue/joints of cervical/CT, scapular GHJ and UE for the purpose of modulating pain, promoting relaxation,  increasing ROM, reducing/eliminating soft tissue swelling/inflammation/restriction, improving soft tissue extensibility and allowing for proper ROM for normal function with self care, reaching, carrying, lifting, house/yardwork, driving/computer work    Modalities: At home     Charges:  Timed Code Treatment Minutes: 30   Total Treatment Minutes: 45     [facility] time in/time out:     [x] EVAL (LOW) 59708 (typically 20 minutes face-to-face)  [] EVAL (MOD) 06219 (typically 30 minutes face-to-face)  [] EVAL (HIGH) 63177 (typically 45 minutes face-to-face)  [] RE-EVAL     [x] XB(49690) x 1    [] IONTO  [] NMR (65975) x     [] VASO  [x] Manual (71300) x 1     [] Other:  [] TA x      [] Mech Traction (18427)  [] ES(attended) (48653)      [] ES (un) (22956): Access Code: [email or web address]   URL: [email or web address]   Date: 07/24/2020   Prepared by: Beatrice Madrid     Exercises   Seated Straight Fist AROM - 10 reps - 3-4x daily - 7x weekly   Seated Wrist Extension AROM - 10 reps - 3-4x daily - 7x weekly   Seated Forearm Pronation and Supination AROM - 10 reps - 3-4x daily - 7x weekly   Seated Elbow Flexion and Extension AROM - 10 reps - 1x daily - 7x weekly   Seated Shoulder Flexion Towel Slide at Table Top - 10 reps - 3-4x daily - 7x weekly   Standing Shoulder Shrug Circles AROM Backward - 10 reps - 3-4x daily - 7x weekly   Seated Scapular Retraction - 10 reps - 3 sets - 1x daily - 7x weekly   Seated Cervical Sidebending AROM - 3 reps - 20 hold - 3-4x daily - 7x weekly     GOALS:  Patient stated goal: increase function of arm  []? Progressing: []? Met: []? Not Met: []? Adjusted     Therapist goals for Patient:   Short Term Goals: To be achieved in: 2 weeks  1. Independent in HEP and progression per patient tolerance, in order to prevent re-injury. []? Progressing: []? Met: []? Not Met: []? Adjusted  2.  Patient will have a decrease in pain to facilitate improvement in movement, function, and ADLs as indicated by Functional Deficits. []? Progressing: []? Met: []? Not Met: []? Adjusted     Long Term Goals: To be achieved in: 8 weeks  1. Disability index score of 50% or less for the [facility] to assist with reaching prior level of function. []? Progressing: []? Met: []? Not Met: []? Adjusted  2. Patient will demonstrate increased AROM to [facility] to allow for proper joint functioning as indicated by patients Functional Deficits. []? Progressing: []? Met: []? Not Met: []? Adjusted  3. Patient will demonstrate an increase in Strength to [facility] to allow for proper functional mobility as indicated by patients Functional Deficits. []? Progressing: []? Met: []? Not Met: []? Adjusted  4. Patient will return to 75% functional activities without increased symptoms or restriction. []? Progressing: []? Met: []? Not Met: []? Adjusted  5. Pt will be able to bear weight on arm in order to safely transfer after his leg surgery. (patient specific functional goal)    []? Progressing: []? Met: []? Not Met: []? Adjusted            Progression Towards Functional goals:  [] Patient is progressing as expected towards functional goals listed. [] Progression is slowed due to complexities listed. [] Progression has been slowed due to co-morbidities. [x] Plan just implemented, too soon to assess goals progression  [] Other:     ASSESSMENT:  See eval    Overall Progression Towards Functional goals/ Treatment Progress Update:  [] Patient is progressing as expected towards functional goals listed. [] Progression is slowed due to complexities/Impairments listed. [] Progression has been slowed due to co-morbidities.   [x] Plan just implemented, too soon to assess goals progression <30days   [] Goals require adjustment due to lack of progress  [] Patient is not progressing as expected and requires additional follow up with physician  [] Other    Prognosis for POC: [x] Good [] Fair  [] Poor      Patient requires continued skilled intervention: [x] Yes  [] No    Treatment/Activity Tolerance:  [x] Patient able to complete treatment  [] Patient limited by fatigue  [] Patient limited by pain    [] Patient limited by other medical complications  [] Other:         PLAN: See eval  [] Continue per plan of care [] Alter current plan (see comments above)  [x] Plan of care initiated [] Hold pending MD visit [] Discharge      Electronically signed by:  Concha Bateman, [place] [id number]    Note: If patient does not return for scheduled/ recommended follow up visits, this note will serve as a discharge from care along with most recent update on progress.

## 2020-07-24 NOTE — PLAN OF CARE
[facility], [address] [facility]  [address] [place] [address], [address] [place] [address]  Phone: [phone]  Fax [phone]     Physical Therapy Certification    Dear Referring Practitioner: Raymon Coleman,    We had the pleasure of evaluating the following patient for physical therapy services at [address] [place] [address]. A summary of our findings can be found in the initial assessment below. This includes our plan of care. If you have any questions or concerns regarding these findings, please do not hesitate to contact me at the office phone number checked above. Thank you for the referral.       Physician Signature:_______________________________Date:__________________  By signing above (or electronic signature), therapists plan is approved by physician    Patient: Evita Ray   : 1955   MRN: [id number]  Referring Physician: Referring Practitioner: Raymon Coleman      Evaluation Date: 2020      Medical Diagnosis Information:  Diagnosis: Z31.618 (ICD-10-CM) - Status post reverse total shoulder replacement, left (DOS 2020)   Treatment Diagnosis: M12.812 (ICD-10-CM) - Rotator cuff arthropathy of left shoulder                                         Insurance information: PT Insurance Information: [facility]    Precautions/ Contra-indications: fall risk, in w/c, rTSA (2020)  Latex Allergy:  [x]NO      []YES  Preferred Language for Healthcare:   [x]English       []other:    SUBJECTIVE: Patient stated complaint: Pt reports longstanding shoulder issues. 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 Felt a pop in the arm, had immediate pain. He was originally dx with shoulder sprain but he continued to have consistent pain. He was then dx w/ RTC tear and had surgery on . He went to [address] [place] [address] for therapy, but the repair failed. He then was evaluated by MD again, who dx pt with RTC tear again. He was approved for surgery. F/u in 4 weeks. 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 duty     Living Status/Prior Level of Function: Independent with ADLs and IADLs, physically demanding job    OBJECTIVE:     ROM Left Right   Shoulder Flex 102 160 AROM   Shoulder Abd 71    Shoulder ER 10    Shoulder IR          Elbow ext Lacking full ext         Strength  Left Right   Shoulder Flex NT 4   Shoulder Scap NT 4-   Shoulder ER NT 4+   Shoulder IR NT 5        Forearm supination/ pronation Lacking full AROM       Reflexes/Sensation:    [x]Dermatomes/Myotomes intact    [x]Reflexes equal and normal bilaterally   []Other:    Joint mobility: limited by pt guarding    []Normal    []Hypo   []Hyper    Palpation: TTP over shoulder    Functional Mobility/Transfers:      Posture: forward head, rounded shoulders, scapular winging noted, post pelvic tilt in seated posture    Bandages/Dressings/Incisions: incision covered by tape    Gait: (include devices/WB status): using david walker and wc    Orthopedic Special Tests: none                       [x] Patient history, allergies, meds reviewed. Medical chart reviewed. See intake form. Review Of Systems (ROS):  [x]Performed Review of systems (Integumentary, CardioPulmonary, Neurological) by intake and observation. Intake form has been scanned into medical record. Patient has been instructed to contact their primary care physician regarding ROS issues if not already being addressed at this time.       Co-morbidities/Complexities (which will affect course of rehabilitation):   []None           Arthritic conditions   []Rheumatoid arthritis (M05.9)  [x]Osteoarthritis (M19.91)   Cardiovascular conditions   [x]Hypertension (I10)  []Hyperlipidemia (E78.5)  []Angina pectoris (I20)  []Atherosclerosis (I70)   Musculoskeletal conditions   []Disc pathology   []Congenital spine pathologies   []Prior surgical intervention  []Osteoporosis (M81.8)  []Osteopenia (M85.8)   Endocrine conditions   []Hypothyroid (E03.9)  []Hyperthyroid Gastrointestinal conditions   []Constipation (R87.47)   Metabolic conditions   []Morbid obesity (E66.01)  []Diabetes type 1(E10.65) or 2 (E11.65)   []Neuropathy (G60.9)     Pulmonary conditions   []Asthma (J45)  []Coughing   []COPD (J44.9)   Psychological Disorders  []Anxiety (F41.9)  []Depression (F32.9)   []Other:   [x]Other: fall risk         Barriers to/and or personal factors that will affect rehab potential:              [x]Age  []Sex              [x]Motivation/Lack of Motivation                        [x]Co-Morbidities              []Cognitive Function, education/learning barriers              [x]Environmental, home barriers              [x]profession/work barriers  []past PT/medical experience  []other:  Justification: pt has multiple comorbidities that may impact therapy but he is motivated to get use of his shoulder back so he can have his leg surgery in September     Falls Risk Assessment (30 days): NV  [x] Falls Risk assessed and no intervention required.   [] Falls Risk assessed and Patient requires intervention due to being higher risk   TUG score (>12s at risk):     [] Falls education provided, including         ASSESSMENT:   Functional Impairments   [x]Noted spinal or UE joint hypomobility   []Noted spinal or UE joint hypermobility   [x]Decreased UE functional ROM   [x]Decreased UE functional strength   [x]Abnormal reflexes/sensation/myotomal/dermatomal deficits   [x]Decreased RC/scapular/core strength and neuromuscular control   []other:      Functional Activity Limitations (from functional questionnaire and intake)   [x]Reduced ability to tolerate prolonged functional positions   [x]Reduced ability or difficulty with changes of positions or transfers between positions   [x]Reduced ability to maintain good posture and demonstrate good body mechanics with sitting, bending, and lifting   [x] Reduced ability or tolerance with driving and/or computer work   [x]Reduced ability to sleep   [x]Reduced ability to perform lifting, reaching, carrying tasks   [x]Reduced ability to tolerate impact through UE   [x]Reduced ability to reach behind back   []Reduced ability to  or hold objects   [x]Reduced ability to throw or toss an object   []other:    Participation Restrictions   [x]Reduced participation in self care activities   [x]Reduced participation in home management activities   [x]Reduced participation in work activities   []Reduced participation in social activities. []Reduced participation in sport/recreation activities. Classification:   [x]Signs/symptoms consistent with post-surgical status including decreased ROM, strength and function.   []Signs/symptoms consistent with joint sprain/strain   []Signs/symptoms consistent with shoulder impingement   []Signs/symptoms consistent with shoulder/elbow/wrist tendinopathy   []Signs/symptoms consistent with Rotator cuff tear   []Signs/symptoms consistent with labral tear   []Signs/symptoms consistent with postural dysfunction    []Signs/symptoms consistent with Glenohumeral IR Deficit - <45 degrees   []Signs/symptoms consistent with facet dysfunction of cervical/thoracic spine    []Signs/symptoms consistent with pathology which may benefit from Dry needling     []other:     Prognosis/Rehab Potential:      []Excellent   []Good    [x]Fair   []Poor    Tolerance of evaluation/treatment:    []Excellent   []Good    [x]Fair   []Poor  Physical Therapy Evaluation Complexity Justification  [x] A history of present problem with:  [] no personal factors and/or comorbidities that impact the plan of care;  []1-2 personal factors and/or comorbidities that impact the plan of care  [x]3 personal factors and/or comorbidities that impact the plan of care  [x] An examination of body systems using standardized tests and measures addressing any of the following: body structures and functions (impairments), activity limitations, and/or participation restrictions;:  [] a total of 1-2 or more elements   [] a total of 3 or more elements   [x] a total of 4 or more elements   [x] A clinical presentation with:  [x] stable and/or uncomplicated characteristics   [] evolving clinical presentation with changing characteristics  [] unstable and unpredictable characteristics;   [x] Clinical decision making of [x] low, [] moderate, [] high complexity using standardized patient assessment instrument and/or measurable assessment of functional outcome. [x] EVAL (LOW) 54817 (typically 20 minutes face-to-face)  [] EVAL (MOD) 29433 (typically 30 minutes face-to-face)  [] EVAL (HIGH) 51362 (typically 45 minutes face-to-face)  [] RE-EVAL       PLAN:  Frequency/Duration:  1-2 days per week for 6-8 Weeks:  INTERVENTIONS:  [x] Therapeutic exercise including: strength training, ROM, for Upper extremity and core   [x]  NMR activation and proprioception for UE, scap and Core   [x] Manual therapy as indicated for shoulder, scapula and spine to include: Dry Needling/IASTM, STM, PROM, Gr I-IV mobilizations, manipulation. [x] Modalities as needed that may include: thermal agents, E-stim, Biofeedback, US, iontophoresis as indicated  [x] Patient education on joint protection, postural re-education, activity modification, progression of HEP. HEP instruction: (see scanned forms)    GOALS:  Patient stated goal: increase function of arm  [] Progressing: [] Met: [] Not Met: [] Adjusted    Therapist goals for Patient:   Short Term Goals: To be achieved in: 2 weeks  1.  Independent in HEP and progression per patient tolerance, in order to prevent re-injury. [] Progressing: [] Met: [] Not Met: [] Adjusted  2. Patient will have a decrease in pain to facilitate improvement in movement, function, and ADLs as indicated by Functional Deficits. [] Progressing: [] Met: [] Not Met: [] Adjusted    Long Term Goals: To be achieved in: 8 weeks  1. Disability index score of 50% or less for the [facility] to assist with reaching prior level of function. [] Progressing: [] Met: [] Not Met: [] Adjusted  2. Patient will demonstrate increased AROM to [facility] [place] to allow for proper joint functioning as indicated by patients Functional Deficits. [] Progressing: [] Met: [] Not Met: [] Adjusted  3. Patient will demonstrate an increase in Strength to [facility] to allow for proper functional mobility as indicated by patients Functional Deficits. [] Progressing: [] Met: [] Not Met: [] Adjusted  4. Patient will return to 75% functional activities without increased symptoms or restriction. [] Progressing: [] Met: [] Not Met: [] Adjusted  5.  Pt will be able to bear weight on arm in order to safely transfer after his leg surgery. (patient specific functional goal)    [] Progressing: [] Met: [] Not Met: [] Adjusted       Electronically signed by:  Margie Gomez, [address] [place] [address]

## 2020-07-27 NOTE — FLOWSHEET NOTE
[facility],  [address] [place]  Phone: [phone]  Fax [phone]        Date:  2020    Patient Name:  Ryan Newell  \"Bud\"  :  1955  MRN: [id number]  Restrictions/Precautions:    Medical/Treatment Diagnosis Information:  · Diagnosis: T81.979 (ICD-10-CM) - Status post reverse total shoulder replacement, left (DOS 2020)  · Treatment Diagnosis: M12.812 (ICD-10-CM) - Rotator cuff arthropathy of left shoulder  Insurance/Certification information:  PT Insurance Information: [facility]  Physician Information:  Referring Practitioner: Laci Noble  Has the plan of care been signed (Y/N):        []  Yes  [x]  No     Date of Patient follow up with Physician: 2020      Is this a Progress Report:     []  Yes  [x]  No        If Yes:  Date Range for reporting period:  Beginning 2020  Ending 2020    Progress report will be due (10 Rx or 30 days whichever is less): see above        Recertification will be due (POC Duration  / 90 days whichever is less): 8 weeks from IE       Visit # Insurance Allowable Auth Required   2 [facility] 18 visits expires 20 []  Yes []  No        Functional Scale: Q DASH     Date assessed:  2020      Therapy Diagnosis/Practice Pattern: L shoulder pain/ stiffness s/p rTSA; H      Number of Comorbidities:  []0     []1-2    []3+    Latex Allergy:  [x]NO      []YES  Preferred Language for Healthcare:   [x]English       []other:      Pain level:  6-8/10     SUBJECTIVE:  No new complaints. Working on the exercises at home and using the [employer]. Still having trouble sleeping at night. Only taking Alleve for pain. OBJECTIVE: Incision intact. 2 scabbed areas noted on proximal and distal incision.    Observation:    Test measurements:  Shld flexion 117    RESTRICTIONS/PRECAUTIONS: in w/c, fall risk, is hoping for tibiotalar fusion in September       Exercises/Interventions: Therapeutic Ex (47208) Sets/sec Reps Notes/CUES   Backward shoulder rolls  Scap retractions 5\" 10 ea hep   UT (S) via C SB 20\" 3 hep   Forearm supination/ pronation  Elbow flexion/ ext  Wrist flexion/ ext  10 reps ea hep   Table slide  10\" 10  Hep                SL scap retraction H5 1x10 reps                                  Manual Intervention (86250)      PROM, STM to biceps 15 min                                    NMR re-education (07525)   CUES NEEDED                                                         Therapeutic Activity (78198)                                                Therapeutic Exercise and NMR EXR  [x] (34500) Provided verbal/tactile cueing for activities related to strengthening, flexibility, endurance, ROM  for improvements in scapular, scapulothoracic and UE control with self care, reaching, carrying, lifting, house/yardwork, driving/computer work. [x] (48034) Provided verbal/tactile cueing for activities related to improving balance, coordination, kinesthetic sense, posture, motor skill, proprioception  to assist with  scapular, scapulothoracic and UE control with self care, reaching, carrying, lifting, house/yardwork, driving/computer work. Therapeutic Activities:    [x] (32983 or 78426) Provided verbal/tactile cueing for activities related to improving balance, coordination, kinesthetic sense, posture, motor skill, proprioception and motor activation to allow for proper function of scapular, scapulothoracic and UE control with self care, carrying, lifting, driving/computer work.      Home Exercise Program:    [x] (18705) Reviewed/Progressed HEP activities related to strengthening, flexibility, endurance, ROM of scapular, scapulothoracic and UE control with self care, reaching, carrying, lifting, house/yardwork, driving/computer work  [] (33412) Reviewed/Progressed HEP activities related to improving balance, coordination, kinesthetic sense, posture, motor skill, proprioception of scapular, scapulothoracic and UE control with self care, reaching, carrying, lifting, house/yardwork, driving/computer work      Manual Treatments:  PROM / STM / Oscillations-Mobs:  G-I, II, III, IV (PA's, Inf., Post.)  [x] (65405) Provided manual therapy to mobilize soft tissue/joints of cervical/CT, scapular GHJ and UE for the purpose of modulating pain, promoting relaxation,  increasing ROM, reducing/eliminating soft tissue swelling/inflammation/restriction, improving soft tissue extensibility and allowing for proper ROM for normal function with self care, reaching, carrying, lifting, house/yardwork, driving/computer work    Modalities: At home     Charges:  Timed Code Treatment Minutes: 45'   Total Treatment Minutes: 45'     [facility] time in/time out: 11:15- 11:53    [] EVAL (LOW) 57644 (typically 20 minutes face-to-face)  [] EVAL (MOD) 05892 (typically 30 minutes face-to-face)  [] EVAL (HIGH) 62974 (typically 45 minutes face-to-face)  [] RE-EVAL     [x] ZV(03167) x 1  11:15-11:30  [] IONTO  [x] NMR (40685) x1  11:45-11:53   [] VASO  [x] Manual (64619) x 1 11:30-11:45    [] Other:  [] TA x      [] Mech Traction (56985)  [] ES(attended) (97473)      [] ES (un) (45297): Access Code: [email or web address]   URL: [email or web address]   Date: 07/24/2020   Prepared by: Mary Snyder     Exercises   Seated Straight Fist AROM - 10 reps - 3-4x daily - 7x weekly   Seated Wrist Extension AROM - 10 reps - 3-4x daily - 7x weekly   Seated Forearm Pronation and Supination AROM - 10 reps - 3-4x daily - 7x weekly   Seated Elbow Flexion and Extension AROM - 10 reps - 1x daily - 7x weekly   Seated Shoulder Flexion Towel Slide at Table Top - 10 reps - 3-4x daily - 7x weekly   Standing Shoulder Shrug Circles AROM Backward - 10 reps - 3-4x daily - 7x weekly   Seated Scapular Retraction - 10 reps - 3 sets - 1x daily - 7x weekly   Seated Cervical Sidebending AROM - 3 reps - 20 hold - 3-4x daily - 7x weekly     GOALS:  Patient stated goal: increase function of arm  []? Progressing: []? Met: []? Not Met: []? Adjusted     Therapist goals for Patient:   Short Term Goals: To be achieved in: 2 weeks  1. Independent in HEP and progression per patient tolerance, in order to prevent re-injury. []? Progressing: []? Met: []? Not Met: []? Adjusted  2. Patient will have a decrease in pain to facilitate improvement in movement, function, and ADLs as indicated by Functional Deficits. []? Progressing: []? Met: []? Not Met: []? Adjusted     Long Term Goals: To be achieved in: 8 weeks  1. Disability index score of 50% or less for the [facility] to assist with reaching prior level of function. []? Progressing: []? Met: []? Not Met: []? Adjusted  2. Patient will demonstrate increased AROM to [facility] [place] to allow for proper joint functioning as indicated by patients Functional Deficits. []? Progressing: []? Met: []? Not Met: []? Adjusted  3. Patient will demonstrate an increase in Strength to [facility] [place] to allow for proper functional mobility as indicated by patients Functional Deficits. []? Progressing: []? Met: []? Not Met: []? Adjusted  4. Patient will return to 75% functional activities without increased symptoms or restriction. []? Progressing: []? Met: []? Not Met: []? Adjusted  5. Pt will be able to bear weight on arm in order to safely transfer after his leg surgery. (patient specific functional goal)    []? Progressing: []? Met: []? Not Met: []? Adjusted            Progression Towards Functional goals:  [] Patient is progressing as expected towards functional goals listed. [] Progression is slowed due to complexities listed. [x] Progression has been slowed due to co-morbidities. [x] Plan just implemented, too soon to assess goals progression  [] Other:     ASSESSMENT:  See eval    Overall Progression Towards Functional goals/ Treatment Progress Update:  [] Patient is progressing as expected towards functional goals listed.     [] Progression is slowed due to complexities/Impairments listed. [] Progression has been slowed due to co-morbidities. [x] Plan just implemented, too soon to assess goals progression <30days   [] Goals require adjustment due to lack of progress  [] Patient is not progressing as expected and requires additional follow up with physician  [] Other    Prognosis for POC: [x] Good [] Fair  [] Poor      Patient requires continued skilled intervention: [x] Yes  [] No    Treatment/Activity Tolerance:  [x] Patient able to complete treatment  [] Patient limited by fatigue  [] Patient limited by pain    [] Patient limited by other medical complications  [] Other:         PLAN:Progress as tolerated. [x] Continue per plan of care [] Alter current plan (see comments above)  [] Plan of care initiated [] Hold pending MD visit [] Discharge      Electronically signed by:  Maricel Jaffe, [facility]    Note: If patient does not return for scheduled/ recommended follow up visits, this note will serve as a discharge from care along with most recent update on progress.

## 2020-07-30 NOTE — PROGRESS NOTES
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 He states he had his left shoulder replaced by Dr. Payal Carranza and is doing well. He is not putting weight through this arm. His left ankle was extremely painful and in a poor position. He is thinking about having that operated on in August  Objective: Physical exam shows 20 degrees of left ankle dorsiflexion 30 degrees of plantarflexion hindfoot is in varus and he walks on the lateral side of his foot. There is no evidence of DVT or cellulitis. Strength is 4-/5 into dorsiflexion plantarflexion  Imaging:  Assessment and plan: This patient needs a manual wheelchair with elevating leg rest to accommodate his left knee and ankle arthritis. He cannot use his left arm for weightbearing with crutches and so would benefit from wheelchair.   Operative option will be a TTC fusion on the left

## 2020-07-31 PROBLEM — M87.00 AVN (AVASCULAR NECROSIS OF BONE) (HCC): Status: ACTIVE | Noted: 2020-01-01

## 2020-07-31 NOTE — FLOWSHEET NOTE
[facility],  [address] [place]  Phone: [phone]  Fax [phone]        Date:  2020    Patient Name:  Dee Frey  \"Bud\"  :  1955  MRN: [id number]  Restrictions/Precautions:    Medical/Treatment Diagnosis Information:  · Diagnosis: O84.877 (ICD-10-CM) - Status post reverse total shoulder replacement, left (DOS 2020)  · Treatment Diagnosis: M12.812 (ICD-10-CM) - Rotator cuff arthropathy of left shoulder  Insurance/Certification information:  PT Insurance Information: [facility]  Physician Information:  Referring Practitioner: Marycarmen Arreola  Has the plan of care been signed (Y/N):        []  Yes  [x]  No     Date of Patient follow up with Physician: 2020      Is this a Progress Report:     []  Yes  [x]  No        If Yes:  Date Range for reporting period:  Beginning 2020  Ending 2020    Progress report will be due (10 Rx or 30 days whichever is less): see above        Recertification will be due (POC Duration  / 90 days whichever is less): 8 weeks from IE       Visit # Insurance Allowable Auth Required   3 [facility] 18 visits expires 20 []  Yes []  No        Functional Scale: Q DASH     Date assessed:  2020      Therapy Diagnosis/Practice Pattern: L shoulder pain/ stiffness s/p rTSA; H      Number of Comorbidities:  []0     []1-2    []3+    Latex Allergy:  [x]NO      []YES  Preferred Language for Healthcare:   [x]English       []other:      Pain level:  6-8/10     SUBJECTIVE:  States he has a lot of soreness in his biceps and front of shoulder. \"I have a softball in my arm\". Ice is helpful. Not wearing sling much at home. OBJECTIVE: Incision intact. 2 scabbed areas noted on proximal and distal incision.    Observation:   OBJECTIVE  Test used Initial score Current Score   Pain Summary 0-10 6-8    Functional questionnaire Q DASH 89%    Functional Testing            ROM PROM shoulder flex 102 131 PROM shoulder ER 10 23    PROM abd      Elbow ext  -6   Strength                  Test measurements:      RESTRICTIONS/PRECAUTIONS: in w/c, fall risk, is hoping for tibiotalar fusion in September       Exercises/Interventions:     Therapeutic Ex (36523) Sets/sec Reps Notes/CUES   Backward shoulder rolls  Scap retractions 5\" 10 ea hep   UT (S) via C SB 20\" 3 hep   Forearm supination/ pronation  Elbow flexion/ ext  Wrist flexion/ ext  10 reps ea hep   Table slide- flex/ scaption  10\" 10 ea Hep                SL scap retraction  SL clocks H5 1x10 reps          Elbow ext (s) 3 mins  LLLD  + Hep    TB ext YTB X 10 reps                Manual Intervention (69972)      PROM, STM to biceps 15 min                                    NMR re-education (42841)   CUES NEEDED                                                         Therapeutic Activity (72686)                                                Therapeutic Exercise and NMR EXR  [x] (44542) Provided verbal/tactile cueing for activities related to strengthening, flexibility, endurance, ROM  for improvements in scapular, scapulothoracic and UE control with self care, reaching, carrying, lifting, house/yardwork, driving/computer work. [x] (05681) Provided verbal/tactile cueing for activities related to improving balance, coordination, kinesthetic sense, posture, motor skill, proprioception  to assist with  scapular, scapulothoracic and UE control with self care, reaching, carrying, lifting, house/yardwork, driving/computer work. Therapeutic Activities:    [x] (45909 or 43168) Provided verbal/tactile cueing for activities related to improving balance, coordination, kinesthetic sense, posture, motor skill, proprioception and motor activation to allow for proper function of scapular, scapulothoracic and UE control with self care, carrying, lifting, driving/computer work.      Home Exercise Program:    [x] (79795) Reviewed/Progressed HEP activities related to at Table Top - 10 reps - 3-4x daily - 7x weekly   Standing Shoulder Shrug Circles AROM Backward - 10 reps - 3-4x daily - 7x weekly   Seated Scapular Retraction - 10 reps - 3 sets - 1x daily - 7x weekly   Seated Cervical Sidebending AROM - 3 reps - 20 hold - 3-4x daily - 7x weekly     GOALS:  Patient stated goal: increase function of arm  []? Progressing: []? Met: []? Not Met: []? Adjusted     Therapist goals for Patient:   Short Term Goals: To be achieved in: 2 weeks  1. Independent in HEP and progression per patient tolerance, in order to prevent re-injury. []? Progressing: []? Met: []? Not Met: []? Adjusted  2. Patient will have a decrease in pain to facilitate improvement in movement, function, and ADLs as indicated by Functional Deficits. []? Progressing: []? Met: []? Not Met: []? Adjusted     Long Term Goals: To be achieved in: 8 weeks  1. Disability index score of 50% or less for the [facility] to assist with reaching prior level of function. []? Progressing: []? Met: []? Not Met: []? Adjusted  2. Patient will demonstrate increased AROM to [place] [place] to allow for proper joint functioning as indicated by patients Functional Deficits. []? Progressing: []? Met: []? Not Met: []? Adjusted  3. Patient will demonstrate an increase in Strength to [place] [place] to allow for proper functional mobility as indicated by patients Functional Deficits. []? Progressing: []? Met: []? Not Met: []? Adjusted  4. Patient will return to 75% functional activities without increased symptoms or restriction. []? Progressing: []? Met: []? Not Met: []? Adjusted  5. Pt will be able to bear weight on arm in order to safely transfer after his leg surgery. (patient specific functional goal)    []? Progressing: []? Met: []? Not Met: []? Adjusted            Progression Towards Functional goals:  [] Patient is progressing as expected towards functional goals listed. [] Progression is slowed due to complexities listed.   [x] Progression has been slowed due to co-morbidities. [] Plan just implemented, too soon to assess goals progression  [] Other:     ASSESSMENT:  Improving PROM. Overall Progression Towards Functional goals/ Treatment Progress Update:  [] Patient is progressing as expected towards functional goals listed. [] Progression is slowed due to complexities/Impairments listed. [x] Progression has been slowed due to co-morbidities. [] Plan just implemented, too soon to assess goals progression <30days   [] Goals require adjustment due to lack of progress  [] Patient is not progressing as expected and requires additional follow up with physician  [] Other    Prognosis for POC: [x] Good [] Fair  [] Poor      Patient requires continued skilled intervention: [x] Yes  [] No    Treatment/Activity Tolerance:  [x] Patient able to complete treatment  [] Patient limited by fatigue  [] Patient limited by pain    [] Patient limited by other medical complications  [] Other:         PLAN:Progress as tolerated. [x] Continue per plan of care [] Alter current plan (see comments above)  [] Plan of care initiated [] Hold pending MD visit [] Discharge      Electronically signed by:  Sherri Smith, [place] [id number]    Note: If patient does not return for scheduled/ recommended follow up visits, this note will serve as a discharge from care along with most recent update on progress.

## 2020-08-03 NOTE — FLOWSHEET NOTE
[facility],  [address] [place]  Phone: [phone]  Fax [phone]        Date:  8/3/2020    Patient Name:  Ac Egan  \"Friendship\"  :  1955  MRN: [id number]  Restrictions/Precautions:    Medical/Treatment Diagnosis Information:  · Diagnosis: R94.461 (ICD-10-CM) - Status post reverse total shoulder replacement, left (DOS 2020)  · Treatment Diagnosis: M12.812 (ICD-10-CM) - Rotator cuff arthropathy of left shoulder  Insurance/Certification information:  PT Insurance Information: [facility]  Physician Information:  Referring Practitioner: Rashaun Winkler  Has the plan of care been signed (Y/N):        []  Yes  [x]  No     Date of Patient follow up with Physician: 2020      Is this a Progress Report:     []  Yes  [x]  No        If Yes:  Date Range for reporting period:  Beginning 2020  Ending 2020    Progress report will be due (10 Rx or 30 days whichever is less): see above        Recertification will be due (POC Duration  / 90 days whichever is less): 8 weeks from IE       Visit # Insurance Allowable Auth Required   4 [facility] 18 visits expires 20 []  Yes []  No        Functional Scale: Q DASH     Date assessed:  2020      Therapy Diagnosis/Practice Pattern: L shoulder pain/ stiffness s/p rTSA; H      Number of Comorbidities:  []0     []1-2    []3+    Latex Allergy:  [x]NO      []YES  Preferred Language for Healthcare:   [x]English       []other:      Pain level:  6-8/10     SUBJECTIVE:  States he has a lot of soreness and pain from the new exercises. OBJECTIVE: Incision intact. 2 scabbed areas noted on proximal and distal incision. Reviewed scaption. Verbal cues to use body to move UE . Pain was lessened to minimal after instruction.    Observation:   OBJECTIVE  Test used Initial score Current Score   Pain Summary 0-10 6-8    Functional questionnaire Q DASH 89%    Functional Testing            ROM PROM shoulder flex 102 131    PROM shoulder ER 10 23    PROM abd      Elbow ext  -6   Strength            4 weeks post-op on 8/6/20      Test measurements:      RESTRICTIONS/PRECAUTIONS: in w/c, fall risk, is hoping for tibiotalar fusion in September       Exercises/Interventions:     Therapeutic Ex (40120) Sets/sec Reps Notes/CUES   Backward shoulder rolls  Scap retractions 5\" 10 ea hep   UT (S) via C SB 20\" 3 hep   Forearm supination/ pronation  Elbow flexion/ ext  Wrist flexion/ ext  10 reps ea hep   Table slide- flex/ scaption  10\" 10 ea Hep          BP with cane/hand H5 1x10 reps No pain/soreness noted after several reps   shld flex with hand assist h5 x10 reps    SL scap retraction  SL clocks H5 2x10 reps HEP         Elbow ext (s) 3 mins  LLLD  + Hep    TB ext          PROM ER to neutral H10 10 reps    Manual Intervention (96043)      PROM, STM to biceps 15 min                                    NMR re-education (79299)   CUES NEEDED                                                         Therapeutic Activity (48122)                                                Therapeutic Exercise and NMR EXR  [x] (92666) Provided verbal/tactile cueing for activities related to strengthening, flexibility, endurance, ROM  for improvements in scapular, scapulothoracic and UE control with self care, reaching, carrying, lifting, house/yardwork, driving/computer work. [x] (11466) Provided verbal/tactile cueing for activities related to improving balance, coordination, kinesthetic sense, posture, motor skill, proprioception  to assist with  scapular, scapulothoracic and UE control with self care, reaching, carrying, lifting, house/yardwork, driving/computer work.     Therapeutic Activities:    [x] (47732 or 11734) Provided verbal/tactile cueing for activities related to improving balance, coordination, kinesthetic sense, posture, motor skill, proprioception and motor activation to allow for proper function of scapular,

## 2020-08-06 NOTE — FLOWSHEET NOTE
[facility],  [address] [place]  Phone: [phone]  Fax [phone]        Date:  2020    Patient Name:  Nany Todd  \"Bud\"  :  1955  MRN: [id number]  Restrictions/Precautions:    Medical/Treatment Diagnosis Information:  · Diagnosis: U43.347 (ICD-10-CM) - Status post reverse total shoulder replacement, left (DOS 2020)  · Treatment Diagnosis: M12.812 (ICD-10-CM) - Rotator cuff arthropathy of left shoulder  Insurance/Certification information:  PT Insurance Information: [facility]  Physician Information:  Referring Practitioner: Yakelin Pretty  Has the plan of care been signed (Y/N):        []  Yes  [x]  No     Date of Patient follow up with Physician: 2020      Is this a Progress Report:     []  Yes  [x]  No        If Yes:  Date Range for reporting period:  Beginning 2020  Ending 2020    Progress report will be due (10 Rx or 30 days whichever is less): see above        Recertification will be due (POC Duration  / 90 days whichever is less): 8 weeks from IE       Visit # Insurance Allowable Auth Required   4 [facility] 18 visits expires 20 []  Yes []  No        Functional Scale: Q DASH     Date assessed:  2020      Therapy Diagnosis/Practice Pattern: L shoulder pain/ stiffness s/p rTSA; H      Number of Comorbidities:  []0     []1-2    []3+    Latex Allergy:  [x]NO      []YES  Preferred Language for Healthcare:   [x]English       []other:      Pain level:  6-8/10     SUBJECTIVE:  States he has been improving with the exercises but still struggles to use his body on the table sliding exercises. Scap exercises feel good and are relieving. Wearing sling less and holding arm in \"pseudo sling position\" when arm gets tired. Denies attempting to lift or reach. OBJECTIVE: Incision intact. 2 scabbed areas noted on proximal and distal incision. Reviewed scaption. Verbal cues to use body to move UE .  Pain with self care, reaching, carrying, lifting, house/yardwork, driving/computer work. Therapeutic Activities:    [x] (46431 or 56316) Provided verbal/tactile cueing for activities related to improving balance, coordination, kinesthetic sense, posture, motor skill, proprioception and motor activation to allow for proper function of scapular, scapulothoracic and UE control with self care, carrying, lifting, driving/computer work. Home Exercise Program:    [x] (77306) Reviewed/Progressed HEP activities related to strengthening, flexibility, endurance, ROM of scapular, scapulothoracic and UE control with self care, reaching, carrying, lifting, house/yardwork, driving/computer work  [] (92750) Reviewed/Progressed HEP activities related to improving balance, coordination, kinesthetic sense, posture, motor skill, proprioception of scapular, scapulothoracic and UE control with self care, reaching, carrying, lifting, house/yardwork, driving/computer work      Manual Treatments:  PROM / STM / Oscillations-Mobs:  G-I, II, III, IV (PA's, Inf., Post.)  [x] (94402) Provided manual therapy to mobilize soft tissue/joints of cervical/CT, scapular GHJ and UE for the purpose of modulating pain, promoting relaxation,  increasing ROM, reducing/eliminating soft tissue swelling/inflammation/restriction, improving soft tissue extensibility and allowing for proper ROM for normal function with self care, reaching, carrying, lifting, house/yardwork, driving/computer work    Modalities:   At home     Charges:  Timed Code Treatment Minutes: 39'   Total Treatment Minutes: 39'     [facility] time in/time out: 10:45-11:30    [] EVAL (LOW) 92976 (typically 20 minutes face-to-face)  [] EVAL (MOD) 60089 (typically 30 minutes face-to-face)  [] EVAL (HIGH) 63482 (typically 45 minutes face-to-face)  [] RE-EVAL     [x] IQ(15198) x 1 11:00-11:30 [] IONTO  [x] NMR (66486) x    [] VASO  [x] Manual (89229) x  10:45-11:00     [] Other:  [] TA x       [] Helane Comfort Traction (06204)  [] ES(attended) (33472)      [] ES (un) (44881): Access Code: [email or web address]   URL: [employer]/   Date: 07/24/2020   Prepared by: Mary Langley     Exercises   Seated Straight Fist AROM - 10 reps - 3-4x daily - 7x weekly   Seated Wrist Extension AROM - 10 reps - 3-4x daily - 7x weekly   Seated Forearm Pronation and Supination AROM - 10 reps - 3-4x daily - 7x weekly   Seated Elbow Flexion and Extension AROM - 10 reps - 1x daily - 7x weekly   Seated Shoulder Flexion Towel Slide at Table Top - 10 reps - 3-4x daily - 7x weekly   Standing Shoulder Shrug Circles AROM Backward - 10 reps - 3-4x daily - 7x weekly   Seated Scapular Retraction - 10 reps - 3 sets - 1x daily - 7x weekly   Seated Cervical Sidebending AROM - 3 reps - 20 hold - 3-4x daily - 7x weekly     GOALS:  Patient stated goal: increase function of arm  []? Progressing: []? Met: []? Not Met: []? Adjusted     Therapist goals for Patient:   Short Term Goals: To be achieved in: 2 weeks  1. Independent in HEP and progression per patient tolerance, in order to prevent re-injury. [x]? Progressing: []? Met: []? Not Met: []? Adjusted  2. Patient will have a decrease in pain to facilitate improvement in movement, function, and ADLs as indicated by Functional Deficits. [x]? Progressing: []? Met: []? Not Met: []? Adjusted     Long Term Goals: To be achieved in: 8 weeks  1. Disability index score of 50% or less for the [facility] to assist with reaching prior level of function. []? Progressing: []? Met: []? Not Met: []? Adjusted  2. Patient will demonstrate increased AROM to [facility] [place] to allow for proper joint functioning as indicated by patients Functional Deficits. []? Progressing: []? Met: []? Not Met: []? Adjusted  3. Patient will demonstrate an increase in Strength to [facility] [place] to allow for proper functional mobility as indicated by patients Functional Deficits. []? Progressing: []? Met: []? Not Met: []? Adjusted  4.  Patient will return to 75% functional activities without increased symptoms or restriction. []? Progressing: []? Met: []? Not Met: []? Adjusted  5. Pt will be able to bear weight on arm in order to safely transfer after his leg surgery. (patient specific functional goal)    []? Progressing: []? Met: []? Not Met: []? Adjusted            Progression Towards Functional goals:  [] Patient is progressing as expected towards functional goals listed. [] Progression is slowed due to complexities listed. [x] Progression has been slowed due to co-morbidities. [] Plan just implemented, too soon to assess goals progression  [] Other:     ASSESSMENT:  Improving PROM. Assess response of supine AAROM/ AROM N.V.    Overall Progression Towards Functional goals/ Treatment Progress Update:  [] Patient is progressing as expected towards functional goals listed. [] Progression is slowed due to complexities/Impairments listed. [x] Progression has been slowed due to co-morbidities. [] Plan just implemented, too soon to assess goals progression <30days   [] Goals require adjustment due to lack of progress  [] Patient is not progressing as expected and requires additional follow up with physician  [] Other    Prognosis for POC: [x] Good [] Fair  [] Poor      Patient requires continued skilled intervention: [x] Yes  [] No    Treatment/Activity Tolerance:  [x] Patient able to complete treatment  [] Patient limited by fatigue  [] Patient limited by pain    [] Patient limited by other medical complications  [] Other:         PLAN:Progress as tolerated. [x] Continue per plan of care [] Alter current plan (see comments above)  [] Plan of care initiated [] Hold pending MD visit [] Discharge      Electronically signed by:  Nay Hagan, [place] [id number]    Note: If patient does not return for scheduled/ recommended follow up visits, this note will serve as a discharge from care along with most recent update on progress.

## 2020-08-10 NOTE — FLOWSHEET NOTE
[facility],  [address] [place]  Phone: [phone]  Fax [phone]        Date:  8/10/2020    Patient Name:  Dee Frey  \"[place]\"  :  1955  MRN: [id number]  Restrictions/Precautions:    Medical/Treatment Diagnosis Information:  · Diagnosis: Z19.552 (ICD-10-CM) - Status post reverse total shoulder replacement, left (DOS 2020)  · Treatment Diagnosis: M12.812 (ICD-10-CM) - Rotator cuff arthropathy of left shoulder  Insurance/Certification information:  PT Insurance Information: [facility]  Physician Information:  Referring Practitioner: Marycarmen Arreola  Has the plan of care been signed (Y/N):        []  Yes  [x]  No     Date of Patient follow up with Physician: 2020      Is this a Progress Report:     []  Yes  [x]  No        If Yes:  Date Range for reporting period:  Beginning 2020  Ending 2020    Progress report will be due (10 Rx or 30 days whichever is less): see above        Recertification will be due (POC Duration  / 90 days whichever is less): 8 weeks from IE       Visit # Insurance Allowable Auth Required   6 [facility] 18 visits expires 20 []  Yes []  No        Functional Scale: Q DASH     Date assessed:  2020      Therapy Diagnosis/Practice Pattern: L shoulder pain/ stiffness s/p rTSA; H      Number of Comorbidities:  []0     []1-2    []3+    Latex Allergy:  [x]NO      []YES  Preferred Language for Healthcare:   [x]English       []other:      Pain level:  0-5/26 With certain movements, No pain at rest    SUBJECTIVE:  States he has been improving with the exercises but still struggles to use his body on the table sliding exercises. Scap exercises feel good and are relieving. Reports sleeping is still difficult and gets pain with moving at night. OBJECTIVE: Incision intact. 2 scabbed areas noted on proximal and distal incision. Reviewed scaption. Verbal cues to use body to move UE .  Pain was lessened to minimal after instruction.  Observation:   OBJECTIVE  Test used Initial score Current Score   Pain Summary 0-10 6-8    Functional questionnaire Q DASH 89%    Functional Testing            ROM PROM shoulder flex 102 135-139    PROM shoulder ER 10 35    PROM abd      Elbow ext  -6   Strength            4 weeks post-op on 8/6/20      Test measurements:      RESTRICTIONS/PRECAUTIONS: in w/c, fall risk, is hoping for tibiotalar fusion in September       Exercises/Interventions:     Therapeutic Ex (87640) Sets/sec Reps Notes/CUES   Backward shoulder rolls  Scap retractions 5\" 10 ea hep   UT (S) via C SB 20\" 3 hep   Forearm supination/ pronation  Elbow flexion/ ext  Wrist flexion/ ext  10 reps ea hep   Table slide- flex/ scaption  10\" 10 ea Hep   VC required for body lean   Supine CW/CCW AROM  10 ea    BP with cane/hand  +flexion H5  10\" 1x10 reps + to HEP   Phase 1 strengthening H5 10 reps + to HEP   shld flex with hand assist  h5 x10 reps 135-140 with cane this visit. SL scap retraction  SL clocks H5 2x10 reps  15 HEP         Elbow ext (s) 3 mins  LLLD  + Hep    TB ext (RTB) H5 X 15 reps    Pulleys shld flex/ elbow bent H5 x15 reps    PROM ER to neutral H10 10 reps    Manual Intervention (59767)      PROM, STM to biceps/ scar massage 15 min      HR (s) technique to gain elbow ext                              NMR re-education (37481)   CUES NEEDED                                                         Therapeutic Activity (60326)                                                Therapeutic Exercise and NMR EXR  [x] (14095) Provided verbal/tactile cueing for activities related to strengthening, flexibility, endurance, ROM  for improvements in scapular, scapulothoracic and UE control with self care, reaching, carrying, lifting, house/yardwork, driving/computer work.     [x] (64153) Provided verbal/tactile cueing for activities related to improving balance, coordination, kinesthetic sense, posture, motor skill, proprioception  to assist with  scapular, scapulothoracic and UE control with self care, reaching, carrying, lifting, house/yardwork, driving/computer work. Therapeutic Activities:    [x] (58828 or 79867) Provided verbal/tactile cueing for activities related to improving balance, coordination, kinesthetic sense, posture, motor skill, proprioception and motor activation to allow for proper function of scapular, scapulothoracic and UE control with self care, carrying, lifting, driving/computer work. Home Exercise Program:    [x] (19813) Reviewed/Progressed HEP activities related to strengthening, flexibility, endurance, ROM of scapular, scapulothoracic and UE control with self care, reaching, carrying, lifting, house/yardwork, driving/computer work  [] (51098) Reviewed/Progressed HEP activities related to improving balance, coordination, kinesthetic sense, posture, motor skill, proprioception of scapular, scapulothoracic and UE control with self care, reaching, carrying, lifting, house/yardwork, driving/computer work      Manual Treatments:  PROM / STM / Oscillations-Mobs:  G-I, II, III, IV (PA's, Inf., Post.)  [x] (33137) Provided manual therapy to mobilize soft tissue/joints of cervical/CT, scapular GHJ and UE for the purpose of modulating pain, promoting relaxation,  increasing ROM, reducing/eliminating soft tissue swelling/inflammation/restriction, improving soft tissue extensibility and allowing for proper ROM for normal function with self care, reaching, carrying, lifting, house/yardwork, driving/computer work    Modalities:   At home     Charges:  Timed Code Treatment Minutes: 39'   Total Treatment Minutes: 39'     [facility] time in/time out: 9:00-9:45    [] EVAL (LOW) 70603 (typically 20 minutes face-to-face)  [] EVAL (MOD) 72854 (typically 30 minutes face-to-face)  [] EVAL (HIGH) 07527 (typically 45 minutes face-to-face)  [] RE-EVAL     [x] PU(26506) x 2 9:00-9:30 [] IONTO  [] NMR (55973) x    [] []? Progressing: []? Met: []? Not Met: []? Adjusted  4. Patient will return to 75% functional activities without increased symptoms or restriction. []? Progressing: []? Met: []? Not Met: []? Adjusted  5. Pt will be able to bear weight on arm in order to safely transfer after his leg surgery. (patient specific functional goal)    []? Progressing: []? Met: []? Not Met: []? Adjusted            Progression Towards Functional goals:  [] Patient is progressing as expected towards functional goals listed. [] Progression is slowed due to complexities listed. [x] Progression has been slowed due to co-morbidities. [] Plan just implemented, too soon to assess goals progression  [] Other:     ASSESSMENT:  Improving PROM. Tolerated AAROM without increased pain. + to HEP. Overall Progression Towards Functional goals/ Treatment Progress Update:  [] Patient is progressing as expected towards functional goals listed. [] Progression is slowed due to complexities/Impairments listed. [x] Progression has been slowed due to co-morbidities. [] Plan just implemented, too soon to assess goals progression <30days   [] Goals require adjustment due to lack of progress  [] Patient is not progressing as expected and requires additional follow up with physician  [] Other    Prognosis for POC: [x] Good [] Fair  [] Poor      Patient requires continued skilled intervention: [x] Yes  [] No    Treatment/Activity Tolerance:  [x] Patient able to complete treatment  [] Patient limited by fatigue  [] Patient limited by pain    [] Patient limited by other medical complications  [] Other:         PLAN:Progress as tolerated.   [x] Continue per plan of care [] Alter current plan (see comments above)  [] Plan of care initiated [] Hold pending MD visit [] Discharge      Electronically signed by:  Maicol Garcia, [address]    Note: If patient does not return for scheduled/ recommended follow up visits, this note will serve as a discharge from care along with most recent update on progress.

## 2020-08-13 NOTE — FLOWSHEET NOTE
[facility],  [address] [place]  Phone: [phone]  Fax [phone]        Date:  2020    Patient Name:  Dolores Delong  \"[place]\"  :  1955  MRN: [id number]  Restrictions/Precautions:    Medical/Treatment Diagnosis Information:  · Diagnosis: I67.437 (ICD-10-CM) - Status post reverse total shoulder replacement, left (DOS 2020)  · Treatment Diagnosis: M12.812 (ICD-10-CM) - Rotator cuff arthropathy of left shoulder  Insurance/Certification information:  PT Insurance Information: [facility]  Physician Information:  Referring Practitioner: Isela Amaya  Has the plan of care been signed (Y/N):        []  Yes  [x]  No     Date of Patient follow up with Physician: 2020      Is this a Progress Report:     []  Yes  [x]  No        If Yes:  Date Range for reporting period:  Beginning 2020  Ending 2020    Progress report will be due (10 Rx or 30 days whichever is less): see above        Recertification will be due (POC Duration  / 90 days whichever is less): 8 weeks from IE       Visit # Insurance Allowable Auth Required   7 [facility] 18 visits expires 20 []  Yes []  No        Functional Scale: Q DASH     Date assessed:  2020      Therapy Diagnosis/Practice Pattern: L shoulder pain/ stiffness s/p rTSA; H      Number of Comorbidities:  []0     []1-2    []3+    Latex Allergy:  [x]NO      []YES  Preferred Language for Healthcare:   [x]English       []other:      Pain level:   With certain movements, No pain at rest    SUBJECTIVE:  Shoulder is fatigued after last session and says it still feels this way. Neck and pec are tight. OBJECTIVE: Incision intact. 2 scabbed areas noted on proximal and distal incision. Reviewed scaption. Verbal cues to use body to move UE . Pain was lessened to minimal after instruction.    Observation:   OBJECTIVE  Test used Initial score Current Score   Pain Summary 0-10 6-8 Functional questionnaire Q DASH 89%    Functional Testing            ROM PROM shoulder flex 102 140    PROM shoulder ER 10 35    PROM abd      Elbow ext  -6   Strength            4 weeks post-op on 8/6/20      Test measurements:      RESTRICTIONS/PRECAUTIONS: in w/c, fall risk, is hoping for tibiotalar fusion in September       Exercises/Interventions:     Therapeutic Ex (58351) Sets/sec Reps Notes/CUES   Backward shoulder rolls  Scap retractions 5\" 10 ea hep   UT/ LS (S)  20\" 3 hep   Forearm supination/ pronation  Elbow flexion/ ext  Wrist flexion/ ext  10 reps ea hep   Table slide- flex/ scaption  10\" 10 ea Hep   VC required for body lean   Supine CW/CCW AROM  A-P, med-lat  2  1 10 ea  10 ea    BP with cane/hand  +flexion H5  10\" 1x10 reps + to HEP   Phase 1 strengthening H5 10 reps + to HEP   shld flex with hand assist  h5 x10 reps     SL scap retraction  SL clocks H5 2x10 reps  15 HEP         Elbow ext (s) 3 mins  LLLD  + Hep    TB ext (RTB) H5 X 15 reps    Pulleys shld flex/ elbow bent H5 x15 reps    PROM ER to neutral H10 10 reps    Manual Intervention (34448)      PROM, STM to biceps/ scar massage 15 min      HR (s) technique to gain elbow ext                              NMR re-education (93069)   CUES NEEDED                                                         Therapeutic Activity (07702)                                                Therapeutic Exercise and NMR EXR  [x] (10840) Provided verbal/tactile cueing for activities related to strengthening, flexibility, endurance, ROM  for improvements in scapular, scapulothoracic and UE control with self care, reaching, carrying, lifting, house/yardwork, driving/computer work.     [x] (73767) Provided verbal/tactile cueing for activities related to improving balance, coordination, kinesthetic sense, posture, motor skill, proprioception  to assist with  scapular, scapulothoracic and UE control with self care, reaching, carrying, lifting, house/yardwork, driving/computer work. Therapeutic Activities:    [x] (73489 or 81352) Provided verbal/tactile cueing for activities related to improving balance, coordination, kinesthetic sense, posture, motor skill, proprioception and motor activation to allow for proper function of scapular, scapulothoracic and UE control with self care, carrying, lifting, driving/computer work. Home Exercise Program:    [x] (54536) Reviewed/Progressed HEP activities related to strengthening, flexibility, endurance, ROM of scapular, scapulothoracic and UE control with self care, reaching, carrying, lifting, house/yardwork, driving/computer work  [] (75304) Reviewed/Progressed HEP activities related to improving balance, coordination, kinesthetic sense, posture, motor skill, proprioception of scapular, scapulothoracic and UE control with self care, reaching, carrying, lifting, house/yardwork, driving/computer work      Manual Treatments:  PROM / STM / Oscillations-Mobs:  G-I, II, III, IV (PA's, Inf., Post.)  [x] (50539) Provided manual therapy to mobilize soft tissue/joints of cervical/CT, scapular GHJ and UE for the purpose of modulating pain, promoting relaxation,  increasing ROM, reducing/eliminating soft tissue swelling/inflammation/restriction, improving soft tissue extensibility and allowing for proper ROM for normal function with self care, reaching, carrying, lifting, house/yardwork, driving/computer work    Modalities:   At home     Charges:  Timed Code Treatment Minutes: 39'   Total Treatment Minutes: 39'     [facility] time in/time out: 10:00-10:45    [] EVAL (LOW) 38516 (typically 20 minutes face-to-face)  [] EVAL (MOD) 49289 (typically 30 minutes face-to-face)  [] EVAL (HIGH) 73747 (typically 45 minutes face-to-face)  [] RE-EVAL     [x] KT(11077) x 2 10:00-10:30 [] IONTO  [] NMR (73861) x    [] VASO  [x] Manual (77703) x  10:30-10:45     [] Other:  [] TA x       [] Mech Traction (03970)  [] ES(attended) (62007)      [] ES (un) restriction. []? Progressing: []? Met: []? Not Met: []? Adjusted  5. Pt will be able to bear weight on arm in order to safely transfer after his leg surgery. (patient specific functional goal)    []? Progressing: []? Met: []? Not Met: []? Adjusted            Progression Towards Functional goals:  [] Patient is progressing as expected towards functional goals listed. [] Progression is slowed due to complexities listed. [x] Progression has been slowed due to co-morbidities. [] Plan just implemented, too soon to assess goals progression  [] Other:     ASSESSMENT:  Improving PROM. Tolerated AAROM without increased pain. + to HEP. Re-enforced importance of RICE and scar massage. Overall Progression Towards Functional goals/ Treatment Progress Update:  [] Patient is progressing as expected towards functional goals listed. [] Progression is slowed due to complexities/Impairments listed. [x] Progression has been slowed due to co-morbidities. [] Plan just implemented, too soon to assess goals progression <30days   [] Goals require adjustment due to lack of progress  [] Patient is not progressing as expected and requires additional follow up with physician  [] Other    Prognosis for POC: [x] Good [] Fair  [] Poor      Patient requires continued skilled intervention: [x] Yes  [] No    Treatment/Activity Tolerance:  [x] Patient able to complete treatment  [] Patient limited by fatigue  [] Patient limited by pain    [] Patient limited by other medical complications  [] Other:         PLAN:Progress as tolerated. [x] Continue per plan of care [] Alter current plan (see comments above)  [] Plan of care initiated [] Hold pending MD visit [] Discharge      Electronically signed by:  Vaishnavi Márquez, [place] [id number]    Note: If patient does not return for scheduled/ recommended follow up visits, this note will serve as a discharge from care along with most recent update on progress.

## 2020-08-17 NOTE — FLOWSHEET NOTE
[facility],  [address]  Phone: [phone]  Fax [phone]        Date:  2020    Patient Name:  Dontrell Wheeler  \"[place]\"  :  1955  MRN: [id number]  Restrictions/Precautions:    Medical/Treatment Diagnosis Information:  · Diagnosis: Q49.551 (ICD-10-CM) - Status post reverse total shoulder replacement, left (DOS 2020)  · Treatment Diagnosis: M12.812 (ICD-10-CM) - Rotator cuff arthropathy of left shoulder  Insurance/Certification information:  PT Insurance Information: [facility]  Physician Information:  Referring Practitioner: Michelle Quinonez  Has the plan of care been signed (Y/N):        []  Yes  [x]  No     Date of Patient follow up with Physician: 2020      Is this a Progress Report: Due N.V 20    []  Yes  [x]  No        If Yes:  Date Range for reporting period:  Beginning 2020  Ending 2020    Progress report will be due (10 Rx or 30 days whichever is less): see above N.V.       Recertification will be due (POC Duration  / 90 days whichever is less): 8 weeks from IE       Visit # Insurance Allowable Auth Required   8 [facility] 18 visits expires 20 []  Yes []  No        Functional Scale: Q DASH  89%   Date assessed:  2020      Therapy Diagnosis/Practice Pattern: L shoulder pain/ stiffness s/p rTSA; H      Number of Comorbidities:  []0     []1-2    []3+    Latex Allergy:  [x]NO      []YES  Preferred Language for Healthcare:   [x]English       []other:      Pain level:  / With certain movements, No pain at rest    SUBJECTIVE:  Shoulder is still painful but less than previous weeks. Feels heavy to lift UE.    OBJECTIVE: Incision intact. 2 scabbed areas noted on proximal and distal incision. Reviewed scaption. Verbal cues to use body to move UE . Pain was lessened to minimal after instruction.    Observation:   OBJECTIVE  Test used Initial score Current Score   Pain Summary 0-10 6-8 4/10 Functional questionnaire Q DASH 89%    Functional Testing            ROM PROM shoulder flex 102 145    PROM shoulder ER 10 35    PROM abd      Elbow ext  -6   Strength AROM flex     AROM abd NT 90   4 weeks post-op on 8/6/20      Test measurements:     RESTRICTIONS/PRECAUTIONS: in w/c, fall risk, is hoping for tibiotalar fusion in September       Exercises/Interventions:     Therapeutic Ex (82629) Sets/sec Reps Notes/CUES   Backward shoulder rolls  Scap retractions 5\" 10 ea hep   UT/ LS (S)  20\" 3 hep   Forearm supination/ pronation  Elbow flexion/ ext  Wrist flexion/ ext  10 reps ea hep   Table slide- flex/ scaption  10\" 10 ea Hep   VC required for body lean   Supine shld flex H2 10 x    Supine CW/CCW AROM  A-P, med-lat     BP with cane/hand  +flexion H5  10\" 2x10 reps + to HEP   Phase 1 strengthening H5 10 reps + to HEP   shld flex with hand assist  H5 x10 reps     SL scap retraction  SL clocks H5 2x10 reps  15 HEP   SL ER to neutral H5 2x10    SL shld abd with elbow bent H5 2x10    Elbow ext (s) 3 mins  LLLD  Hep    TB ext (RTB)/ Rows(GTB) H5 2x10  reps    Pulleys shld flex/ elbow bent H10 x15 reps    PROM ER to neutral with cane in supine H10 10 reps    Manual Intervention (61726)      PROM, STM to biceps/ scar massage 15 min      HR (s) technique to gain elbow ext                              NMR re-education (38178)   CUES NEEDED                                                         Therapeutic Activity (51071)                                                Therapeutic Exercise and NMR EXR  [x] (28511) Provided verbal/tactile cueing for activities related to strengthening, flexibility, endurance, ROM  for improvements in scapular, scapulothoracic and UE control with self care, reaching, carrying, lifting, house/yardwork, driving/computer work.     [x] (99510) Provided verbal/tactile cueing for activities related to improving balance, coordination, kinesthetic sense, posture, motor skill, proprioception  to assist with  scapular, scapulothoracic and UE control with self care, reaching, carrying, lifting, house/yardwork, driving/computer work. Therapeutic Activities:    [x] (60784 or 01989) Provided verbal/tactile cueing for activities related to improving balance, coordination, kinesthetic sense, posture, motor skill, proprioception and motor activation to allow for proper function of scapular, scapulothoracic and UE control with self care, carrying, lifting, driving/computer work. Home Exercise Program:    [x] (28676) Reviewed/Progressed HEP activities related to strengthening, flexibility, endurance, ROM of scapular, scapulothoracic and UE control with self care, reaching, carrying, lifting, house/yardwork, driving/computer work  [] (29521) Reviewed/Progressed HEP activities related to improving balance, coordination, kinesthetic sense, posture, motor skill, proprioception of scapular, scapulothoracic and UE control with self care, reaching, carrying, lifting, house/yardwork, driving/computer work      Manual Treatments:  PROM / STM / Oscillations-Mobs:  G-I, II, III, IV (PA's, Inf., Post.)  [x] (14700) Provided manual therapy to mobilize soft tissue/joints of cervical/CT, scapular GHJ and UE for the purpose of modulating pain, promoting relaxation,  increasing ROM, reducing/eliminating soft tissue swelling/inflammation/restriction, improving soft tissue extensibility and allowing for proper ROM for normal function with self care, reaching, carrying, lifting, house/yardwork, driving/computer work    Modalities:   At home     Charges:  Timed Code Treatment Minutes: 54'   Total Treatment Minutes: 54'     [facility] time in/time out: 9:45-10:40    [] EVAL (LOW) 38688 (typically 20 minutes face-to-face)  [] EVAL (MOD) 83202 (typically 30 minutes face-to-face)  [] EVAL (HIGH) 19593 (typically 45 minutes face-to-face)  [] RE-EVAL     [x] WX(59920) x 2 10:00-10:30 [] IONTO  [x] NMR (45907) x Functional Deficits. []? Progressing: []? Met: []? Not Met: []? Adjusted  4. Patient will return to 75% functional activities without increased symptoms or restriction. []? Progressing: []? Met: []? Not Met: []? Adjusted  5. Pt will be able to bear weight on arm in order to safely transfer after his leg surgery. (patient specific functional goal)    []? Progressing: []? Met: []? Not Met: []? Adjusted            Progression Towards Functional goals:  [] Patient is progressing as expected towards functional goals listed. [] Progression is slowed due to complexities listed. [x] Progression has been slowed due to co-morbidities. [] Plan just implemented, too soon to assess goals progression  [] Other:     ASSESSMENT:  Improving PROM. Tolerated AAROM without increased pain. + to HEP. Re-enforced importance of RICE and scar massage. Overall Progression Towards Functional goals/ Treatment Progress Update:  [] Patient is progressing as expected towards functional goals listed. [] Progression is slowed due to complexities/Impairments listed. [x] Progression has been slowed due to co-morbidities. [] Plan just implemented, too soon to assess goals progression <30days   [] Goals require adjustment due to lack of progress  [] Patient is not progressing as expected and requires additional follow up with physician  [] Other    Prognosis for POC: [x] Good [] Fair  [] Poor      Patient requires continued skilled intervention: [x] Yes  [] No    Treatment/Activity Tolerance:  [x] Patient able to complete treatment  [] Patient limited by fatigue  [] Patient limited by pain    [] Patient limited by other medical complications  [] Other:         PLAN:Progress as tolerated.   [x] Continue per plan of care [] Alter current plan (see comments above)  [] Plan of care initiated [] Hold pending MD visit [] Discharge      Electronically signed by:  Acosta Houser, [address]    Note: If patient does not return for scheduled/ recommended follow up visits, this note will serve as a discharge from care along with most recent update on progress. no

## 2020-08-20 NOTE — FLOWSHEET NOTE
[facility],  [address] [place]  Phone: [phone]  Fax [phone]        Date:  2020    Patient Name:  Mario Tyson  \"Bud\"  :  1955  MRN: [id number]  Restrictions/Precautions:    Medical/Treatment Diagnosis Information:  · Diagnosis: P34.181 (ICD-10-CM) - Status post reverse total shoulder replacement, left (DOS 2020)  · Treatment Diagnosis: M12.812 (ICD-10-CM) - Rotator cuff arthropathy of left shoulder  Insurance/Certification information:  PT Insurance Information: [facility]  Physician Information:  Referring Practitioner: Trinidad Sher  Has the plan of care been signed (Y/N):        []  Yes  [x]  No     Date of Patient follow up with Physician: 2020      Is this a Progress Report: Due N.V 20    []  Yes  [x]  No        If Yes:  Date Range for reporting period:  Beginning 2020  Ending 2020    Progress report will be due (10 Rx or 30 days whichever is less): see above N.V.       Recertification will be due (POC Duration  / 90 days whichever is less): 8 weeks from IE       Visit # Insurance Allowable Auth Required   9 [facility] 18 visits expires 20 []  Yes []  No        Functional Scale: Q DASH  89%   Date assessed:  2020      Therapy Diagnosis/Practice Pattern: L shoulder pain/ stiffness s/p rTSA; H      Number of Comorbidities:  []0     []1-2    []3+    Latex Allergy:  [x]NO      []YES  Preferred Language for Healthcare:   [x]English       []other:      Pain level:   With certain movements, No pain at rest    SUBJECTIVE:  Pt perceives 50% improvement in overall condition. He is frustrated to the point of belligerence about his lack of functionality with the arm. Biological and physiological healing time tables were reviewed. He is anxious to have his ankle operated on and fears that his shoulder is not progressing quickly enough to support surgery.  He c/o intermittent radiating pains NEEDED                                                         Therapeutic Activity (33313)                                                Therapeutic Exercise and NMR EXR  [x] (12535) Provided verbal/tactile cueing for activities related to strengthening, flexibility, endurance, ROM  for improvements in scapular, scapulothoracic and UE control with self care, reaching, carrying, lifting, house/yardwork, driving/computer work. [x] (32068) Provided verbal/tactile cueing for activities related to improving balance, coordination, kinesthetic sense, posture, motor skill, proprioception  to assist with  scapular, scapulothoracic and UE control with self care, reaching, carrying, lifting, house/yardwork, driving/computer work. Therapeutic Activities:    [x] (64308 or 83975) Provided verbal/tactile cueing for activities related to improving balance, coordination, kinesthetic sense, posture, motor skill, proprioception and motor activation to allow for proper function of scapular, scapulothoracic and UE control with self care, carrying, lifting, driving/computer work.      Home Exercise Program:    [x] (09015) Reviewed/Progressed HEP activities related to strengthening, flexibility, endurance, ROM of scapular, scapulothoracic and UE control with self care, reaching, carrying, lifting, house/yardwork, driving/computer work  [] (92549) Reviewed/Progressed HEP activities related to improving balance, coordination, kinesthetic sense, posture, motor skill, proprioception of scapular, scapulothoracic and UE control with self care, reaching, carrying, lifting, house/yardwork, driving/computer work      Manual Treatments:  PROM / STM / Oscillations-Mobs:  G-I, II, III, IV (PA's, Inf., Post.)  [x] (38526) Provided manual therapy to mobilize soft tissue/joints of cervical/CT, scapular GHJ and UE for the purpose of modulating pain, promoting relaxation,  increasing ROM, reducing/eliminating soft tissue to prevent re-injury. [x]? Progressing: []? Met: []? Not Met: []? Adjusted  2. Patient will have a decrease in pain to facilitate improvement in movement, function, and ADLs as indicated by Functional Deficits. [x]? Progressing: []? Met: []? Not Met: []? Adjusted     Long Term Goals: To be achieved in: 8 weeks  1. Disability index score of 50% or less for the [facility] to assist with reaching prior level of function. [x]? Progressing: []? Met: []? Not Met: []? Adjusted  2. Patient will demonstrate increased AROM to [place] [place] to allow for proper joint functioning as indicated by patients Functional Deficits. [x]? Progressing: []? Met: []? Not Met: []? Adjusted  3. Patient will demonstrate an increase in Strength to [place] [place] to allow for proper functional mobility as indicated by patients Functional Deficits. [x]? Progressing: []? Met: []? Not Met: []? Adjusted  4. Patient will return to 75% functional activities without increased symptoms or restriction. [x]? Progressing: []? Met: []? Not Met: []? Adjusted  5. Pt will be able to bear weight on arm in order to safely transfer after his leg surgery. (patient specific functional goal)    [x]? Progressing: []? Met: []? Not Met: []? Adjusted            Progression Towards Functional goals:  [] Patient is progressing as expected towards functional goals listed. [x] Progression is slowed due to complexities listed. [x] Progression has been slowed due to co-morbidities. [] Plan just implemented, too soon to assess goals progression  [] Other:     ASSESSMENT:  Updated HEP. PROM, AAROM, and AROM have improved since onset of therapy but pt still exhibits deficits that prohibt normal functionality of the UE. Overall Progression Towards Functional goals/ Treatment Progress Update:  [] Patient is progressing as expected towards functional goals listed. [] Progression is slowed due to complexities/Impairments listed.   [x] Progression has been slowed due to co-morbidities. [] Plan just implemented, too soon to assess goals progression <30days   [] Goals require adjustment due to lack of progress  [] Patient is not progressing as expected and requires additional follow up with physician  [] Other    Prognosis for POC: [x] Good [] Fair  [] Poor      Patient requires continued skilled intervention: [x] Yes  [] No    Treatment/Activity Tolerance:  [x] Patient able to complete treatment  [] Patient limited by fatigue  [] Patient limited by pain    [] Patient limited by other medical complications  [] Other:         PLAN:Progress as tolerated. [x] Continue per plan of care [] Alter current plan (see comments above)  [] Plan of care initiated [] Hold pending MD visit [] Discharge      Electronically signed by:  Елена Noyola, [place] [id number]    Note: If patient does not return for scheduled/ recommended follow up visits, this note will serve as a discharge from care along with most recent update on progress.

## 2020-08-20 NOTE — TELEPHONE ENCOUNTER
Scanned in a Light Duty notification to media.  wants Dr Lisa Lee to know that Mr YADI [facility] employer has modified/ light duty positions available that may provide an opportunity for this injured worker to safely return to work in a light duty/ transitional capacity after surgery.     Just an [place]

## 2020-08-24 NOTE — FLOWSHEET NOTE
[facility],  [address] [place]  Phone: [phone]  Fax [phone]        Date:  2020    Patient Name:  Dolores Delong  \"[place]\"  :  1955  MRN: [id number]  Restrictions/Precautions:    Medical/Treatment Diagnosis Information:  · Diagnosis: D51.087 (ICD-10-CM) - Status post reverse total shoulder replacement, left (DOS 2020)  · Treatment Diagnosis: M12.812 (ICD-10-CM) - Rotator cuff arthropathy of left shoulder  Insurance/Certification information:  PT Insurance Information: [facility]  Physician Information:  Referring Practitioner: Isela Amaya  Has the plan of care been signed (Y/N):        []  Yes  [x]  No     Date of Patient follow up with Physician: 2020      Is this a Progress Report:    [x]  Yes  []  No        If Yes:  Date Range for reporting period:  Beginning 2020  Ending 2020    Progress report will be due (10 Rx or 30 days whichever is less): see above      Recertification will be due (POC Duration  / 90 days whichever is less): 8 weeks from IE ()      Visit # Insurance Allowable Auth Required    [facility] 18 visits expires 20 []  Yes []  No        Functional Scale: Q DASH  89%   Date assessed:  2020      Therapy Diagnosis/Practice Pattern: L shoulder pain/ stiffness s/p rTSA; H      Number of Comorbidities:  []0     []1-2    []3+    Latex Allergy:  [x]NO      []YES  Preferred Language for Healthcare:   [x]English       []other:      Pain level:  4-5/10 with exercise, No pain at rest    SUBJECTIVE:  Pt perceives 50% improvement in overall condition. He is frustrated to the point of belligerence about his lack of functionality with the arm. Biological and physiological healing time tables were reviewed. He is anxious to have his ankle operated on and fears that his shoulder is not progressing quickly enough to support surgery.  He c/o intermittent radiating pains through his arm when trying to use, especially at a scaption angle. He demonstrates performance of some of the exercises incorrectly, so technique, form and explanations were reviewed. OBJECTIVE: Incision intact. 2 scabbed areas noted on proximal and distal incision. Reviewed scaption. Verbal cues to use body to move UE . Pain was lessened to minimal after instruction.    Observation:   OBJECTIVE  Test used Initial score Current Score   Pain Summary 0-10 6-8 0-5/10   Functional questionnaire Q DASH 89% 73%   Functional Testing            ROM PROM shoulder flex 102 150    PROM shoulder ER 10 35    AAROM flex  136    Elbow ext  -6   Strength AROM flex     AROM abd NT 90    AROM ER  Occiput     Flex  4-    ABD (bent elbow)  4   4 weeks post-op on 8/6/20      Test measurements:     RESTRICTIONS/PRECAUTIONS: in w/c, fall risk, is hoping for tibiotalar fusion in September       Exercises/Interventions:     Therapeutic Ex (81213) Sets/sec Reps Notes/CUES   Pt education: px, dx, poc, role of PT, healing time, PROM vs AROM, effect of gravity on exercises, HEP performance with corrections 15 mins     Backward shoulder rolls  Scap retractions 5\" 10 ea hep   UT/ LS (S)  20\" 3 hep   Cane T spine rotation H5 10 B +hep   Table slide- flex/ scaption  10\" 10 ea Hep   VC required for body lean   BW No money in pain free range  H5 10 reps    Supine shld flex H2 10 x    Supine CW/CCW AROM  A-P, med-lat     BP with cane/hand  +flexion H5  10\" 2x10 reps + to HEP   Phase 1 strengthening H5 10 reps + to HEP   shld flex with hand assist  H5 x10 reps     SL scap retraction  SL clocks H5 2x10 reps  15 HEP   SL ER to neutral H5 2x10    SL shld abd with elbow bent  SL H ABD H5 2x10    Elbow ext (s) 3 mins  LLLD  Hep    TB ext (RTB)/ Rows(GTB) H5 2x10  reps    Pulleys shld flex/ elbow bent H10 x15 reps    PROM ER to neutral with cane in supine H10 10 reps    Manual Intervention (56167)      PROM, IASTM to biceps (sweeping, fanning, strumming), pin and stretch x 8/ scar massage 10 min      HR (s) technique to gain elbow ext                              NMR re-education (58132)   CUES NEEDED                                                         Therapeutic Activity (09037)                                                Therapeutic Exercise and NMR EXR  [x] (09115) Provided verbal/tactile cueing for activities related to strengthening, flexibility, endurance, ROM  for improvements in scapular, scapulothoracic and UE control with self care, reaching, carrying, lifting, house/yardwork, driving/computer work. [x] (24888) Provided verbal/tactile cueing for activities related to improving balance, coordination, kinesthetic sense, posture, motor skill, proprioception  to assist with  scapular, scapulothoracic and UE control with self care, reaching, carrying, lifting, house/yardwork, driving/computer work. Therapeutic Activities:    [x] (64335 or 79059) Provided verbal/tactile cueing for activities related to improving balance, coordination, kinesthetic sense, posture, motor skill, proprioception and motor activation to allow for proper function of scapular, scapulothoracic and UE control with self care, carrying, lifting, driving/computer work.      Home Exercise Program:    [x] (89813) Reviewed/Progressed HEP activities related to strengthening, flexibility, endurance, ROM of scapular, scapulothoracic and UE control with self care, reaching, carrying, lifting, house/yardwork, driving/computer work  [] (31603) Reviewed/Progressed HEP activities related to improving balance, coordination, kinesthetic sense, posture, motor skill, proprioception of scapular, scapulothoracic and UE control with self care, reaching, carrying, lifting, house/yardwork, driving/computer work      Manual Treatments:  PROM / STM / Oscillations-Mobs:  G-I, II, III, IV (PA's, Inf., Post.)  [x] (91791) Provided manual therapy to mobilize soft tissue/joints of cervical/CT, scapular GHJ and UE for the purpose of modulating pain, promoting relaxation,  increasing ROM, reducing/eliminating soft tissue swelling/inflammation/restriction, improving soft tissue extensibility and allowing for proper ROM for normal function with self care, reaching, carrying, lifting, house/yardwork, driving/computer work    Modalities: At home     Charges:  Timed Code Treatment Minutes: 52'   Total Treatment Minutes: 52'     [facility] time in/time out: 10:00-10:47    [] EVAL (LOW) 38749 (typically 20 minutes face-to-face)  [] EVAL (MOD) 02552 (typically 30 minutes face-to-face)  [] EVAL (HIGH) 74645 (typically 45 minutes face-to-face)  [x] RE-EVAL 10:00-10:15    [x] ZM(74325) x 2 10:10-10:47 [] IONTO  [x] NMR (74294) x    [] VASO  [] Manual (15707) x 1    10:00-10:10  [] Other:  [] TA x       [] Mech Traction (25292)  [] ES(attended) (26991)      [] ES (un) (81771): Access Code: [email or web address]   URL: [email or web address]   Date: 08/20/2020   Prepared by: Laura Tate     Exercises   Seated Elbow Flexion and Extension AROM - 10 reps - 1x daily - 7x weekly   Seated Shoulder Flexion Towel Slide at Table Top - 10 reps - 3-4x daily - 7x weekly   Standing Shoulder Shrug Circles AROM Backward - 10 reps - 3-4x daily - 7x weekly   Shoulder Extension with Resistance - 10 reps - 3 sets - 1x daily - 7x weekly   Seated Scapular Retraction - 10 reps - 3 sets - 1x daily - 7x weekly   Standing Shoulder Row with Anchored Resistance - 10 reps - 3 sets - 1x daily - 7x weekly   Seated Cervical Sidebending AROM - 3 reps - 20 hold - 3-4x daily - 7x weekly   Supine Shoulder Flexion Extension AAROM with Dowel - 10 reps - 3 sets - 1x daily - 7x weekly   Supine Shoulder Flexion AAROM with Hands Clasped - 10 reps - 3 sets - 1x daily - 7x weekly   Supine Shoulder Circles - 10 reps - 3 sets - 1x daily - 7x weekly     GOALS:  Patient stated goal: increase function of arm  []? Progressing: []? Met: []? Not Met: []?  Adjusted     Therapist goals for Patient: Short Term Goals: To be achieved in: 2 weeks  1. Independent in HEP and progression per patient tolerance, in order to prevent re-injury. [x]? Progressing: []? Met: []? Not Met: []? Adjusted  2. Patient will have a decrease in pain to facilitate improvement in movement, function, and ADLs as indicated by Functional Deficits. [x]? Progressing: []? Met: []? Not Met: []? Adjusted     Long Term Goals: To be achieved in: 8 weeks  1. Disability index score of 50% or less for the [facility] to assist with reaching prior level of function. [x]? Progressing: []? Met: []? Not Met: []? Adjusted  2. Patient will demonstrate increased AROM to [place] [place] to allow for proper joint functioning as indicated by patients Functional Deficits. [x]? Progressing: []? Met: []? Not Met: []? Adjusted  3. Patient will demonstrate an increase in Strength to [place] [place] to allow for proper functional mobility as indicated by patients Functional Deficits. [x]? Progressing: []? Met: []? Not Met: []? Adjusted  4. Patient will return to 75% functional activities without increased symptoms or restriction. [x]? Progressing: []? Met: []? Not Met: []? Adjusted  5. Pt will be able to bear weight on arm in order to safely transfer after his leg surgery. (patient specific functional goal)    [x]? Progressing: []? Met: []? Not Met: []? Adjusted            Progression Towards Functional goals:  [] Patient is progressing as expected towards functional goals listed. [x] Progression is slowed due to complexities listed. [x] Progression has been slowed due to co-morbidities. [] Plan just implemented, too soon to assess goals progression  [] Other:     ASSESSMENT:  Updated HEP. PROM, AAROM, AROM, strength and scapular kinematics have improved since onset of therapy but pt still exhibits deficits that prohibt normal functionality of the UE.         Overall Progression Towards Functional goals/ Treatment Progress Update:  [] Patient is progressing as expected towards functional goals listed. [] Progression is slowed due to complexities/Impairments listed. [x] Progression has been slowed due to co-morbidities. [] Plan just implemented, too soon to assess goals progression <30days   [] Goals require adjustment due to lack of progress  [] Patient is not progressing as expected and requires additional follow up with physician  [] Other    Prognosis for POC: [x] Good [] Fair  [] Poor      Patient requires continued skilled intervention: [x] Yes  [] No    Treatment/Activity Tolerance:  [x] Patient able to complete treatment  [] Patient limited by fatigue  [] Patient limited by pain    [] Patient limited by other medical complications  [] Other:         PLAN:Progress as tolerated. [x] Continue per plan of care [] Alter current plan (see comments above)  [] Plan of care initiated [] Hold pending MD visit [] Discharge      Electronically signed by:  Christiano Aldridge, [place] [id number]    Note: If patient does not return for scheduled/ recommended follow up visits, this note will serve as a discharge from care along with most recent update on progress.

## 2020-08-27 NOTE — FLOWSHEET NOTE
[facility],  [address] [place]  Phone: [phone]  Fax [phone]        Date:  2020    Patient Name:  Saint Loots  \"Bud\"  :  1955  MRN: [id number]  Restrictions/Precautions:    Medical/Treatment Diagnosis Information:  · Diagnosis: D22.789 (ICD-10-CM) - Status post reverse total shoulder replacement, left (DOS 2020)  · Treatment Diagnosis: M12.812 (ICD-10-CM) - Rotator cuff arthropathy of left shoulder  Insurance/Certification information:  PT Insurance Information: [facility]  Physician Information:  Referring Practitioner: Adal Bergman  Has the plan of care been signed (Y/N):        []  Yes  [x]  No     Date of Patient follow up with Physician: 2020      Is this a Progress Report: Due N.V 20    []  Yes  [x]  No        If Yes:  Date Range for reporting period:  Beginning 2020  Ending 2020    Progress report will be due (10 Rx or 30 days whichever is less): see above      Recertification will be due (POC Duration  / 90 days whichever is less): 8 weeks from IE (20)      Visit # Insurance Allowable Auth Required   10 [facility] 18 visits expires 20 []  Yes []  No        Functional Scale: Q DASH  89%   Date assessed:  2020      Therapy Diagnosis/Practice Pattern: L shoulder pain/ stiffness s/p rTSA; H      Number of Comorbidities:  []0     []1-2    []3+    Latex Allergy:  [x]NO      []YES  Preferred Language for Healthcare:   [x]English       []other:      Pain level:  4-5/10 with exercise, No pain at rest    SUBJECTIVE:  Saw MD, who is pleased with progress of ROM/ strength thus far. ROM restrictions lifted, pt can only perform light duty activities at a desk, and can begin deltoid strengthening. OBJECTIVE: Incision intact. 2 scabbed areas noted on proximal and distal incision. Reviewed scaption. Verbal cues to use body to move UE .  Pain was lessened to minimal after instruction.    Observation:   OBJECTIVE  Test used Initial score Current Score   Pain Summary 0-10 6-8 0-5/10   Functional questionnaire Q DASH 89% 73%   Functional Testing            ROM PROM shoulder flex 102 150    PROM shoulder ER 10 35    AAROM flex  136    Elbow ext  -6   Strength AROM flex     AROM abd NT 90    AROM ER  Occiput     Flex  4-    ABD (bent elbow)  4   4 weeks post-op on 8/6/20      Test measurements:     RESTRICTIONS/PRECAUTIONS: in w/c, fall risk, is hoping for tibiotalar fusion in September       Exercises/Interventions:     Therapeutic Ex (33347) Sets/sec Reps Notes/CUES   Pt education: px, dx, poc, role of PT, healing time, PROM vs AROM, effect of gravity on exercises, HEP performance with corrections 15 mins     Wall walks 8  reps    UT/ LS (S)  20\" 3 hep   Cane T spine rotation H5 10 B +hep   Table slide- flex/ scaption  10\" 10 ea Hep   VC required for body lean   BW No money in pain free range  H5 10 reps    Supine shld flex H2 10 x    Supine CW/CCW AROM  A-P, med-lat  3 10 ea    BP with cane/hand  +flexion H5  10\" 2x10 reps + to HEP   Phase 1 strengthening H5 10 reps + to HEP   shld flex with hand assist  H5 x10 reps     SL scap retraction  SL clocks H5 2x10 reps  15 HEP   SL ER to neutral H5 2x10    SL shld abd   SL H ABD  SL flex H5 3 x10  2 x 10  2 x 10    Ant/ middle deltoid TB strengthening  10 reps ea YTB   TB ext (RTB)/ Rows(GTB)  TB H ABD (YTB) H5 2x10 ea  3 x 5    Pulleys shld flex/ elbow bent H10 x15 reps    PROM ER to neutral with cane in supine H10 10 reps    Manual Intervention (99075)          HR (s) technique to gain elbow ext            Scar massage 8 mins                 NMR re-education (62368)   CUES NEEDED                                                         Therapeutic Activity (88312)                                                Therapeutic Exercise and NMR EXR  [x] (14914) Provided verbal/tactile cueing for activities related to strengthening, flexibility, endurance, ROM  for improvements in scapular, scapulothoracic and UE control with self care, reaching, carrying, lifting, house/yardwork, driving/computer work. [x] (46681) Provided verbal/tactile cueing for activities related to improving balance, coordination, kinesthetic sense, posture, motor skill, proprioception  to assist with  scapular, scapulothoracic and UE control with self care, reaching, carrying, lifting, house/yardwork, driving/computer work. Therapeutic Activities:    [x] (16715 or 34513) Provided verbal/tactile cueing for activities related to improving balance, coordination, kinesthetic sense, posture, motor skill, proprioception and motor activation to allow for proper function of scapular, scapulothoracic and UE control with self care, carrying, lifting, driving/computer work. Home Exercise Program:    [x] (99291) Reviewed/Progressed HEP activities related to strengthening, flexibility, endurance, ROM of scapular, scapulothoracic and UE control with self care, reaching, carrying, lifting, house/yardwork, driving/computer work  [] (94078) Reviewed/Progressed HEP activities related to improving balance, coordination, kinesthetic sense, posture, motor skill, proprioception of scapular, scapulothoracic and UE control with self care, reaching, carrying, lifting, house/yardwork, driving/computer work      Manual Treatments:  PROM / STM / Oscillations-Mobs:  G-I, II, III, IV (PA's, Inf., Post.)  [x] (36714) Provided manual therapy to mobilize soft tissue/joints of cervical/CT, scapular GHJ and UE for the purpose of modulating pain, promoting relaxation,  increasing ROM, reducing/eliminating soft tissue swelling/inflammation/restriction, improving soft tissue extensibility and allowing for proper ROM for normal function with self care, reaching, carrying, lifting, house/yardwork, driving/computer work    Modalities:   At home     Charges:  Timed Code Treatment Minutes: 55   Total Treatment Minutes: 55     [facility] time in/time out: 10:00-10:55    [] EVAL (LOW) 24184 (typically 20 minutes face-to-face)  [] EVAL (MOD) 33219 (typically 30 minutes face-to-face)  [] EVAL (HIGH) 48707 (typically 45 minutes face-to-face)  [x] RE-EVAL     [x] HC(22100) x 3 10:08-10:55 [] IONTO  [x] NMR (41266) x    [] VASO  [] Manual (98845) x 1    10:00-10:08   [] TA x       [] Mech Traction (01255)  [] ES(attended) (63357)      [] ES (un) (11162): Access Code: [email or web address]   URL: [email or web address]   Date: 08/20/2020   Prepared by: Jose L Loving     Exercises   Seated Elbow Flexion and Extension AROM - 10 reps - 1x daily - 7x weekly   Seated Shoulder Flexion Towel Slide at Table Top - 10 reps - 3-4x daily - 7x weekly   Standing Shoulder Shrug Circles AROM Backward - 10 reps - 3-4x daily - 7x weekly   Shoulder Extension with Resistance - 10 reps - 3 sets - 1x daily - 7x weekly   Seated Scapular Retraction - 10 reps - 3 sets - 1x daily - 7x weekly   Standing Shoulder Row with Anchored Resistance - 10 reps - 3 sets - 1x daily - 7x weekly   Seated Cervical Sidebending AROM - 3 reps - 20 hold - 3-4x daily - 7x weekly   Supine Shoulder Flexion Extension AAROM with Dowel - 10 reps - 3 sets - 1x daily - 7x weekly   Supine Shoulder Flexion AAROM with Hands Clasped - 10 reps - 3 sets - 1x daily - 7x weekly   Supine Shoulder Circles - 10 reps - 3 sets - 1x daily - 7x weekly     GOALS:  Patient stated goal: increase function of arm  []? Progressing: []? Met: []? Not Met: []? Adjusted     Therapist goals for Patient:   Short Term Goals: To be achieved in: 2 weeks  1. Independent in HEP and progression per patient tolerance, in order to prevent re-injury. [x]? Progressing: []? Met: []? Not Met: []? Adjusted  2. Patient will have a decrease in pain to facilitate improvement in movement, function, and ADLs as indicated by Functional Deficits. [x]? Progressing: []? Met: []? Not Met: []? Adjusted     Long Term Goals:  To be achieved in: 8 weeks  1. Disability index score of 50% or less for the [facility] to assist with reaching prior level of function. [x]? Progressing: []? Met: []? Not Met: []? Adjusted  2. Patient will demonstrate increased AROM to [facility] to allow for proper joint functioning as indicated by patients Functional Deficits. [x]? Progressing: []? Met: []? Not Met: []? Adjusted  3. Patient will demonstrate an increase in Strength to [facility] to allow for proper functional mobility as indicated by patients Functional Deficits. [x]? Progressing: []? Met: []? Not Met: []? Adjusted  4. Patient will return to 75% functional activities without increased symptoms or restriction. [x]? Progressing: []? Met: []? Not Met: []? Adjusted  5. Pt will be able to bear weight on arm in order to safely transfer after his leg surgery. (patient specific functional goal)    [x]? Progressing: []? Met: []? Not Met: []? Adjusted            Progression Towards Functional goals:  [] Patient is progressing as expected towards functional goals listed. [x] Progression is slowed due to complexities listed. [x] Progression has been slowed due to co-morbidities. [] Plan just implemented, too soon to assess goals progression  [] Other:     ASSESSMENT:  Updated HEP and gave pt bands for home. PROM, AAROM, AROM, strength and scapular kinematics have improved since onset of therapy but pt still exhibits deficits that prohibt normal functionality of the UE. Overall Progression Towards Functional goals/ Treatment Progress Update:  [] Patient is progressing as expected towards functional goals listed. [] Progression is slowed due to complexities/Impairments listed. [x] Progression has been slowed due to co-morbidities.   [] Plan just implemented, too soon to assess goals progression <30days   [] Goals require adjustment due to lack of progress  [] Patient is not progressing as expected and requires additional follow up with physician  [] Other    Prognosis for POC: [x] Good [] Fair  [] Poor      Patient requires continued skilled intervention: [x] Yes  [] No    Treatment/Activity Tolerance:  [x] Patient able to complete treatment  [] Patient limited by fatigue  [] Patient limited by pain    [] Patient limited by other medical complications  [] Other:         PLAN:Progress as tolerated. [x] Continue per plan of care [] Alter current plan (see comments above)  [] Plan of care initiated [] Hold pending MD visit [] Discharge      Electronically signed by:  Maria Fernanda Garza, [place] [id number]    Note: If patient does not return for scheduled/ recommended follow up visits, this note will serve as a discharge from care along with most recent update on progress.

## 2020-08-31 NOTE — FLOWSHEET NOTE
[facility],  [address]  Phone: [phone]  Fax [phone]        Date:  2020    Patient Name:  Alicia Villegas  \"Garrett\"  :  1955  MRN: [id number]  Restrictions/Precautions:    Medical/Treatment Diagnosis Information:  · Diagnosis: D08.172 (ICD-10-CM) - Status post reverse total shoulder replacement, left (DOS 2020)  · Treatment Diagnosis: M12.812 (ICD-10-CM) - Rotator cuff arthropathy of left shoulder  Insurance/Certification information:  PT Insurance Information: [facility]  Physician Information:  Referring Practitioner: Ian Whelan  Has the plan of care been signed (Y/N):        []  Yes  [x]  No     Date of Patient follow up with Physician: 2020      Is this a Progress Report:     []  Yes  [x]  No        If Yes:  Date Range for reporting period:  Beginning 2020  Ending 2020    Progress report will be due (10 Rx or 30 days whichever is less): see above      Recertification will be due (POC Duration  / 90 days whichever is less): 8 weeks from IE (20)      Visit # Insurance Allowable Auth Required   12 [facility] 18 visits expires 20 [x]  Yes []  No        Functional Scale: Q DASH  73%   Date assessed:  2020      Therapy Diagnosis/Practice Pattern: L shoulder pain/ stiffness s/p rTSA; H      Number of Comorbidities:  []0     []1-2    []3+    Latex Allergy:  [x]NO      []YES  Preferred Language for Healthcare:   [x]English       []other:      Pain level:  3-4/10 with exercise, No pain at rest.    SUBJECTIVE:  Saw MD, who is pleased with progress of ROM/ strength thus far. ROM restrictions lifted. No light duty work available so far. Dr. Ian Whelan to okay foot surgery for end of September. OBJECTIVE: Incision intact. 2 scabbed areas noted on proximal and distal incision. Reviewed scaption. Verbal cues to use body to move UE . Pain was lessened to minimal after instruction.    Observation: OBJECTIVE  Test used Initial score Current Score   Pain Summary 0-10 6-8 0-5/10   Functional questionnaire Q DASH 89% 73%   Functional Testing            ROM PROM shoulder flex 102 150    PROM shoulder ER 10 35    AAROM flex  136    Elbow ext  -6   Strength AROM flex     AROM abd NT 90    AROM ER  Occiput     Flex  4-    ABD (bent elbow)  4   7 weeks post-op on 8/27/20.  Test measurements: ER PROM: 70 this visit on 8/31/20.     RESTRICTIONS/PRECAUTIONS: in w/c, fall risk, is hoping for tibiotalar fusion in September       Exercises/Interventions:     Therapeutic Ex (34251) Sets/sec Reps Notes/CUES   Pt education: px, dx, poc, role of PT, healing time, PROM vs AROM, effect of gravity on exercises, HEP performance with corrections 15 mins     Wall walks 8  reps    UT/ LS (S)  20\" 3 hep   Cane T spine rotation H5 10 B +hep   Table slide- flex/ scaption  10\" 10 ea Hep   VC required for body lean   YTB  No money in pain free range  H5 10 reps    Supine shld flex 1# H2 10 x    Supine CW/CCW AROM  A-P, med-lat  2 10 ea 1#   BP with cane/hand  +flexion H5  10\" 1x10 reps + to HEP   Phase 1 strengthening 1# H5 2x10 reps + to HEP    H5 x10 reps     SL scap retraction  SL clocks HEP   SL ER to neutral H5 2x10    SL shld abd 1#  SL H ABD 1#   H5 2 x10  2 x 10  2 x 10    Ant/ middle deltoid TB strengthening  2x10 reps ea YTB   TB ext (RTB)/ Rows(GTB)  TB H ABD (YTB) H5 2x10 ea  3 x 5 YTB ER 1x10   Pulleys shld flex/ elbow bent H10 x15 reps    PROM ER to neutral with cane in supine H10 10 reps    Manual Intervention (54224)          HR (s) technique to gain elbow ext            Scar massage/PROM 10 mins                 NMR re-education (51667)   CUES NEEDED                                                         Therapeutic Activity (47024)                                                Therapeutic Exercise and NMR EXR  [x] (26906) Provided verbal/tactile cueing for activities related to strengthening, flexibility, endurance, ROM  for improvements in scapular, scapulothoracic and UE control with self care, reaching, carrying, lifting, house/yardwork, driving/computer work. [x] (81024) Provided verbal/tactile cueing for activities related to improving balance, coordination, kinesthetic sense, posture, motor skill, proprioception  to assist with  scapular, scapulothoracic and UE control with self care, reaching, carrying, lifting, house/yardwork, driving/computer work. Therapeutic Activities:    [x] (82389 or 86045) Provided verbal/tactile cueing for activities related to improving balance, coordination, kinesthetic sense, posture, motor skill, proprioception and motor activation to allow for proper function of scapular, scapulothoracic and UE control with self care, carrying, lifting, driving/computer work. Home Exercise Program:    [x] (27286) Reviewed/Progressed HEP activities related to strengthening, flexibility, endurance, ROM of scapular, scapulothoracic and UE control with self care, reaching, carrying, lifting, house/yardwork, driving/computer work  [] (60994) Reviewed/Progressed HEP activities related to improving balance, coordination, kinesthetic sense, posture, motor skill, proprioception of scapular, scapulothoracic and UE control with self care, reaching, carrying, lifting, house/yardwork, driving/computer work      Manual Treatments:  PROM / STM / Oscillations-Mobs:  G-I, II, III, IV (PA's, Inf., Post.)  [x] (24946) Provided manual therapy to mobilize soft tissue/joints of cervical/CT, scapular GHJ and UE for the purpose of modulating pain, promoting relaxation,  increasing ROM, reducing/eliminating soft tissue swelling/inflammation/restriction, improving soft tissue extensibility and allowing for proper ROM for normal function with self care, reaching, carrying, lifting, house/yardwork, driving/computer work    Modalities:   At home     Charges:  Timed Code Treatment Minutes: 54'   Total Treatment Minutes: 54'     28[facility] time in/time out: 9:45-10:40    [] EVAL (LOW) 72069 (typically 20 minutes face-to-face)  [] EVAL (MOD) 78942 (typically 30 minutes face-to-face)  [] EVAL (HIGH) 27552 (typically 45 minutes face-to-face)  [] RE-EVAL     [x] OX(98376) x 2 10;15-10:40 [] IONTO  [x] NMR (59803) x 1 9:45-10:00   [] VASO  [x] Manual (71657) x 1    10:00-10:15  [] TA x       [] Mech Traction (94638)  [] ES(attended) (10283)      [] ES (un) (00036): Access Code: [email or web address]   URL: [email or web address]   Date: 08/20/2020   Prepared by: Noelle Ganser     Exercises   Seated Elbow Flexion and Extension AROM - 10 reps - 1x daily - 7x weekly   Seated Shoulder Flexion Towel Slide at Table Top - 10 reps - 3-4x daily - 7x weekly   Standing Shoulder Shrug Circles AROM Backward - 10 reps - 3-4x daily - 7x weekly   Shoulder Extension with Resistance - 10 reps - 3 sets - 1x daily - 7x weekly   Seated Scapular Retraction - 10 reps - 3 sets - 1x daily - 7x weekly   Standing Shoulder Row with Anchored Resistance - 10 reps - 3 sets - 1x daily - 7x weekly   Seated Cervical Sidebending AROM - 3 reps - 20 hold - 3-4x daily - 7x weekly   Supine Shoulder Flexion Extension AAROM with Dowel - 10 reps - 3 sets - 1x daily - 7x weekly   Supine Shoulder Flexion AAROM with Hands Clasped - 10 reps - 3 sets - 1x daily - 7x weekly   Supine Shoulder Circles - 10 reps - 3 sets - 1x daily - 7x weekly     GOALS:  Patient stated goal: increase function of arm  []? Progressing: []? Met: []? Not Met: []? Adjusted     Therapist goals for Patient:   Short Term Goals: To be achieved in: 2 weeks  1. Independent in HEP and progression per patient tolerance, in order to prevent re-injury. [x]? Progressing: []? Met: []? Not Met: []? Adjusted  2. Patient will have a decrease in pain to facilitate improvement in movement, function, and ADLs as indicated by Functional Deficits. [x]? Progressing: []? Met: []? Not Met: []? Adjusted     Long Term Goals:  To be achieved in: 8 weeks  1. Disability index score of 50% or less for the [facility] to assist with reaching prior level of function. [x]? Progressing: []? Met: []? Not Met: []? Adjusted  2. Patient will demonstrate increased AROM to [facility] to allow for proper joint functioning as indicated by patients Functional Deficits. [x]? Progressing: []? Met: []? Not Met: []? Adjusted  3. Patient will demonstrate an increase in Strength to [facility] to allow for proper functional mobility as indicated by patients Functional Deficits. [x]? Progressing: []? Met: []? Not Met: []? Adjusted  4. Patient will return to 75% functional activities without increased symptoms or restriction. [x]? Progressing: []? Met: []? Not Met: []? Adjusted  5. Pt will be able to bear weight on arm in order to safely transfer after his leg surgery. (patient specific functional goal)    [x]? Progressing: []? Met: []? Not Met: []? Adjusted            Progression Towards Functional goals:  [] Patient is progressing as expected towards functional goals listed. [x] Progression is slowed due to complexities listed. [x] Progression has been slowed due to co-morbidities. [] Plan just implemented, too soon to assess goals progression  [] Other:     ASSESSMENT:  Updated HEP and gave pt bands for home. PROM, AAROM, AROM, strength and scapular kinematics have improved since onset of therapy but pt still exhibits deficits that prohibt normal functionality of the UE. Strength progressing L UE. Overall Progression Towards Functional goals/ Treatment Progress Update:  [] Patient is progressing as expected towards functional goals listed. [] Progression is slowed due to complexities/Impairments listed. [x] Progression has been slowed due to co-morbidities.   [] Plan just implemented, too soon to assess goals progression <30days   [] Goals require adjustment due to lack of progress  [] Patient is not progressing as expected and requires additional follow up with physician  [] Other    Prognosis for POC: [x] Good [] Fair  [] Poor      Patient requires continued skilled intervention: [x] Yes  [] No    Treatment/Activity Tolerance:  [x] Patient able to complete treatment  [] Patient limited by fatigue  [] Patient limited by pain    [] Patient limited by other medical complications  [] Other:         PLAN:Progress as tolerated. Progress strengthening as tolerated. [x] Continue per plan of care [] Alter current plan (see comments above)  [] Plan of care initiated [] Hold pending MD visit [] Discharge      Electronically signed by:  Mode Russell, [address] [place] [address]    Note: If patient does not return for scheduled/ recommended follow up visits, this note will serve as a discharge from care along with most recent update on progress.

## 2020-09-03 NOTE — FLOWSHEET NOTE
[facility],  [address] [place]  Phone: [phone]  Fax [phone]        Date:  9/3/2020    Patient Name:  Denita Chambers  \"Bud\"  :  1955  MRN: [id number]  Restrictions/Precautions:    Medical/Treatment Diagnosis Information:  · Diagnosis: A50.126 (ICD-10-CM) - Status post reverse total shoulder replacement, left (DOS 2020)  · Treatment Diagnosis: M12.812 (ICD-10-CM) - Rotator cuff arthropathy of left shoulder  Insurance/Certification information:  PT Insurance Information: [facility]  Physician Information:  Referring Practitioner: Bibi Akins  Has the plan of care been signed (Y/N):        []  Yes  [x]  No     Date of Patient follow up with Physician: 2020      Is this a Progress Report:     []  Yes  [x]  No        If Yes:  Date Range for reporting period:  Beginning 2020  Ending 2020    Progress report will be due (10 Rx or 30 days whichever is less): see above      Recertification will be due (POC Duration  / 90 days whichever is less): 8 weeks from IE (20)      Visit # Insurance Allowable Auth Required   13 [facility] 18 visits expires 20 [x]  Yes []  No        Functional Scale: Q DASH  73%   Date assessed:  2020      Therapy Diagnosis/Practice Pattern: L shoulder pain/ stiffness s/p rTSA; H      Number of Comorbidities:  []0     []1-2    []3+    Latex Allergy:  [x]NO      []YES  Preferred Language for Healthcare:   [x]English       []other:      Pain level:  3-4/10 with exercise, No pain at rest.    SUBJECTIVE:  Got news on ankle sx- DOS 10-5. Hoping to be done with shoulder rehab by then. OBJECTIVE: Incision intact. 2 scabbed areas noted on proximal and distal incision. Reviewed scaption. Verbal cues to use body to move UE . Pain was lessened to minimal after instruction.    Observation:   OBJECTIVE  Test used Initial score Current Score   Pain Summary 0-10 6-8 0-5/10   Functional questionnaire Q DASH 89% 73%   Functional Testing            ROM PROM shoulder flex 102 150    PROM shoulder ER 10 35    AAROM flex  136    Elbow ext  -6   Strength AROM flex     AROM abd NT 90    AROM ER  Occiput     Flex  4-    ABD (bent elbow)  4   7 weeks post-op on 8/27/20.  Test measurements: ER PROM: 70 this visit on 8/31/20.     RESTRICTIONS/PRECAUTIONS: in w/c, fall risk, is hoping for tibiotalar fusion in September       Exercises/Interventions:     Therapeutic Ex (58828) Sets/sec Reps Notes/CUES   Pt education: px, dx, poc, role of PT, healing time, PROM vs AROM, effect of gravity on exercises, HEP performance with corrections 15 mins     Wall walks 10 reps    UT/ LS (S)  20\" 3 hep   Cane T spine rotation H5 10 B +hep   Stage 1 IR (s) 10\" 10 Gentle   Table slide- flex/ scaption  10\" 10 ea Hep   VC required for body lean   RTB  No money in pain free range  H5/ 2 10 reps    Supine shld flex/ scaption 1#  10 x ea    Supine CW/CCW AROM  A-P, med-lat  2 10 ea 1#   Tricep ext 1# 2 x 10    Phase 1 strengthening 1# H5 2x10 reps + to HEP   D1 flexion 1# H5/ 2 x10 reps     SL scap retraction  SL clocks HEP   SL ER to neutral H5 2x10    SL shld abd 1#  SL H ABD 1#   H5 2 x10  2 x 10  2 x 10    Ant/ middle deltoid TB strengthening  2x10 reps ea YTB   TB ext (RTB)/ Rows(GTB)  TB H ABD (RTB)- B H5 2x10 ea  2 x 10 YTB ER 1x10   Pulleys shld flex/ elbow bent H10 x15 reps    PROM ER to neutral with cane in supine H10 10 reps    Manual Intervention (82985)          HR (s) technique to gain elbow ext            Scar massage/PROM 10 mins                 NMR re-education (10831)   CUES NEEDED                                                         Therapeutic Activity (04200)                                                Therapeutic Exercise and NMR EXR  [x] (68574) Provided verbal/tactile cueing for activities related to strengthening, flexibility, endurance, ROM  for improvements in scapular, scapulothoracic and UE control with self care, reaching, carrying, lifting, house/yardwork, driving/computer work. [x] (80291) Provided verbal/tactile cueing for activities related to improving balance, coordination, kinesthetic sense, posture, motor skill, proprioception  to assist with  scapular, scapulothoracic and UE control with self care, reaching, carrying, lifting, house/yardwork, driving/computer work. Therapeutic Activities:    [x] (47903 or 92277) Provided verbal/tactile cueing for activities related to improving balance, coordination, kinesthetic sense, posture, motor skill, proprioception and motor activation to allow for proper function of scapular, scapulothoracic and UE control with self care, carrying, lifting, driving/computer work. Home Exercise Program:    [x] (80898) Reviewed/Progressed HEP activities related to strengthening, flexibility, endurance, ROM of scapular, scapulothoracic and UE control with self care, reaching, carrying, lifting, house/yardwork, driving/computer work  [] (05780) Reviewed/Progressed HEP activities related to improving balance, coordination, kinesthetic sense, posture, motor skill, proprioception of scapular, scapulothoracic and UE control with self care, reaching, carrying, lifting, house/yardwork, driving/computer work      Manual Treatments:  PROM / STM / Oscillations-Mobs:  G-I, II, III, IV (PA's, Inf., Post.)  [x] (25324) Provided manual therapy to mobilize soft tissue/joints of cervical/CT, scapular GHJ and UE for the purpose of modulating pain, promoting relaxation,  increasing ROM, reducing/eliminating soft tissue swelling/inflammation/restriction, improving soft tissue extensibility and allowing for proper ROM for normal function with self care, reaching, carrying, lifting, house/yardwork, driving/computer work    Modalities:   At home     Charges:  Timed Code Treatment Minutes: 39'   Total Treatment Minutes: 39'     [facility] time in/time out: 10:00-10:45    [] KB (LOW) 61272 (typically 20 minutes face-to-face)  [] EVAL (MOD) 31650 (typically 30 minutes face-to-face)  [] EVAL (HIGH) 60512 (typically 45 minutes face-to-face)  [] RE-EVAL     [x] NA(05851) x 2 10:15-10:45 [] IONTO  [x] NMR (19011) x    [] VASO  [x] Manual (10038) x 1    10:00-10:15  [] TA x       [] Mech Traction (87967)  [] ES(attended) (16941)      [] ES (un) (57906): Access Code: [email or web address]   URL: [email or web address]   Date: 08/20/2020   Prepared by: Minta Arena     Exercises   Seated Elbow Flexion and Extension AROM - 10 reps - 1x daily - 7x weekly   Seated Shoulder Flexion Towel Slide at Table Top - 10 reps - 3-4x daily - 7x weekly   Standing Shoulder Shrug Circles AROM Backward - 10 reps - 3-4x daily - 7x weekly   Shoulder Extension with Resistance - 10 reps - 3 sets - 1x daily - 7x weekly   Seated Scapular Retraction - 10 reps - 3 sets - 1x daily - 7x weekly   Standing Shoulder Row with Anchored Resistance - 10 reps - 3 sets - 1x daily - 7x weekly   Seated Cervical Sidebending AROM - 3 reps - 20 hold - 3-4x daily - 7x weekly   Supine Shoulder Flexion Extension AAROM with Dowel - 10 reps - 3 sets - 1x daily - 7x weekly   Supine Shoulder Flexion AAROM with Hands Clasped - 10 reps - 3 sets - 1x daily - 7x weekly   Supine Shoulder Circles - 10 reps - 3 sets - 1x daily - 7x weekly     GOALS:  Patient stated goal: increase function of arm  []? Progressing: []? Met: []? Not Met: []? Adjusted     Therapist goals for Patient:   Short Term Goals: To be achieved in: 2 weeks  1. Independent in HEP and progression per patient tolerance, in order to prevent re-injury. [x]? Progressing: []? Met: []? Not Met: []? Adjusted  2. Patient will have a decrease in pain to facilitate improvement in movement, function, and ADLs as indicated by Functional Deficits. [x]? Progressing: []? Met: []? Not Met: []? Adjusted     Long Term Goals: To be achieved in: 8 weeks  1.  Disability index score of 50% or less for the [facility] to assist with reaching prior level of function. [x]? Progressing: []? Met: []? Not Met: []? Adjusted  2. Patient will demonstrate increased AROM to [facility] to allow for proper joint functioning as indicated by patients Functional Deficits. [x]? Progressing: []? Met: []? Not Met: []? Adjusted  3. Patient will demonstrate an increase in Strength to [facility] to allow for proper functional mobility as indicated by patients Functional Deficits. [x]? Progressing: []? Met: []? Not Met: []? Adjusted  4. Patient will return to 75% functional activities without increased symptoms or restriction. [x]? Progressing: []? Met: []? Not Met: []? Adjusted  5. Pt will be able to bear weight on arm in order to safely transfer after his leg surgery. (patient specific functional goal)    [x]? Progressing: []? Met: []? Not Met: []? Adjusted            Progression Towards Functional goals:  [] Patient is progressing as expected towards functional goals listed. [x] Progression is slowed due to complexities listed. [x] Progression has been slowed due to co-morbidities. [] Plan just implemented, too soon to assess goals progression  [] Other:     ASSESSMENT:  Updated HEP and gave pt bands for home. PROM, AAROM, AROM, strength and scapular kinematics have improved since onset of therapy but pt still exhibits deficits that prohibt normal functionality of the UE. Strength progressing L UE. Overall Progression Towards Functional goals/ Treatment Progress Update:  [] Patient is progressing as expected towards functional goals listed. [] Progression is slowed due to complexities/Impairments listed. [x] Progression has been slowed due to co-morbidities.   [] Plan just implemented, too soon to assess goals progression <30days   [] Goals require adjustment due to lack of progress  [] Patient is not progressing as expected and requires additional follow up with physician  [] Other    Prognosis for POC: [x] Good [] Fair  [] Poor      Patient requires continued skilled intervention: [x] Yes  [] No    Treatment/Activity Tolerance:  [x] Patient able to complete treatment  [] Patient limited by fatigue  [] Patient limited by pain    [] Patient limited by other medical complications  [] Other:         PLAN:Progress as tolerated. Progress strengthening as tolerated. [x] Continue per plan of care [] Alter current plan (see comments above)  [] Plan of care initiated [] Hold pending MD visit [] Discharge      Electronically signed by:  Surjit Maciel, [place] [id number]    Note: If patient does not return for scheduled/ recommended follow up visits, this note will serve as a discharge from care along with most recent update on progress.

## 2020-09-08 NOTE — FLOWSHEET NOTE
[facility], [address], [address] [place] [address] [place]  Phone: [phone]  Fax [phone]        Date:  2020    Patient Name:  Sarah Humphries  \"Bud\"  :  1955  MRN: [id number]  Restrictions/Precautions:    Medical/Treatment Diagnosis Information:  · Diagnosis: X25.266 (ICD-10-CM) - Status post reverse total shoulder replacement, left (DOS 2020)  · Treatment Diagnosis: M12.812 (ICD-10-CM) - Rotator cuff arthropathy of left shoulder  Insurance/Certification information:  PT Insurance Information: [facility]  Physician Information:  Referring Practitioner: Pool Olguin  Has the plan of care been signed (Y/N):        []  Yes  [x]  No     Date of Patient follow up with Physician: 20      Is this a Progress Report:     []  Yes  [x]  No        If Yes:  Date Range for reporting period:  Beginning 2020  Ending 2020    Progress report will be due (10 Rx or 30 days whichever is less): see above      Recertification will be due (POC Duration  / 90 days whichever is less): 8 weeks from IE (20)      Visit # Insurance Allowable Auth Required   14 [facility] 18 visits expires 20 [x]  Yes []  No        Functional Scale: Q DASH  73%   Date assessed:  2020      Therapy Diagnosis/Practice Pattern: L shoulder pain/ stiffness s/p rTSA; H      Number of Comorbidities:  []0     []1-2    []3+    Latex Allergy:  [x]NO      []YES  Preferred Language for Healthcare:   [x]English       []other:      Pain level:  4/10 with exercise, No pain at rest.    SUBJECTIVE:  Got news on ankle sx- DOS 10-5. Hoping to be done with shoulder rehab by then. Reports incident with getting into shower and slipped. Used L hand to catch himself on wall. Some soreness noted in general.    OBJECTIVE: Incision intact. 2 scabbed areas noted on proximal and distal incision. Reviewed scaption. Verbal cues to use body to move UE .  Pain was lessened to minimal after instruction.  Observation: AROm flex/scap >90 this visit  OBJECTIVE  Test used Initial score Current Score   Pain Summary 0-10 6-8 0-5/10   Functional questionnaire Q DASH 89% 73%   Functional Testing            ROM PROM shoulder flex 102 150    PROM shoulder ER 10 35    AAROM flex  136    Elbow ext  -6   Strength AROM flex     AROM abd NT 90    AROM ER  Occiput     Flex  4-    ABD (bent elbow)  4   9 weeks post-op on 9/10/20     Test measurements: ER PROM: 70 this visit on 8/31/20.     RESTRICTIONS/PRECAUTIONS: in w/c, fall risk, is hoping for tibiotalar fusion in September       Exercises/Interventions:     Therapeutic Ex (55386) Sets/sec Reps Notes/CUES   Pt education: px, dx, poc, role of PT, healing time, PROM vs AROM, effect of gravity on exercises, HEP performance with corrections 15 mins     Wall walks 10 reps    UT/ LS (S)  20\" 3 hep   Cane T spine rotation H5 10 B +hep   Stage 1 IR (s) 10\" 10 Gentle   Table slide- flex/ scaption  10\" 10 ea Hep   VC required for body lean   RTB  No money in pain free range  H5/ 2 10 reps    Supine shld flex/ scaption 1#  10 x ea    Supine CW/CCW AROM  A-P, med-lat  2 10 ea 2#   Wall on ball circles 45\" 1 rep    Tricep ext 2# 2 x 10    Phase 1 strengthening 2# H5 2x10 reps + to HEP   D1 flexion 2# H5/ 2 x10 reps     SL scap retraction  SL clocks HEP   SL ER to neutral H5 2x10    SL shld abd 1#  SL H ABD 1#      Ant/ middle deltoid TB strengthening  2x10 reps ea RTB   TB ext (RTB)/ Rows(GTB)  TB H ABD (GTB)- B H5 2x10 ea  2 x 10 YTB ER 1x10   Pulleys shld flex/ elbow bent H10 x15 reps    PROM ER to neutral with cane in supine H10 10 reps    Manual Intervention (38668)          HR (s) technique to gain elbow ext            Scar massage/PROM 10 mins                 NMR re-education (04852)   CUES NEEDED                                                         Therapeutic Activity (27634)                                                Therapeutic Exercise and NMR EXR  [x] (21165) Provided verbal/tactile cueing for activities related to strengthening, flexibility, endurance, ROM  for improvements in scapular, scapulothoracic and UE control with self care, reaching, carrying, lifting, house/yardwork, driving/computer work. [x] (92792) Provided verbal/tactile cueing for activities related to improving balance, coordination, kinesthetic sense, posture, motor skill, proprioception  to assist with  scapular, scapulothoracic and UE control with self care, reaching, carrying, lifting, house/yardwork, driving/computer work. Therapeutic Activities:    [x] (68374 or 39375) Provided verbal/tactile cueing for activities related to improving balance, coordination, kinesthetic sense, posture, motor skill, proprioception and motor activation to allow for proper function of scapular, scapulothoracic and UE control with self care, carrying, lifting, driving/computer work.      Home Exercise Program:    [x] (77201) Reviewed/Progressed HEP activities related to strengthening, flexibility, endurance, ROM of scapular, scapulothoracic and UE control with self care, reaching, carrying, lifting, house/yardwork, driving/computer work  [] (59887) Reviewed/Progressed HEP activities related to improving balance, coordination, kinesthetic sense, posture, motor skill, proprioception of scapular, scapulothoracic and UE control with self care, reaching, carrying, lifting, house/yardwork, driving/computer work      Manual Treatments:  PROM / STM / Oscillations-Mobs:  G-I, II, III, IV (PA's, Inf., Post.)  [x] (57147) Provided manual therapy to mobilize soft tissue/joints of cervical/CT, scapular GHJ and UE for the purpose of modulating pain, promoting relaxation,  increasing ROM, reducing/eliminating soft tissue swelling/inflammation/restriction, improving soft tissue extensibility and allowing for proper ROM for normal function with self care, reaching, carrying, lifting, house/yardwork, driving/computer work    Modalities: At home     Charges:  Timed Code Treatment Minutes: 39'   Total Treatment Minutes: 39'     [facility] time in/time out: 10:00-10:45    [] EVAL (LOW) 30794 (typically 20 minutes face-to-face)  [] EVAL (MOD) 38901 (typically 30 minutes face-to-face)  [] EVAL (HIGH) 98396 (typically 45 minutes face-to-face)  [] RE-EVAL     [x] SE(79781) x 2 11:00-11:30 [] IONTO  [] NMR (32730) x    [] VASO  [x] Manual (44057) x 1    11:30-11:45  [] TA x       [] Mech Traction (15720)  [] ES(attended) (82039)      [] ES (un) (39980): Access Code: [email or web address]   URL: [email or web address]   Date: 08/20/2020   Prepared by: Umu Xiao     Exercises   Seated Elbow Flexion and Extension AROM - 10 reps - 1x daily - 7x weekly   Seated Shoulder Flexion Towel Slide at Table Top - 10 reps - 3-4x daily - 7x weekly   Standing Shoulder Shrug Circles AROM Backward - 10 reps - 3-4x daily - 7x weekly   Shoulder Extension with Resistance - 10 reps - 3 sets - 1x daily - 7x weekly   Seated Scapular Retraction - 10 reps - 3 sets - 1x daily - 7x weekly   Standing Shoulder Row with Anchored Resistance - 10 reps - 3 sets - 1x daily - 7x weekly   Seated Cervical Sidebending AROM - 3 reps - 20 hold - 3-4x daily - 7x weekly   Supine Shoulder Flexion Extension AAROM with Dowel - 10 reps - 3 sets - 1x daily - 7x weekly   Supine Shoulder Flexion AAROM with Hands Clasped - 10 reps - 3 sets - 1x daily - 7x weekly   Supine Shoulder Circles - 10 reps - 3 sets - 1x daily - 7x weekly     GOALS:  Patient stated goal: increase function of arm  []? Progressing: []? Met: []? Not Met: []? Adjusted     Therapist goals for Patient:   Short Term Goals: To be achieved in: 2 weeks  1. Independent in HEP and progression per patient tolerance, in order to prevent re-injury. [x]? Progressing: []? Met: []? Not Met: []? Adjusted  2.  Patient will have a decrease in pain to facilitate improvement in movement, function, and ADLs as indicated by Functional Deficits. [x]? Progressing: []? Met: []? Not Met: []? Adjusted     Long Term Goals: To be achieved in: 8 weeks  1. Disability index score of 50% or less for the [facility] to assist with reaching prior level of function. [x]? Progressing: []? Met: []? Not Met: []? Adjusted  2. Patient will demonstrate increased AROM to [facility] to allow for proper joint functioning as indicated by patients Functional Deficits. [x]? Progressing: []? Met: []? Not Met: []? Adjusted  3. Patient will demonstrate an increase in Strength to [facility] to allow for proper functional mobility as indicated by patients Functional Deficits. [x]? Progressing: []? Met: []? Not Met: []? Adjusted  4. Patient will return to 75% functional activities without increased symptoms or restriction. [x]? Progressing: []? Met: []? Not Met: []? Adjusted  5. Pt will be able to bear weight on arm in order to safely transfer after his leg surgery. (patient specific functional goal)    [x]? Progressing: []? Met: []? Not Met: []? Adjusted            Progression Towards Functional goals:  [] Patient is progressing as expected towards functional goals listed. [x] Progression is slowed due to complexities listed. [x] Progression has been slowed due to co-morbidities. [] Plan just implemented, too soon to assess goals progression  [] Other:     ASSESSMENT:  Updated HEP and gave pt bands for home. PROM, AAROM, AROM, strength and scapular kinematics have improved since onset of therapy but pt still exhibits deficits that prohibt normal functionality of the UE. Strength progressing L UE. Overall Progression Towards Functional goals/ Treatment Progress Update:  [] Patient is progressing as expected towards functional goals listed. [] Progression is slowed due to complexities/Impairments listed. [x] Progression has been slowed due to co-morbidities.   [] Plan just implemented, too soon to assess goals progression <30days   [] Goals require adjustment due to lack

## 2020-09-10 NOTE — TELEPHONE ENCOUNTER
Ju - I see in this note that you pre-certed this surgery for [facility] but it is being done at [facility] . Will that be a problem?

## 2020-09-10 NOTE — TELEPHONE ENCOUNTER
Auth: # [id number]    Date: 10/05/2020 - 01/03/2021  Type of SX:  OP  Location: [facility]  CPT: 94440   [id number]   DX Code: M87.00  SX area: Left foot  Insurance: [facility]

## 2020-09-11 NOTE — FLOWSHEET NOTE
[facility],  [address], [address] [place] [address] [facility]  Phone: [phone]  Fax [phone]        Date:  2020    Patient Name:  Azalea Hayes  \"Garrett\"  :  1955  MRN: [id number]  Restrictions/Precautions:    Medical/Treatment Diagnosis Information:  · Diagnosis: F88.213 (ICD-10-CM) - Status post reverse total shoulder replacement, left (DOS 2020)  · Treatment Diagnosis: M12.812 (ICD-10-CM) - Rotator cuff arthropathy of left shoulder  Insurance/Certification information:  PT Insurance Information: [facility]  Physician Information:  Referring Practitioner: Kishore Carmichael  Has the plan of care been signed (Y/N):        []  Yes  [x]  No     Date of Patient follow up with Physician: 20      Is this a Progress Report:     []  Yes  [x]  No        If Yes:  Date Range for reporting period:  Beginning 2020  Ending 2020     Progress report will be due (10 Rx or 30 days whichever is less): see above      Recertification will be due (POC Duration  / 90 days whichever is less): 8 weeks from IE (20)      Visit # Insurance Allowable Auth Required   [address] [place] [address] 18 visits expires 20 [x]  Yes []  No    Requested date ext/more visits for C-9    Functional Scale: Q DASH  73%   Date assessed:  2020      Therapy Diagnosis/Practice Pattern: L shoulder pain/ stiffness s/p rTSA; H      Number of Comorbidities:  []0     []1-2    []3+    Latex Allergy:  [x]NO      []YES  Preferred Language for Healthcare:   [x]English       []other:      Pain level:  4/10 with exercise, No pain at rest.    SUBJECTIVE:  Got news on ankle sx- DOS 10-5. Hoping to be done with shoulder rehab by then. Reports incident with getting into shower and slipped. Used L hand to catch himself on wall. Some soreness noted in general.    OBJECTIVE: Incision intact. 2 scabbed areas noted on proximal and distal incision. Reviewed scaption. Verbal cues to use body to move UE .  Pain was lessened to minimal after instruction.  Observation: AROM  flex/scap >90 this visit  OBJECTIVE  Test used Initial score Current Score   Pain Summary 0-10 6-8 0-5/10   Functional questionnaire Q DASH 89% 73%   Functional Testing            ROM PROM shoulder flex 102 150    PROM shoulder ER 10 35    AAROM flex  136    Elbow ext  -6   Strength AROM flex     AROM abd NT 90    AROM ER  Occiput     Flex  4-    ABD (bent elbow)  4   9 weeks post-op on 9/10/20     Test measurements: ER PROM: 70 this visit on 8/31/20.     RESTRICTIONS/PRECAUTIONS: in w/c, fall risk, is hoping for tibiotalar fusion in September       Exercises/Interventions:     Therapeutic Ex (99412) Sets/sec Reps Notes/CUES   Pt education: px, dx, poc, role of PT, healing time, PROM vs AROM, effect of gravity on exercises, HEP performance with corrections 15 mins     Wall walks 10 reps    UT/ LS (S)  20\" 3 hep   Cane T spine rotation H5 10 B +hep   Stage 1 IR (s) 10\" 10 Gentle   Table slide- flex/ scaption  10\" 10 ea Hep   VC required for body lean   RTB  No money in pain free range  H5/ 2 10 reps    Supine shld flex/ scaption 1#  10 x ea    Supine CW/CCW AROM  A-P, med-lat  2 10 ea 2#   Wall on ball circles 45\" 1 rep    Tricep ext 2# 2 x 10    Phase 1 strengthening 2# H5 2x10 reps + to HEP   D1 flexion 2# H5/ 2 x10 reps     SL scap retraction  SL clocks HEP   SL ER to neutral H5 2x10    SL shld abd 2#  SL H ABD 2#   H52 x10  2 x 10  2 x 10   Ant/ middle deltoid TB strengthening  RTB   TB ext (RTB)/ Rows(GTB)  TB H ABD (GTB)- B YTB ER 1x10   Pulleys shld flex/ elbow bent H10 x15 reps    PROM ER to neutral with cane in supine H10 10 reps    Manual Intervention (26342)          HR (s) technique to gain elbow ext            Scar massage/PROM 10 mins                 NMR re-education (73723)   CUES NEEDED                                                         Therapeutic Activity (31421)                                                Therapeutic house/yardwork, driving/computer work    Modalities: At home     Charges:  Timed Code Treatment Minutes: 39'   Total Treatment Minutes: 39'     [facility] time in/time out: 3:30-4:20    [] EVAL (LOW) 46842 (typically 20 minutes face-to-face)  [] EVAL (MOD) 89782 (typically 30 minutes face-to-face)  [] EVAL (HIGH) 60177 (typically 45 minutes face-to-face)  [] RE-EVAL     [x] JX(80769) x 2 3:30-4:00 [] IONTO  [] NMR (49437) x    [] VASO  [x] Manual (86643) x 1   4:00-4:20  [] TA x       [] Mech Traction (54750)  [] ES(attended) (01098)      [] ES (un) (60631): Access Code: [email or web address]   URL: [email or web address]   Date: 08/20/2020   Prepared by: Beatrice Madrid     Exercises   Seated Elbow Flexion and Extension AROM - 10 reps - 1x daily - 7x weekly   Seated Shoulder Flexion Towel Slide at Table Top - 10 reps - 3-4x daily - 7x weekly   Standing Shoulder Shrug Circles AROM Backward - 10 reps - 3-4x daily - 7x weekly   Shoulder Extension with Resistance - 10 reps - 3 sets - 1x daily - 7x weekly   Seated Scapular Retraction - 10 reps - 3 sets - 1x daily - 7x weekly   Standing Shoulder Row with Anchored Resistance - 10 reps - 3 sets - 1x daily - 7x weekly   Seated Cervical Sidebending AROM - 3 reps - 20 hold - 3-4x daily - 7x weekly   Supine Shoulder Flexion Extension AAROM with Dowel - 10 reps - 3 sets - 1x daily - 7x weekly   Supine Shoulder Flexion AAROM with Hands Clasped - 10 reps - 3 sets - 1x daily - 7x weekly   Supine Shoulder Circles - 10 reps - 3 sets - 1x daily - 7x weekly     GOALS:  Patient stated goal: increase function of arm  []? Progressing: []? Met: []? Not Met: []? Adjusted     Therapist goals for Patient:   Short Term Goals: To be achieved in: 2 weeks  1. Independent in HEP and progression per patient tolerance, in order to prevent re-injury. [x]? Progressing: []? Met: []? Not Met: []? Adjusted  2.  Patient will have a decrease in pain to facilitate improvement in movement, function, and ADLs as indicated by Functional Deficits. [x]? Progressing: []? Met: []? Not Met: []? Adjusted     Long Term Goals: To be achieved in: 8 weeks  1. Disability index score of 50% or less for the [facility] to assist with reaching prior level of function. [x]? Progressing: []? Met: []? Not Met: []? Adjusted  2. Patient will demonstrate increased AROM to [facility] to allow for proper joint functioning as indicated by patients Functional Deficits. [x]? Progressing: []? Met: []? Not Met: []? Adjusted  3. Patient will demonstrate an increase in Strength to [facility] to allow for proper functional mobility as indicated by patients Functional Deficits. [x]? Progressing: []? Met: []? Not Met: []? Adjusted  4. Patient will return to 75% functional activities without increased symptoms or restriction. [x]? Progressing: []? Met: []? Not Met: []? Adjusted  5. Pt will be able to bear weight on arm in order to safely transfer after his leg surgery. (patient specific functional goal)    [x]? Progressing: []? Met: []? Not Met: []? Adjusted            Progression Towards Functional goals:  [] Patient is progressing as expected towards functional goals listed. [x] Progression is slowed due to complexities listed. [x] Progression has been slowed due to co-morbidities. [] Plan just implemented, too soon to assess goals progression  [] Other:     ASSESSMENT:  Updated HEP and gave pt bands for home. PROM, AAROM, AROM, strength and scapular kinematics have improved since onset of therapy but pt still exhibits deficits that prohibt normal functionality of the UE. Strength progressing L UE. Overall Progression Towards Functional goals/ Treatment Progress Update:  [] Patient is progressing as expected towards functional goals listed. [] Progression is slowed due to complexities/Impairments listed. [x] Progression has been slowed due to co-morbidities.   [] Plan just implemented, too soon to assess goals progression <30days   [] Goals require adjustment due to lack of progress  [] Patient is not progressing as expected and requires additional follow up with physician  [] Other    Prognosis for POC: [x] Good [] Fair  [] Poor      Patient requires continued skilled intervention: [x] Yes  [] No    Treatment/Activity Tolerance:  [x] Patient able to complete treatment  [] Patient limited by fatigue  [] Patient limited by pain    [] Patient limited by other medical complications  [] Other:         PLAN:Progress as tolerated. Progress strengthening as tolerated. [x] Continue per plan of care [] Alter current plan (see comments above)  [] Plan of care initiated [] Hold pending MD visit [] Discharge      Electronically signed by:  Elaine Landon, [address] [place] [address]    Note: If patient does not return for scheduled/ recommended follow up visits, this note will serve as a discharge from care along with most recent update on progress.

## 2020-09-14 NOTE — FLOWSHEET NOTE
[facility],  [address] [place]  Phone: [phone]  Fax [phone]        Date:  2020    Patient Name:  Azalea Hayes  \"Garrett\"  :  1955  MRN: [id number]  Restrictions/Precautions:    Medical/Treatment Diagnosis Information:  · Diagnosis: V35.792 (ICD-10-CM) - Status post reverse total shoulder replacement, left (DOS 2020)  · Treatment Diagnosis: M12.812 (ICD-10-CM) - Rotator cuff arthropathy of left shoulder  Insurance/Certification information:  PT Insurance Information: [facility]  Physician Information:  Referring Practitioner: Kishore Carmichael  Has the plan of care been signed (Y/N):        []  Yes  [x]  No     Date of Patient follow up with Physician: 20      Is this a Progress Report:     []  Yes  [x]  No        If Yes:  Date Range for reporting period:  Beginning 2020  Ending 2020     Progress report will be due (10 Rx or 30 days whichever is less): see above      Recertification will be due (POC Duration  / 90 days whichever is less): 8 weeks from IE (20)    CHECK for UPDATED C-9  Visit # Insurance Allowable Auth Required   16 [facility] 18 visits expires 20 [x]  Yes []  No    Requested date ext/more visits for C-9    Functional Scale: Q DASH  73%   Date assessed:  2020      Therapy Diagnosis/Practice Pattern: L shoulder pain/ stiffness s/p rTSA; H      Number of Comorbidities:  []0     []1-2    []3+    Latex Allergy:  [x]NO      []YES  Preferred Language for Healthcare:   [x]English       []other:      Pain level:  4/10 with exercise, No pain at rest.    SUBJECTIVE:  Got news on ankle sx- DOS 10-5. Hoping to be done with shoulder rehab by then. Reports shoulder still sore in general about 4-5/10. OBJECTIVE: Incision intact.  Observation: AROM  flex/scap >90 this visit.  ER: 65 this visit  OBJECTIVE  Test used Initial score Current Score   Pain Summary 0-10 6-8 0-5/10   Functional questionnaire Q DASH 89% 73%   Functional Testing            ROM PROM shoulder flex 102 150    PROM shoulder ER 10 35    AAROM flex  136    Elbow ext  -6   Strength AROM flex     AROM abd NT 90    AROM ER  Occiput     Flex  4-    ABD (bent elbow)  4   9 weeks post-op on 9/10/20     Test measurements: ER PROM: 70 this visit on 8/31/20.     RESTRICTIONS/PRECAUTIONS: in w/c, fall risk, is hoping for tibiotalar fusion in September       Exercises/Interventions:     Therapeutic Ex (73496) Sets/sec Reps Notes/CUES   Pt education: px, dx, poc, role of PT, healing time, PROM vs AROM, effect of gravity on exercises, HEP performance with corrections 15 mins     Wall walks 10 reps    UT/ LS (S)  20\" 3 hep   Cane T spine rotation H5 10 B +hep   Stage 1 IR (s) 10\" 10 Gentle   Table slide- flex/ scaption  10\" 10 ea Hep   VC required for body lean   RTB  No money in pain free range  H5/ 2 10 reps    Supine shld flex/ scaption 2#  10 x ea    Supine CW/CCW AROM  A-P, med-lat  2#   Wall on ball circles    Tricep ext 2# 2 x 10    Phase 1 strengthening 2# + to HEP   D1 flexion 2# H5/ 2 x10 reps     SL scap retraction  SL clocks HEP   SL ER to neutral H5 2x10    SL shld abd 2#  SL H ABD 2#   H52 x10  2 x 10  2 x 10   Ant/ middle deltoid TB strengthening  RTB   TB ext (GTB)/ Rows(GTB)  TB H ABD (GTB)- B H5 2x10 ea  2 x 10 YTB ER 1x10   TB eccentric flex O.H to 90 H5 2x10 reps    Pulleys shld flex/ elbow bent H10 x15 reps    PROM ER to neutral with cane in supine H10 10 reps    Manual Intervention (98758)          HR (s) technique to gain elbow ext            Scar massage/PROM 10 mins           IR with towel assist H10 10 reps To PSIS   NMR re-education (34123)   CUES NEEDED                                                         Therapeutic Activity (36027)                                                Therapeutic Exercise and NMR EXR  [x] (21590) Provided verbal/tactile cueing for activities related to strengthening, flexibility, endurance, ROM  for improvements in scapular, scapulothoracic and UE control with self care, reaching, carrying, lifting, house/yardwork, driving/computer work. [x] (03256) Provided verbal/tactile cueing for activities related to improving balance, coordination, kinesthetic sense, posture, motor skill, proprioception  to assist with  scapular, scapulothoracic and UE control with self care, reaching, carrying, lifting, house/yardwork, driving/computer work. Therapeutic Activities:    [x] (60706 or 03531) Provided verbal/tactile cueing for activities related to improving balance, coordination, kinesthetic sense, posture, motor skill, proprioception and motor activation to allow for proper function of scapular, scapulothoracic and UE control with self care, carrying, lifting, driving/computer work. Home Exercise Program:    [x] (33180) Reviewed/Progressed HEP activities related to strengthening, flexibility, endurance, ROM of scapular, scapulothoracic and UE control with self care, reaching, carrying, lifting, house/yardwork, driving/computer work  [] (69269) Reviewed/Progressed HEP activities related to improving balance, coordination, kinesthetic sense, posture, motor skill, proprioception of scapular, scapulothoracic and UE control with self care, reaching, carrying, lifting, house/yardwork, driving/computer work      Manual Treatments:  PROM / STM / Oscillations-Mobs:  G-I, II, III, IV (PA's, Inf., Post.)  [x] (31076) Provided manual therapy to mobilize soft tissue/joints of cervical/CT, scapular GHJ and UE for the purpose of modulating pain, promoting relaxation,  increasing ROM, reducing/eliminating soft tissue swelling/inflammation/restriction, improving soft tissue extensibility and allowing for proper ROM for normal function with self care, reaching, carrying, lifting, house/yardwork, driving/computer work    Modalities:   At home     Charges:  Timed Code Treatment Minutes: 48'   Total Treatment Minutes: 48'     [facility] time in/time out: 9:45-10:35    [] EVAL (LOW) 53360 (typically 20 minutes face-to-face)  [] EVAL (MOD) 84806 (typically 30 minutes face-to-face)  [] EVAL (HIGH) 27606 (typically 45 minutes face-to-face)  [] RE-EVAL     [x] WJ(20198) x 2 9:45-10;15 [] IONTO  [] NMR (49631) x    [] VASO  [x] Manual (53044) x 1   10:15-10:35  [] TA x       [] Mech Traction (44707)  [] ES(attended) (33988)      [] ES (un) (20025): Access Code: [email or web address]   URL: [email or web address]   Date: 08/20/2020   Prepared by: Umu Xiao     Exercises   Seated Elbow Flexion and Extension AROM - 10 reps - 1x daily - 7x weekly   Seated Shoulder Flexion Towel Slide at Table Top - 10 reps - 3-4x daily - 7x weekly   Standing Shoulder Shrug Circles AROM Backward - 10 reps - 3-4x daily - 7x weekly   Shoulder Extension with Resistance - 10 reps - 3 sets - 1x daily - 7x weekly   Seated Scapular Retraction - 10 reps - 3 sets - 1x daily - 7x weekly   Standing Shoulder Row with Anchored Resistance - 10 reps - 3 sets - 1x daily - 7x weekly   Seated Cervical Sidebending AROM - 3 reps - 20 hold - 3-4x daily - 7x weekly   Supine Shoulder Flexion Extension AAROM with Dowel - 10 reps - 3 sets - 1x daily - 7x weekly   Supine Shoulder Flexion AAROM with Hands Clasped - 10 reps - 3 sets - 1x daily - 7x weekly   Supine Shoulder Circles - 10 reps - 3 sets - 1x daily - 7x weekly     GOALS:  Patient stated goal: increase function of arm  []? Progressing: []? Met: []? Not Met: []? Adjusted     Therapist goals for Patient:   Short Term Goals: To be achieved in: 2 weeks  1. Independent in HEP and progression per patient tolerance, in order to prevent re-injury. [x]? Progressing: []? Met: []? Not Met: []? Adjusted  2. Patient will have a decrease in pain to facilitate improvement in movement, function, and ADLs as indicated by Functional Deficits. [x]? Progressing: []? Met: []? Not Met: []? Adjusted     Long Term Goals:  To be achieved in: 8 weeks  1. Disability index score of 50% or less for the [facility] to assist with reaching prior level of function. [x]? Progressing: []? Met: []? Not Met: []? Adjusted  2. Patient will demonstrate increased [facility]OM to [facility] to allow for proper joint functioning as indicated by patients Functional Deficits. [x]? Progressing: []? Met: []? Not Met: []? Adjusted  3. Patient will demonstrate an increase in Strength to [facility] to allow for proper functional mobility as indicated by patients Functional Deficits. [x]? Progressing: []? Met: []? Not Met: []? Adjusted  4. Patient will return to 75% functional activities without increased symptoms or restriction. [x]? Progressing: []? Met: []? Not Met: []? Adjusted  5. Pt will be able to bear weight on arm in order to safely transfer after his leg surgery. (patient specific functional goal)    [x]? Progressing: []? Met: []? Not Met: []? Adjusted            Progression Towards Functional goals:  [] Patient is progressing as expected towards functional goals listed. [x] Progression is slowed due to complexities listed. [x] Progression has been slowed due to co-morbidities. [] Plan just implemented, too soon to assess goals progression  [] Other:     ASSESSMENT:  Updated HEP and gave pt bands for home. PROM, AAROM, AROM, strength and scapular kinematics have improved since onset of therapy but pt still exhibits deficits that prohibt normal functionality of the UE. Strength progressing L UE but compensatory hiking still noted. Overall Progression Towards Functional goals/ Treatment Progress Update:  [] Patient is progressing as expected towards functional goals listed. [] Progression is slowed due to complexities/Impairments listed. [x] Progression has been slowed due to co-morbidities.   [] Plan just implemented, too soon to assess goals progression <30days   [] Goals require adjustment due to lack of progress  [] Patient is not progressing as expected and requires

## 2020-09-18 NOTE — FLOWSHEET NOTE
[facility],  [address] [place]  Phone: [phone]  Fax [phone]        Date:  2020    Patient Name:  Dontrell Wheeler  \"[place]\"  :  1955  MRN: [id number]  Restrictions/Precautions:    Medical/Treatment Diagnosis Information:  · Diagnosis: C71.383 (ICD-10-CM) - Status post reverse total shoulder replacement, left (DOS 2020)  · Treatment Diagnosis: M12.812 (ICD-10-CM) - Rotator cuff arthropathy of left shoulder  Insurance/Certification information:  PT Insurance Information: [facility]  Physician Information:  Referring Practitioner: Michelle Quinonez  Has the plan of care been signed (Y/N):        []  Yes  [x]  No     Date of Patient follow up with Physician: 20      Is this a Progress Report:N.V.     []  Yes  [x]  No        If Yes:  Date Range for reporting period:  Beginning 2020  Ending 2020     Progress report will be due (10 Rx or 30 days whichever is less): see above      Recertification will be due (POC Duration  / 90 days whichever is less): 8 weeks from IE (20)    CHECK for UPDATED C-9  Visit # Insurance Allowable Auth Required   17 [facility] 18 visits extended date   [x]  Yes []  No    Request more visits N.V. Functional Scale: Q DASH  73%   Date assessed:  2020      Therapy Diagnosis/Practice Pattern: L shoulder pain/ stiffness s/p rTSA; H      Number of Comorbidities:  []0     []1-2    []3+    Latex Allergy:  [x]NO      []YES  Preferred Language for Healthcare:   [x]English       []other:      Pain level:  4/10 with exercise, No pain at rest.    SUBJECTIVE:  Got news on ankle sx- DOS 10-5. Hoping to be done with shoulder rehab by then. Had significant increased pain after last visit L shoulder. Pain located posterior shld /upper arm. Had issues with sleeping due to pain and tingling. OBJECTIVE: Incision intact.  Observation: AROM  Flex 110, abd 95. IR to PSIS this visit.    OBJECTIVE lifting, house/yardwork, driving/computer work    Modalities: At home     Charges:  Timed Code Treatment Minutes: 54'   Total Treatment Minutes: 54'     [facility] time in/time out: 3:45-4:40    [] EVAL (LOW) 62104 (typically 20 minutes face-to-face)  [] EVAL (MOD) 57546 (typically 30 minutes face-to-face)  [] EVAL (HIGH) 90000 (typically 45 minutes face-to-face)  [] RE-EVAL     [x] XB(24266) x 2 3:45-4:15 [] IONTO  [x] NMR (20232) x 1 4:30-4;40   [] VASO  [x] Manual (20772) x 1   4:15-4:30  [] TA x       [] Mech Traction (30859)  [] ES(attended) (63439)      [] ES (un) (47215): Access Code: [email or web address]   URL: [email or web address]   Date: 08/20/2020   Prepared by: Jemima Herman     Exercises   Seated Elbow Flexion and Extension AROM - 10 reps - 1x daily - 7x weekly   Seated Shoulder Flexion Towel Slide at Table Top - 10 reps - 3-4x daily - 7x weekly   Standing Shoulder Shrug Circles AROM Backward - 10 reps - 3-4x daily - 7x weekly   Shoulder Extension with Resistance - 10 reps - 3 sets - 1x daily - 7x weekly   Seated Scapular Retraction - 10 reps - 3 sets - 1x daily - 7x weekly   Standing Shoulder Row with Anchored Resistance - 10 reps - 3 sets - 1x daily - 7x weekly   Seated Cervical Sidebending AROM - 3 reps - 20 hold - 3-4x daily - 7x weekly   Supine Shoulder Flexion Extension AAROM with Dowel - 10 reps - 3 sets - 1x daily - 7x weekly   Supine Shoulder Flexion AAROM with Hands Clasped - 10 reps - 3 sets - 1x daily - 7x weekly   Supine Shoulder Circles - 10 reps - 3 sets - 1x daily - 7x weekly     GOALS:  Patient stated goal: increase function of arm  []? Progressing: []? Met: []? Not Met: []? Adjusted     Therapist goals for Patient:   Short Term Goals: To be achieved in: 2 weeks  1. Independent in HEP and progression per patient tolerance, in order to prevent re-injury. [x]? Progressing: []? Met: []? Not Met: []? Adjusted  2.  Patient will have a decrease in pain to facilitate improvement in movement, function, and ADLs as indicated by Functional Deficits. [x]? Progressing: []? Met: []? Not Met: []? Adjusted     Long Term Goals: To be achieved in: 8 weeks  1. Disability index score of 50% or less for the [facility] to assist with reaching prior level of function. [x]? Progressing: []? Met: []? Not Met: []? Adjusted  2. Patient will demonstrate increased AROM to [facility] to allow for proper joint functioning as indicated by patients Functional Deficits. [x]? Progressing: []? Met: []? Not Met: []? Adjusted  3. Patient will demonstrate an increase in Strength to [facility] to allow for proper functional mobility as indicated by patients Functional Deficits. [x]? Progressing: []? Met: []? Not Met: []? Adjusted  4. Patient will return to 75% functional activities without increased symptoms or restriction. [x]? Progressing: []? Met: []? Not Met: []? Adjusted  5. Pt will be able to bear weight on arm in order to safely transfer after his leg surgery. (patient specific functional goal)    [x]? Progressing: []? Met: []? Not Met: []? Adjusted            Progression Towards Functional goals:  [] Patient is progressing as expected towards functional goals listed. [x] Progression is slowed due to complexities listed. [x] Progression has been slowed due to co-morbidities. [] Plan just implemented, too soon to assess goals progression  [] Other:     ASSESSMENT:  Updated HEP and gave pt bands for home. PROM, AAROM, AROM, strength and scapular kinematics have improved since onset of therapy but pt still exhibits deficits that prohibt normal functionality of the UE. Strength progressing L UE but compensatory hiking still noted. Increased IR noted this visit. Overall Progression Towards Functional goals/ Treatment Progress Update:  [] Patient is progressing as expected towards functional goals listed. [] Progression is slowed due to complexities/Impairments listed. [x] Progression has been slowed due to co-morbidities.   [] Plan just implemented, too soon to assess goals progression <30days   [] Goals require adjustment due to lack of progress  [] Patient is not progressing as expected and requires additional follow up with physician  [] Other    Prognosis for POC: [x] Good [] Fair  [] Poor      Patient requires continued skilled intervention: [x] Yes  [] No    Treatment/Activity Tolerance:  [x] Patient able to complete treatment  [] Patient limited by fatigue  [] Patient limited by pain    [] Patient limited by other medical complications  [] Other:         PLAN:Progress as tolerated. Progress strengthening as tolerated. [x] Continue per plan of care [] Alter current plan (see comments above)  [] Plan of care initiated [] Hold pending MD visit [] Discharge      Electronically signed by:  Acosta Houser, [address]    Note: If patient does not return for scheduled/ recommended follow up visits, this note will serve as a discharge from care along with most recent update on progress.

## 2020-09-21 NOTE — PLAN OF CARE
[facility],  [address] [place]  Phone: [phone]  Fax [phone]    Physical Therapy Recertification  Date: 2020        Patient Name:  Ramos Khan    :  1955  MRN: [id number]  Referring Physician:Dr. Ciarra Delgado  Diagnosis:s/p L reverse TSA                        ICD Code:M12.812 RC arthropathy  Therapy Diagnosis/Practice Pattern:4H      Total number of visits: 18   Reporting Period:   Beginning Date:20   End Date:20      OBJECTIVE  Test used Initial score 20   Pain Summary 0-10 6-8 0-5/10 0-4/10   Functional questionnaire Q DASH 89% 73% 45%   Functional Testing                       ROM PROM shoulder flex 102 150 150     PROM shoulder ER 10 35 70-75     AAROM flex   136 145     Elbow ext   -6 -6   Strength AROM flex  110 P     AROM abd NT 90 95 P     AROM ER/IR   Occiput /NT Occiput/PSIS     Flex   4- 4-/5     ABD (bent elbow)   4 4/5   10 weeks post-op on 20                  Test/tests used to determine % limitation:  Actual Score used to drive % limitation:    Treatment to date:  [x] Therapeutic Exercise    [] Modalities:  [x] Therapeutic Activity             []Ultrasound            []Electrical Stimulation  [] Gait Training     []Cervical Traction    [] Lumbar Traction  [x] Neuromuscular Re-education [x] Cold/hotpack         []Iontophoresis  [x] Instruction in HEP      Other:  [x] Manual Therapy                   []                       ?           []   Assessment:  [x] Improvement noted relative to goals:  [] No Improvement noted related to goals:/Patient's response to treatment/Additional assessment:    New or Updated Goals (if applicable):  [x] No change to goals established upon initial eval/last progress note:  New Goals:    Rehab Potential:   []Excellent   [x] Good   [] Fair   [] Poor    Plan of Care:  [x] Continue Therapy    Frequency/Duration:  # Days per week: [] 1 day # Weeks: [] 1 week [] 7 weeks     [x] 2 days? [] 2 weeks [] 8 weeks     [x] 3 days   [] 3 weeks [] 9 weeks     [] 4 days   [] 4 weeks [] 10 weeks      [] 5 days   [] 5 weeks [] 11 weeks          [x] 6 weeks [] 12 weeks    Interventions:  [x] Therapeutic Exercise    [] Modalities:  [] Therapeutic Activity    [] Ultrasound  [] Electrical Stimulation  [] Gait Training     [] Cervical Traction [] Lumbar Traction  [x] Neuromuscular Re-education [x] Cold/hotpack [] Iontophoresis   [x] Instruction in HEP     Other:  [x] Manual Therapy      []                     []           ?       Electronically signed by:  Tegan Infante , [id number]    If you have any questions or concerns, please don't hesitate to call. Thank you for the referral.  Medicare requires recertification of the therapy plan of care. Additional visits are being requested. Please recertify for additional visits:    Physician Signature:____________________________________Date:_______________  By signing above, therapist's plan is approved by the physician.

## 2020-09-21 NOTE — FLOWSHEET NOTE
[facility], [address] [facility]  [address] [facility], [address] [place] [address] [place]  Phone: [phone]  Fax [phone]        Date:  2020    Patient Name:  Chen Maxwell  \"[place]\"  :  1955  MRN: [id number]  Restrictions/Precautions:    Medical/Treatment Diagnosis Information:  · Diagnosis: B13.827 (ICD-10-CM) - Status post reverse total shoulder replacement, left (DOS 2020)  · Treatment Diagnosis: M12.812 (ICD-10-CM) - Rotator cuff arthropathy of left shoulder  Insurance/Certification information:  PT Insurance Information: [address] [place] [address]  Physician Information:  Referring Practitioner: Jose Luis Hammonds  Has the plan of care been signed (Y/N):        []  Yes  [x]  No     Date of Patient follow up with Physician: 20      Is this a Progress Report:     [x]  Yes  []  No        If Yes:  Date Range for reporting period:  Beginning 2020  Ending 2020     Progress report will be due (10 Rx or 30 days whichever is less):       Recertification will be due (POC Duration  / 90 days whichever is less): 8 weeks from IE (20)    CHECK for [address] [place] [address] C-9  Visit # Insurance Allowable Auth Required   18 [address] [place] [address] 18 visits extended date   [x]  Yes []  No    Requested  more visits: 2-3week for 6 weeks    Functional Scale: Q DASH  45%   Date assessed:  20     Therapy Diagnosis/Practice Pattern: L shoulder pain/ stiffness s/p rTSA; H      Number of Comorbidities:  []0     []1-2    []3+    Latex Allergy:  [x]NO      []YES  Preferred Language for Healthcare:   [x]English       []other:      Pain level:  4/10 with exercise, No pain at rest. Mostly stiffness and soreness still noted L shoulder. SUBJECTIVE:  Got news on ankle sx- DOS 10-5. Hoping to be done with shoulder rehab by then. Still having the tingling/falling asleep feeling from elbow to fingertips last night. Able to use L UE to push up to a standing position with increased pain. OBJECTIVE: Incision intact.  Observation: AROM  Flex 110, abd 95. IR to PSIS this visit. OBJECTIVE  Test used Initial score 8/24/20 9/21/20   Pain Summary 0-10 6-8 0-5/10 0-4/10   Functional questionnaire Q DASH 89% 73% 45%   Functional Testing              ROM PROM shoulder flex 102 150 150    PROM shoulder ER 10 35 70-75    AAROM flex  136 145    Elbow ext  -6 -6   Strength AROM flex  110 P    AROM abd NT 90 95 P    AROM ER/IR  Occiput /NT Occiput/PSIS    Flex  4- 4-/5    ABD (bent elbow)  4 4/5   9 weeks post-op on 9/10/20     Test measurements: ER PROM: 70 this visit on 8/31/20.     RESTRICTIONS/PRECAUTIONS: in w/c, fall risk, is hoping for tibiotalar fusion in September       Exercises/Interventions:     Therapeutic Ex (04876) Sets/sec Reps Notes/CUES   Pt education: px, dx, poc, role of PT, healing time, PROM vs AROM, effect of gravity on exercises, HEP performance with corrections 15 mins     Wall walks 10 reps    UT/ LS (S)  20\" 3 hep   Cane T spine rotation H5 10 B +hep   Stage 1 IR (s) 10\" 10 Gentle   Table slide- flex/ scaption  10\" 10 ea Hep   VC required for body lean   RTB  No money in pain free range  H5/ 2 10 reps    Supine shld flex/ scaption 2#  2x10 x ea    Supine CW/CCW AROM  A-P, med-lat  2#   Wall on ball circles    SA punches  3# 2x15     Tricep ext 2# 2 x 15 H5   Phase 1 strengthening 2# + to HEP   D1/D2 flexion 2# H5/ 2 x10 reps     SL scap retraction  SL clocks HEP   SL ER to neutral H5 2x10    SL shld abd 2#  SL H ABD 2#   H52 x10  2 x 10  2 x 10   Ant/ middle deltoid TB strengthening  RTB   TB ext (GTB)/ Rows(GTB)  TB H ABD (GTB)- B YTB ER 1x10   TB eccentric flex O.H to 90    Pulleys shld flex/ elbow bent H10 x15 reps    PROM ER to neutral with cane in supine H10 10 reps    Manual Intervention (69452)      PROM, IASTM to biceps (sweeping, fanning, strumming), pin and stretch x 8/ scar massage 10' min     HR (s) technique to gain elbow ext            Scar massage/PROM 5 mins           IR with towel assist H10 10 reps To PSIS   NMR re-education (15858)   CUES NEEDED                                                         Therapeutic Activity (53297)                                                Therapeutic Exercise and NMR EXR  [x] (73686) Provided verbal/tactile cueing for activities related to strengthening, flexibility, endurance, ROM  for improvements in scapular, scapulothoracic and UE control with self care, reaching, carrying, lifting, house/yardwork, driving/computer work. [x] (73334) Provided verbal/tactile cueing for activities related to improving balance, coordination, kinesthetic sense, posture, motor skill, proprioception  to assist with  scapular, scapulothoracic and UE control with self care, reaching, carrying, lifting, house/yardwork, driving/computer work. Therapeutic Activities:    [x] (32030 or 83721) Provided verbal/tactile cueing for activities related to improving balance, coordination, kinesthetic sense, posture, motor skill, proprioception and motor activation to allow for proper function of scapular, scapulothoracic and UE control with self care, carrying, lifting, driving/computer work.      Home Exercise Program:    [x] (70715) Reviewed/Progressed HEP activities related to strengthening, flexibility, endurance, ROM of scapular, scapulothoracic and UE control with self care, reaching, carrying, lifting, house/yardwork, driving/computer work  [] (21516) Reviewed/Progressed HEP activities related to improving balance, coordination, kinesthetic sense, posture, motor skill, proprioception of scapular, scapulothoracic and UE control with self care, reaching, carrying, lifting, house/yardwork, driving/computer work      Manual Treatments:  PROM / STM / Oscillations-Mobs:  G-I, II, III, IV (PA's, Inf., Post.)  [x] (99399) Provided manual therapy to mobilize soft tissue/joints of cervical/CT, scapular GHJ and UE for the purpose of modulating pain, promoting relaxation,  increasing ROM, reducing/eliminating soft tissue swelling/inflammation/restriction, improving soft tissue extensibility and allowing for proper ROM for normal function with self care, reaching, carrying, lifting, house/yardwork, driving/computer work    Modalities: At home     Charges:  Timed Code Treatment Minutes: 61'   Total Treatment Minutes: 61'     [facility] time in/time out: 9:45-10:45    [] EVAL (LOW) 65466 (typically 20 minutes face-to-face)  [] EVAL (MOD) 54042 (typically 30 minutes face-to-face)  [] EVAL (HIGH) 30656 (typically 45 minutes face-to-face)  [] RE-EVAL     [x] KRISS(78495) x 2 9:45-10:15 [] IONTO  [x] NMR (55004) x 1 10:30-10;45   [] VASO  [x] Manual (97120) x 1   10:15-10:30   [] TA x       [] Mech Traction (47963)  [] ES(attended) (31207)      [] ES (un) (76587): Access Code: [email or web address]   URL: [email or web address]   Date: 08/20/2020   Prepared by: Ilir Miranda     Exercises   Seated Elbow Flexion and Extension AROM - 10 reps - 1x daily - 7x weekly   Seated Shoulder Flexion Towel Slide at Table Top - 10 reps - 3-4x daily - 7x weekly   Standing Shoulder Shrug Circles AROM Backward - 10 reps - 3-4x daily - 7x weekly   Shoulder Extension with Resistance - 10 reps - 3 sets - 1x daily - 7x weekly   Seated Scapular Retraction - 10 reps - 3 sets - 1x daily - 7x weekly   Standing Shoulder Row with Anchored Resistance - 10 reps - 3 sets - 1x daily - 7x weekly   Seated Cervical Sidebending AROM - 3 reps - 20 hold - 3-4x daily - 7x weekly   Supine Shoulder Flexion Extension AAROM with Dowel - 10 reps - 3 sets - 1x daily - 7x weekly   Supine Shoulder Flexion AAROM with Hands Clasped - 10 reps - 3 sets - 1x daily - 7x weekly   Supine Shoulder Circles - 10 reps - 3 sets - 1x daily - 7x weekly     GOALS:  Patient stated goal: increase function of arm  [x]? Progressing: []? Met: []? Not Met: []? Adjusted     Therapist goals for Patient:   Short Term Goals: To be achieved in: 2 weeks  1.  Independent in HEP and Progression Towards Functional goals/ Treatment Progress Update:  [x] Patient is progressing as expected towards functional goals listed. [] Progression is slowed due to complexities/Impairments listed. [] Progression has been slowed due to co-morbidities. [] Plan just implemented, too soon to assess goals progression <30days   [] Goals require adjustment due to lack of progress  [] Patient is not progressing as expected and requires additional follow up with physician  [] Other    Prognosis for POC: [x] Good [] Fair  [] Poor      Patient requires continued skilled intervention: [x] Yes  [] No    Treatment/Activity Tolerance:  [x] Patient able to complete treatment  [] Patient limited by fatigue  [] Patient limited by pain    [] Patient limited by other medical complications  [] Other:         PLAN:Progress as tolerated. Progress strengthening as tolerated. Recommend 2-3x/week for 6 weeks. [x] Continue per plan of care [] Alter current plan (see comments above)  [] Plan of care initiated [] Hold pending MD visit [] Discharge      Electronically signed by:  Diana Chance, [address]    Note: If patient does not return for scheduled/ recommended follow up visits, this note will serve as a discharge from care along with most recent update on progress.

## 2020-09-24 NOTE — PROGRESS NOTES
aspect of his work. 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 I do think it is medically safe for him to consider any other surgical interventions as needed. We will see him back in 3 months with radiographs          [address] [facility] ([facility])  Shoulder and Sports Medicine Surgery     This dictation was performed with a verbal recognition program [facility] ) and it was checked for errors. It is possible that there are still dictated errors within this office note. If so, please bring any errors to my attention for an addendum. All efforts were made to ensure that this office note is accurate.

## 2020-09-25 NOTE — FLOWSHEET NOTE
[facility],  [place] [id number] [facility], [address] [place] [address]        Physical Therapy  Cancellation/No-show Note  Patient Name:  Alicia Villegas  :  1955   Date:  2020  Cancelled visits to date: 1  No-shows to date: 0    For today's appointment patient:  ?  Cancelled  ? Rescheduled appointment  ? No-show     Reason given by patient:  ?  Patient ill  ? Conflicting appointment  ? No transportation    ? Conflict with work  ? No reason given  ?   Other:     Comments:  Waiting for authorization from [facility]    Electronically signed by:  Prince Balderrama, [address] [facility], [id number]

## 2020-09-25 NOTE — PROGRESS NOTES
Kilmichael Dante    Age 72 y.o.    male    1955    N [id number]    10/5/2020  Arrival Time_____________  OR Time____________[address]     Procedure(s):  LEFT TIBIOTALOCALCANEAL FUSION AND FIBULAR OSTECTOMY -BLOCK-                      General    Surgeon(s):  Jeffrey Boyle, MD       Phone [phone] (home) [phone] (work)    [address] [place] [address]  Cell Work  _________________________________________________________________  _________________________________________________________________  _________________________________________________________________  _________________________________________________________________  _________________________________________________________________      PCP _____________________________ Phone_________________       H&P__________________Bringing    Chart            Epic  DOS     Called_______  EKG__________________Bringing    Chart            Epic  DOS     Called_______  LAB__________________ Bringing    Chart            Epic  DOS     Called_______  Cardiac Clearance_______Bringing    Chart            Epic      DOS       Called_______    Cardiologist________________________ Phone___________________________      ? Uatsdin concerns / Waiver on Chart            PAT Communications_____________  ? Pre-op Instructions Given [place]          ______________________________  ? Directions to Surgery Center                          ______________________________  ? Transportation Home_______________      _______________________________  ?  Crutches/Walker__________________        _______________________________      ________Pre-op Orders   _______Transcribed    _______Wt.  ________Pharmacy          _______SCD  ______VTE     ______[employer]  ________Consent             ________TED Avie Sack

## 2020-09-28 NOTE — FLOWSHEET NOTE
[facility],  [address]        Physical Therapy  Cancellation/No-show Note  Patient Name:  Christopher Méndez  :  1955   Date:  2020  Cancelled visits to date: 2  No-shows to date: 0    For today's appointment patient:  ?  Cancelled  ? Rescheduled appointment  ? No-show     Reason given by patient:  ?  Patient ill  ? Conflicting appointment  ? No transportation    ? Conflict with work  ? No reason given  ?   Other:     Comments: Still  Waiting for authorization from [facility]    Electronically signed by:  Tegan Lind, [id number]

## 2020-10-02 NOTE — FLOWSHEET NOTE
[facility], [facility]  [address] [place] [address] [facility]  Phone: [phone]  Fax [phone]        Date:  10/2/2020    Patient Name:  Pedro Broad  \"Bud\"  :  1955  MRN: [id number]  Restrictions/Precautions:    Medical/Treatment Diagnosis Information:  · Diagnosis: T31.913 (ICD-10-CM) - Status post reverse total shoulder replacement, left (DOS 2020)  · Treatment Diagnosis: M12.812 (ICD-10-CM) - Rotator cuff arthropathy of left shoulder  Insurance/Certification information:  PT Insurance Information: [facility]  Physician Information:  Referring Practitioner: Dave Chowdhury  Has the plan of care been signed (Y/N):        []  Yes  [x]  No     Date of Patient follow up with Physician: 20      Is this a Progress Report:     [x]  Yes  []  No        If Yes:  Date Range for reporting period:  Beginning 2020  Ending 2020     Progress report will be due (10 Rx or 30 days whichever is less):       Recertification will be due (POC Duration  / 90 days whichever is less): 8 weeks from IE (20)    CHECK for [address] [place] [address] C-9  Visit # Insurance Allowable Auth Required    total [facility] 18 visits extended date / 12 visits t/o 20 [x]  Yes []  No    Requested  more visits: 2-3week for 6 weeks    Functional Scale: Q DASH  45%   Date assessed:  20     Therapy Diagnosis/Practice Pattern: L shoulder pain/ stiffness s/p rTSA; H      Number of Comorbidities:  []0     []1-2    []3+    Latex Allergy:  [x]NO      []YES  Preferred Language for Healthcare:   [x]English       []other:      Pain level:  4/10 with exercise, No pain at rest. Mostly stiffness and soreness still noted L shoulder. SUBJECTIVE:  Got news on ankle sx- DOS 10-5. Hoping to be done with shoulder rehab by then. Still having the tingling/falling asleep feeling from elbow to fingertips last night.  Able to use L UE to push up to a standing position with increased pain.    OBJECTIVE: Incision intact.  Observation: AROM  Flex 110, abd 95. IR to PSIS this visit. OBJECTIVE  Test used Initial score 8/24/20 9/21/20   Pain Summary 0-10 6-8 0-5/10 0-4/10   Functional questionnaire Q DASH 89% 73% 45%   Functional Testing              ROM PROM shoulder flex 102 150 150    PROM shoulder ER 10 35 70-75    AAROM flex  136 145    Elbow ext  -6 -6   Strength AROM flex  110 P    AROM abd NT 90 95 P    AROM ER/IR  Occiput /NT Occiput/PSIS    Flex  4- 4-/5    ABD (bent elbow)  4 4/5   9 weeks post-op on 9/10/20     Test measurements: ER PROM: 70 this visit on 8/31/20.     RESTRICTIONS/PRECAUTIONS: in w/c, fall risk, is hoping for tibiotalar fusion in September       Exercises/Interventions:     Therapeutic Ex (56990) Sets/sec Reps Notes/CUES   Pt education: px, dx, poc, role of PT, healing time, PROM vs AROM, effect of gravity on exercises, HEP performance with corrections 15 mins     Wall walks 10 reps    UT/ LS (S)  20\" 3 hep   Cane T spine rotation H5 10 B +hep   Stage 1 IR (s) 10\" 10 Gentle   Table slide- flex/ scaption  10\" 10 ea Hep   VC required for body lean   RTB  No money in pain free range  H5/ 2 10 reps    Supine shld flex/ scaption 2#  2x10 x ea    Supine CW/CCW AROM  A-P, med-lat  2#   Wall on ball circles    SA punches  3# 2x15     Tricep ext 2# 2 x 15 H5   Phase 1 strengthening 2# + to HEP   D1/D2 flexion 2# H5/ 2 x10 reps     SL scap retraction  SL clocks HEP   SL ER to neutral H5 2x10    SL shld abd 3#  SL H ABD 3#   H52 x10  2 x 10  2 x 10   Ant/ middle deltoid TB strengthening  RTB   TB ext (GTB)/ Rows(GTB)  TB H ABD (GTB)- B YTB ER 1x10   TB eccentric flex O.H to 90    Pulleys shld flex/ elbow straight H10 x15 reps    PROM ER to neutral with cane in supine H10 10 reps    Manual Intervention (27619)      PROM, IASTM to biceps (sweeping, fanning, strumming), pin and stretch x 8/ scar massage 10' min     HR (s) technique to gain elbow ext            Scar massage/PROM 5 mins           IR with towel assist H10 10 reps To PSIS   NMR re-education (85431)   CUES NEEDED                                                         Therapeutic Activity (39238)                                                Therapeutic Exercise and NMR EXR  [x] (15454) Provided verbal/tactile cueing for activities related to strengthening, flexibility, endurance, ROM  for improvements in scapular, scapulothoracic and UE control with self care, reaching, carrying, lifting, house/yardwork, driving/computer work. [x] (37011) Provided verbal/tactile cueing for activities related to improving balance, coordination, kinesthetic sense, posture, motor skill, proprioception  to assist with  scapular, scapulothoracic and UE control with self care, reaching, carrying, lifting, house/yardwork, driving/computer work. Therapeutic Activities:    [x] (19708 or 64240) Provided verbal/tactile cueing for activities related to improving balance, coordination, kinesthetic sense, posture, motor skill, proprioception and motor activation to allow for proper function of scapular, scapulothoracic and UE control with self care, carrying, lifting, driving/computer work.      Home Exercise Program:    [x] (39216) Reviewed/Progressed HEP activities related to strengthening, flexibility, endurance, ROM of scapular, scapulothoracic and UE control with self care, reaching, carrying, lifting, house/yardwork, driving/computer work  [] (60597) Reviewed/Progressed HEP activities related to improving balance, coordination, kinesthetic sense, posture, motor skill, proprioception of scapular, scapulothoracic and UE control with self care, reaching, carrying, lifting, house/yardwork, driving/computer work      Manual Treatments:  PROM / STM / Oscillations-Mobs:  G-I, II, III, IV (PA's, Inf., Post.)  [x] (49543) Provided manual therapy to mobilize soft tissue/joints of cervical/CT, scapular GHJ and UE for the purpose of modulating pain, promoting relaxation,  increasing ROM, reducing/eliminating soft tissue swelling/inflammation/restriction, improving soft tissue extensibility and allowing for proper ROM for normal function with self care, reaching, carrying, lifting, house/yardwork, driving/computer work    Modalities: At home     Charges:  Timed Code Treatment Minutes: 48'   Total Treatment Minutes: 48'     [facility] time in/time out: 10:30-11: 20    [] EVAL (LOW) 38228 (typically 20 minutes face-to-face)  [] EVAL (MOD) 78668 (typically 30 minutes face-to-face)  [] EVAL (HIGH) 03326 (typically 45 minutes face-to-face)  [] RE-EVAL     [x] KU(56052) x 1 11:00-11:20 [] IONTO  [x] NMR (64478) x 1 10:30-10;45   [] VASO  [x] Manual (97007) x 1   10:45-11:00  [] TA x       [] Mech Traction (05717)  [] ES(attended) (32320)      [] ES (un) (87533): Access Code: [email or web address]   URL: [email or web address]   Date: 08/20/2020   Prepared by: [place] [facility]     Exercises   Seated Elbow Flexion and Extension AROM - 10 reps - 1x daily - 7x weekly   Seated Shoulder Flexion Towel Slide at Table Top - 10 reps - 3-4x daily - 7x weekly   Standing Shoulder Shrug Circles AROM Backward - 10 reps - 3-4x daily - 7x weekly   Shoulder Extension with Resistance - 10 reps - 3 sets - 1x daily - 7x weekly   Seated Scapular Retraction - 10 reps - 3 sets - 1x daily - 7x weekly   Standing Shoulder Row with Anchored Resistance - 10 reps - 3 sets - 1x daily - 7x weekly   Seated Cervical Sidebending AROM - 3 reps - 20 hold - 3-4x daily - 7x weekly   Supine Shoulder Flexion Extension AAROM with Dowel - 10 reps - 3 sets - 1x daily - 7x weekly   Supine Shoulder Flexion AAROM with Hands Clasped - 10 reps - 3 sets - 1x daily - 7x weekly   Supine Shoulder Circles - 10 reps - 3 sets - 1x daily - 7x weekly     GOALS:  Patient stated goal: increase function of arm  [x]? Progressing: []? Met: []? Not Met: []? Adjusted     Therapist goals for Patient:   Short Term Goals:  To be achieved in: 2 weeks  1. Independent in HEP and progression per patient tolerance, in order to prevent re-injury. []? Progressing: [x]? Met: []? Not Met: []? Adjusted  2. Patient will have a decrease in pain to facilitate improvement in movement, function, and ADLs as indicated by Functional Deficits. []? Progressing: [x]? Met: []? Not Met: []? Adjusted     Long Term Goals: To be achieved in: 8 weeks  1. Disability index score of 50% or less for the [facility] to assist with reaching prior level of function. []? Progressing: [x]? Met: []? Not Met: []? Adjusted  2. Patient will demonstrate increased AROM to [facility] [place] to allow for proper joint functioning as indicated by patients Functional Deficits. [x]? Progressing: []? Met: []? Not Met: []? Adjusted  3. Patient will demonstrate an increase in Strength to [facility] [place] to allow for proper functional mobility as indicated by patients Functional Deficits. [x]? Progressing: []? Met: []? Not Met: []? Adjusted  4. Patient will return to 75% functional activities without increased symptoms or restriction. [x]? Progressing: []? Met: []? Not Met: []? Adjusted  5. Pt will be able to bear weight on arm in order to safely transfer after his leg surgery. (patient specific functional goal)    [x]? Progressing: []? Met: []? Not Met: []? Adjusted            Progression Towards Functional goals:  [x] Patient is progressing as expected towards functional goals listed. [] Progression is slowed due to complexities listed. [] Progression has been slowed due to co-morbidities. [] Plan just implemented, too soon to assess goals progression  [] Other:     ASSESSMENT:   PROM, AAROM, AROM, strength and scapular kinematics improving with less compensatory shoulder hiking from last progress note. Main complaint is stiffness and soreness L shld in general and still has pain / tightness in distal bicep region. Able to push off with L UE with transfers without increased discomfort.  Increased motion with reaching behind back. Overall Progression Towards Functional goals/ Treatment Progress Update:  [x] Patient is progressing as expected towards functional goals listed. [] Progression is slowed due to complexities/Impairments listed. [] Progression has been slowed due to co-morbidities. [] Plan just implemented, too soon to assess goals progression <30days   [] Goals require adjustment due to lack of progress  [] Patient is not progressing as expected and requires additional follow up with physician  [] Other    Prognosis for POC: [x] Good [] Fair  [] Poor      Patient requires continued skilled intervention: [x] Yes  [] No    Treatment/Activity Tolerance:  [x] Patient able to complete treatment  [] Patient limited by fatigue  [] Patient limited by pain    [] Patient limited by other medical complications  [] Other:         PLAN:Progress as tolerated. Patient scheduled for L foot sx on Monday. Will resume PT for shoulder when able pending recovery and NWB status of L ankle  [x] Continue per plan of care [] Alter current plan (see comments above)  [] Plan of care initiated [] Hold pending MD visit [] Discharge      Electronically signed by:  Gilberto Roblero, [address] [place] [address]    Note: If patient does not return for scheduled/ recommended follow up visits, this note will serve as a discharge from care along with most recent update on progress.

## 2020-10-05 PROBLEM — M21.542: Status: ACTIVE | Noted: 2020-01-01

## 2020-10-05 NOTE — ANESTHESIA PROCEDURE NOTES
Peripheral Block    Patient location during procedure: pre-op  Start time: 10/5/2020 8:14 AM  End time: 10/5/2020 8:19 AM  Staffing  Anesthesiologist: Shannan Hadley MD  Performed: anesthesiologist   Preanesthetic Checklist  Completed: patient identified, site marked, surgical consent, pre-op evaluation, timeout performed, IV checked, risks and benefits discussed, monitors and equipment checked, anesthesia consent given, oxygen available and patient being monitored  Peripheral Block  Patient position: right lateral decubitus  Prep: ChloraPrep  Patient monitoring: continuous pulse ox and IV access  Block type: Sciatic  Laterality: left  Injection technique: single-shot  Procedures: nerve stimulator  Infragluteal  Provider prep: sterile gloves  Needle  Needle gauge: 21 G  Needle length: 10 cm  Needle localization: nerve stimulator  Test dose: negative  Assessment  Injection assessment: no paresthesia on injection and negative aspiration for heme  Paresthesia pain: none  Slow fractionated injection: yes  Hemodynamics: stable  Additional Notes  BPV 0.25% 20cc in divided doses    IVS:  Propofol 60mg  Reason for block: post-op pain management and at surgeon's request

## 2020-10-05 NOTE — BRIEF OP NOTE
Brief Postoperative Note      Patient: Elham Warren  YOB: 1955  MRN: [id number]    Date of Procedure: 10/5/2020    Pre-Op Diagnosis: Necrosis, avascular, of bone ([address] [M87.00] severe equina varus deformity  Post-Op Diagnosis: Same       Procedure(s):  LEFT TIBIOTALOCALCANEAL FUSION AND FIBULAR OSTECTOMY -BLOCK- -SLEEP APNEA-bone graft    Surgeon(s):  Nino Loving MD    Assistant:  Surgical Assistant: Louisa Huber    Anesthesia: General    Estimated Blood Loss (mL): Minimal    Complications: None    Specimens:   * No specimens in log *    Implants:  * No implants in log *      Drains: * No LDAs found *    Findings: Severe equina varus deformity    Electronically signed by Nino Loving MD on 10/5/2020 at 8:44 AM

## 2020-10-05 NOTE — CONSULTS
Hospital Medicine  Consult History & Physical        Chief Complaint: Postoperative medical management    Date of Service: Pt seen/examined in consultation on 10/05/20    History Of Present Illness:    72 y.o. male with PMH of hypertension, obesity, BELEN (noncompliant to CPAP)  who we are asked to see/evaluate by Laura Mendez MD for medical management    Patient underwent left tibiocalcaneal fusion and fibular ostectomy for severe equinovarus deformity of left leg by Dr. Mp Rojas today. Patient tolerated procedure well. Currently complains of sharp intermittent pain at the incision site. Denies any postoperative nausea, vomiting. Noted blood pressure running high. Patient aware and reports that his blood pressure usually runs high and has family history of it. He also reports that his PCP recently increased his Norvasc 2.5 mg to twice daily. Also reports that he take labetalol 200 mg twice daily. Offers no new complaints. Denies smoking, drinking, illicit drug use. Past Medical History:        Diagnosis Date    Arthritis     Hypertension     Knee pain     PONV (postoperative nausea and vomiting)     Sleep apnea     did not tolerate CPAP       Past Surgical History:        Procedure Laterality Date    CHOLECYSTECTOMY      COLONOSCOPY      HIP SURGERY Left 01/01/1968    due to dislocation    AR LAP,DIAGNOSTIC ABDOMEN N/A 9/18/2018    DIAGNOSTIC LAPAROSCOPY performed [facility]ando [facility]; for possible SBO    PROSTATE SURGERY      TURP    SHOULDER ARTHROSCOPY Left 11/13/2019    VIDEO ARTHROSCOPY LEFT SHOULDER, removal loose body, debridment -BLOCK- -SLEEP APNEA- performed by Dannie Regalado MD at [address] [place] [address] Left 7/9/2020    LEFT REVERSE TOTAL SHOULDER ARTHROPLASTY performed by Dannie Regalado MD at [address] [place] [address]      age 8       Medications Prior to Admission:    Prior to Admission medications    Medication Sig Start Date End Date Taking? Authorizing Provider   acetaminophen (TYLENOL) 325 MG tablet Take 2 tablets by mouth every 6 hours as needed for Pain 7/10/20  Yes HUE Drummond   Naproxen Sodium (ALEVE PO) Take by mouth as needed   Yes Historical Provider, MD   amLODIPine (NORVASC) 2.5 MG tablet Take 2.5 mg by mouth 2 times daily    Yes Historical Provider, MD   labetalol (NORMODYNE) 200 MG tablet TAKE 1 TABLET BY MOUTH TWICE A DAY 5/7/19  Yes Historical Provider, MD       Allergies:  Ciprofloxacin and Lisinopril    Social History:    The patient currently lives at home and is independent of IADLs    TOBACCO:   reports that he quit smoking about 33 years ago. His smoking use included cigarettes. He started smoking about 47 years ago. He has a 2.50 pack-year smoking history. He has never used smokeless tobacco.  ETOH:   reports no history of alcohol use. Family History:  Reviewed in detail and negative for DM, CAD, Cancer, CVA. Positive as follows:        Problem Relation Age of Onset    Heart Disease Mother     Seizures Mother     High Blood Pressure Father     Kidney Disease Father     Cancer Father         colon,bone       REVIEW OF SYSTEMS:   Pertinent positives as noted in the HPI. All other systems reviewed and negative. PHYSICAL EXAM PERFORMED:  BP (!) 172/83   Pulse 78   Temp 98.3 °F (36.8 °C) (Oral)   Resp 16   Ht 6' 1\" (1.854 m)   Wt (!) 324 lb (147 kg)   SpO2 95%   BMI 42.75 kg/m²      General appearance: Obese CM in no apparent distress, appears stated age and cooperative. HEENT: Normal cephalic, atraumatic without obvious deformity. Pupils equal, round, and reactive to light. Extra ocular muscles intact. Conjunctivae/corneas clear. Neck: Supple, with full range of motion. No jugular venous distention. Trachea midline. Respiratory:  Normal respiratory effort. Clear to auscultation, bilaterally without Rales/Wheezes/Rhonchi.   Cardiovascular: Regular rate and rhythm with normal S1/S2 without murmurs, rubs or gallops. Abdomen: Soft, non-tender, non-distended with normal bowel sounds. Musculoskeletal: No clubbing, cyanosis or edema bilaterally. S/p Left tibiocalcaneal fusion and fibular ostectomy-currently covered with dressing. Skin: Skin color, texture, turgor normal.  No rashes or lesions. Neurologic:  Neurovascularly intact without any focal sensory/motor deficits. Cranial nerves: II-XII intact, grossly non-focal.  Psychiatric: Alert and oriented, thought content appropriate, normal insight  Capillary Refill: Brisk,< 3 seconds   Peripheral Pulses: +2 palpable, equal bilaterally     Active Hospital Problems    Diagnosis Date Noted    Equinovarus, acquired, left [M21.542] 10/05/2020     ASSESSMENT/ PLAN:  1. HTN -uncontrolled secondary to obstructive sleep apnea and noncompliance to CPAP  -Patient on labetalol 200 mg twice daily and recently had increase in Norvasc 2.5 mg to twice daily through his PCP-continue new dosing and monitor and adjust Norvasc dose as able. 2.  Severe equinovarus deformity of left leg S/p left tibiocalcaneal fusion and fibular ostectomy on 10/5/2020 by Dr. Maris Matthews.  -Postop pain management, DVT prophylaxis, PT OT referral per Ortho. 3. Morbid Obesity -  With Body mass index is 42.75 kg/m². Complicating assessment and treatment. Placing patient at risk for multiple co-morbidities as well as early death and contributing to the patient's presentation. Counseled on weight loss.      DVT Prophylaxis: As per Ortho   Diet: DIET GENERAL;  Code Status: Full Code    PT/OT Eval Status: Active and ongoing    Dispo - Thank you for the consultation, will follow up as needed    Electronically signed by Darcie Kirk MD on 10/5/20 at 6:46 PM EDT

## 2020-10-05 NOTE — PROGRESS NOTES
Block Time Out      Verified   Correct Pt. Correct   Correct Procedure  Correct Site  Correct Extremity        Per Dr. Azalea Felix and Davion Villalta RN.

## 2020-10-05 NOTE — ANESTHESIA PRE PROCEDURE
Department of Anesthesiology  Preprocedure Note       Name:  Sony Lantigua   Age:  72 y.o.  :  1955                                          MRN:  [id number]         Date:  10/5/2020      Surgeon: Juan Alberto Brantley):  René Mcgowan MD    Procedure: Procedure(s):  LEFT TIBIOTALOCALCANEAL FUSION AND FIBULAR OSTECTOMY -BLOCK- -SLEEP APNEA-    Medications prior to admission:   Prior to Admission medications    Medication Sig Start Date End Date Taking?  Authorizing Provider   acetaminophen (TYLENOL) 325 MG tablet Take 2 tablets by mouth every 6 hours as needed for Pain 7/10/20  Yes HUE Marti   Naproxen Sodium (ALEVE PO) Take by mouth as needed   Yes Historical Provider, MD   amLODIPine (NORVASC) 2.5 MG tablet Take 2.5 mg by mouth every evening    Yes Historical Provider, MD   labetalol (NORMODYNE) 200 MG tablet TAKE 1 TABLET BY MOUTH TWICE A DAY 19  Yes Historical Provider, MD       Current medications:    Current Facility-Administered Medications   Medication Dose Route Frequency Provider Last Rate Last Dose    ceFAZolin (ANCEF) 3 g in dextrose 5 % 100 mL IVPB  3 g Intravenous On Call to [address] [place] [address] MD Ubaldo        lactated ringers infusion   Intravenous Continuous Yesi Hunt  mL/hr at 10/05/20 0757      sodium chloride flush 0.9 % injection 10 mL  10 mL Intravenous 2 times per day Yesi Hunt MD        sodium chloride flush 0.9 % injection 10 mL  10 mL Intravenous PRN Yesi Hunt MD        lidocaine PF 1 % injection 0.3 mL  0.3 mL Intradermal Once PRN Yesi Hunt MD        HYDROmorphone (DILAUDID) injection 0.5 mg  0.5 mg Intravenous Q10 Min PRN Ria Dumont MD        HYDROmorphone (DILAUDID) injection 0.5 mg  0.5 mg Intravenous Q5 Min PRN Ria Dumont MD        oxyCODONE-acetaminophen (PERCOCET) 5-325 MG per tablet 1 tablet  1 tablet Oral PRN Ria Dumont MD        Or    oxyCODONE-acetaminophen (PERCOCET) 5-325 MG per tablet 2 tablet  2 tablet Oral PRN Tristen Diaz Edgar Altamirano MD        diphenhydrAMINE (BENADRYL) injection 6.25 mg  6.25 mg Intravenous Once PRN Roderick Saunders MD        [facility]ron [facility]) [facility] 4 mg  4 mg Intravenous Q30 Min PRN Roderick Saunders MD        labetalol (NORMODYNE;TRANDATE) injection 5 mg  5 mg Intravenous Q15 Min PRN Roderick Saunders MD        hydrALAZINE (APRESOLINE) injection 5 mg  5 mg Intravenous Q30 Min PRN Roderick Saunders MD        meperidine (DEMEROL) injection 12.5 mg  12.5 mg Intravenous Q5 Min PRN Roderick Saunders MD           Allergies: Allergies   Allergen Reactions    Ciprofloxacin Itching    Lisinopril Other (See Comments)     cough       Problem List:    Patient Active Problem List   Diagnosis Code    Left ankle pain M25.572    Arthritis of left ankle M19.072    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 92XA    AVN (avascular necrosis of bone) ([facility]) M87.00       Past Medical History:        Diagnosis Date    Arthritis     Hypertension     Knee pain     PONV (postoperative nausea and vomiting)     Sleep apnea     did not tolerate CPAP       Past Surgical History:        Procedure Laterality Date    CHOLECYSTECTOMY      COLONOSCOPY      HIP SURGERY Left 01/01/1968    due to dislocation    NJ LAP,DIAGNOSTIC ABDOMEN N/A 9/18/2018    DI[facility]; for possible SBO    PROSTATE SURGERY      TURP    SHOULDER ARTHROSCOPY Left 11/13/2019    VIDEO ARTHROSCOPY LEFT SHOULDER, removal loose body, debridment -BLOCK- -SLEEP APNEA- performed by Ulises Andrews MD at [address] [place] [address] Left 2020    LEFT REVERSE TOTAL SHOULDER [facility]THROPLAST[facility]      age 8       Social History:    Social History     Tobacco Use    Smoking status: Former Smoker     Packs/day: 0.50     Years: 5.00     Pack years: 2.50     Types: Cigarettes     Start date:      Last attempt to quit: 1987     Years since quittin.3    Smokeless tobacco: Never Used   Substance Use Topics    Alcohol use: No     Alcohol/week: 0.0 standard drinks     Comment: rarely                                Counseling given: Not Answered      Vital Signs (Current):   Vitals:    20 0938 10/05/20 0750   BP:  (!) 143/84   Pulse:  74   Resp:  16   Temp:  98.8 °F (37.1 °C)   SpO2:  96%   Weight: (!) 324 lb (147 kg) (!) 324 lb (147 kg)   Height: 6' 1\" (1.854 m) 6' 1\" (1.854 m)                                              BP Readings from Last 3 Encounters:   10/05/20 (!) 143/84   07/10/20 118/73   20 126/81       NPO Status: Time of last liquid consumption: 430                        Time of last solid consumption:                         Date of last liquid consumption: 10/05/20                        Date of last solid food consumption: 10/04/20    BMI:   Wt Readings from Last 3 Encounters:   10/05/20 (!) 324 lb (147 kg)   20 (!) 320 lb (145.2 kg)   20 (!) 320 lb (145.2 kg)     Body mass index is 42.75 kg/m².     CBC:   Lab Results   Component Value Date    WBC 5.9 2020    RBC 5.05 2020    HGB 15.7 2020    HCT 45.6 2020    MCV 90.3 2020    RDW 13.0 2020     2020       CMP:   Lab Results   Component Value Date     2020    K 3.9 2020    K 3.8 2018     2020    CO2 27 2020    BUN 8 2020    CREATININE 0.8 2020    GFRAA >60 2020    AGRATIO 1.4 2020    LABGLOM >60 2020 GLUCOSE 104 05/31/2020    PROT 6.3 05/31/2020    CALCIUM 9.1 05/31/2020    BILITOT 0.6 05/31/2020    ALKPHOS 85 05/31/2020    AST 31 05/31/2020    ALT 39 05/31/2020       POC Tests: No results for input(s): POCGLU, POCNA, POCK, POCCL, POCBUN, POCHEMO, POCHCT in the last 72 hours. Coags: No results found for: PROTIME, INR, APTT    HCG (If Applicable): No results found for: PREGTESTUR, PREGSERUM, HCG, HCGQUANT     ABGs: No results found for: PHART, PO2ART, UOP2QBT, IWB4GNG, BEART, J2ZKQQOL     Type & Screen (If Applicable):  No results found for: LABABO, LABRH    Drug/Infectious Status (If Applicable):  No results found for: HIV, HEPCAB    COVID-19 Screening (If Applicable):   Lab Results   Component Value Date    COVID19 NOT DETECTED 09/29/2020         Anesthesia Evaluation  Patient summary reviewed and Nursing notes reviewed history of anesthetic complications:   Airway: Mallampati: III     Neck ROM: full   Dental:          Pulmonary:   (+) sleep apnea:                             Cardiovascular:    (+) hypertension:,                   Neuro/Psych:               GI/Hepatic/Renal:   (+) morbid obesity          Endo/Other:                     Abdominal:           Vascular:                                        Anesthesia Plan      general and regional     ASA 3     (FNB for POA)  Induction: intravenous. Anesthetic plan and risks discussed with patient. Plan discussed with CRNA.                   Jacqueline Quinones MD   10/5/2020

## 2020-10-06 NOTE — OP NOTE
[facility]                 Dede Nolasco                                OPERATIVE REPORT    PATIENT NAME: Prema Mathur                    :        1955  MED REC NO:   [id number]                          ROOM:       6882  ACCOUNT NO:   [de-identified]                           ADMIT DATE: 10/05/2020  PROVIDER:     Ramón Arciniega MD    DATE OF PROCEDURE:  10/05/2020    PREOPERATIVE DIAGNOSIS:  Left ankle equinovarus deformity. POSTOPERATIVE DIAGNOSIS:  Left ankle equinovarus deformity. OPERATION PERFORMED:  Left tibiotalocalcaneal fusion, fibular ostectomy,  bone grafting. PRIMARY SURGEON:  Ramón Arciniega MD    ANESTHESIA:  General.    ANTIBIOTICS:  Ancef. DRAINS:  None. TUBES:  None. SPECIMENS:  None. ESTIMATED BLOOD LOSS:  Less than 50 mL. TOURNIQUET TIME:  Less than 2 hours. INDICATIONS:  The patient is a 42-year-old gentleman who has had  multiple injuries to his left ankle. He was found to have a severe  equinovarus deformity and now presents for left TTC fusion, bone  grafting. Risks and benefits of surgery were explained to the patient. Informed consent was signed in the chart prior to surgery. No  guarantees were given or implied. PROCEDURE:  The patient was brought to the operating room and after  adequate general anesthesia and block was placed in supine position. Nonsterile tourniquet was placed around the proximal aspect of his left  upper thigh. His left lower extremity was prepped and draped in sterile  fashion. Leg was exsanguinated, tourniquet inflated to 350 mmHg. At  this point, a curvilinear incision was made extending from the tip of  the fibula distally 2 cm curving toward the sinus tarsi and proximally 8  cm. The fibula was skeletonized using a Bovie and osteotomized  proximally using an oscillating saw approximately 4 cm above the tibial  plafond.   The bone was taken to the back table were it was debrided of  all soft tissue and then morselized and made into bone graft using a  bone mill. This was mixed with AUGMENT graft and 4 mL of blood. The  tibiotalar joint was in significant varus and the subtalar joint was  completely displaced. The joints were then decorticated using a power  chisel, curved curette and rongeur. Once we cleaned out the medial  gutter and decorticated the tibiotalar joint, we were able to reduce the  tibiotalar joint in a much better position. The subtalar joint was  decorticated and lined up nicely. At this point, the surfaces were fish  scaled and AUGMENT graft injected into both the ankle and subtalar  joint. The ankle joint was reduced, so that there was 90 degrees of  ankle dorsiflexion, plantar flexion. The second ray lined up with the  anterior tibial crest and the heel was in slight valgus. So it was  pinned in place using three 2.0 K-wires. Position was checked under  image intensification, noted to be excellent. The guidewire for the  Integra Panta nail was then inserted through the heel. The 7 and then 9  mm reamers were used over the top of this once the wire was noted to be  in midline position AP and lateral.  Serial reaming was then performed  up to a size 11.5 and at this point, the 12.5 mm proximal canal reamer  was used in the calcaneus and talus. A size 11 x 180 mm TTC Integra  Panta two nail was inserted without difficulty. Two small incisions  were made on the posterior heel and then two 5 mm partially threaded  headless screws were inserted through the calcaneus. The tibial  compression rods were then inserted through the outrigger proximally and  the nail was compressed down approximately 3 to 4 mm. Two static  locking screws were then placed from the medial side and one lateral  talar screw through the outrigger. Excellent stability was noted.   The  outrigger was removed after the compression released and the two  compression rods removed. Final images were obtained after bone graft  was inserted in any of the areas of the tibiotalar and subtalar joint  that weren't completely congruent. Excellent compression was noted. The alignment looked excellent. Once the outrigger was removed, the  wounds were copiously irrigated and closed using 2-0 Vicryl suture for  the deep fascia, 3-0 Vicryl suture inverted fashion for the subcutaneous  tissue and staples for the skin. The posterior and plantar heels were  closed using 4-0 nylon suture in simple fashion. Wounds were dressed  with Xeroform dry sterile dressing, sterile Webril, ABDs and placed into  a well-padded posterior splint. Tourniquet was deflated after less than  2 hours. Toes were noted to pink up nicely. The patient was awakened  in the operating room, brought to the PACU in good condition. ADDENDUM:  Three views of the ankle were obtained multiple times  throughout the procedure. Final images were obtained, read by me and  showed that the ankle and subtalar joint had been bridged with a nail. There were two screws from posterior to anterior in the calcaneus, one  from lateral to medial in the talus and two from medial to lateral in  the proximal tibia.         Rommel Tiwari MD    D: 10/05/2020 12:40:55       T: 10/05/2020 12:46:39     [email or web address]  Job#: [id number]     Doc#: [id number]    CC:

## 2020-10-06 NOTE — PROGRESS NOTES
Occupational Therapy   Occupational Therapy Initial Assessment/Treatment   Date: 10/6/2020   Patient Name: Meghan Saba  MRN: [id number]     : 1955    Date of Service: 10/6/2020    Discharge Recommendations:  24 hour supervision or assist, Home with Home health OT(home occupational therapy needed for home evaluation and safety training for adls with weight bearing status.)  OT Equipment Recommendations  Equipment Needed: Yes  Mobility Devices: ADL Assistive Devices  ADL Assistive Devices: Toileting - Drop Arm Commode, Heavy Duty Drop Arm Commode  Other: Pt is not able to complete toilet transfer per home set up and new NWB status. Pt is unable  to position w/c in bathroom to complete safe toilet transfer. Pt will need drop arm commode to safely complete toileting and sit pivot transfer to R side due to NWB on LLE and recent L total shoulder replacement. Assessment   Performance deficits / Impairments: Decreased ADL status; Decreased functional mobility ; Decreased balance;Decreased safe awareness;Decreased high-level IADLs  Assessment: Pt 71 yo male functioning with deficits in the areas listed above following Left tibiotalocalcaneal fusion. Pt had L reverse total shoulder replacement 2 months ago. Pt is currently non weight bearing to LLE. Pt reports IND PLOF and will have 24 hour assist at home. Pt is currently at a CGA level for functional transfers. Pt and spouse verbalized understanding of transferring to R side and increased safety awareness. Pt educated on sponge bath for home due to tub transfer unsafe at this time. Pt will be unable to position w/c in bathroom to complete safe toilet transfer. Pt will need droparm bedside commode to complete toileting. Skilled OT services needed while in acute care. Prognosis: Good  Decision Making: Medium Complexity  OT Education: OT Role;Plan of Care;Precautions; ADL Adaptive Strategies;Transfer Training;Family Education;Equipment  Patient Education: disease

## 2020-10-06 NOTE — PROGRESS NOTES
Physical Therapy    Facility/Department: [facility] - MED SURG/ORTHO  Initial Assessment    NAME: Sony Lantigua  : 1955  MRN: [id number]    Date of Service: 10/6/2020    Discharge Recommendations:  24 hour supervision or assist, Home with Home health PT   PT Equipment Recommendations  Equipment Needed: (bedside commode, w/c will not fit in restroom)  If pt discharges prior to next PT session this note will serve as discharge summary. Assessment   Body structures, Functions, Activity limitations: Decreased functional mobility ; Increased pain;Decreased ROM  Assessment: pt is 72 yr old male POD 1, L tib-kia fusion, fibular ostectomy, bone grafting, NWB LLE. PMH includes reverse TSA L shoulder 2020. PLOF: Lives with wife in single level home with ramped entrance. Has been amb with hemiwalker in [place] since L shoulder surgery in August. Today pt performs bed mob Supervised, performs a partial stand pivot to his R with CG ( restrict use of LUE due to recent surgery, keep LLE NWB). pt will benefit from skilled PT to address post op deficits. Recommend home with 24 hr assist and home PT. Pt owns electric and manual w/c, will need bedside commode due to small bathroom where w/c will not fit  Treatment Diagnosis: decreased mobility post ankle surgery  Specific instructions for Next Treatment: ex, transfers  Prognosis: Good  Decision Making: Medium Complexity  PT Education: Goals; General Safety;Gait Training;PT Role;Disease Specific Education;Plan of Care; Functional Mobility Training;Transfer Training;Weight-bearing Education  Patient Education: instructed in compensatory transfer technique keeping LLE NWB post op, and restricting use of LUE due to reverse TSA in August  Barriers to Learning: none  REQUIRES PT FOLLOW UP: Yes  Activity Tolerance  Activity Tolerance: Patient Tolerated treatment well       Patient Diagnosis(es): There were no encounter diagnoses.      has a past medical history of Arthritis, Hypertension, Knee pain, PONV (postoperative nausea and vomiting), and Sleep apnea. has a past surgical history that includes Cholecystectomy; Prostate surgery; hip surgery (Left, 01/01/1968); pr lap,diagnostic abdomen (N/A, 9/18/2018); Colonoscopy; Shoulder arthroscopy (Left, 11/13/2019); shoulder surgery (Left, 7/9/2020); and Tonsillectomy.     Restrictions  Restrictions/Precautions: Weight Bearing, General Precautions, Fall Risk  Lower Extremity Weight Bearing Restrictions  Left Lower Extremity Weight Bearing: Non Weight Bearing  Vision/Hearing  Vision: Impaired  Vision Exceptions: Wears glasses for reading  Hearing: Within functional limits     Subjective  Chart Reviewed: Yes  Patient assessed for rehabilitation services?: Yes  Family / Caregiver Present: Andrew Blanton)  Referring Practitioner: Jaspreet Burnett  Referral Date : 10/06/20  Diagnosis: L tib-kia fusion, fibular ostectomy and bone grafting  Follows Commands: Within Functional Limits  Comments: RN cleared pt for therapy, pt resting in bed on approach  Subjective: Pt agrees to therapy, voices understanding of NWB LLE, L shoulder precautions from prior surgery, Up with staff assist only and use of call light  Pain Screening  Patient Currently in Pain: Yes  Pain Assessment  Pain Assessment: 0-10  Pain Level: 4  Patient's Stated Pain Goal: No pain  Pain Type: Surgical pain  Pain Location: Ankle  Pain Orientation: Left  Pain Descriptors: Discomfort  Pain Frequency: Continuous  Pain Onset: On-going  Clinical Progression: Not changed  Functional Pain Assessment: Activities are not prevented  Non-Pharmaceutical Pain Intervention(s): Emotional support;Repositioned  Response to Pain Intervention: Patient Satisfied    Orientation  Overall Orientation Status: Within Normal Limits  Social/Functional History  Social/Functional History  Lives With: Family  Type of Home: House  Home Layout: Multi-level, Able to Live on Main level with bedroom/bathroom  Home Access: Ramped entrance  Entrance Stairs - Number of Steps: 6 with L rail  Bathroom Shower/Tub: Tub/Shower unit, Shower chair with back  Bathroom Toilet: Handicap height  Bathroom Equipment: Grab bars in shower  Home Equipment: [address])  ADL Assistance: Independent  Ambulation Assistance: Independent(hemiwalker)  Transfer Assistance: Independent  Active : Yes  Occupation: Full time employment  Type of occupation: maintance  Objective     RLE AROM: WFL  LLE AROM : WFL(ankle not tested)  Strength RLE: WFL  Strength LLE: WFL(ankle not tested post op)        Bed mobility  Supine to Sit: Supervision  Transfers  Squat Pivot Transfers: Contact guard assistance  Ambulation  Ambulation?: No(unable due to NWB LLE and precautions for L shoulder post reverse TSA done in August)        Exercises  Straight Leg Raise: 10 x RLE  Quad Sets: 10 x B  Gluteal Sets: 10 x B  Ankle Pumps: 10 x RLE     Plan   Times per week: 7 days wk  Specific instructions for Next Treatment: ex, transfers  Current Treatment Recommendations: Strengthening, Home Exercise Program, Safety Education & Training, Functional Mobility Training, Transfer Training  Safety Devices  Type of devices:  All fall risk precautions in place, Left in chair, Call light within reach, Nurse notified, Gait belt, Patient at risk for falls(pt and wife voice understanding of up with staff assist only and use of call light, no alarm)  AM-PAC Score     AM-PAC Inpatient Mobility without Stair Climbing Raw Score : 14 (10/06/20 0955)  AM-PAC Inpatient without Stair Climbing T-Scale Score : 40.85 (10/06/20 0955)  Mobility Inpatient CMS 0-100% Score: 53.33 (10/06/20 0955)  Mobility Inpatient without Stair CMS G-Code Modifier : CK (10/06/20 0955)       Goals  Short term goals  Time Frame for Short term goals: 2-3 days (10/8) unless otherwise specified  Short term goal 1: pt to perform bed mob modified indep  Short term goal 2: pt to perform partial stand pivot bed to chair to the R SBA  Short term goal 3: pt to participate in 12-15 reps LE Ex by 10/7  Patient Goals   Patient goals : \"to move from my bed to a chair with one person guarding\"- MET       Therapy Time   Individual Concurrent Group Co-treatment   Time In 0825         Time Out 0858         Minutes 33         Timed Code Treatment Minutes: [address] [facility] [place] Mike, PT

## 2020-10-06 NOTE — PROGRESS NOTES
Department of Orthopedic Surgery  Nurse Practitioner   Progress Note    Subjective:     Post-Operative Day: 1 Status Post left tibiotalocalcaneal fusion, fibular ostectomy, bone grafting  Systemic or Specific Complaints: Patient resting in bed. Complaining of pain after working with therapy. Wife at bedside. Objective:     Patient Vitals for the past 24 hrs:   BP Temp Temp src Pulse Resp SpO2   10/06/20 1131 (!) 147/77 98.4 °F (36.9 °C) Oral 80 18 93 %   10/06/20 0837 136/77 98.5 °F (36.9 °C) Oral 80 18 94 %   10/05/20 2351 (!) 173/77 98.1 °F (36.7 °C) Oral 85 16 94 %   10/05/20 2130 -- 98.4 °F (36.9 °C) Axillary -- 17 95 %   10/05/20 1630 (!) 172/83 98.3 °F (36.8 °C) Oral 78 16 95 %   10/05/20 1530 (!) 152/81 -- -- 86 16 92 %   10/05/20 1425 (!) 150/70 -- -- 75 18 95 %   10/05/20 1415 -- -- -- 80 -- 96 %   10/05/20 1409 (!) 149/82 -- -- 81 16 97 %   10/05/20 1355 (!) 193/86 -- -- 82 18 97 %   10/05/20 1353 -- -- -- -- -- 94 %   10/05/20 1330 (!) 162/88 -- -- 77 11 96 %   10/05/20 1315 (!) 179/83 -- -- 77 14 94 %   10/05/20 1310 (!) 172/86 -- -- 75 15 95 %   10/05/20 1300 (!) 200/76 -- -- 75 14 96 %   10/05/20 1245 (!) 187/85 -- -- 73 16 92 %   10/05/20 1230 (!) 175/83 -- -- 70 21 95 %   10/05/20 1225 (!) 172/84 -- -- 72 15 94 %   10/05/20 1220 (!) 164/87 97.3 °F (36.3 °C) -- 72 19 95 %   10/05/20 1215 (!) 162/87 -- -- 72 19 91 %   10/05/20 1210 (!) 176/86 -- -- 75 21 95 %   10/05/20 1205 (!) 155/99 98 °F (36.7 °C) Temporal 74 20 96 %       General: alert, appears stated age, cooperative and no distress   Wound: Wound clean and dry no evidence of infection. Motion: ROM deferred. Moving toes   DVT Exam: No evidence of DVT seen on physical exam.       Splint in good position, no shadow drainage noted to ace bandage  NVI to surgical extremity.         Data Review  CBC:   Lab Results   Component Value Date    WBC 11.3 10/06/2020    RBC 4.72 10/06/2020    HGB 14.5 10/06/2020    HCT 42.9 10/06/2020     10/06/2020       Assessment:     Status Post left  left tibiotalocalcaneal fusion, fibular ostectomy, bone grafting. Doing well postoperatively. Plan:      1: Continues current post-op course : Continue current plan of care. Post-op labs reviewed. Medicine following. 2:  Continue Deep venous thrombosis prophylaxis - Lovenox  3:  Continue physical therapy, OT, NWB. Limitations LUE due to L shoulder replacement in July. 4:  Continue Pain Control  5:  Discharge planning pending progress with therapy. Planning for home with [facility]  and [facility], however patient may require SNF at discharge as wife is unsure if she can properly assist him at home with current limitations. CM gave list of SNFs for patient to review. Discharge pending plan and pain control.     Nieves Munoz, CNP

## 2020-10-07 NOTE — PROGRESS NOTES
Occupational Therapy  Facility/Department: [facility] C5 - MED SURG/ORTHO  Daily Treatment Note  NAME: Sony Lantigua  : 1955  MRN: [id number]    Date of Service: 10/7/2020    Discharge Recommendations:  24 hour supervision or assist, Home with Home health OT(home OT to evaluate home set up for adls, toileting and bathing)  OT Equipment Recommendations  ADL Assistive Devices: Toileting - Drop Arm Commode, Heavy Duty Drop Arm Commode    Assessment   Performance deficits / Impairments: Decreased ADL status; Decreased functional mobility ; Decreased balance;Decreased safe awareness;Decreased high-level IADLs  Assessment: Pt pleasant and agreeable to therapy. Pt and spouse verbalized concerns with transfers and need for commod for home. Pt educated on the importance of NWB to LLE and limited use of LUE for all functional transfers. Pt attempted transfer with RW(per [facility], [address], imelda for transfers only). Pt unable to get to standing positon. Pt completed x4 attempts for safe sit pivot transfers for home. Pt educated on sponge bath at home until able to safely complete tub transfer with home OT. Pt and spouse verbalized understanding. Cont with POC. Prognosis: Good  OT Education: OT Role;Plan of Care;Precautions; ADL Adaptive Strategies;Transfer Training;Family Education;Equipment  Patient Education: disease specific: safe transfer, adaptive bathing, weight bearing status, multiple transfer attempts  REQUIRES OT FOLLOW UP: Yes  Activity Tolerance  Activity Tolerance: Patient Tolerated treatment well  Safety Devices  Safety Devices in place: Yes  Type of devices: Call light within reach;Gait belt;Nurse notified; Bed alarm in place; Left in bed         Patient Diagnosis(es): The encounter diagnosis was S/P ankle fusion. has a past medical history of Arthritis, Hypertension, Knee pain, PONV (postoperative nausea and vomiting), and Sleep apnea.    has a past surgical history that includes Cholecystectomy; Prostate surgery; chair<>bed, pt practiced simulated transfers for home set up. Pt attempted transfer with RW(okay for RW for transfers only from Leila Dakins, [address]). Pt and spouse verbalized understanding                                                                 Plan   Plan  Times per week: 4-6x/wk  Current Treatment Recommendations: Functional Mobility Training, Safety Education & Training, Self-Care / ADL, Strengthening, Patient/Caregiver Education & Training, Balance Training    AM-PAC Score        AM-PAC Inpatient Daily Activity Raw Score: 20 (10/07/20 1257)  AM-PAC Inpatient ADL T-Scale Score : 42.03 (10/07/20 1257)  ADL Inpatient CMS 0-100% Score: 38.32 (10/07/20 1257)  ADL Inpatient CMS G-Code Modifier : Kip Waldwick (10/07/20 1257)    Goals  Short term goals  Time Frame for Short term goals: 1 week (10/13/20)  Short term goal 1: Pt will complete toilet transfer to bed side commode with CGA. goal met 10/07  Short term goal 2: Pt will complete LB dressing of pants with min A. ongoing  Short term goal 3: Pt will complete 15 reps of RUE exercises for increased strength for functional transfer. (10/10/20) ongoing  Patient Goals   Patient goals : \"to figure out how I am going to get around at home\"       Therapy Time   Individual Concurrent Group Co-treatment   Time In 1036         Time Out 1120         Minutes 44         Timed Code Treatment Minutes: [address] OTR/L    If pt is unable to be seen after this session, please let this note serve as discharge summary. Please see case management note for discharge disposition. Thank you.

## 2020-10-07 NOTE — PROGRESS NOTES
Pt assessment completed and charted. VSS. Pt a/o. Pt declines any pain at this time. Sx dressing C/D/I. Bed in lowest position and wheels locked. Call light within reach. Bedside table within reach. Non-skid footwear in place. Pt denies any other needs at this time. Pt calls out appropriately. Will continue to monitor.

## 2020-10-07 NOTE — DISCHARGE INSTR - COC
Continuity of Care Form    Patient Name: Yasmin Brice   :  1955  MRN:  [id number]    Admit date:  10/5/2020  Discharge date:  ***    Code Status Order: Full Code   Advance Directives:   Advance Care Flowsheet Documentation       Date/Time Healthcare Directive Type of Healthcare Directive Copy in [address] Agent's Name Healthcare Agent's Phone Number    20 [phone]  No, patient does not have an advance directive for healthcare treatment -- -- -- -- --            Admitting Physician:  Donald Saunders MD  PCP: Sharron Gillis MD    Discharging Nurse: [facility] Unit/Room#: 0521/0521-01  Discharging Unit Phone Number: ***    Emergency Contact:   Extended Emergency Contact Information  Primary Emergency Contact: Roseann Murrieta  Address: [place], [place] [address] [address] Phone: [phone]  Mobile Phone: [phone]  Relation: Spouse    Past Surgical History:  Past Surgical History:   Procedure Laterality Date    CHOLECYSTECTOMY      COLONOSCOPY      HIP SURGERY Left 1968    due to dislocation    DC LAP,DIAGNOSTIC ABDOMEN N/A 2018    DIAGNOSTIC LAPAROSCOPY performed by Fortino Snowden [facility] at [facility]; for possible SBO    PROSTATE SURGERY      TURP    SHOULDER ARTHROSCOPY Left 2019    VIDEO ARTHROSCOPY LEFT SHOULDER, removal loose body, debridment -BLOCK- -SLEEP APNEA- performed by Azul Larios MD at [address] [place] [address] Left 2020    LEFT REVERSE TOTAL SHOULDER ARTHROPLASTY performed by Azul Larios MD at [place] [address].      age 8       Immunization History: There is no immunization history on file for this patient.     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 92XA    AVN (avascular necrosis of bone) ([facility]) M87.00    Equinovarus, acquired, left M21.542       Isolation/Infection:   Isolation            No Isolation          Patient Infection Status       Infection Onset Added Last Indicated Last Indicated By Review Planned Expiration Resolved Resolved By    None active    Resolved    COVID-19 Rule Out 07/09/20 07/09/20 07/09/20 COVID-19 (Ordered)   07/09/20 Rule-Out Test Resulted            Nurse Assessment:  Last Vital Signs: BP (!) 154/78   Pulse 78   Temp 98.5 °F (36.9 °C) (Oral)   Resp 17   Ht 6' 1\" (1.854 m)   Wt (!) 324 lb (147 kg)   SpO2 93%   BMI 42.75 kg/m²     Last documented pain score (0-10 scale): Pain Level: 3  Last Weight:   Wt Readings from Last 1 Encounters:   10/05/20 (!) 324 lb (147 kg)     Mental Status:  oriented and alert    IV Access:  - None    Nursing Mobility/ADLs:  Walking   Assisted  Transfer  Assisted  Bathing  Assisted  Dressing  Assisted  Toileting  Assisted  Feeding  Independent  Med Admin  Independent  Med Delivery   whole    Wound Care Documentation and Therapy:        Elimination:  Continence:   · Bowel: Yes  · Bladder: Yes  Urinary Catheter: None   Colostomy/Ileostomy/Ileal Conduit: No       Date of Last BM: 10/7/20    Intake/Output Summary (Last 24 hours) at 10/7/2020 1223  Last data filed at 10/7/2020 0905  Gross per 24 hour   Intake 480 ml   Output 500 ml   Net -20 ml     I/O last 3 completed shifts: In: 480 [P.O.:480]  Out: 300 [Urine:300]    Safety Concerns:      At Risk for Falls    Impairments/Disabilities:      None    Nutrition Therapy:  Current Nutrition Therapy:   - Oral Diet:  General    Routes of Feeding: Oral  Liquids: No Restrictions  Daily Fluid Restriction: no  Last Modified Barium Swallow with Video (Video Swallowing Test): not done    Treatments at the Time of Hospital Discharge:   Respiratory Treatments: ***  Oxygen Therapy:  is not on home oxygen therapy. Ventilator:    - No ventilator support    Rehab Therapies: Physical Therapy and Occupational Therapy  Weight Bearing Status/Restrictions: Non-weight bearing on left leg  Other Medical Equipment (for information only, NOT a DME order):  wheelchair, walker, bath bench and bedside commode  Other Treatments: ***    Patient's personal belongings (please select all that are sent with patient):  None    RN SIGNATURE:  Electronically signed by Abby Tellez RN on 10/7/20 at 1:59 PM EDT    CASE MANAGEMENT/SOCIAL WORK SECTION    Inpatient Status Date: ***    Readmission Risk Assessment Score:  Readmission Risk              Risk of Unplanned Readmission:        7           Discharging to Facility/ [address]   [address] [facility]   [address] [facility]   [phone]       / signature: Electronically signed by DACIA Tomlinson on 10/7/20 at 2:26 PM EDT    PHYSICIAN SECTION    Prognosis: {Prognosis:4970723752:::0}    Condition at Discharge: [address] Patient Condition:389153137:::0}    Rehab Potential (if transferring to Rehab): {Prognosis:0059596976:::0}    Recommended Labs or Other Treatments After Discharge: ***    Physician Certification: I certify the above information and transfer of Nohemi Poole  is necessary for the continuing treatment of the diagnosis listed and that he requires {Admit to Appropriate Level of Care:15512:::0} for {GREATER/LESS:210754568} 30 days.      Update Admission H&P: {CHP DME Changes in HandP:002760656:::0}    PHYSICIAN SIGNATURE:  {Esignature:180519888:::0}

## 2020-10-07 NOTE — PLAN OF CARE
Problem: Infection:  Goal: Will remain free from infection  Description: Will remain free from infection  10/7/2020 1353 by Elina Mendoza RN  Outcome: Completed  10/7/2020 0039 by Vasu Keyes RN  Outcome: Ongoing

## 2020-10-07 NOTE — DISCHARGE SUMMARY
Department of Orthopedic Surgery  Physician Assistant   Discharge Summary    The Louise Shipley is a 72 y.o. male admitted for left TTC fusion. Louise Shipley was admitted to the floor following His recovery in the PACU. Discharge Diagnosis  left  TTC fusion, fibular osteotomy, and bone grafting    Current Inpatient Medications    Current Facility-Administered Medications: acetaminophen (TYLENOL) tablet 650 mg, 650 mg, Oral, Q4H PRN  ketorolac (TORADOL) injection 15 mg, 15 mg, Intravenous, Q6H PRN  labetalol (NORMODYNE) tablet 200 mg, 200 mg, Oral, BID  dextrose 5 % and 0.45 % NaCl with KCl 20 mEq infusion, , Intravenous, Continuous  sodium chloride flush 0.9 % injection 10 mL, 10 mL, Intravenous, 2 times per day  sodium chloride flush 0.9 % injection 10 mL, 10 mL, Intravenous, PRN  polyethylene glycol (GLYCOLAX) packet 17 g, 17 g, Oral, Daily PRN  promethazine (PHENERGAN) tablet 12.5 mg, 12.5 mg, Oral, Q6H PRN **OR** ondansetron (ZOFRAN) injection 4 mg, 4 mg, Intravenous, Q6H PRN  oxyCODONE (ROXICODONE) immediate release tablet 5 mg, 5 mg, Oral, Q4H PRN **OR** oxyCODONE (ROXICODONE) immediate release tablet 10 mg, 10 mg, Oral, Q4H PRN  amLODIPine (NORVASC) tablet 2.5 mg, 2.5 mg, Oral, BID  enoxaparin (LOVENOX) injection 40 mg, 40 mg, Subcutaneous, Daily    Post-operatively the patients diet was advanced as tolerated and their dressing was changed on POD #1. The incision is dressing in place, clean, dry and intact with no signs of infection. The patient remained neurovascularly intact in the left lower and had intact pulses distally. Patients calf remained soft and showed no evidence of DVT. The patient was able to move their left lower extremity without any problems post-operatively. Physical therapy and occupational therapy were consulted and began working with the patient post-operatively. The patient progressed with PT/OT as would be expected and continued to improve through their stay.   The patients pain was initially controlled with IV medications but we were able to transition to oral pain medications soon after arrival to the floor and their pain remained under good control through their hospital stay. From a medical standpoint the patient remained stable and continued to have the medicine team follow throughout their stay. The patient will be discharged at this time to Home  with their current diet restrictions and will continue to follow the precautions outlined to them by us and PT/OT. Condition on Discharge: Stable    Plan  Return visit in 2 weeks. .  Patient was instructed on the use of pain medications, the signs and symptoms of infection, and was given our number to call should they have any questions or concerns following discharge. For opioid prescriptions given at discharge the following statement is provided for compliance with [employer]SM[employer] rules. Patient being given increased dosage/quantity of opoid pain medication in excess of [employer]SMB guidelines which noted a 30 MED daily of opioids due to the fact that he/she has undergone major orthopaedic surgery as outlined in rule 4731-11-13. Dosages and further duration of the pain medication will be re-evaluated at her post op visit in 2 weeks. Patient was educated on dosing expectations and limits of prescribing as a result of the new [facility] rules enacted August 31, 2017. Please also note that this is not the initial opoid prescription issued to this patient but a continuation of medication utilized during the hospital admission as noted in the medical record. [place]ARRS report has also been utilized to screen for any abuse history or suspicious activity as outlined in [place]. All efforts have been taken to prevent abuse potential and misuse of opioid medications including education, screening, and close clinical follow up.

## 2020-10-07 NOTE — PROGRESS NOTES
Physical Therapy  Facility/Department: [facility] C5 - MED SURG/ORTHO  Daily Treatment Note  NAME: Brynn Motley  : 1955  MRN: [id number]    Date of Service: 10/7/2020    Discharge Recommendations:  24 hour supervision or assist, Home with Home health PT        Assessment   Body structures, Functions, Activity limitations: Decreased functional mobility ; Increased pain;Decreased ROM  Assessment: Pt is 70yo male s/p TSA 2020 and now with L ankle fusion, NWB. Pt performed squat pivot transfers X 4 in preparataion for home tranfers OOB to commode and or wheelchair. Trialed stnading with walker but pt was unable to stand fully erect to grab walker. Pt plans to D/C home with spousal support and will need drop arm commode for easier transfers as he us unable to stand up completely to clear the arm rest. Pt has electric and manual wheelchairs. Treatment Diagnosis: decreased mobility post ankle surgery  Specific instructions for Next Treatment: ex, transfers  Prognosis: Good  PT Education: Goals; General Safety;Gait Training;PT Role;Disease Specific Education;Plan of Care; Functional Mobility Training;Transfer Training;Weight-bearing Education  Patient Education: instructed in compensatory transfer technique keeping LLE NWB post op, and restricting use of LUE due to reverse TSA in August  REQUIRES PT FOLLOW UP: Yes  Activity Tolerance  Activity Tolerance: Patient Tolerated treatment well     Patient Diagnosis(es): There were no encounter diagnoses. has a past medical history of Arthritis, Hypertension, Knee pain, PONV (postoperative nausea and vomiting), and Sleep apnea. has a past surgical history that includes Cholecystectomy; Prostate surgery; hip surgery (Left, 1968); pr lap,diagnostic abdomen (N/A, 2018); Colonoscopy; Shoulder arthroscopy (Left, 2019); shoulder surgery (Left, 2020); and Tonsillectomy.     Restrictions  Restrictions/Precautions  Restrictions/Precautions: Weight Bearing, General Precautions, Fall Risk  Lower Extremity Weight Bearing Restrictions  Left Lower Extremity Weight Bearing: Non Weight Bearing     Subjective   General  Chart Reviewed: Yes  Response To Previous Treatment: Patient with no complaints from previous session. Family / Caregiver Present: Yes(spouse)  Referring Practitioner: [place]  Subjective  Subjective: Pt agrees to therapy, voices understanding of NWB LLE, L shoulder precautions from prior surgery, Up with staff assist only and use of call light  General Comment  Comments: RN cleared pt for therapy, pt resting in bed on approach  Pain Screening  Patient Currently in Pain: Yes  Pain Assessment  Pain Assessment: 0-10  Pain Level: 3  Pain Location: Ankle  Pain Orientation: Left  Vital Signs  Patient Currently in Pain: Yes       Orientation  Orientation  Overall Orientation Status: Within Normal Limits     Objective   Bed mobility  Supine to Sit: Modified independent  Sit to Supine: Modified independent  Transfers  Squat Pivot Transfers: Contact guard assistance(trialed 4x and pt was CGA/ SBA each time. Pt did have 1 LOB but was  able to recover with Abelardo and use of UE's. )  Ambulation  Ambulation?: No(got approval for walker trial during transfers only from Wilver Nicholas but pt was uanble to stand fully erect and reach for walker, he decides to continue with sqat pivot transfers)   * discussed car transfers with spouse and pt, they both voice understanding    AM-PAC Score     AM-PAC Inpatient Mobility without Stair Climbing Raw Score : 13 (10/07/20 1208)  AM-PAC Inpatient without Stair Climbing T-Scale Score : 38.96 (10/07/20 1208)  Mobility Inpatient CMS 0-100% Score: 58.44 (10/07/20 1208)  Mobility Inpatient without Stair CMS G-Code Modifier : CK (10/07/20 1208)       Goals  Short term goals  Time Frame for Short term goals: 2-3 days (10/8) unless otherwise specified  Short term goal 1: pt to perform bed mob modified indep; 10/7 met  Short term goal 2: pt to perform partial stand pivot bed to chair to the R SBA; 10/7 sqat pivots X 4 reps CGA/SBA with 1 LOB  Short term goal 3: pt to participate in 12-15 reps LE Ex by 10/7; 10/7 continue goal  Patient Goals   Patient goals : \"to move from my bed to a chair with one person guarding\"- MET    Plan    Plan  Times per week: 7 days wk  Specific instructions for Next Treatment: ex, transfers  Current Treatment Recommendations: Strengthening, Home Exercise Program, Safety Education & Training, Functional Mobility Training, Transfer Training  Safety Devices  Type of devices:  All fall risk precautions in place, Call light within reach, Bed alarm in place, Gait belt, Left in bed, Nurse notified     Therapy Time   Individual Concurrent Group Co-treatment   Time In 1036         Time Out 1120         Minutes [address] [place] [address] [id number]

## 2020-10-07 NOTE — CARE COORDINATION
CASE MANAGEMENT DISCHARGE SUMMARY      Discharge to: Home with wife     New Durable Medical Equipment ordered/agency: [facility]- providing bedside         commode   Transportation: Private car, Patient and wife declined EMS    Confirmed discharge plan with: Patient and wife      Facility/Agency, name: [facility] review recommendations for 24 hr care Patient and wife, both agreeable. Patient to discharge home with 24 support from wife and other family. Patient and wife declined assistance with transport home reports will go private care. Review per request of patient cost of lift chair. Patient edu cost of renting lift chair- [employer] does not support, [employer] private pay $300 a month. Patient declined. Patient will be [facility] by [facility] and [facility] to provide bedside commode to be delivered between 2-4 pm Patient and family aware. No other dc needs at this time.  DACIA Mcgarry
per therapy. Order in call placed to [facility] for fill order. Family reports has been requesting extenders thru [employer] for leg rest- in progress. Patient inquiring about renting a lift chair [employer] does not provide [employer] is out of pocket cost of $300 per month.  Patietn and family edu on cost and will see how thing progress re cost. DACIA Vasquez on chart for MD signature

## 2020-10-07 NOTE — PROGRESS NOTES
Department of Orthopedic Surgery  TRISHA  Progress Note    Subjective:     Post-Operative Day: 2 Status Post left tibiotalocalcaneal fusion, fibular ostectomy, bone grafting  Systemic or Specific Complaints:  No new issues today, pain control improved. Wife at bedside. Objective:     Patient Vitals for the past 24 hrs:   BP Temp Temp src Pulse Resp SpO2   10/07/20 0900 (!) 154/78 98.5 °F (36.9 °C) Oral 78 17 93 %   10/06/20 2225 (!) 148/86 99.1 °F (37.3 °C) Oral 96 18 90 %   10/06/20 2224 -- 99.1 °F (37.3 °C) -- 96 -- 92 %   10/06/20 2050 (!) 147/78 98.5 °F (36.9 °C) Oral 87 18 92 %   10/06/20 1347 (!) 156/83 99.3 °F (37.4 °C) Oral -- 18 --       General: alert, appears stated age, cooperative and no distress   Wound: Wound clean and dry no evidence of infection. Motion: ROM deferred. Moving toes   DVT Exam: No evidence of DVT seen on physical exam.       Splint in good position, no shadow drainage noted to ace bandage  NVI to surgical extremity. Data Review  CBC:   Lab Results   Component Value Date    WBC 9.2 10/07/2020    RBC 4.57 10/07/2020    HGB 14.2 10/07/2020    HCT 41.3 10/07/2020     10/07/2020       Assessment:     Status Post left  left tibiotalocalcaneal fusion, fibular ostectomy, bone grafting. Doing well postoperatively. Plan:      1: Continues current post-op course : Continue current plan of care. Post-op labs reviewed and stable. Medicine following. 2:  Continue Deep venous thrombosis prophylaxis - Lovenox  3:  Continue physical therapy, OT, NWB. Limitations LUE due to L shoulder replacement in July. Call out to Dr. Yennifer Amaya, he is 13 weeks out of reverse TSR.   4:  Continue Pain Control  5:  Discharge for home today. Planning for home with [facility]. FLP updated  6:  Prescriptions have been filled through the Meds to [facility].       Johnathon Arevalo PA-C

## 2020-10-07 NOTE — PLAN OF CARE
Problem: Pain:  Goal: Pain level will decrease  Description: Pt scoring pain on 0-10 scale. Pain medications given per MAR. Pt instructed to call out when pain level increasing. Call light within reach. Nurse will continue to reassess and monitor.    10/7/2020 1126 by Bre Hall RN  Outcome: Ongoing  10/7/2020 0039 by Alana Ruiz RN  Outcome: Ongoing     Problem: Infection:  Goal: Will remain free from infection  Description: Will remain free from infection  10/7/2020 0039 by Alana Ruiz RN  Outcome: Ongoing

## 2020-10-27 NOTE — PROGRESS NOTES
Telephone consultation morning October 27. Follow-up of his reverse shoulder arthroplasty. Seems to be making good progress. The movement still feels somewhat unnatural to him and strength is coming back slowly but he is having no complications. Chanel Clarke is a 72 y.o. male evaluated via telephone on 10/27/2020. Consent:  He and/or health care decision maker is aware that that he may receive a bill for this telephone service, depending on his insurance coverage, and has provided verbal consent to proceed: Yes      Documentation:  I communicated with the patient and/or health care decision maker about recovery from reverse shoulder arthroplasty. Details of this discussion including any medical advice provided: As noted above. Recommended ongoing strengthening work. Permanent restrictions will be in place      I affirm this is a Patient Initiated Episode with a Patient who has not had a related appointment within my department in the past 7 days or scheduled within the next 24 hours.     Patient identification was verified at the start of the visit: Yes    Total Time: minutes: 11-20 minutes    Note: not billable if this call serves to triage the patient into an appointment for the relevant concern      Waylon Alas

## 2020-11-03 NOTE — PROGRESS NOTES
Subjective: Patient is here for follow-up of his 10/5/2020 left TTC fusion. He states his pain is minimal maybe a 2 out of 10 on the left. His main complaint is right knee pain again. It gives way at times. He is used a brace which helps a little but he is not doing much walking or any activities. He feels very off balance when he tries to stand on that right side only  Objective: Physical exam shows incisions look good on the left ankle. Alignment looks excellent. Is able to wiggle his toes move his knee no signs of DVT or compartment syndrome. His right knee is painful and has crepitus with motion. Strength is 3/5 in the knee flexion extension through limited range has obvious crepitus  Imaging: 3 views of the left ankle show the TTC fusion healing nicely appears to have a early fusion mass. The right knee shows end-stage tricompartmental osteoarthritis with medial deviation of the femur on the tibia  Assessment and plan: Patient has very severe osteoarthritis in the right knee and is status post TTC fusion on the left. Is fusions doing well and I injected his right knee with Marcaine lidocaine and Kenalog 4/4/2 cc for osteoarthritis. He tolerated this well follow-up in 3 weeks. I did recast his left ankle which I think will give him more protection.   He will go into a boot on his next visit and we may or may not let him weight-bear

## 2020-11-23 NOTE — PROGRESS NOTES
Subjective: Patient is here for follow-up of his 10/5/2020 left TTC fusion with fibular ostectomy.   He states he has no pain he is doing well out of cast today  Objective: Physical exam shows incisions look good no evidence of infection sensations intact his alignment looks excellent he has moderate swelling  Imaging: 3 views of the left ankle show the TTC fusion healing very nicely looks excellent  Assessment and plan: Illness patient is doing well I put him in a boot given the weightbearing handout he can weight-bear in 1 week and then I will see him back in 3 weeks repeat x-rays will try to get him into a shoe

## 2020-12-01 NOTE — PROGRESS NOTES
Surgery: Left reverse total shoulder replacement    Post-Op Month: 6 months    Chief Complaint:  Shoulder Pain (WC F/U LEFT RTSA)      History of Present of Illness: Today, the patient is doing well overall. In regard to his shoulder he has little pain. He still is limited in his range of motion and is anxious to get back to doing what he wants to do. 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 He is very physical and needs to be able to climb ladders. He has been unable to attend outpatient physical therapy for his shoulder due to his recent foot surgery. However he has had home physical therapy for his foot and it has also been working with his shoulder. He feels that he has continued to make progress. Since his shoulder surgery he has recently had an left ankle fusion by Lord Wallace in early October. He seems to be recovering well from that but comes to the office in a wheelchair today. He is allowed to start putting 25% of his weight on that foot and has a walker coming soon. Also note the patient reports that he has an appointment for his right knee as he has been diagnosed with significant arthritic changes. He thinks that he may need a knee replacement. Review of Systems  Pertinent items are noted in HPI  Denies fever, chills, confusion, bowel/bladder active change. Review of systems reviewed from Patient History Form dated on 12/16/2019 and available in the patient's chart under the Media tab. Examination:  No gross deformities noted. No ecchymosis or erythema. Incision is healed nicely. Forward flexion to approximately 120 degrees, abduction to approximately 100 degrees. Strength continues to improve as well. Radiology:     2 views of the left shoulder taken in the office today show normal postoperative changes following a reverse total shoulder arthroplasty. No signs of hardware loosening.     Orders Placed This Encounter   Procedures    XR SHOULDER LEFT (MIN 2 VIEWS)

## 2020-12-01 NOTE — TELEPHONE ENCOUNTER
11/30/2020  [facility]   NOTIFICATION OR PRIOR AUTHORIZATION IS NOT REQUIRED FOR THE REQUESTED SERVICES. PER [facility] ONLINE. DECISION ID # [id number].   AP

## 2020-12-14 NOTE — LETTER
CONSENT TO SURGICAL OR MEDICAL PROCEDURE    PATIENTS NAMES: Layla Howell 1955  72 y.o. [phone] (home) [phone] (work)  DATE/TIME: 12/14/2020 2:15 PM    1) I consent that Dr. Brian Beard perform one or more surgical and or medical procedures on the above named patient at: [facility] [address] to treat the condition(s) which appear indicated by the diagnostic studies already preformed. I have been told in general terms the nature and purpose of the procedure(s) and what the procedure(s) is/are expected to accomplish. They procedure(s) are as follows:   RIGHT [place] TOTAL KNEE REPLACEMENT     2) It has been explained to me by the informing physician that during the course of any surgical or medical procedure unforeseen condition(s) may be revealed that necessitate an extension of the original procedure(s) or a different procedure(s) than set forth in Paragraph 1. I therefore consent that the above named physician perform such additional or different procedure(s) as are necessary or desirable in the exercise of his professional judgement. 3) I have been made aware by the informing physician of certain reasonably known risks that are associated with the procedure(s) set forth in Paragraph 1.  I understand the reasonably known risk to be: Including but not limited to: CVA, infection, M.I., Phlebitis, Cardiac Arrest and Pulmonary Embolism, Loss of Circulation, Nerve Injury, Delayed Healing, Recurrence, Loss of extremity/digit, R.S.D., Screw breakage, Arthritis, Pain, Swelling, Stiffness, Failure of Prothesis, Fracture, Leg length discrepancy, Wound complication/non-healing, need for further surgery and persistent pain. 4) I have also been informed by the informing physician that there are other risks, from both known and unknown causes, that are attendant to the performance of any surgical or medical procedure(s). I am aware that the practice of surgery and medicine is not an exact science, and I acknowledge that no guarantees have been made to me concerning the results of the procedure(s). 5) I consent to the taking of photographs before, during and after the procedure(s) for scientific and educational purposes. I also understand that medical students and residents may participate in the procedure(s) set forth in Paragraph 1, and I consent to their participation under the supervision of the above named physician. 6) I consent to the administration of anesthesia and to the use of such anesthetics as may be deemed advisable by the anesthesiologist engaged to administer anesthesia.   7) I certify that I have read and understand this consent to the surgical or medical procedure(s); that all the information contained herein was disclosed to me by the informing physician prior to my signing; that all blanks or statements requiring insertions or completion were filled in and inapplicable paragraphs, if any, were stricken before I signed; and that all questions asked by me about the procedure(s) have been fully answered by the informing physician in a satisfactory manner.    ________________________________                           _______________________________  Signature of patient                                                                  Jesusita Ta M.D.  ________________________________                           ________________________________  Signature of Informing Physician                                           Informing Physician (Print) If patient is unable to sign or is a minor, complete one of the following:   A) Patient is a minor ______________ years of age. B) Patient is unable to sign because_________________________________________________    The undersigned represents that he or she is duly authorized to execute to this consent for and on the behalf of the above named patient.    ________________________________             __________________________________________  Witness                                                                         Parent/Spouse/Guardian/Other:_________________    Medical Record#  Insurance  Smartphone:  Yes   Or   No  Email:                   You have signed a consent to have a total joint replacement surgery performed. Before you can proceed with surgery the following things must be done. Please use this form as a checklist.      _____   Please schedule your Physical Therapy functional evaluation. (Allowed locations are listed on the order)    _____   Please take your lab orders and get your blood work done at a [facility]. (If needed, please use the web site to find the location closest to you:  [email or web address]) You do not need an appointment and you do not need to fast.    _____  If you did not register in the office, you will need to go to the website for [facility] ([email or web address]) to complete registration and the medical questionnaire prior to your physical therapy evaluation. Do not schedule an appointment with your primary care physician until you have a surgery date. This pre-op history and physical has to be within 30 days of the surgery. _____  CT Scan. This will be scheduled once your surgery has been scheduled. _____  Information about the pre-op class, your CT scan, your post-op appointment and cold therapy options will be in your surgery packet that will be mailed to you after you are scheduled for surgery. Once you have completed both the labs and the Physical Therapy evaluation please call Halle Payne @ [phone] ([phone])  to let her know. Once verification of the PT Evaluation and completed labs has been determined you will be called and set up for surgery. This may take 1-2 days to check results and return your phone call. You will not be called about lab results if everything is normal.      Please make sure you have not blocked our number. Halle Payne will call from [phone]. Also, please make sure your voice mail is not full.

## 2020-12-14 NOTE — LETTER
Attention    These are medical screening labs, not pre-admission surgery labs. Alex Butterfield M.D. Phone: [phone] / [phone] ([phone])   Fax:  [phone], [address] [place] [address]    Date:  2020    Name: Alisha Kelly    : 1955    Please take this form with you.       THE FOLLOWING LABS NEED TO BE COMPLETED:    _x__UA  _x__URINE C/S (THIS NEEDS TO BE DONE EVEN IF THE UA IS NORMAL)  _x__CBC W/ DIFF  _x__PT/INR  _x__PTT  _x__TRANSFERRIN  _x__ALBUMIN  _x__CHEM 7  _x__HEMAGLOBIN A1-C  ____OTHER: _____________________________________________                         Diagnosis:  RIGHT KNEE OSTEOARTHRITIS            RT KNEE OA M17.11      LT KNEE OA M17.12         RT HIP OA  M16.11         LT HIP OA M16.12         RT SHLD OA  M19.011   LT SHLD OA  M19.012             Z01.812  (Pre-op examination code)      2020 2:14 PM  Conchis Brush PA-C

## 2020-12-14 NOTE — PROGRESS NOTES
Subjective: Patient is here for follow-up of his 10/5/2020 left TTC fusion. He states he is doing well has no pain on the left side. His main complaint is right knee pain. He did see Felicitas Velazquez yesterday and he states he was told he needs to be able to walk 6 minutes and get his BMI below 30 before he considers anything surgical.  He does not know how he will make this because of his severe right knee pain. He has been full weightbearing on the left ankle in the boot  Objective: Physical exam shows incisions are well-healed no evidence of infection sensations unchanged. He is able to wiggle his toes has excellent alignment with the foot on the ground. Imaging: 3 views of the left ankle show the fusion healing very nicely looks excellent.   Assessment and plan: I gave him a prescription for physical therapy weightbearing as tolerated wean to a shoe and follow-up with me in 4 weeks repeat x-rays

## 2020-12-14 NOTE — PROGRESS NOTES
Chief Complaint    New Patient (right knee, knee gives out with any WBing, has had problems for years. Cant stand on it, had coritzone inj about 6 weeks ago)      History of Present Illness: Maru Sadlre is a 72 y.o. male who comes in today for evaluation and treatment of ongoing right knee pain. He is an established patient with [address] [place] [address] Dr. Chula Jennings for a left ankle related issue. Patient's been having pain and instability in his right knee for a number of years. It severely affected his mobility. He recently received a cortisone injection from Dr. Boy Leal as recently as 6 weeks ago. He reports frequent episodes of instability and giving way and really cannot bear weight on that right leg without any support with a walker or cane. He has tried various different oral analgesics such as Tylenol and other NSAIDs such as ibuprofen. Reports pain at night and difficulty sleeping. Aggravating factors include walking, standing, stairs and pre much anything with weightbearing. Reports his level of pain to be an 8 out of 10. He comes in today and she discussing his options for treatment     PAIN:   Pain Assessment  Location of Pain: Knee  Location Modifiers: Right  Severity of Pain: 8  Quality of Pain: Aching, Dull  Duration of Pain: A few minutes  Frequency of Pain: Intermittent  Aggravating Factors: Walking, Standing  Limiting Behavior: Some  Relieving Factors: Rest  Result of Injury: No  Work-Related Injury: No  Are there other pain locations you wish to document?: No    The patients chronic pain has gradually worsening over the last 3 years. The patient rates pain on a level of 8/10. Pain impacts patients ability to drive, sleep and climb stairs.       Medical History:     Past Medical History:   Diagnosis Date    Arthritis     Hypertension     Knee pain     PONV (postoperative nausea and vomiting)     Sleep apnea     did not tolerate CPAP Patient Active Problem List    Diagnosis Date Noted    Equinovarus, acquired, left 10/05/2020    AVN (avascular necrosis of bone) ([facility] 07/31/2020    Shoulder fracture, left, closed, initial encounter 07/09/2020    SBO (small bowel obstruction) ([facility] 05/27/2020    Essential hypertension 05/27/2020    BELEN (obstructive sleep apnea) 05/27/2020    Obesity, Class III, BMI 40-49.9 (morbid obesity) ([facility] 05/27/2020    Small bowel obstruction ([facility] 09/17/2018    Diverticulosis 08/21/2018    Partial intestinal obstruction (HCC)     Partial small bowel obstruction (HCC) 08/20/2018    Arthritis of left ankle 04/24/2018    Osteoarthritis of left subtalar joint 04/24/2018    Left ankle pain 06/10/2015     Current Outpatient Medications   Medication Sig Dispense Refill    aspirin 325 MG EC tablet Take 1 tablet by mouth daily 30 tablet 0    meloxicam (MOBIC) 7.5 MG tablet Take 1 tablet by mouth daily 30 tablet 0    acetaminophen (TYLENOL) 325 MG tablet Take 2 tablets by mouth every 6 hours as needed for Pain 120 tablet 0    Naproxen Sodium (ALEVE PO) Take by mouth as needed      amLODIPine (NORVASC) 2.5 MG tablet Take 2.5 mg by mouth 2 times daily       labetalol (NORMODYNE) 200 MG tablet TAKE 1 TABLET BY MOUTH TWICE A DAY       No current facility-administered medications for this visit. Medication Management  Patient has been treated with NSAIDs, Steroids and Analgesics with Minimal relief for 3 years. Review of Systems:  Relevant review of systems dated 12/14/2020was reviewed and available in the patient's chart under the media tab. Vital Signs: There were no vitals filed for this visit. Body mass index is 44.07 kg/m². Limitation in Activities of Daily Living (ADLs)  The patient is able to ambulate with/without the assistance of wheelchair, cane and walker for No steps  steps/feet.       Walking distance less than 1 block Examination of the right hip reveals intact skin. The patient demonstrates full painless range of motion with regards to flexion, abduction, internal and external rotation. There is no tenderness about the greater trochanter. There is a negative straight leg raise against resistance. Strength is 5/5 throughout all planes. Radiology:   X-rays previously obtained of the right knee as well as 2 additional views of the right knee taken today in the office do not demonstrate severe, tricompartmental osteoarthritis of the right knee with 25% subluxation of the tibiofemoral joint. Severe subchondral sclerosing and osteophyte formation is present with early tibial erosion. Marked varus deformity          Failed Outpatient management or Previous Surgical Intervention  Patient has undergone conservative treatment of right knee osteoarthritis for the past 3 years. Patient has undergone therapy consisting of at least 3 months of physical therapy which included muscle strengthening and flexibility program, cortisone injections, Visco supplementation, different oral medications including NSAIDs and analgesics, efforts at weight loss, and activity modification years with no improvement in  pain relief or function. Impression:  Encounter Diagnoses   Name Primary?     Chronic pain of right knee     Primary osteoarthritis of right knee Yes    Obesity, Class III, BMI 40-49.9 (morbid obesity) ([facility]        Office Procedures:  Orders Placed This Encounter   Procedures    XR KNEE RIGHT (1-2 VIEWS)     Standing Status:   Future     Number of Occurrences:   1     Standing Expiration Date:   1/14/2021   Stu Gibbs PT - [facility]     Referral Priority:   Routine     Referral Type:   Eval and Treat     Referral Reason:   Specialty Services Required     Requested Specialty:   Physical Therapy     Number of Visits Requested:   1    [employer]     Referral Priority:   Routine Referral Type:   Consult for Advice and Opinion     Referral Reason:   Specialty Services Required     Requested Specialty:   Nutrition     Number of Visits Requested:   1 Treatment Plan:  I discussed with the patient the nature of osteoarthritis of the knee. We talked about treatment of arthritis and the various options that are involved with this. The patient understands that the treatments can vary from essentially doing nothing to a total joint replacement arthroplasty for arthritis. I then went on to describe the utilization of glucosamine and chondroitin sulfate as a joint nutrition product. We talked about the fact that this is essentially a joint vitamin with typically minimal side effects. We also talked about utilization of prescription over-the-counter anti-inflammatory medications as the next option. We also discussed the possibility of brace wear or orthotic wear if the patient has significant varus alignment. We then went on to discuss the possibility of Visco supplementation with hyaluronate products. We talked about the typical course of this type of treatment and the fact that often times in the treatment for significant arthritis, this is successful less than half the time. We also talked about the corticosteroid injections and the fact that this can give a brief window of relief, but does not cure the problem; in fact, the pain often has a rebound effect in 6-10 weeks after the steroid has worn off. We also discussed arthroscopy surgery in attempts to debride the joint, but the fact that this is relatively unreliable treatment in the face of significant arthritis. It can occasionally be used, particularly if there is significant meniscus pathology. Lastly we discussed total joint replacement arthroplasty as the final and definitive step in treatment of arthritis. Patient realizes the magnitude of this type of treatment as well as having voiced a general understanding to the duration of the prosthesis. The patient voiced understanding to these continuum of treatment options. At this point the patient has failed conservative treatment as mentioned above. They would like to go ahead and proceed with a right total knee replacement with robotic assistance. This may require a posterior stabilized or total stabilized total knee replacement system. This does require nondiagnostic CT scan for surgical planning. The patient is aware that this may or may not be covered by the insurance provider and would be an out-of-pocket expense. Patient also is aware that he does require some medical optimization. His current BMI is 40 kg/m². We would like for him to work on a weight loss program and try to achieve as much weight loss as he can prior to surgical intervention. He also require some optimization through physical therapy to improve his ambulatory status. He also need clearance from Dr. Aura Henderson as he is just recently recovered from an ankle surgery and has just at this point begun weightbearing as tolerated on the left lower extremity. He understands that medical optimization is essential for both immediate as well as long-term outcomes. We discussed the risk, benefits, and potential complications of knee replacement arthroplasty surgery. The patient voiced their understanding to concerns that include infection, deep vein thrombosis, neurological injury, and delayed rehabilitation. The patient also realizes that there are concerns regarding the potential need for manipulation under anesthesia if range of motion proves to be problematic. The patient also understands that there is always a chance of dystrophy and anesthetic complications that would include a stroke, cardiopulmonary pathology, and even death. We also discussed the rehabilitation process involved with this operation and options that involved not only the hospitalization but outpatient physical therapy and independent home exercise program.  The patient also realizes the need for a knee brace and ambulatory aids in the rehabilitation process as well as the very significant role that the patient plays in terms of rehabilitation after this type of operation. The patient also understands that although the patient typically functional by 6-8 weeks postop that it may take 9 months to year for full recovery. All questions were answered.     Demand matching completed

## 2021-01-01 ENCOUNTER — HOSPITAL ENCOUNTER (INPATIENT)
Age: 66
LOS: 4 days | DRG: 177 | End: 2021-02-10
Attending: EMERGENCY MEDICINE | Admitting: INTERNAL MEDICINE
Payer: COMMERCIAL

## 2021-01-01 ENCOUNTER — APPOINTMENT (OUTPATIENT)
Dept: GENERAL RADIOLOGY | Age: 66
DRG: 336 | End: 2021-01-01
Payer: COMMERCIAL

## 2021-01-01 ENCOUNTER — TELEPHONE (OUTPATIENT)
Dept: SURGERY | Age: 66
End: 2021-01-01

## 2021-01-01 ENCOUNTER — ANESTHESIA EVENT (OUTPATIENT)
Dept: OPERATING ROOM | Age: 66
DRG: 336 | End: 2021-01-01
Payer: COMMERCIAL

## 2021-01-01 ENCOUNTER — VIRTUAL VISIT (OUTPATIENT)
Dept: ORTHOPEDIC SURGERY | Age: 66
End: 2021-01-01
Payer: COMMERCIAL

## 2021-01-01 ENCOUNTER — APPOINTMENT (OUTPATIENT)
Dept: GENERAL RADIOLOGY | Age: 66
DRG: 177 | End: 2021-01-01
Payer: COMMERCIAL

## 2021-01-01 ENCOUNTER — APPOINTMENT (OUTPATIENT)
Dept: PHYSICAL THERAPY | Age: 66
End: 2021-01-01
Payer: COMMERCIAL

## 2021-01-01 ENCOUNTER — APPOINTMENT (OUTPATIENT)
Dept: CT IMAGING | Age: 66
DRG: 336 | End: 2021-01-01
Payer: COMMERCIAL

## 2021-01-01 ENCOUNTER — HOSPITAL ENCOUNTER (INPATIENT)
Age: 66
LOS: 8 days | Discharge: HOME | DRG: 336 | End: 2021-01-23
Attending: EMERGENCY MEDICINE | Admitting: INTERNAL MEDICINE
Payer: COMMERCIAL

## 2021-01-01 ENCOUNTER — HOSPITAL ENCOUNTER (OUTPATIENT)
Dept: PHYSICAL THERAPY | Age: 66
Setting detail: THERAPIES SERIES
Discharge: HOME | End: 2021-01-05
Payer: COMMERCIAL

## 2021-01-01 ENCOUNTER — APPOINTMENT (OUTPATIENT)
Dept: CT IMAGING | Age: 66
DRG: 177 | End: 2021-01-01
Payer: COMMERCIAL

## 2021-01-01 ENCOUNTER — ANESTHESIA (OUTPATIENT)
Dept: OPERATING ROOM | Age: 66
DRG: 336 | End: 2021-01-01
Payer: COMMERCIAL

## 2021-01-01 ENCOUNTER — TELEPHONE (OUTPATIENT)
Dept: ORTHOPEDIC SURGERY | Age: 66
End: 2021-01-01

## 2021-01-01 ENCOUNTER — OFFICE VISIT (OUTPATIENT)
Dept: ORTHOPEDIC SURGERY | Age: 66
End: 2021-01-01
Payer: COMMERCIAL

## 2021-01-01 VITALS
SYSTOLIC BLOOD PRESSURE: 125 MMHG | DIASTOLIC BLOOD PRESSURE: 70 MMHG | RESPIRATION RATE: 8 BRPM | TEMPERATURE: 98.6 F | OXYGEN SATURATION: 88 %

## 2021-01-01 VITALS
BODY MASS INDEX: 41.75 KG/M2 | SYSTOLIC BLOOD PRESSURE: 100 MMHG | DIASTOLIC BLOOD PRESSURE: 70 MMHG | HEIGHT: 73 IN | RESPIRATION RATE: 27 BRPM | WEIGHT: 315 LBS | OXYGEN SATURATION: 64 % | HEART RATE: 78 BPM | TEMPERATURE: 96 F

## 2021-01-01 VITALS
HEART RATE: 75 BPM | SYSTOLIC BLOOD PRESSURE: 136 MMHG | DIASTOLIC BLOOD PRESSURE: 79 MMHG | WEIGHT: 315 LBS | OXYGEN SATURATION: 92 % | BODY MASS INDEX: 41.75 KG/M2 | TEMPERATURE: 98 F | RESPIRATION RATE: 16 BRPM | HEIGHT: 73 IN

## 2021-01-01 VITALS — HEIGHT: 72 IN | BODY MASS INDEX: 42.66 KG/M2 | WEIGHT: 315 LBS

## 2021-01-01 DIAGNOSIS — Z96.612 STATUS POST REVERSE TOTAL SHOULDER REPLACEMENT, LEFT: Primary | ICD-10-CM

## 2021-01-01 DIAGNOSIS — R10.30 LOWER ABDOMINAL PAIN: ICD-10-CM

## 2021-01-01 DIAGNOSIS — J96.01 ACUTE RESPIRATORY FAILURE WITH HYPOXIA (HCC): Primary | ICD-10-CM

## 2021-01-01 DIAGNOSIS — G89.29 CHRONIC PAIN OF RIGHT KNEE: ICD-10-CM

## 2021-01-01 DIAGNOSIS — U07.1 COVID-19: ICD-10-CM

## 2021-01-01 DIAGNOSIS — K56.609 SMALL BOWEL OBSTRUCTION (HCC): Primary | ICD-10-CM

## 2021-01-01 DIAGNOSIS — R74.01 ELEVATED TRANSAMINASE LEVEL: ICD-10-CM

## 2021-01-01 DIAGNOSIS — M25.561 CHRONIC PAIN OF RIGHT KNEE: ICD-10-CM

## 2021-01-01 DIAGNOSIS — R06.02 SHORTNESS OF BREATH: ICD-10-CM

## 2021-01-01 DIAGNOSIS — M19.072 ARTHRITIS OF LEFT ANKLE: Primary | ICD-10-CM

## 2021-01-01 DIAGNOSIS — M17.11 PRIMARY OSTEOARTHRITIS OF RIGHT KNEE: Primary | ICD-10-CM

## 2021-01-01 LAB
A/G RATIO: 1.3 (ref 1.1–2.2)
A/G RATIO: 1.4 (ref 1.1–2.2)
A/G RATIO: 1.6 (ref 1.1–2.2)
ALBUMIN SERPL-MCNC: 3.4 G/DL (ref 3.4–5)
ALBUMIN SERPL-MCNC: 3.5 G/DL (ref 3.4–5)
ALBUMIN SERPL-MCNC: 3.6 G/DL (ref 3.4–5)
ALBUMIN SERPL-MCNC: 3.8 G/DL (ref 3.4–5)
ALBUMIN SERPL-MCNC: 3.9 G/DL (ref 3.4–5)
ALBUMIN SERPL-MCNC: 3.9 G/DL (ref 3.4–5)
ALBUMIN SERPL-MCNC: 4 G/DL (ref 3.4–5)
ALBUMIN SERPL-MCNC: 4 G/DL (ref 3.4–5)
ALBUMIN SERPL-MCNC: 4.6 G/DL (ref 3.4–5)
ALP BLD-CCNC: 102 U/L (ref 40–129)
ALP BLD-CCNC: 103 U/L (ref 40–129)
ALP BLD-CCNC: 105 U/L (ref 40–129)
ALP BLD-CCNC: 111 U/L (ref 40–129)
ALP BLD-CCNC: 128 U/L (ref 40–129)
ALP BLD-CCNC: 86 U/L (ref 40–129)
ALT SERPL-CCNC: 32 U/L (ref 10–40)
ALT SERPL-CCNC: 34 U/L (ref 10–40)
ALT SERPL-CCNC: 40 U/L (ref 10–40)
ALT SERPL-CCNC: 41 U/L (ref 10–40)
ALT SERPL-CCNC: 47 U/L (ref 10–40)
ALT SERPL-CCNC: 55 U/L (ref 10–40)
ANION GAP SERPL CALCULATED.3IONS-SCNC: 10 MMOL/L (ref 3–16)
ANION GAP SERPL CALCULATED.3IONS-SCNC: 12 MMOL/L (ref 3–16)
ANION GAP SERPL CALCULATED.3IONS-SCNC: 14 MMOL/L (ref 3–16)
ANION GAP SERPL CALCULATED.3IONS-SCNC: 20 MMOL/L (ref 3–16)
ANION GAP SERPL CALCULATED.3IONS-SCNC: 7 MMOL/L (ref 3–16)
ANION GAP SERPL CALCULATED.3IONS-SCNC: 8 MMOL/L (ref 3–16)
ANION GAP SERPL CALCULATED.3IONS-SCNC: 9 MMOL/L (ref 3–16)
APTT: 27.2 SEC (ref 24.2–36.2)
AST SERPL-CCNC: 23 U/L (ref 15–37)
AST SERPL-CCNC: 23 U/L (ref 15–37)
AST SERPL-CCNC: 27 U/L (ref 15–37)
AST SERPL-CCNC: 31 U/L (ref 15–37)
AST SERPL-CCNC: 40 U/L (ref 15–37)
AST SERPL-CCNC: 51 U/L (ref 15–37)
BASE EXCESS ARTERIAL: -13 (ref -3–3)
BASE EXCESS VENOUS: -1.2 MMOL/L (ref -3–3)
BASOPHILS ABSOLUTE: 0 K/UL (ref 0–0.2)
BASOPHILS ABSOLUTE: 0.1 K/UL (ref 0–0.2)
BASOPHILS RELATIVE PERCENT: 0 %
BASOPHILS RELATIVE PERCENT: 0.1 %
BASOPHILS RELATIVE PERCENT: 0.1 %
BASOPHILS RELATIVE PERCENT: 0.2 %
BASOPHILS RELATIVE PERCENT: 0.3 %
BASOPHILS RELATIVE PERCENT: 0.3 %
BASOPHILS RELATIVE PERCENT: 0.4 %
BASOPHILS RELATIVE PERCENT: 0.5 %
BILIRUB SERPL-MCNC: 0.6 MG/DL (ref 0–1)
BILIRUB SERPL-MCNC: 0.7 MG/DL (ref 0–1)
BILIRUB SERPL-MCNC: 0.7 MG/DL (ref 0–1)
BILIRUB SERPL-MCNC: 0.9 MG/DL (ref 0–1)
BILIRUB SERPL-MCNC: 1.1 MG/DL (ref 0–1)
BILIRUB SERPL-MCNC: 1.2 MG/DL (ref 0–1)
BILIRUBIN DIRECT: 0.4 MG/DL (ref 0–0.3)
BILIRUBIN URINE: ABNORMAL
BILIRUBIN, INDIRECT: 0.3 MG/DL (ref 0–1)
BLOOD, URINE: NEGATIVE
BUN BLDV-MCNC: 10 MG/DL (ref 7–20)
BUN BLDV-MCNC: 11 MG/DL (ref 7–20)
BUN BLDV-MCNC: 13 MG/DL (ref 7–20)
BUN BLDV-MCNC: 14 MG/DL (ref 7–20)
BUN BLDV-MCNC: 14 MG/DL (ref 7–20)
BUN BLDV-MCNC: 15 MG/DL (ref 7–20)
BUN BLDV-MCNC: 16 MG/DL (ref 7–20)
BUN BLDV-MCNC: 18 MG/DL (ref 7–20)
BUN BLDV-MCNC: 4 MG/DL (ref 7–20)
BUN BLDV-MCNC: 4 MG/DL (ref 7–20)
BUN BLDV-MCNC: 40 MG/DL (ref 7–20)
BUN BLDV-MCNC: 6 MG/DL (ref 7–20)
C-REACTIVE PROTEIN: 20.9 MG/L (ref 0–5.1)
C-REACTIVE PROTEIN: 37.7 MG/L (ref 0–5.1)
C-REACTIVE PROTEIN: 43.6 MG/L (ref 0–5.1)
C-REACTIVE PROTEIN: 83.8 MG/L (ref 0–5.1)
CALCIUM IONIZED: 1.07 MMOL/L (ref 1.12–1.32)
CALCIUM SERPL-MCNC: 8.2 MG/DL (ref 8.3–10.6)
CALCIUM SERPL-MCNC: 8.4 MG/DL (ref 8.3–10.6)
CALCIUM SERPL-MCNC: 8.5 MG/DL (ref 8.3–10.6)
CALCIUM SERPL-MCNC: 8.5 MG/DL (ref 8.3–10.6)
CALCIUM SERPL-MCNC: 8.6 MG/DL (ref 8.3–10.6)
CALCIUM SERPL-MCNC: 8.7 MG/DL (ref 8.3–10.6)
CALCIUM SERPL-MCNC: 8.8 MG/DL (ref 8.3–10.6)
CALCIUM SERPL-MCNC: 8.9 MG/DL (ref 8.3–10.6)
CALCIUM SERPL-MCNC: 9 MG/DL (ref 8.3–10.6)
CALCIUM SERPL-MCNC: 9 MG/DL (ref 8.3–10.6)
CALCIUM SERPL-MCNC: 9.3 MG/DL (ref 8.3–10.6)
CALCIUM SERPL-MCNC: 9.7 MG/DL (ref 8.3–10.6)
CARBOXYHEMOGLOBIN: 1.6 % (ref 0–1.5)
CHLORIDE BLD-SCNC: 100 MMOL/L (ref 99–110)
CHLORIDE BLD-SCNC: 101 MMOL/L (ref 99–110)
CHLORIDE BLD-SCNC: 102 MMOL/L (ref 99–110)
CHLORIDE BLD-SCNC: 102 MMOL/L (ref 99–110)
CHLORIDE BLD-SCNC: 103 MMOL/L (ref 99–110)
CHLORIDE BLD-SCNC: 104 MMOL/L (ref 99–110)
CHLORIDE BLD-SCNC: 99 MMOL/L (ref 99–110)
CHOLESTEROL, TOTAL: 156 MG/DL (ref 0–199)
CLARITY: CLEAR
CO2: 18 MMOL/L (ref 21–32)
CO2: 23 MMOL/L (ref 21–32)
CO2: 26 MMOL/L (ref 21–32)
CO2: 27 MMOL/L (ref 21–32)
CO2: 28 MMOL/L (ref 21–32)
CO2: 28 MMOL/L (ref 21–32)
CO2: 29 MMOL/L (ref 21–32)
COLOR: YELLOW
CREAT SERPL-MCNC: 0.7 MG/DL (ref 0.8–1.3)
CREAT SERPL-MCNC: 0.8 MG/DL (ref 0.8–1.3)
CREAT SERPL-MCNC: 0.8 MG/DL (ref 0.8–1.3)
CREAT SERPL-MCNC: 0.9 MG/DL (ref 0.8–1.3)
CREAT SERPL-MCNC: 3.3 MG/DL (ref 0.8–1.3)
D DIMER: 2923 NG/ML DDU (ref 0–229)
D DIMER: 4004 NG/ML DDU (ref 0–229)
D DIMER: >5250 NG/ML DDU (ref 0–229)
EKG ATRIAL RATE: 79 BPM
EKG ATRIAL RATE: 93 BPM
EKG DIAGNOSIS: NORMAL
EKG DIAGNOSIS: NORMAL
EKG P AXIS: 57 DEGREES
EKG P AXIS: 68 DEGREES
EKG P-R INTERVAL: 160 MS
EKG P-R INTERVAL: 162 MS
EKG Q-T INTERVAL: 346 MS
EKG Q-T INTERVAL: 366 MS
EKG QRS DURATION: 86 MS
EKG QRS DURATION: 88 MS
EKG QTC CALCULATION (BAZETT): 419 MS
EKG QTC CALCULATION (BAZETT): 430 MS
EKG R AXIS: 1 DEGREES
EKG R AXIS: 21 DEGREES
EKG T AXIS: 50 DEGREES
EKG T AXIS: 54 DEGREES
EKG VENTRICULAR RATE: 79 BPM
EKG VENTRICULAR RATE: 93 BPM
EOSINOPHILS ABSOLUTE: 0 K/UL (ref 0–0.6)
EOSINOPHILS ABSOLUTE: 0.1 K/UL (ref 0–0.6)
EOSINOPHILS ABSOLUTE: 0.1 K/UL (ref 0–0.6)
EOSINOPHILS ABSOLUTE: 0.2 K/UL (ref 0–0.6)
EOSINOPHILS RELATIVE PERCENT: 0 %
EOSINOPHILS RELATIVE PERCENT: 0.7 %
EOSINOPHILS RELATIVE PERCENT: 0.8 %
EOSINOPHILS RELATIVE PERCENT: 1 %
EOSINOPHILS RELATIVE PERCENT: 1.6 %
ESTIMATED AVERAGE GLUCOSE: 93.9 MG/DL
FERRITIN: 431.6 NG/ML (ref 30–400)
FIBRINOGEN: 564 MG/DL (ref 200–397)
GFR AFRICAN AMERICAN: 23
GFR AFRICAN AMERICAN: >60
GFR NON-AFRICAN AMERICAN: 19
GFR NON-AFRICAN AMERICAN: >60
GLOBULIN: 2.5 G/DL
GLOBULIN: 2.7 G/DL
GLOBULIN: 2.8 G/DL
GLOBULIN: 2.9 G/DL
GLOBULIN: 3.2 G/DL
GLUCOSE BLD-MCNC: 100 MG/DL (ref 70–99)
GLUCOSE BLD-MCNC: 101 MG/DL (ref 70–99)
GLUCOSE BLD-MCNC: 102 MG/DL (ref 70–99)
GLUCOSE BLD-MCNC: 103 MG/DL (ref 70–99)
GLUCOSE BLD-MCNC: 104 MG/DL (ref 70–99)
GLUCOSE BLD-MCNC: 110 MG/DL (ref 70–99)
GLUCOSE BLD-MCNC: 112 MG/DL (ref 70–99)
GLUCOSE BLD-MCNC: 114 MG/DL (ref 70–99)
GLUCOSE BLD-MCNC: 117 MG/DL (ref 70–99)
GLUCOSE BLD-MCNC: 121 MG/DL (ref 70–99)
GLUCOSE BLD-MCNC: 126 MG/DL (ref 70–99)
GLUCOSE BLD-MCNC: 131 MG/DL (ref 70–99)
GLUCOSE BLD-MCNC: 146 MG/DL (ref 70–99)
GLUCOSE BLD-MCNC: 196 MG/DL (ref 70–99)
GLUCOSE BLD-MCNC: 220 MG/DL (ref 70–99)
GLUCOSE BLD-MCNC: 73 MG/DL (ref 70–99)
GLUCOSE BLD-MCNC: 73 MG/DL (ref 70–99)
GLUCOSE BLD-MCNC: 75 MG/DL (ref 70–99)
GLUCOSE BLD-MCNC: 76 MG/DL (ref 70–99)
GLUCOSE BLD-MCNC: 78 MG/DL (ref 70–99)
GLUCOSE BLD-MCNC: 78 MG/DL (ref 70–99)
GLUCOSE BLD-MCNC: 79 MG/DL (ref 70–99)
GLUCOSE BLD-MCNC: 80 MG/DL (ref 70–99)
GLUCOSE BLD-MCNC: 83 MG/DL (ref 70–99)
GLUCOSE BLD-MCNC: 84 MG/DL (ref 70–99)
GLUCOSE BLD-MCNC: 86 MG/DL (ref 70–99)
GLUCOSE BLD-MCNC: 88 MG/DL (ref 70–99)
GLUCOSE BLD-MCNC: 89 MG/DL (ref 70–99)
GLUCOSE BLD-MCNC: 90 MG/DL (ref 70–99)
GLUCOSE BLD-MCNC: 92 MG/DL (ref 70–99)
GLUCOSE BLD-MCNC: 93 MG/DL (ref 70–99)
GLUCOSE BLD-MCNC: 93 MG/DL (ref 70–99)
GLUCOSE BLD-MCNC: 96 MG/DL (ref 70–99)
GLUCOSE BLD-MCNC: 96 MG/DL (ref 70–99)
GLUCOSE URINE: NEGATIVE MG/DL
HBA1C MFR BLD: 4.9 %
HCO3 ARTERIAL: 14.8 MMOL/L (ref 21–29)
HCO3 VENOUS: 23.8 MMOL/L (ref 23–29)
HCT VFR BLD CALC: 36.2 % (ref 40.5–52.5)
HCT VFR BLD CALC: 40.5 % (ref 40.5–52.5)
HCT VFR BLD CALC: 40.9 % (ref 40.5–52.5)
HCT VFR BLD CALC: 41.1 % (ref 40.5–52.5)
HCT VFR BLD CALC: 41.1 % (ref 40.5–52.5)
HCT VFR BLD CALC: 41.6 % (ref 40.5–52.5)
HCT VFR BLD CALC: 43.2 % (ref 40.5–52.5)
HCT VFR BLD CALC: 44.6 % (ref 40.5–52.5)
HCT VFR BLD CALC: 45.8 % (ref 40.5–52.5)
HCT VFR BLD CALC: 46.6 % (ref 40.5–52.5)
HCT VFR BLD CALC: 47.8 % (ref 40.5–52.5)
HCT VFR BLD CALC: 48 % (ref 40.5–52.5)
HCT VFR BLD CALC: 48.5 % (ref 40.5–52.5)
HDLC SERPL-MCNC: 43 MG/DL (ref 40–60)
HEMOGLOBIN: 11.2 GM/DL (ref 13.5–17.5)
HEMOGLOBIN: 11.8 G/DL (ref 13.5–17.5)
HEMOGLOBIN: 13.6 G/DL (ref 13.5–17.5)
HEMOGLOBIN: 13.7 G/DL (ref 13.5–17.5)
HEMOGLOBIN: 13.9 G/DL (ref 13.5–17.5)
HEMOGLOBIN: 13.9 G/DL (ref 13.5–17.5)
HEMOGLOBIN: 14 G/DL (ref 13.5–17.5)
HEMOGLOBIN: 14.5 G/DL (ref 13.5–17.5)
HEMOGLOBIN: 15.1 G/DL (ref 13.5–17.5)
HEMOGLOBIN: 15.4 G/DL (ref 13.5–17.5)
HEMOGLOBIN: 15.6 G/DL (ref 13.5–17.5)
HEMOGLOBIN: 16 G/DL (ref 13.5–17.5)
HEMOGLOBIN: 16 G/DL (ref 13.5–17.5)
HEMOGLOBIN: 16.3 G/DL (ref 13.5–17.5)
IGA: 220 MG/DL (ref 70–400)
INR BLD: 1.29 (ref 0.86–1.14)
KETONES, URINE: ABNORMAL MG/DL
LACTATE DEHYDROGENASE: 337 U/L (ref 100–190)
LACTATE: 10.64 MMOL/L (ref 0.4–2)
LACTATE: 9.29 MMOL/L (ref 0.4–2)
LACTIC ACID: 1 MMOL/L (ref 0.4–2)
LACTIC ACID: 1.5 MMOL/L (ref 0.4–2)
LACTIC ACID: 10.8 MMOL/L (ref 0.4–2)
LACTIC ACID: 7.9 MMOL/L (ref 0.4–2)
LDL CHOLESTEROL CALCULATED: 104 MG/DL
LEUKOCYTE ESTERASE, URINE: NEGATIVE
LIPASE: 22 U/L (ref 13–60)
LIPASE: 26 U/L (ref 13–60)
LYMPHOCYTES ABSOLUTE: 0.4 K/UL (ref 1–5.1)
LYMPHOCYTES ABSOLUTE: 0.6 K/UL (ref 1–5.1)
LYMPHOCYTES ABSOLUTE: 0.7 K/UL (ref 1–5.1)
LYMPHOCYTES ABSOLUTE: 0.8 K/UL (ref 1–5.1)
LYMPHOCYTES ABSOLUTE: 0.9 K/UL (ref 1–5.1)
LYMPHOCYTES ABSOLUTE: 1 K/UL (ref 1–5.1)
LYMPHOCYTES ABSOLUTE: 1 K/UL (ref 1–5.1)
LYMPHOCYTES ABSOLUTE: 1.5 K/UL (ref 1–5.1)
LYMPHOCYTES RELATIVE PERCENT: 11.1 %
LYMPHOCYTES RELATIVE PERCENT: 11.5 %
LYMPHOCYTES RELATIVE PERCENT: 7.7 %
LYMPHOCYTES RELATIVE PERCENT: 7.9 %
LYMPHOCYTES RELATIVE PERCENT: 8 %
LYMPHOCYTES RELATIVE PERCENT: 8.2 %
LYMPHOCYTES RELATIVE PERCENT: 8.7 %
LYMPHOCYTES RELATIVE PERCENT: 9.6 %
MAGNESIUM: 2 MG/DL (ref 1.8–2.4)
MCH RBC QN AUTO: 28.6 PG (ref 26–34)
MCH RBC QN AUTO: 29.2 PG (ref 26–34)
MCH RBC QN AUTO: 29.3 PG (ref 26–34)
MCH RBC QN AUTO: 29.4 PG (ref 26–34)
MCH RBC QN AUTO: 29.5 PG (ref 26–34)
MCH RBC QN AUTO: 29.7 PG (ref 26–34)
MCH RBC QN AUTO: 29.8 PG (ref 26–34)
MCH RBC QN AUTO: 29.9 PG (ref 26–34)
MCH RBC QN AUTO: 30 PG (ref 26–34)
MCH RBC QN AUTO: 30.2 PG (ref 26–34)
MCH RBC QN AUTO: 30.3 PG (ref 26–34)
MCHC RBC AUTO-ENTMCNC: 32.7 G/DL (ref 31–36)
MCHC RBC AUTO-ENTMCNC: 33 G/DL (ref 31–36)
MCHC RBC AUTO-ENTMCNC: 33.1 G/DL (ref 31–36)
MCHC RBC AUTO-ENTMCNC: 33.3 G/DL (ref 31–36)
MCHC RBC AUTO-ENTMCNC: 33.5 G/DL (ref 31–36)
MCHC RBC AUTO-ENTMCNC: 33.7 G/DL (ref 31–36)
MCHC RBC AUTO-ENTMCNC: 33.8 G/DL (ref 31–36)
MCHC RBC AUTO-ENTMCNC: 33.9 G/DL (ref 31–36)
MCHC RBC AUTO-ENTMCNC: 34 G/DL (ref 31–36)
MCHC RBC AUTO-ENTMCNC: 34 G/DL (ref 31–36)
MCHC RBC AUTO-ENTMCNC: 34.2 G/DL (ref 31–36)
MCV RBC AUTO: 85.9 FL (ref 80–100)
MCV RBC AUTO: 86.4 FL (ref 80–100)
MCV RBC AUTO: 86.8 FL (ref 80–100)
MCV RBC AUTO: 87.3 FL (ref 80–100)
MCV RBC AUTO: 87.6 FL (ref 80–100)
MCV RBC AUTO: 88.2 FL (ref 80–100)
MCV RBC AUTO: 88.9 FL (ref 80–100)
MCV RBC AUTO: 89.1 FL (ref 80–100)
MCV RBC AUTO: 89.3 FL (ref 80–100)
MCV RBC AUTO: 89.3 FL (ref 80–100)
MCV RBC AUTO: 89.5 FL (ref 80–100)
MCV RBC AUTO: 89.8 FL (ref 80–100)
MCV RBC AUTO: 90.1 FL (ref 80–100)
METHEMOGLOBIN VENOUS: 0.5 %
MICROSCOPIC EXAMINATION: ABNORMAL
MONOCYTES ABSOLUTE: 0.2 K/UL (ref 0–1.3)
MONOCYTES ABSOLUTE: 0.6 K/UL (ref 0–1.3)
MONOCYTES ABSOLUTE: 0.7 K/UL (ref 0–1.3)
MONOCYTES ABSOLUTE: 0.8 K/UL (ref 0–1.3)
MONOCYTES ABSOLUTE: 0.9 K/UL (ref 0–1.3)
MONOCYTES ABSOLUTE: 0.9 K/UL (ref 0–1.3)
MONOCYTES ABSOLUTE: 1 K/UL (ref 0–1.3)
MONOCYTES ABSOLUTE: 2 K/UL (ref 0–1.3)
MONOCYTES RELATIVE PERCENT: 10.2 %
MONOCYTES RELATIVE PERCENT: 10.3 %
MONOCYTES RELATIVE PERCENT: 10.9 %
MONOCYTES RELATIVE PERCENT: 11.1 %
MONOCYTES RELATIVE PERCENT: 4.7 %
MONOCYTES RELATIVE PERCENT: 6.1 %
MONOCYTES RELATIVE PERCENT: 6.6 %
MONOCYTES RELATIVE PERCENT: 9.8 %
NEUTROPHILS ABSOLUTE: 10.2 K/UL (ref 1.7–7.7)
NEUTROPHILS ABSOLUTE: 10.9 K/UL (ref 1.7–7.7)
NEUTROPHILS ABSOLUTE: 14.9 K/UL (ref 1.7–7.7)
NEUTROPHILS ABSOLUTE: 2.9 K/UL (ref 1.7–7.7)
NEUTROPHILS ABSOLUTE: 5.4 K/UL (ref 1.7–7.7)
NEUTROPHILS ABSOLUTE: 5.5 K/UL (ref 1.7–7.7)
NEUTROPHILS ABSOLUTE: 7 K/UL (ref 1.7–7.7)
NEUTROPHILS ABSOLUTE: 7.9 K/UL (ref 1.7–7.7)
NEUTROPHILS RELATIVE PERCENT: 77.8 %
NEUTROPHILS RELATIVE PERCENT: 78.2 %
NEUTROPHILS RELATIVE PERCENT: 80.2 %
NEUTROPHILS RELATIVE PERCENT: 80.8 %
NEUTROPHILS RELATIVE PERCENT: 81.7 %
NEUTROPHILS RELATIVE PERCENT: 82.5 %
NEUTROPHILS RELATIVE PERCENT: 84.3 %
NEUTROPHILS RELATIVE PERCENT: 86.1 %
NITRITE, URINE: NEGATIVE
O2 CONTENT, VEN: 20 VOL %
O2 SAT, ARTERIAL: 99 % (ref 93–100)
O2 SAT, VEN: 84 %
O2 THERAPY: ABNORMAL
PCO2 ARTERIAL: 35.4 MM HG (ref 35–45)
PCO2, VEN: 40.8 MMHG (ref 40–50)
PDW BLD-RTO: 13.6 % (ref 12.4–15.4)
PDW BLD-RTO: 13.7 % (ref 12.4–15.4)
PDW BLD-RTO: 13.8 % (ref 12.4–15.4)
PDW BLD-RTO: 13.9 % (ref 12.4–15.4)
PDW BLD-RTO: 14 % (ref 12.4–15.4)
PDW BLD-RTO: 14.1 % (ref 12.4–15.4)
PDW BLD-RTO: 14.1 % (ref 12.4–15.4)
PDW BLD-RTO: 14.2 % (ref 12.4–15.4)
PDW BLD-RTO: 14.6 % (ref 12.4–15.4)
PERFORMED ON: ABNORMAL
PERFORMED ON: NORMAL
PH ARTERIAL: 7.23 (ref 7.35–7.45)
PH UA: 5 (ref 5–8)
PH VENOUS: 7.38 (ref 7.35–7.45)
PHOSPHORUS: 2.6 MG/DL (ref 2.5–4.9)
PHOSPHORUS: 2.6 MG/DL (ref 2.5–4.9)
PHOSPHORUS: 2.9 MG/DL (ref 2.5–4.9)
PHOSPHORUS: 3.1 MG/DL (ref 2.5–4.9)
PHOSPHORUS: 3.3 MG/DL (ref 2.5–4.9)
PHOSPHORUS: 3.6 MG/DL (ref 2.5–4.9)
PLATELET # BLD: 129 K/UL (ref 135–450)
PLATELET # BLD: 146 K/UL (ref 135–450)
PLATELET # BLD: 151 K/UL (ref 135–450)
PLATELET # BLD: 151 K/UL (ref 135–450)
PLATELET # BLD: 181 K/UL (ref 135–450)
PLATELET # BLD: 189 K/UL (ref 135–450)
PLATELET # BLD: 194 K/UL (ref 135–450)
PLATELET # BLD: 205 K/UL (ref 135–450)
PLATELET # BLD: 218 K/UL (ref 135–450)
PLATELET # BLD: 222 K/UL (ref 135–450)
PLATELET # BLD: 225 K/UL (ref 135–450)
PLATELET # BLD: 244 K/UL (ref 135–450)
PLATELET # BLD: 245 K/UL (ref 135–450)
PMV BLD AUTO: 10.4 FL (ref 5–10.5)
PMV BLD AUTO: 9.3 FL (ref 5–10.5)
PMV BLD AUTO: 9.4 FL (ref 5–10.5)
PMV BLD AUTO: 9.5 FL (ref 5–10.5)
PMV BLD AUTO: 9.5 FL (ref 5–10.5)
PMV BLD AUTO: 9.6 FL (ref 5–10.5)
PMV BLD AUTO: 9.7 FL (ref 5–10.5)
PMV BLD AUTO: 9.9 FL (ref 5–10.5)
PO2 ARTERIAL: 168 MM HG (ref 75–108)
PO2, VEN: 48.9 MMHG (ref 25–40)
POC HEMATOCRIT: 33 % (ref 40.5–52.5)
POC POTASSIUM: 5.4 MMOL/L (ref 3.5–5.1)
POC SAMPLE TYPE: ABNORMAL
POC SAMPLE TYPE: ABNORMAL
POC SODIUM: 137 MMOL/L (ref 136–145)
POTASSIUM REFLEX MAGNESIUM: 4.2 MMOL/L (ref 3.5–5.1)
POTASSIUM REFLEX MAGNESIUM: 4.2 MMOL/L (ref 3.5–5.1)
POTASSIUM REFLEX MAGNESIUM: 4.7 MMOL/L (ref 3.5–5.1)
POTASSIUM SERPL-SCNC: 3.4 MMOL/L (ref 3.5–5.1)
POTASSIUM SERPL-SCNC: 3.4 MMOL/L (ref 3.5–5.1)
POTASSIUM SERPL-SCNC: 3.7 MMOL/L (ref 3.5–5.1)
POTASSIUM SERPL-SCNC: 3.9 MMOL/L (ref 3.5–5.1)
POTASSIUM SERPL-SCNC: 3.9 MMOL/L (ref 3.5–5.1)
POTASSIUM SERPL-SCNC: 4 MMOL/L (ref 3.5–5.1)
POTASSIUM SERPL-SCNC: 4 MMOL/L (ref 3.5–5.1)
POTASSIUM SERPL-SCNC: 4.1 MMOL/L (ref 3.5–5.1)
POTASSIUM SERPL-SCNC: 4.2 MMOL/L (ref 3.5–5.1)
POTASSIUM SERPL-SCNC: 4.6 MMOL/L (ref 3.5–5.1)
PROTEIN UA: NEGATIVE MG/DL
PROTHROMBIN TIME: 15 SEC (ref 10–13.2)
RBC # BLD: 4.13 M/UL (ref 4.2–5.9)
RBC # BLD: 4.58 M/UL (ref 4.2–5.9)
RBC # BLD: 4.65 M/UL (ref 4.2–5.9)
RBC # BLD: 4.66 M/UL (ref 4.2–5.9)
RBC # BLD: 4.69 M/UL (ref 4.2–5.9)
RBC # BLD: 4.78 M/UL (ref 4.2–5.9)
RBC # BLD: 4.83 M/UL (ref 4.2–5.9)
RBC # BLD: 5.11 M/UL (ref 4.2–5.9)
RBC # BLD: 5.17 M/UL (ref 4.2–5.9)
RBC # BLD: 5.28 M/UL (ref 4.2–5.9)
RBC # BLD: 5.35 M/UL (ref 4.2–5.9)
RBC # BLD: 5.37 M/UL (ref 4.2–5.9)
RBC # BLD: 5.45 M/UL (ref 4.2–5.9)
SARS-COV-2, NAAT: DETECTED
SEDIMENTATION RATE, ERYTHROCYTE: 8 MM/HR (ref 0–20)
SODIUM BLD-SCNC: 137 MMOL/L (ref 136–145)
SODIUM BLD-SCNC: 137 MMOL/L (ref 136–145)
SODIUM BLD-SCNC: 138 MMOL/L (ref 136–145)
SODIUM BLD-SCNC: 139 MMOL/L (ref 136–145)
SODIUM BLD-SCNC: 139 MMOL/L (ref 136–145)
SODIUM BLD-SCNC: 140 MMOL/L (ref 136–145)
SODIUM BLD-SCNC: 141 MMOL/L (ref 136–145)
SODIUM BLD-SCNC: 142 MMOL/L (ref 136–145)
SPECIFIC GRAVITY UA: >=1.03 (ref 1–1.03)
SPECIMEN STATUS: NORMAL
TCO2 ARTERIAL: 16 MMOL/L
TCO2 CALC VENOUS: 25 MMOL/L
TISSUE TRANSGLUTAMINASE IGA: <0.5 U/ML (ref 0–14)
TOTAL PROTEIN: 6.1 G/DL (ref 6.4–8.2)
TOTAL PROTEIN: 6.5 G/DL (ref 6.4–8.2)
TOTAL PROTEIN: 6.7 G/DL (ref 6.4–8.2)
TOTAL PROTEIN: 6.7 G/DL (ref 6.4–8.2)
TOTAL PROTEIN: 7.2 G/DL (ref 6.4–8.2)
TOTAL PROTEIN: 7.5 G/DL (ref 6.4–8.2)
TRIGL SERPL-MCNC: 45 MG/DL (ref 0–150)
TROPONIN: 0.01 NG/ML
TROPONIN: 0.02 NG/ML
TROPONIN: 0.02 NG/ML
TROPONIN: <0.01 NG/ML
URINE REFLEX TO CULTURE: ABNORMAL
URINE TYPE: ABNORMAL
UROBILINOGEN, URINE: 0.2 E.U./DL
VITAMIN D 25-HYDROXY: 25.3 NG/ML
VLDLC SERPL CALC-MCNC: 9 MG/DL
WBC # BLD: 10.1 K/UL (ref 4–11)
WBC # BLD: 10.4 K/UL (ref 4–11)
WBC # BLD: 11.8 K/UL (ref 4–11)
WBC # BLD: 13 K/UL (ref 4–11)
WBC # BLD: 18.5 K/UL (ref 4–11)
WBC # BLD: 3.6 K/UL (ref 4–11)
WBC # BLD: 6.6 K/UL (ref 4–11)
WBC # BLD: 6.8 K/UL (ref 4–11)
WBC # BLD: 7 K/UL (ref 4–11)
WBC # BLD: 8.2 K/UL (ref 4–11)
WBC # BLD: 8.6 K/UL (ref 4–11)
WBC # BLD: 8.8 K/UL (ref 4–11)
WBC # BLD: 9.7 K/UL (ref 4–11)

## 2021-01-01 PROCEDURE — 94761 N-INVAS EAR/PLS OXIMETRY MLT: CPT

## 2021-01-01 PROCEDURE — 1200000000 HC SEMI PRIVATE

## 2021-01-01 PROCEDURE — 36415 COLL VENOUS BLD VENIPUNCTURE: CPT

## 2021-01-01 PROCEDURE — 2500000003 HC RX 250 WO HCPCS: Performed by: SURGERY

## 2021-01-01 PROCEDURE — 2500000003 HC RX 250 WO HCPCS: Performed by: INTERNAL MEDICINE

## 2021-01-01 PROCEDURE — 2580000003 HC RX 258: Performed by: SURGERY

## 2021-01-01 PROCEDURE — 84484 ASSAY OF TROPONIN QUANT: CPT

## 2021-01-01 PROCEDURE — 85652 RBC SED RATE AUTOMATED: CPT

## 2021-01-01 PROCEDURE — 71045 X-RAY EXAM CHEST 1 VIEW: CPT

## 2021-01-01 PROCEDURE — 80069 RENAL FUNCTION PANEL: CPT

## 2021-01-01 PROCEDURE — G8427 DOCREV CUR MEDS BY ELIG CLIN: HCPCS | Performed by: ORTHOPAEDIC SURGERY

## 2021-01-01 PROCEDURE — 85025 COMPLETE CBC W/AUTO DIFF WBC: CPT

## 2021-01-01 PROCEDURE — 6360000004 HC RX CONTRAST MEDICATION: Performed by: EMERGENCY MEDICINE

## 2021-01-01 PROCEDURE — 7100000000 HC PACU RECOVERY - FIRST 15 MIN: Performed by: SURGERY

## 2021-01-01 PROCEDURE — 82728 ASSAY OF FERRITIN: CPT

## 2021-01-01 PROCEDURE — 85014 HEMATOCRIT: CPT

## 2021-01-01 PROCEDURE — 2700000000 HC OXYGEN THERAPY PER DAY

## 2021-01-01 PROCEDURE — 6360000002 HC RX W HCPCS: Performed by: INTERNAL MEDICINE

## 2021-01-01 PROCEDURE — 83735 ASSAY OF MAGNESIUM: CPT

## 2021-01-01 PROCEDURE — 6360000002 HC RX W HCPCS: Performed by: NURSE PRACTITIONER

## 2021-01-01 PROCEDURE — 2709999900 HC NON-CHARGEABLE SUPPLY: Performed by: SURGERY

## 2021-01-01 PROCEDURE — 85730 THROMBOPLASTIN TIME PARTIAL: CPT

## 2021-01-01 PROCEDURE — 6370000000 HC RX 637 (ALT 250 FOR IP): Performed by: NURSE PRACTITIONER

## 2021-01-01 PROCEDURE — 97162 PT EVAL MOD COMPLEX 30 MIN: CPT

## 2021-01-01 PROCEDURE — 99212 OFFICE O/P EST SF 10 MIN: CPT | Performed by: ORTHOPAEDIC SURGERY

## 2021-01-01 PROCEDURE — 96375 TX/PRO/DX INJ NEW DRUG ADDON: CPT

## 2021-01-01 PROCEDURE — 74019 RADEX ABDOMEN 2 VIEWS: CPT

## 2021-01-01 PROCEDURE — 3700000001 HC ADD 15 MINUTES (ANESTHESIA): Performed by: SURGERY

## 2021-01-01 PROCEDURE — 86140 C-REACTIVE PROTEIN: CPT

## 2021-01-01 PROCEDURE — 85027 COMPLETE CBC AUTOMATED: CPT

## 2021-01-01 PROCEDURE — 2580000003 HC RX 258: Performed by: INTERNAL MEDICINE

## 2021-01-01 PROCEDURE — 74018 RADEX ABDOMEN 1 VIEW: CPT

## 2021-01-01 PROCEDURE — 80053 COMPREHEN METABOLIC PANEL: CPT

## 2021-01-01 PROCEDURE — 83036 HEMOGLOBIN GLYCOSYLATED A1C: CPT

## 2021-01-01 PROCEDURE — 93005 ELECTROCARDIOGRAM TRACING: CPT | Performed by: EMERGENCY MEDICINE

## 2021-01-01 PROCEDURE — 85379 FIBRIN DEGRADATION QUANT: CPT

## 2021-01-01 PROCEDURE — 2500000003 HC RX 250 WO HCPCS: Performed by: NURSE ANESTHETIST, CERTIFIED REGISTERED

## 2021-01-01 PROCEDURE — 6370000000 HC RX 637 (ALT 250 FOR IP): Performed by: INTERNAL MEDICINE

## 2021-01-01 PROCEDURE — 31500 INSERT EMERGENCY AIRWAY: CPT

## 2021-01-01 PROCEDURE — 99213 OFFICE O/P EST LOW 20 MIN: CPT | Performed by: ORTHOPAEDIC SURGERY

## 2021-01-01 PROCEDURE — 99291 CRITICAL CARE FIRST HOUR: CPT | Performed by: INTERNAL MEDICINE

## 2021-01-01 PROCEDURE — 99232 SBSQ HOSP IP/OBS MODERATE 35: CPT | Performed by: SURGERY

## 2021-01-01 PROCEDURE — 97535 SELF CARE MNGMENT TRAINING: CPT

## 2021-01-01 PROCEDURE — 74022 RADEX COMPL AQT ABD SERIES: CPT

## 2021-01-01 PROCEDURE — 82784 ASSAY IGA/IGD/IGG/IGM EACH: CPT

## 2021-01-01 PROCEDURE — 3700000000 HC ANESTHESIA ATTENDED CARE: Performed by: SURGERY

## 2021-01-01 PROCEDURE — 82330 ASSAY OF CALCIUM: CPT

## 2021-01-01 PROCEDURE — 82306 VITAMIN D 25 HYDROXY: CPT

## 2021-01-01 PROCEDURE — 6360000002 HC RX W HCPCS

## 2021-01-01 PROCEDURE — 3600000014 HC SURGERY LEVEL 4 ADDTL 15MIN: Performed by: SURGERY

## 2021-01-01 PROCEDURE — 7100000001 HC PACU RECOVERY - ADDTL 15 MIN: Performed by: SURGERY

## 2021-01-01 PROCEDURE — 93010 ELECTROCARDIOGRAM REPORT: CPT | Performed by: INTERNAL MEDICINE

## 2021-01-01 PROCEDURE — APPSS45 APP SPLIT SHARED TIME 31-45 MINUTES: Performed by: CLINICAL NURSE SPECIALIST

## 2021-01-01 PROCEDURE — 97110 THERAPEUTIC EXERCISES: CPT

## 2021-01-01 PROCEDURE — 3600000004 HC SURGERY LEVEL 4 BASE: Performed by: SURGERY

## 2021-01-01 PROCEDURE — 82947 ASSAY GLUCOSE BLOOD QUANT: CPT

## 2021-01-01 PROCEDURE — APPNB30 APP NON BILLABLE TIME 0-30 MINS: Performed by: CLINICAL NURSE SPECIALIST

## 2021-01-01 PROCEDURE — 82803 BLOOD GASES ANY COMBINATION: CPT

## 2021-01-01 PROCEDURE — 6370000000 HC RX 637 (ALT 250 FOR IP): Performed by: SURGERY

## 2021-01-01 PROCEDURE — 93005 ELECTROCARDIOGRAM TRACING: CPT | Performed by: INTERNAL MEDICINE

## 2021-01-01 PROCEDURE — 99231 SBSQ HOSP IP/OBS SF/LOW 25: CPT | Performed by: SURGERY

## 2021-01-01 PROCEDURE — 83690 ASSAY OF LIPASE: CPT

## 2021-01-01 PROCEDURE — 6360000002 HC RX W HCPCS: Performed by: NURSE ANESTHETIST, CERTIFIED REGISTERED

## 2021-01-01 PROCEDURE — 3017F COLORECTAL CA SCREEN DOC REV: CPT | Performed by: ORTHOPAEDIC SURGERY

## 2021-01-01 PROCEDURE — 97116 GAIT TRAINING THERAPY: CPT

## 2021-01-01 PROCEDURE — 99285 EMERGENCY DEPT VISIT HI MDM: CPT

## 2021-01-01 PROCEDURE — 74177 CT ABD & PELVIS W/CONTRAST: CPT

## 2021-01-01 PROCEDURE — 31500 INSERT EMERGENCY AIRWAY: CPT | Performed by: INTERNAL MEDICINE

## 2021-01-01 PROCEDURE — 83605 ASSAY OF LACTIC ACID: CPT

## 2021-01-01 PROCEDURE — G8417 CALC BMI ABV UP PARAM F/U: HCPCS | Performed by: ORTHOPAEDIC SURGERY

## 2021-01-01 PROCEDURE — G8484 FLU IMMUNIZE NO ADMIN: HCPCS | Performed by: ORTHOPAEDIC SURGERY

## 2021-01-01 PROCEDURE — 81003 URINALYSIS AUTO W/O SCOPE: CPT

## 2021-01-01 PROCEDURE — 96374 THER/PROPH/DIAG INJ IV PUSH: CPT

## 2021-01-01 PROCEDURE — XW033E5 INTRODUCTION OF REMDESIVIR ANTI-INFECTIVE INTO PERIPHERAL VEIN, PERCUTANEOUS APPROACH, NEW TECHNOLOGY GROUP 5: ICD-10-PCS | Performed by: INTERNAL MEDICINE

## 2021-01-01 PROCEDURE — 85610 PROTHROMBIN TIME: CPT

## 2021-01-01 PROCEDURE — 2500000003 HC RX 250 WO HCPCS

## 2021-01-01 PROCEDURE — 83516 IMMUNOASSAY NONANTIBODY: CPT

## 2021-01-01 PROCEDURE — U0002 COVID-19 LAB TEST NON-CDC: HCPCS

## 2021-01-01 PROCEDURE — 2580000003 HC RX 258: Performed by: NURSE ANESTHETIST, CERTIFIED REGISTERED

## 2021-01-01 PROCEDURE — 80048 BASIC METABOLIC PNL TOTAL CA: CPT

## 2021-01-01 PROCEDURE — 6360000002 HC RX W HCPCS: Performed by: EMERGENCY MEDICINE

## 2021-01-01 PROCEDURE — 85384 FIBRINOGEN ACTIVITY: CPT

## 2021-01-01 PROCEDURE — 99221 1ST HOSP IP/OBS SF/LOW 40: CPT | Performed by: SURGERY

## 2021-01-01 PROCEDURE — 97161 PT EVAL LOW COMPLEX 20 MIN: CPT

## 2021-01-01 PROCEDURE — 1123F ACP DISCUSS/DSCN MKR DOCD: CPT | Performed by: ORTHOPAEDIC SURGERY

## 2021-01-01 PROCEDURE — 99024 POSTOP FOLLOW-UP VISIT: CPT | Performed by: SURGERY

## 2021-01-01 PROCEDURE — 97166 OT EVAL MOD COMPLEX 45 MIN: CPT

## 2021-01-01 PROCEDURE — 6360000002 HC RX W HCPCS: Performed by: SURGERY

## 2021-01-01 PROCEDURE — 80061 LIPID PANEL: CPT

## 2021-01-01 PROCEDURE — 93005 ELECTROCARDIOGRAM TRACING: CPT | Performed by: NURSE PRACTITIONER

## 2021-01-01 PROCEDURE — 97530 THERAPEUTIC ACTIVITIES: CPT

## 2021-01-01 PROCEDURE — 36556 INSERT NON-TUNNEL CV CATH: CPT | Performed by: NURSE PRACTITIONER

## 2021-01-01 PROCEDURE — 02HV33Z INSERTION OF INFUSION DEVICE INTO SUPERIOR VENA CAVA, PERCUTANEOUS APPROACH: ICD-10-PCS | Performed by: INTERNAL MEDICINE

## 2021-01-01 PROCEDURE — 92950 HEART/LUNG RESUSCITATION CPR: CPT

## 2021-01-01 PROCEDURE — 99284 EMERGENCY DEPT VISIT MOD MDM: CPT

## 2021-01-01 PROCEDURE — 0BH18EZ INSERTION OF ENDOTRACHEAL AIRWAY INTO TRACHEA, VIA NATURAL OR ARTIFICIAL OPENING ENDOSCOPIC: ICD-10-PCS | Performed by: INTERNAL MEDICINE

## 2021-01-01 PROCEDURE — 84132 ASSAY OF SERUM POTASSIUM: CPT

## 2021-01-01 PROCEDURE — 80076 HEPATIC FUNCTION PANEL: CPT

## 2021-01-01 PROCEDURE — 83615 LACTATE (LD) (LDH) ENZYME: CPT

## 2021-01-01 PROCEDURE — 84295 ASSAY OF SERUM SODIUM: CPT

## 2021-01-01 PROCEDURE — 74250 X-RAY XM SM INT 1CNTRST STD: CPT

## 2021-01-01 PROCEDURE — 0DN84ZZ RELEASE SMALL INTESTINE, PERCUTANEOUS ENDOSCOPIC APPROACH: ICD-10-PCS | Performed by: SURGERY

## 2021-01-01 PROCEDURE — 44180 LAP ENTEROLYSIS: CPT | Performed by: SURGERY

## 2021-01-01 PROCEDURE — 1036F TOBACCO NON-USER: CPT | Performed by: ORTHOPAEDIC SURGERY

## 2021-01-01 PROCEDURE — 2580000003 HC RX 258: Performed by: ANESTHESIOLOGY

## 2021-01-01 PROCEDURE — 87040 BLOOD CULTURE FOR BACTERIA: CPT

## 2021-01-01 PROCEDURE — 4040F PNEUMOC VAC/ADMIN/RCVD: CPT | Performed by: ORTHOPAEDIC SURGERY

## 2021-01-01 RX ORDER — MORPHINE SULFATE 2 MG/ML
1 INJECTION, SOLUTION INTRAMUSCULAR; INTRAVENOUS EVERY 5 MIN PRN
Status: DISCONTINUED | OUTPATIENT
Start: 2021-01-01 | End: 2021-01-01

## 2021-01-01 RX ORDER — OXYCODONE HYDROCHLORIDE AND ACETAMINOPHEN 5; 325 MG/1; MG/1
1 TABLET ORAL EVERY 4 HOURS PRN
Status: COMPLETED | OUTPATIENT
Start: 2021-01-01 | End: 2021-01-01

## 2021-01-01 RX ORDER — PROMETHAZINE HYDROCHLORIDE 25 MG/1
12.5 TABLET ORAL EVERY 6 HOURS PRN
Status: DISCONTINUED | OUTPATIENT
Start: 2021-01-01 | End: 2021-01-01 | Stop reason: HOSPADM

## 2021-01-01 RX ORDER — EPINEPHRINE 0.1 MG/ML
0.5 SYRINGE (ML) INJECTION ONCE
Status: COMPLETED | OUTPATIENT
Start: 2021-01-01 | End: 2021-01-01

## 2021-01-01 RX ORDER — ACETAMINOPHEN 325 MG/1
650 TABLET ORAL EVERY 6 HOURS PRN
Status: DISCONTINUED | OUTPATIENT
Start: 2021-01-01 | End: 2021-01-01 | Stop reason: HOSPADM

## 2021-01-01 RX ORDER — DEXAMETHASONE 4 MG/1
6 TABLET ORAL DAILY
Status: DISCONTINUED | OUTPATIENT
Start: 2021-01-01 | End: 2021-01-01 | Stop reason: HOSPADM

## 2021-01-01 RX ORDER — ROCURONIUM BROMIDE 10 MG/ML
INJECTION, SOLUTION INTRAVENOUS PRN
Status: DISCONTINUED | OUTPATIENT
Start: 2021-01-01 | End: 2021-01-01 | Stop reason: SDUPTHER

## 2021-01-01 RX ORDER — OXYCODONE HYDROCHLORIDE AND ACETAMINOPHEN 5; 325 MG/1; MG/1
1 TABLET ORAL EVERY 4 HOURS PRN
Status: DISCONTINUED | OUTPATIENT
Start: 2021-01-01 | End: 2021-01-01 | Stop reason: HOSPADM

## 2021-01-01 RX ORDER — KETOROLAC TROMETHAMINE 30 MG/ML
INJECTION, SOLUTION INTRAMUSCULAR; INTRAVENOUS PRN
Status: DISCONTINUED | OUTPATIENT
Start: 2021-01-01 | End: 2021-01-01 | Stop reason: SDUPTHER

## 2021-01-01 RX ORDER — ACETAMINOPHEN 650 MG/1
650 SUPPOSITORY RECTAL EVERY 6 HOURS PRN
Status: DISCONTINUED | OUTPATIENT
Start: 2021-01-01 | End: 2021-01-01 | Stop reason: HOSPADM

## 2021-01-01 RX ORDER — BUPIVACAINE HYDROCHLORIDE AND EPINEPHRINE 5; 5 MG/ML; UG/ML
INJECTION, SOLUTION PERINEURAL PRN
Status: DISCONTINUED | OUTPATIENT
Start: 2021-01-01 | End: 2021-01-01 | Stop reason: ALTCHOICE

## 2021-01-01 RX ORDER — LABETALOL HYDROCHLORIDE 5 MG/ML
5 INJECTION, SOLUTION INTRAVENOUS EVERY 10 MIN PRN
Status: DISCONTINUED | OUTPATIENT
Start: 2021-01-01 | End: 2021-01-01

## 2021-01-01 RX ORDER — PROPOFOL 10 MG/ML
INJECTION, EMULSION INTRAVENOUS PRN
Status: DISCONTINUED | OUTPATIENT
Start: 2021-01-01 | End: 2021-01-01 | Stop reason: SDUPTHER

## 2021-01-01 RX ORDER — DEXAMETHASONE SODIUM PHOSPHATE 10 MG/ML
10 INJECTION INTRAMUSCULAR; INTRAVENOUS ONCE
Status: COMPLETED | OUTPATIENT
Start: 2021-01-01 | End: 2021-01-01

## 2021-01-01 RX ORDER — GLYCOPYRROLATE 0.2 MG/ML
INJECTION INTRAMUSCULAR; INTRAVENOUS PRN
Status: DISCONTINUED | OUTPATIENT
Start: 2021-01-01 | End: 2021-01-01 | Stop reason: SDUPTHER

## 2021-01-01 RX ORDER — HYDRALAZINE HYDROCHLORIDE 20 MG/ML
5 INJECTION INTRAMUSCULAR; INTRAVENOUS EVERY 10 MIN PRN
Status: DISCONTINUED | OUTPATIENT
Start: 2021-01-01 | End: 2021-01-01

## 2021-01-01 RX ORDER — SODIUM CHLORIDE, SODIUM LACTATE, POTASSIUM CHLORIDE, CALCIUM CHLORIDE 600; 310; 30; 20 MG/100ML; MG/100ML; MG/100ML; MG/100ML
INJECTION, SOLUTION INTRAVENOUS CONTINUOUS PRN
Status: DISCONTINUED | OUTPATIENT
Start: 2021-01-01 | End: 2021-01-01 | Stop reason: SDUPTHER

## 2021-01-01 RX ORDER — MORPHINE SULFATE 4 MG/ML
INJECTION, SOLUTION INTRAMUSCULAR; INTRAVENOUS
Status: COMPLETED
Start: 2021-01-01 | End: 2021-01-01

## 2021-01-01 RX ORDER — EPINEPHRINE 0.1 MG/ML
0.5 SYRINGE (ML) INJECTION ONCE
Status: DISCONTINUED | OUTPATIENT
Start: 2021-01-01 | End: 2021-01-01 | Stop reason: HOSPADM

## 2021-01-01 RX ORDER — MORPHINE SULFATE 2 MG/ML
2 INJECTION, SOLUTION INTRAMUSCULAR; INTRAVENOUS EVERY 4 HOURS PRN
Status: DISCONTINUED | OUTPATIENT
Start: 2021-01-01 | End: 2021-01-01 | Stop reason: HOSPADM

## 2021-01-01 RX ORDER — ONDANSETRON 2 MG/ML
4 INJECTION INTRAMUSCULAR; INTRAVENOUS ONCE
Status: COMPLETED | OUTPATIENT
Start: 2021-01-01 | End: 2021-01-01

## 2021-01-01 RX ORDER — SODIUM CHLORIDE 9 MG/ML
1000 INJECTION, SOLUTION INTRAVENOUS ONCE
Status: COMPLETED | OUTPATIENT
Start: 2021-01-01 | End: 2021-01-01

## 2021-01-01 RX ORDER — OXYCODONE HYDROCHLORIDE AND ACETAMINOPHEN 5; 325 MG/1; MG/1
1 TABLET ORAL ONCE
Status: COMPLETED | OUTPATIENT
Start: 2021-01-01 | End: 2021-01-01

## 2021-01-01 RX ORDER — LABETALOL HYDROCHLORIDE 5 MG/ML
5 INJECTION, SOLUTION INTRAVENOUS EVERY 4 HOURS PRN
Status: DISCONTINUED | OUTPATIENT
Start: 2021-01-01 | End: 2021-01-01 | Stop reason: HOSPADM

## 2021-01-01 RX ORDER — OXYCODONE HYDROCHLORIDE AND ACETAMINOPHEN 5; 325 MG/1; MG/1
1 TABLET ORAL EVERY 6 HOURS PRN
Qty: 20 TABLET | Refills: 0 | Status: SHIPPED | OUTPATIENT
Start: 2021-01-01 | End: 2021-01-01

## 2021-01-01 RX ORDER — MORPHINE SULFATE 4 MG/ML
4 INJECTION, SOLUTION INTRAMUSCULAR; INTRAVENOUS ONCE
Status: COMPLETED | OUTPATIENT
Start: 2021-01-01 | End: 2021-01-01

## 2021-01-01 RX ORDER — SODIUM PHOSPHATE, DIBASIC AND SODIUM PHOSPHATE, MONOBASIC 3.5; 9.5 G/66ML; G/66ML
1 ENEMA RECTAL ONCE
Status: COMPLETED | OUTPATIENT
Start: 2021-01-01 | End: 2021-01-01

## 2021-01-01 RX ORDER — OXYCODONE HYDROCHLORIDE AND ACETAMINOPHEN 5; 325 MG/1; MG/1
1 TABLET ORAL PRN
Status: DISCONTINUED | OUTPATIENT
Start: 2021-01-01 | End: 2021-01-01

## 2021-01-01 RX ORDER — AMLODIPINE BESYLATE 5 MG/1
5 TABLET ORAL DAILY
Status: DISCONTINUED | OUTPATIENT
Start: 2021-01-01 | End: 2021-01-01

## 2021-01-01 RX ORDER — DIPHENHYDRAMINE HYDROCHLORIDE 50 MG/ML
12.5 INJECTION INTRAMUSCULAR; INTRAVENOUS
Status: DISCONTINUED | OUTPATIENT
Start: 2021-01-01 | End: 2021-01-01

## 2021-01-01 RX ORDER — SODIUM CHLORIDE 0.9 % (FLUSH) 0.9 %
10 SYRINGE (ML) INJECTION PRN
Status: DISCONTINUED | OUTPATIENT
Start: 2021-01-01 | End: 2021-01-01 | Stop reason: HOSPADM

## 2021-01-01 RX ORDER — PHENYLEPHRINE HCL IN 0.9% NACL 1 MG/10 ML
SYRINGE (ML) INTRAVENOUS PRN
Status: DISCONTINUED | OUTPATIENT
Start: 2021-01-01 | End: 2021-01-01 | Stop reason: SDUPTHER

## 2021-01-01 RX ORDER — MORPHINE SULFATE 2 MG/ML
2 INJECTION, SOLUTION INTRAMUSCULAR; INTRAVENOUS
Status: DISCONTINUED | OUTPATIENT
Start: 2021-01-01 | End: 2021-01-01 | Stop reason: HOSPADM

## 2021-01-01 RX ORDER — SODIUM CHLORIDE 0.9 % (FLUSH) 0.9 %
10 SYRINGE (ML) INJECTION EVERY 12 HOURS SCHEDULED
Status: DISCONTINUED | OUTPATIENT
Start: 2021-01-01 | End: 2021-01-01 | Stop reason: HOSPADM

## 2021-01-01 RX ORDER — BENZONATATE 100 MG/1
100 CAPSULE ORAL 3 TIMES DAILY PRN
Status: DISCONTINUED | OUTPATIENT
Start: 2021-01-01 | End: 2021-01-01 | Stop reason: HOSPADM

## 2021-01-01 RX ORDER — BETHANECHOL CHLORIDE 25 MG/1
25 TABLET ORAL
Status: DISCONTINUED | OUTPATIENT
Start: 2021-01-01 | End: 2021-01-01 | Stop reason: HOSPADM

## 2021-01-01 RX ORDER — PROMETHAZINE HYDROCHLORIDE 25 MG/ML
6.25 INJECTION, SOLUTION INTRAMUSCULAR; INTRAVENOUS
Status: DISCONTINUED | OUTPATIENT
Start: 2021-01-01 | End: 2021-01-01

## 2021-01-01 RX ORDER — MORPHINE SULFATE 2 MG/ML
INJECTION, SOLUTION INTRAMUSCULAR; INTRAVENOUS
Status: COMPLETED
Start: 2021-01-01 | End: 2021-01-01

## 2021-01-01 RX ORDER — ONDANSETRON 2 MG/ML
4 INJECTION INTRAMUSCULAR; INTRAVENOUS EVERY 6 HOURS PRN
Status: DISCONTINUED | OUTPATIENT
Start: 2021-01-01 | End: 2021-01-01 | Stop reason: HOSPADM

## 2021-01-01 RX ORDER — HYDROXYZINE HYDROCHLORIDE 10 MG/1
50 TABLET, FILM COATED ORAL ONCE
Status: COMPLETED | OUTPATIENT
Start: 2021-01-01 | End: 2021-01-01

## 2021-01-01 RX ORDER — AMLODIPINE BESYLATE 2.5 MG/1
2.5 TABLET ORAL 2 TIMES DAILY
Status: DISCONTINUED | OUTPATIENT
Start: 2021-01-01 | End: 2021-01-01 | Stop reason: HOSPADM

## 2021-01-01 RX ORDER — SODIUM CHLORIDE 9 MG/ML
INJECTION, SOLUTION INTRAVENOUS CONTINUOUS
Status: DISCONTINUED | OUTPATIENT
Start: 2021-01-01 | End: 2021-01-01 | Stop reason: HOSPADM

## 2021-01-01 RX ORDER — ONDANSETRON 2 MG/ML
4 INJECTION INTRAMUSCULAR; INTRAVENOUS EVERY 30 MIN PRN
Status: DISCONTINUED | OUTPATIENT
Start: 2021-01-01 | End: 2021-01-01

## 2021-01-01 RX ORDER — POTASSIUM CHLORIDE 7.45 MG/ML
10 INJECTION INTRAVENOUS PRN
Status: DISCONTINUED | OUTPATIENT
Start: 2021-01-01 | End: 2021-01-01

## 2021-01-01 RX ORDER — OXYCODONE HYDROCHLORIDE AND ACETAMINOPHEN 5; 325 MG/1; MG/1
2 TABLET ORAL PRN
Status: DISCONTINUED | OUTPATIENT
Start: 2021-01-01 | End: 2021-01-01

## 2021-01-01 RX ORDER — POLYETHYLENE GLYCOL 3350 17 G/17G
17 POWDER, FOR SOLUTION ORAL DAILY PRN
Status: DISCONTINUED | OUTPATIENT
Start: 2021-01-01 | End: 2021-01-01 | Stop reason: HOSPADM

## 2021-01-01 RX ORDER — LIDOCAINE HYDROCHLORIDE 20 MG/ML
INJECTION, SOLUTION INFILTRATION; PERINEURAL PRN
Status: DISCONTINUED | OUTPATIENT
Start: 2021-01-01 | End: 2021-01-01 | Stop reason: SDUPTHER

## 2021-01-01 RX ORDER — FAMOTIDINE 20 MG/1
20 TABLET, FILM COATED ORAL DAILY PRN
Status: DISCONTINUED | OUTPATIENT
Start: 2021-01-01 | End: 2021-01-01

## 2021-01-01 RX ORDER — 0.9 % SODIUM CHLORIDE 0.9 %
30 INTRAVENOUS SOLUTION INTRAVENOUS PRN
Status: DISCONTINUED | OUTPATIENT
Start: 2021-01-01 | End: 2021-01-01 | Stop reason: HOSPADM

## 2021-01-01 RX ORDER — LABETALOL 200 MG/1
200 TABLET, FILM COATED ORAL 2 TIMES DAILY
Status: DISCONTINUED | OUTPATIENT
Start: 2021-01-01 | End: 2021-01-01 | Stop reason: HOSPADM

## 2021-01-01 RX ORDER — MORPHINE SULFATE 2 MG/ML
2 INJECTION, SOLUTION INTRAMUSCULAR; INTRAVENOUS EVERY 5 MIN PRN
Status: DISCONTINUED | OUTPATIENT
Start: 2021-01-01 | End: 2021-01-01

## 2021-01-01 RX ORDER — LABETALOL 100 MG/1
200 TABLET, FILM COATED ORAL 2 TIMES DAILY
Status: DISCONTINUED | OUTPATIENT
Start: 2021-01-01 | End: 2021-01-01 | Stop reason: HOSPADM

## 2021-01-01 RX ORDER — FENTANYL CITRATE 50 UG/ML
INJECTION, SOLUTION INTRAMUSCULAR; INTRAVENOUS PRN
Status: DISCONTINUED | OUTPATIENT
Start: 2021-01-01 | End: 2021-01-01 | Stop reason: SDUPTHER

## 2021-01-01 RX ORDER — MAGNESIUM SULFATE IN WATER 40 MG/ML
2 INJECTION, SOLUTION INTRAVENOUS PRN
Status: DISCONTINUED | OUTPATIENT
Start: 2021-01-01 | End: 2021-01-01

## 2021-01-01 RX ORDER — SODIUM CHLORIDE, SODIUM LACTATE, POTASSIUM CHLORIDE, CALCIUM CHLORIDE 600; 310; 30; 20 MG/100ML; MG/100ML; MG/100ML; MG/100ML
INJECTION, SOLUTION INTRAVENOUS CONTINUOUS
Status: DISCONTINUED | OUTPATIENT
Start: 2021-01-01 | End: 2021-01-01 | Stop reason: HOSPADM

## 2021-01-01 RX ORDER — TETRAHYDROZOLINE HCL 0.05 %
2 DROPS OPHTHALMIC (EYE) 3 TIMES DAILY PRN
Status: DISCONTINUED | OUTPATIENT
Start: 2021-01-01 | End: 2021-01-01 | Stop reason: HOSPADM

## 2021-01-01 RX ORDER — MEPERIDINE HYDROCHLORIDE 50 MG/ML
12.5 INJECTION INTRAMUSCULAR; INTRAVENOUS; SUBCUTANEOUS EVERY 5 MIN PRN
Status: DISCONTINUED | OUTPATIENT
Start: 2021-01-01 | End: 2021-01-01

## 2021-01-01 RX ORDER — MIDAZOLAM HYDROCHLORIDE 1 MG/ML
INJECTION INTRAMUSCULAR; INTRAVENOUS PRN
Status: DISCONTINUED | OUTPATIENT
Start: 2021-01-01 | End: 2021-01-01 | Stop reason: SDUPTHER

## 2021-01-01 RX ORDER — GUAIFENESIN/DEXTROMETHORPHAN 100-10MG/5
5 SYRUP ORAL EVERY 4 HOURS PRN
Status: DISCONTINUED | OUTPATIENT
Start: 2021-01-01 | End: 2021-01-01 | Stop reason: HOSPADM

## 2021-01-01 RX ORDER — DEXAMETHASONE SODIUM PHOSPHATE 4 MG/ML
INJECTION, SOLUTION INTRA-ARTICULAR; INTRALESIONAL; INTRAMUSCULAR; INTRAVENOUS; SOFT TISSUE PRN
Status: DISCONTINUED | OUTPATIENT
Start: 2021-01-01 | End: 2021-01-01 | Stop reason: SDUPTHER

## 2021-01-01 RX ORDER — ONDANSETRON 2 MG/ML
4 INJECTION INTRAMUSCULAR; INTRAVENOUS PRN
Status: DISCONTINUED | OUTPATIENT
Start: 2021-01-01 | End: 2021-01-01

## 2021-01-01 RX ORDER — VITAMIN B COMPLEX
2000 TABLET ORAL DAILY
Status: DISCONTINUED | OUTPATIENT
Start: 2021-01-01 | End: 2021-01-01 | Stop reason: HOSPADM

## 2021-01-01 RX ORDER — ONDANSETRON 2 MG/ML
INJECTION INTRAMUSCULAR; INTRAVENOUS PRN
Status: DISCONTINUED | OUTPATIENT
Start: 2021-01-01 | End: 2021-01-01 | Stop reason: SDUPTHER

## 2021-01-01 RX ADMIN — GUAIFENESIN AND DEXTROMETHORPHAN 5 ML: 100; 10 SYRUP ORAL at 19:15

## 2021-01-01 RX ADMIN — MORPHINE SULFATE 2 MG: 2 INJECTION, SOLUTION INTRAMUSCULAR; INTRAVENOUS at 18:41

## 2021-01-01 RX ADMIN — BETHANECHOL CHLORIDE 25 MG: 25 TABLET ORAL at 09:06

## 2021-01-01 RX ADMIN — MORPHINE SULFATE 2 MG: 2 INJECTION, SOLUTION INTRAMUSCULAR; INTRAVENOUS at 05:23

## 2021-01-01 RX ADMIN — SODIUM CHLORIDE, PRESERVATIVE FREE 10 ML: 5 INJECTION INTRAVENOUS at 10:22

## 2021-01-01 RX ADMIN — SODIUM CHLORIDE 30 ML: 9 INJECTION, SOLUTION INTRAVENOUS at 21:47

## 2021-01-01 RX ADMIN — REMDESIVIR 100 MG: 100 INJECTION, POWDER, LYOPHILIZED, FOR SOLUTION INTRAVENOUS at 22:15

## 2021-01-01 RX ADMIN — Medication 1 LOZENGE: at 22:30

## 2021-01-01 RX ADMIN — LABETALOL HYDROCHLORIDE 200 MG: 100 TABLET, FILM COATED ORAL at 20:57

## 2021-01-01 RX ADMIN — ONDANSETRON 4 MG: 2 INJECTION INTRAMUSCULAR; INTRAVENOUS at 20:44

## 2021-01-01 RX ADMIN — ONDANSETRON 4 MG: 2 INJECTION INTRAMUSCULAR; INTRAVENOUS at 20:39

## 2021-01-01 RX ADMIN — MORPHINE SULFATE 2 MG: 2 INJECTION, SOLUTION INTRAMUSCULAR; INTRAVENOUS at 02:00

## 2021-01-01 RX ADMIN — AMLODIPINE BESYLATE 2.5 MG: 2.5 TABLET ORAL at 08:12

## 2021-01-01 RX ADMIN — SODIUM CHLORIDE: 9 INJECTION, SOLUTION INTRAVENOUS at 10:31

## 2021-01-01 RX ADMIN — ROCURONIUM BROMIDE 50 MG: 10 SOLUTION INTRAVENOUS at 12:23

## 2021-01-01 RX ADMIN — Medication 1 LOZENGE: at 06:19

## 2021-01-01 RX ADMIN — SODIUM CHLORIDE, PRESERVATIVE FREE 10 ML: 5 INJECTION INTRAVENOUS at 21:24

## 2021-01-01 RX ADMIN — SODIUM CHLORIDE: 9 INJECTION, SOLUTION INTRAVENOUS at 15:38

## 2021-01-01 RX ADMIN — MORPHINE SULFATE 2 MG: 2 INJECTION, SOLUTION INTRAMUSCULAR; INTRAVENOUS at 10:19

## 2021-01-01 RX ADMIN — MORPHINE SULFATE 2 MG: 2 INJECTION, SOLUTION INTRAMUSCULAR; INTRAVENOUS at 21:06

## 2021-01-01 RX ADMIN — LABETALOL HYDROCHLORIDE 200 MG: 100 TABLET, FILM COATED ORAL at 09:23

## 2021-01-01 RX ADMIN — ONDANSETRON 4 MG: 2 INJECTION INTRAMUSCULAR; INTRAVENOUS at 12:32

## 2021-01-01 RX ADMIN — SODIUM CHLORIDE: 9 INJECTION, SOLUTION INTRAVENOUS at 08:11

## 2021-01-01 RX ADMIN — MORPHINE SULFATE 2 MG: 2 INJECTION, SOLUTION INTRAMUSCULAR; INTRAVENOUS at 06:18

## 2021-01-01 RX ADMIN — Medication 2000 UNITS: at 20:55

## 2021-01-01 RX ADMIN — BETHANECHOL CHLORIDE 25 MG: 25 TABLET ORAL at 15:56

## 2021-01-01 RX ADMIN — ENOXAPARIN SODIUM 40 MG: 40 INJECTION SUBCUTANEOUS at 10:25

## 2021-01-01 RX ADMIN — SODIUM CHLORIDE: 9 INJECTION, SOLUTION INTRAVENOUS at 20:59

## 2021-01-01 RX ADMIN — FAMOTIDINE 20 MG: 10 INJECTION, SOLUTION INTRAVENOUS at 08:11

## 2021-01-01 RX ADMIN — Medication 10 ML: at 08:54

## 2021-01-01 RX ADMIN — MORPHINE SULFATE 2 MG: 2 INJECTION, SOLUTION INTRAMUSCULAR; INTRAVENOUS at 17:22

## 2021-01-01 RX ADMIN — FAMOTIDINE 20 MG: 10 INJECTION, SOLUTION INTRAVENOUS at 21:24

## 2021-01-01 RX ADMIN — ONDANSETRON 4 MG: 2 INJECTION INTRAMUSCULAR; INTRAVENOUS at 23:47

## 2021-01-01 RX ADMIN — ONDANSETRON 4 MG: 2 INJECTION INTRAMUSCULAR; INTRAVENOUS at 17:11

## 2021-01-01 RX ADMIN — DEXAMETHASONE SODIUM PHOSPHATE 10 MG: 10 INJECTION INTRAMUSCULAR; INTRAVENOUS at 17:05

## 2021-01-01 RX ADMIN — SODIUM CHLORIDE, PRESERVATIVE FREE 10 ML: 5 INJECTION INTRAVENOUS at 20:00

## 2021-01-01 RX ADMIN — FAMOTIDINE 20 MG: 10 INJECTION, SOLUTION INTRAVENOUS at 09:23

## 2021-01-01 RX ADMIN — PROMETHAZINE HYDROCHLORIDE 12.5 MG: 25 TABLET ORAL at 15:25

## 2021-01-01 RX ADMIN — SODIUM CHLORIDE, PRESERVATIVE FREE 10 ML: 5 INJECTION INTRAVENOUS at 09:33

## 2021-01-01 RX ADMIN — AMLODIPINE BESYLATE 2.5 MG: 2.5 TABLET ORAL at 20:56

## 2021-01-01 RX ADMIN — AMLODIPINE BESYLATE 2.5 MG: 2.5 TABLET ORAL at 22:18

## 2021-01-01 RX ADMIN — BENZONATATE 100 MG: 100 CAPSULE ORAL at 18:50

## 2021-01-01 RX ADMIN — ENOXAPARIN SODIUM 40 MG: 40 INJECTION SUBCUTANEOUS at 08:12

## 2021-01-01 RX ADMIN — LABETALOL HYDROCHLORIDE 200 MG: 100 TABLET, FILM COATED ORAL at 08:11

## 2021-01-01 RX ADMIN — SODIUM CHLORIDE: 9 INJECTION, SOLUTION INTRAVENOUS at 22:20

## 2021-01-01 RX ADMIN — FAMOTIDINE 20 MG: 10 INJECTION, SOLUTION INTRAVENOUS at 20:57

## 2021-01-01 RX ADMIN — Medication 200 MCG: at 13:41

## 2021-01-01 RX ADMIN — ROCURONIUM BROMIDE 10 MG: 10 SOLUTION INTRAVENOUS at 13:27

## 2021-01-01 RX ADMIN — ENOXAPARIN SODIUM 150 MG: 150 INJECTION SUBCUTANEOUS at 21:38

## 2021-01-01 RX ADMIN — ONDANSETRON 4 MG: 2 INJECTION INTRAMUSCULAR; INTRAVENOUS at 20:00

## 2021-01-01 RX ADMIN — GUAIFENESIN AND DEXTROMETHORPHAN 5 ML: 100; 10 SYRUP ORAL at 10:04

## 2021-01-01 RX ADMIN — MORPHINE SULFATE 2 MG: 2 INJECTION, SOLUTION INTRAMUSCULAR; INTRAVENOUS at 04:24

## 2021-01-01 RX ADMIN — DEXAMETHASONE 6 MG: 4 TABLET ORAL at 08:53

## 2021-01-01 RX ADMIN — REMDESIVIR 100 MG: 100 INJECTION, POWDER, LYOPHILIZED, FOR SOLUTION INTRAVENOUS at 21:45

## 2021-01-01 RX ADMIN — ROCURONIUM BROMIDE 20 MG: 10 SOLUTION INTRAVENOUS at 12:56

## 2021-01-01 RX ADMIN — MORPHINE SULFATE 4 MG: 4 INJECTION, SOLUTION INTRAMUSCULAR; INTRAVENOUS at 20:40

## 2021-01-01 RX ADMIN — OXYCODONE HYDROCHLORIDE AND ACETAMINOPHEN 1 TABLET: 5; 325 TABLET ORAL at 19:51

## 2021-01-01 RX ADMIN — Medication 2000 UNITS: at 09:57

## 2021-01-01 RX ADMIN — AMLODIPINE BESYLATE 2.5 MG: 2.5 TABLET ORAL at 09:23

## 2021-01-01 RX ADMIN — MORPHINE SULFATE 2 MG: 2 INJECTION, SOLUTION INTRAMUSCULAR; INTRAVENOUS at 12:50

## 2021-01-01 RX ADMIN — AMLODIPINE BESYLATE 2.5 MG: 2.5 TABLET ORAL at 21:46

## 2021-01-01 RX ADMIN — PROPOFOL 200 MG: 10 INJECTION, EMULSION INTRAVENOUS at 12:23

## 2021-01-01 RX ADMIN — ENOXAPARIN SODIUM 40 MG: 40 INJECTION SUBCUTANEOUS at 08:30

## 2021-01-01 RX ADMIN — AMLODIPINE BESYLATE 2.5 MG: 2.5 TABLET ORAL at 10:22

## 2021-01-01 RX ADMIN — MORPHINE SULFATE 2 MG: 2 INJECTION, SOLUTION INTRAMUSCULAR; INTRAVENOUS at 21:46

## 2021-01-01 RX ADMIN — AMLODIPINE BESYLATE 5 MG: 5 TABLET ORAL at 08:30

## 2021-01-01 RX ADMIN — ONDANSETRON 4 MG: 2 INJECTION INTRAMUSCULAR; INTRAVENOUS at 14:35

## 2021-01-01 RX ADMIN — LABETALOL HYDROCHLORIDE 200 MG: 100 TABLET, FILM COATED ORAL at 09:40

## 2021-01-01 RX ADMIN — SODIUM CHLORIDE, PRESERVATIVE FREE 10 ML: 5 INJECTION INTRAVENOUS at 20:57

## 2021-01-01 RX ADMIN — MORPHINE SULFATE 2 MG: 2 INJECTION, SOLUTION INTRAMUSCULAR; INTRAVENOUS at 19:59

## 2021-01-01 RX ADMIN — FAMOTIDINE 20 MG: 10 INJECTION, SOLUTION INTRAVENOUS at 21:59

## 2021-01-01 RX ADMIN — FAMOTIDINE 20 MG: 10 INJECTION, SOLUTION INTRAVENOUS at 07:51

## 2021-01-01 RX ADMIN — SODIUM CHLORIDE: 9 INJECTION, SOLUTION INTRAVENOUS at 21:05

## 2021-01-01 RX ADMIN — DEXAMETHASONE 6 MG: 4 TABLET ORAL at 08:28

## 2021-01-01 RX ADMIN — AMLODIPINE BESYLATE 2.5 MG: 2.5 TABLET ORAL at 20:57

## 2021-01-01 RX ADMIN — DEXAMETHASONE 6 MG: 4 TABLET ORAL at 09:57

## 2021-01-01 RX ADMIN — Medication 10 ML: at 20:39

## 2021-01-01 RX ADMIN — REMDESIVIR 200 MG: 100 INJECTION, POWDER, LYOPHILIZED, FOR SOLUTION INTRAVENOUS at 22:30

## 2021-01-01 RX ADMIN — LABETALOL HYDROCHLORIDE 200 MG: 200 TABLET, FILM COATED ORAL at 21:38

## 2021-01-01 RX ADMIN — ENOXAPARIN SODIUM 40 MG: 40 INJECTION SUBCUTANEOUS at 07:50

## 2021-01-01 RX ADMIN — SODIUM CHLORIDE, SODIUM LACTATE, POTASSIUM CHLORIDE, AND CALCIUM CHLORIDE: .6; .31; .03; .02 INJECTION, SOLUTION INTRAVENOUS at 12:17

## 2021-01-01 RX ADMIN — GUAIFENESIN AND DEXTROMETHORPHAN 5 ML: 100; 10 SYRUP ORAL at 22:29

## 2021-01-01 RX ADMIN — ENOXAPARIN SODIUM 40 MG: 40 INJECTION SUBCUTANEOUS at 09:32

## 2021-01-01 RX ADMIN — HYDROMORPHONE HYDROCHLORIDE 0.5 MG: 1 INJECTION, SOLUTION INTRAMUSCULAR; INTRAVENOUS; SUBCUTANEOUS at 23:12

## 2021-01-01 RX ADMIN — ENOXAPARIN SODIUM 150 MG: 150 INJECTION SUBCUTANEOUS at 08:53

## 2021-01-01 RX ADMIN — SODIUM CHLORIDE: 9 INJECTION, SOLUTION INTRAVENOUS at 10:22

## 2021-01-01 RX ADMIN — BENZONATATE 100 MG: 100 CAPSULE ORAL at 06:19

## 2021-01-01 RX ADMIN — AMLODIPINE BESYLATE 2.5 MG: 2.5 TABLET ORAL at 20:59

## 2021-01-01 RX ADMIN — MORPHINE SULFATE 2 MG: 2 INJECTION, SOLUTION INTRAMUSCULAR; INTRAVENOUS at 16:26

## 2021-01-01 RX ADMIN — SODIUM BICARBONATE: 84 INJECTION, SOLUTION INTRAVENOUS at 10:02

## 2021-01-01 RX ADMIN — ENOXAPARIN SODIUM 150 MG: 150 INJECTION SUBCUTANEOUS at 20:25

## 2021-01-01 RX ADMIN — LABETALOL HYDROCHLORIDE 200 MG: 100 TABLET, FILM COATED ORAL at 20:59

## 2021-01-01 RX ADMIN — Medication 10 ML: at 21:38

## 2021-01-01 RX ADMIN — SODIUM CHLORIDE 30 ML: 9 INJECTION, SOLUTION INTRAVENOUS at 22:30

## 2021-01-01 RX ADMIN — SUGAMMADEX 200 MG: 100 INJECTION, SOLUTION INTRAVENOUS at 13:56

## 2021-01-01 RX ADMIN — AMLODIPINE BESYLATE 5 MG: 5 TABLET ORAL at 10:02

## 2021-01-01 RX ADMIN — ENOXAPARIN SODIUM 40 MG: 40 INJECTION SUBCUTANEOUS at 20:55

## 2021-01-01 RX ADMIN — MORPHINE SULFATE 2 MG: 2 INJECTION, SOLUTION INTRAMUSCULAR; INTRAVENOUS at 13:08

## 2021-01-01 RX ADMIN — LABETALOL HYDROCHLORIDE 200 MG: 200 TABLET, FILM COATED ORAL at 09:57

## 2021-01-01 RX ADMIN — OXYCODONE HYDROCHLORIDE AND ACETAMINOPHEN 1 TABLET: 5; 325 TABLET ORAL at 02:38

## 2021-01-01 RX ADMIN — DEXAMETHASONE SODIUM PHOSPHATE 8 MG: 4 INJECTION, SOLUTION INTRAMUSCULAR; INTRAVENOUS at 12:39

## 2021-01-01 RX ADMIN — ENOXAPARIN SODIUM 40 MG: 40 INJECTION SUBCUTANEOUS at 09:40

## 2021-01-01 RX ADMIN — MORPHINE SULFATE 2 MG: 2 INJECTION, SOLUTION INTRAMUSCULAR; INTRAVENOUS at 22:20

## 2021-01-01 RX ADMIN — AMLODIPINE BESYLATE 2.5 MG: 2.5 TABLET ORAL at 21:59

## 2021-01-01 RX ADMIN — Medication 10 ML: at 09:58

## 2021-01-01 RX ADMIN — Medication 10 ML: at 20:25

## 2021-01-01 RX ADMIN — SODIUM CHLORIDE: 9 INJECTION, SOLUTION INTRAVENOUS at 12:08

## 2021-01-01 RX ADMIN — OXYCODONE HYDROCHLORIDE AND ACETAMINOPHEN 1 TABLET: 5; 325 TABLET ORAL at 17:11

## 2021-01-01 RX ADMIN — OXYCODONE HYDROCHLORIDE AND ACETAMINOPHEN 1 TABLET: 5; 325 TABLET ORAL at 15:55

## 2021-01-01 RX ADMIN — DEXTROSE MONOHYDRATE 5 MCG/MIN: 50 INJECTION, SOLUTION INTRAVENOUS at 09:44

## 2021-01-01 RX ADMIN — IOPAMIDOL 75 ML: 755 INJECTION, SOLUTION INTRAVENOUS at 20:43

## 2021-01-01 RX ADMIN — MORPHINE SULFATE 2 MG: 2 INJECTION, SOLUTION INTRAMUSCULAR; INTRAVENOUS at 12:09

## 2021-01-01 RX ADMIN — SODIUM CHLORIDE: 9 INJECTION, SOLUTION INTRAVENOUS at 13:08

## 2021-01-01 RX ADMIN — FAMOTIDINE 20 MG: 10 INJECTION, SOLUTION INTRAVENOUS at 10:33

## 2021-01-01 RX ADMIN — OXYCODONE HYDROCHLORIDE AND ACETAMINOPHEN 1 TABLET: 5; 325 TABLET ORAL at 03:50

## 2021-01-01 RX ADMIN — ENOXAPARIN SODIUM 40 MG: 40 INJECTION SUBCUTANEOUS at 09:23

## 2021-01-01 RX ADMIN — LABETALOL HYDROCHLORIDE 5 MG: 5 INJECTION INTRAVENOUS at 16:31

## 2021-01-01 RX ADMIN — BENZONATATE 100 MG: 100 CAPSULE ORAL at 22:30

## 2021-01-01 RX ADMIN — MIDAZOLAM HYDROCHLORIDE 2 MG: 2 INJECTION, SOLUTION INTRAMUSCULAR; INTRAVENOUS at 12:17

## 2021-01-01 RX ADMIN — AMLODIPINE BESYLATE 2.5 MG: 2.5 TABLET ORAL at 21:38

## 2021-01-01 RX ADMIN — FAMOTIDINE 20 MG: 10 INJECTION, SOLUTION INTRAVENOUS at 12:08

## 2021-01-01 RX ADMIN — Medication 10 ML: at 08:29

## 2021-01-01 RX ADMIN — MORPHINE SULFATE 2 MG: 2 INJECTION, SOLUTION INTRAMUSCULAR; INTRAVENOUS at 09:33

## 2021-01-01 RX ADMIN — IOPAMIDOL 75 ML: 755 INJECTION, SOLUTION INTRAVENOUS at 17:52

## 2021-01-01 RX ADMIN — ONDANSETRON 4 MG: 2 INJECTION INTRAMUSCULAR; INTRAVENOUS at 15:24

## 2021-01-01 RX ADMIN — KETOROLAC TROMETHAMINE 15 MG: 30 INJECTION, SOLUTION INTRAMUSCULAR; INTRAVENOUS at 13:47

## 2021-01-01 RX ADMIN — LABETALOL HYDROCHLORIDE 200 MG: 200 TABLET, FILM COATED ORAL at 08:28

## 2021-01-01 RX ADMIN — LABETALOL HYDROCHLORIDE 200 MG: 100 TABLET, FILM COATED ORAL at 10:22

## 2021-01-01 RX ADMIN — FAMOTIDINE 20 MG: 10 INJECTION, SOLUTION INTRAVENOUS at 10:22

## 2021-01-01 RX ADMIN — HYDROXYZINE HYDROCHLORIDE 50 MG: 10 TABLET, FILM COATED ORAL at 06:46

## 2021-01-01 RX ADMIN — LABETALOL HYDROCHLORIDE 200 MG: 200 TABLET, FILM COATED ORAL at 20:55

## 2021-01-01 RX ADMIN — OXYCODONE HYDROCHLORIDE AND ACETAMINOPHEN 1 TABLET: 5; 325 TABLET ORAL at 21:38

## 2021-01-01 RX ADMIN — FAMOTIDINE 20 MG: 10 INJECTION, SOLUTION INTRAVENOUS at 09:40

## 2021-01-01 RX ADMIN — ONDANSETRON 4 MG: 2 INJECTION INTRAMUSCULAR; INTRAVENOUS at 09:09

## 2021-01-01 RX ADMIN — LABETALOL HYDROCHLORIDE 200 MG: 100 TABLET, FILM COATED ORAL at 07:49

## 2021-01-01 RX ADMIN — AMLODIPINE BESYLATE 2.5 MG: 2.5 TABLET ORAL at 21:25

## 2021-01-01 RX ADMIN — Medication 100 MCG: at 13:30

## 2021-01-01 RX ADMIN — LABETALOL HYDROCHLORIDE 200 MG: 100 TABLET, FILM COATED ORAL at 21:58

## 2021-01-01 RX ADMIN — SODIUM CHLORIDE 1000 ML: 9 INJECTION, SOLUTION INTRAVENOUS at 00:24

## 2021-01-01 RX ADMIN — FAMOTIDINE 20 MG: 10 INJECTION, SOLUTION INTRAVENOUS at 08:30

## 2021-01-01 RX ADMIN — CEFAZOLIN SODIUM 3000 MG: 10 INJECTION, POWDER, FOR SOLUTION INTRAVENOUS at 12:18

## 2021-01-01 RX ADMIN — FAMOTIDINE 20 MG: 10 INJECTION, SOLUTION INTRAVENOUS at 20:59

## 2021-01-01 RX ADMIN — FAMOTIDINE 20 MG: 10 INJECTION, SOLUTION INTRAVENOUS at 19:56

## 2021-01-01 RX ADMIN — MORPHINE SULFATE 2 MG: 2 INJECTION, SOLUTION INTRAMUSCULAR; INTRAVENOUS at 00:56

## 2021-01-01 RX ADMIN — Medication 2000 UNITS: at 08:28

## 2021-01-01 RX ADMIN — LABETALOL HYDROCHLORIDE 200 MG: 100 TABLET, FILM COATED ORAL at 22:18

## 2021-01-01 RX ADMIN — ACETAMINOPHEN 650 MG: 325 TABLET ORAL at 02:52

## 2021-01-01 RX ADMIN — LIDOCAINE HYDROCHLORIDE 60 MG: 20 INJECTION, SOLUTION INFILTRATION; PERINEURAL at 12:23

## 2021-01-01 RX ADMIN — ONDANSETRON 4 MG: 2 INJECTION INTRAMUSCULAR; INTRAVENOUS at 23:39

## 2021-01-01 RX ADMIN — ENOXAPARIN SODIUM 150 MG: 150 INJECTION SUBCUTANEOUS at 08:28

## 2021-01-01 RX ADMIN — DEXAMETHASONE 6 MG: 4 TABLET ORAL at 20:56

## 2021-01-01 RX ADMIN — AMLODIPINE BESYLATE 2.5 MG: 2.5 TABLET ORAL at 09:40

## 2021-01-01 RX ADMIN — FAMOTIDINE 20 MG: 10 INJECTION, SOLUTION INTRAVENOUS at 19:59

## 2021-01-01 RX ADMIN — BETHANECHOL CHLORIDE 25 MG: 25 TABLET ORAL at 16:02

## 2021-01-01 RX ADMIN — LABETALOL HYDROCHLORIDE 200 MG: 100 TABLET, FILM COATED ORAL at 21:24

## 2021-01-01 RX ADMIN — SODIUM CHLORIDE, PRESERVATIVE FREE 10 ML: 5 INJECTION INTRAVENOUS at 00:25

## 2021-01-01 RX ADMIN — LABETALOL HYDROCHLORIDE 5 MG: 5 INJECTION INTRAVENOUS at 15:21

## 2021-01-01 RX ADMIN — ENOXAPARIN SODIUM 150 MG: 150 INJECTION SUBCUTANEOUS at 09:58

## 2021-01-01 RX ADMIN — Medication 2000 UNITS: at 08:53

## 2021-01-01 RX ADMIN — MORPHINE SULFATE 2 MG: 2 INJECTION, SOLUTION INTRAMUSCULAR; INTRAVENOUS at 23:50

## 2021-01-01 RX ADMIN — FENTANYL CITRATE 50 MCG: 50 INJECTION INTRAMUSCULAR; INTRAVENOUS at 12:50

## 2021-01-01 RX ADMIN — SODIUM CHLORIDE: 9 INJECTION, SOLUTION INTRAVENOUS at 04:50

## 2021-01-01 RX ADMIN — ONDANSETRON 4 MG: 2 INJECTION INTRAMUSCULAR; INTRAVENOUS at 02:20

## 2021-01-01 RX ADMIN — LABETALOL HYDROCHLORIDE 200 MG: 200 TABLET, FILM COATED ORAL at 08:53

## 2021-01-01 RX ADMIN — ENOXAPARIN SODIUM 150 MG: 150 INJECTION SUBCUTANEOUS at 21:45

## 2021-01-01 RX ADMIN — SODIUM CHLORIDE, PRESERVATIVE FREE 10 ML: 5 INJECTION INTRAVENOUS at 22:20

## 2021-01-01 RX ADMIN — BENZONATATE 100 MG: 100 CAPSULE ORAL at 08:53

## 2021-01-01 RX ADMIN — EPINEPHRINE 0.5 MG: 0.1 INJECTION, SOLUTION ENDOTRACHEAL; INTRACARDIAC; INTRAVENOUS at 09:15

## 2021-01-01 RX ADMIN — GLYCOPYRROLATE 0.2 MG: 0.2 INJECTION, SOLUTION INTRAMUSCULAR; INTRAVENOUS at 13:29

## 2021-01-01 RX ADMIN — Medication 10 ML: at 21:45

## 2021-01-01 RX ADMIN — AMLODIPINE BESYLATE 2.5 MG: 2.5 TABLET ORAL at 07:49

## 2021-01-01 RX ADMIN — SODIUM PHOSPHATE, DIBASIC AND SODIUM PHOSPHATE, MONOBASIC 1 ENEMA: 3.5; 9.5 ENEMA RECTAL at 06:18

## 2021-01-01 RX ADMIN — AMLODIPINE BESYLATE 2.5 MG: 2.5 TABLET ORAL at 09:58

## 2021-01-01 RX ADMIN — OXYCODONE HYDROCHLORIDE AND ACETAMINOPHEN 1 TABLET: 5; 325 TABLET ORAL at 22:29

## 2021-01-01 RX ADMIN — LABETALOL HYDROCHLORIDE 200 MG: 200 TABLET, FILM COATED ORAL at 20:24

## 2021-01-01 RX ADMIN — REMDESIVIR 100 MG: 100 INJECTION, POWDER, LYOPHILIZED, FOR SOLUTION INTRAVENOUS at 22:30

## 2021-01-01 RX ADMIN — FENTANYL CITRATE 100 MCG: 50 INJECTION INTRAMUSCULAR; INTRAVENOUS at 12:23

## 2021-01-01 RX ADMIN — AMLODIPINE BESYLATE 2.5 MG: 2.5 TABLET ORAL at 20:24

## 2021-01-01 RX ADMIN — MORPHINE SULFATE 2 MG: 2 INJECTION, SOLUTION INTRAMUSCULAR; INTRAVENOUS at 14:07

## 2021-01-01 RX ADMIN — AMLODIPINE BESYLATE 2.5 MG: 2.5 TABLET ORAL at 08:28

## 2021-01-01 RX ADMIN — AMLODIPINE BESYLATE 2.5 MG: 2.5 TABLET ORAL at 08:53

## 2021-01-01 RX ADMIN — OXYCODONE HYDROCHLORIDE AND ACETAMINOPHEN 1 TABLET: 5; 325 TABLET ORAL at 08:53

## 2021-01-01 RX ADMIN — FAMOTIDINE 20 MG: 10 INJECTION, SOLUTION INTRAVENOUS at 21:46

## 2021-01-01 RX ADMIN — LABETALOL HYDROCHLORIDE 200 MG: 100 TABLET, FILM COATED ORAL at 21:46

## 2021-01-01 ASSESSMENT — PULMONARY FUNCTION TESTS
PIF_VALUE: 22
PIF_VALUE: 5
PIF_VALUE: 21
PIF_VALUE: 32
PIF_VALUE: 29
PIF_VALUE: 24
PIF_VALUE: 11
PIF_VALUE: 28
PIF_VALUE: 26
PIF_VALUE: 28
PIF_VALUE: 30
PIF_VALUE: 31
PIF_VALUE: 29
PIF_VALUE: 29
PIF_VALUE: 28
PIF_VALUE: 29
PIF_VALUE: 1
PIF_VALUE: 1
PIF_VALUE: 33
PIF_VALUE: 33
PIF_VALUE: 34
PIF_VALUE: 0
PIF_VALUE: 33
PIF_VALUE: 19
PIF_VALUE: 33
PIF_VALUE: 23
PIF_VALUE: 18
PIF_VALUE: 29
PIF_VALUE: 18
PIF_VALUE: 33
PIF_VALUE: 28
PIF_VALUE: 22
PIF_VALUE: 18
PIF_VALUE: 33
PIF_VALUE: 27
PIF_VALUE: 33
PIF_VALUE: 20
PIF_VALUE: 17
PIF_VALUE: 30
PIF_VALUE: 0
PIF_VALUE: 29
PIF_VALUE: 29
PIF_VALUE: 33
PIF_VALUE: 33
PIF_VALUE: 18
PIF_VALUE: 0
PIF_VALUE: 4
PIF_VALUE: 17
PIF_VALUE: 33
PIF_VALUE: 30
PIF_VALUE: 29
PIF_VALUE: 33
PIF_VALUE: 33
PIF_VALUE: 31
PIF_VALUE: 22
PIF_VALUE: 19
PIF_VALUE: 4
PIF_VALUE: 30
PIF_VALUE: 17

## 2021-01-01 ASSESSMENT — PAIN SCALES - GENERAL
PAINLEVEL_OUTOF10: 8
PAINLEVEL_OUTOF10: 6
PAINLEVEL_OUTOF10: 6
PAINLEVEL_OUTOF10: 7
PAINLEVEL_OUTOF10: 10
PAINLEVEL_OUTOF10: 7
PAINLEVEL_OUTOF10: 0
PAINLEVEL_OUTOF10: 8
PAINLEVEL_OUTOF10: 7
PAINLEVEL_OUTOF10: 0
PAINLEVEL_OUTOF10: 5
PAINLEVEL_OUTOF10: 2
PAINLEVEL_OUTOF10: 0
PAINLEVEL_OUTOF10: 2
PAINLEVEL_OUTOF10: 8
PAINLEVEL_OUTOF10: 9
PAINLEVEL_OUTOF10: 7
PAINLEVEL_OUTOF10: 7
PAINLEVEL_OUTOF10: 4
PAINLEVEL_OUTOF10: 3
PAINLEVEL_OUTOF10: 7
PAINLEVEL_OUTOF10: 9
PAINLEVEL_OUTOF10: 10
PAINLEVEL_OUTOF10: 8
PAINLEVEL_OUTOF10: 4
PAINLEVEL_OUTOF10: 6
PAINLEVEL_OUTOF10: 2
PAINLEVEL_OUTOF10: 8
PAINLEVEL_OUTOF10: 7
PAINLEVEL_OUTOF10: 6
PAINLEVEL_OUTOF10: 6
PAINLEVEL_OUTOF10: 7

## 2021-01-01 ASSESSMENT — PAIN DESCRIPTION - PAIN TYPE
TYPE: ACUTE PAIN

## 2021-01-01 ASSESSMENT — PAIN DESCRIPTION - LOCATION
LOCATION: ABDOMEN

## 2021-01-01 ASSESSMENT — PAIN DESCRIPTION - DESCRIPTORS
DESCRIPTORS: ACHING
DESCRIPTORS: CRAMPING;DISCOMFORT
DESCRIPTORS: CRAMPING;SHARP;STABBING
DESCRIPTORS: CRAMPING;ACHING
DESCRIPTORS: DISCOMFORT
DESCRIPTORS: ACHING

## 2021-01-01 ASSESSMENT — ENCOUNTER SYMPTOMS
CHEST TIGHTNESS: 0
VOMITING: 1
NAUSEA: 1
SHORTNESS OF BREATH: 0
CONSTIPATION: 0
DIARRHEA: 0
ABDOMINAL PAIN: 1
BACK PAIN: 0
COUGH: 0

## 2021-01-01 ASSESSMENT — PAIN DESCRIPTION - FREQUENCY
FREQUENCY: CONTINUOUS
FREQUENCY: CONTINUOUS

## 2021-01-01 ASSESSMENT — PAIN DESCRIPTION - ORIENTATION
ORIENTATION: MID
ORIENTATION: RIGHT

## 2021-01-01 ASSESSMENT — PAIN - FUNCTIONAL ASSESSMENT: PAIN_FUNCTIONAL_ASSESSMENT: ACTIVITIES ARE NOT PREVENTED

## 2021-01-01 ASSESSMENT — PAIN DESCRIPTION - PROGRESSION
CLINICAL_PROGRESSION: NOT CHANGED
CLINICAL_PROGRESSION: GRADUALLY WORSENING

## 2021-01-01 ASSESSMENT — PAIN DESCRIPTION - ONSET: ONSET: ON-GOING

## 2021-01-05 NOTE — FLOWSHEET NOTE
[facility], [address]  Phone: [phone]  Fax [phone]    Physical Therapy Daily Treatment Note  Date:  2021    Patient Name:  Stefania Hardy    :  1955  MRN: [id number]  Restrictions/Precautions:    Medical/Treatment Diagnosis Information:  · Diagnosis: Primary OA of R knee (M17.11), Chronic pain R knee (M25.561), Class III Morbid Obesity (E66.01)  · Treatment Diagnosis: R knee pain (M25.561), Difficulty walking (R26.2), Weakness (D08.9)  Insurance/Certification information:  PT Insurance Information: [facility]/  Physician Information:  Referring Practitioner: Dr. Jordan Vizcaino of care signed (Y/N):     Date of Patient follow up with Physician:     G-Code (if applicable): LEFS 36/ (81%)   Date G-Code Applied:  21       Progress Note: [x]  Yes  []  No  Next due by: Visit #10       Latex Allergy:  [x]NO      []YES  Preferred Language for Healthcare:   [x]English       []other       Visit # Insurance Allowable Auth Required   In-person 1  []  Yes []  No    Telehealth   []  Yes []  No    Total        Pain level:  0-10/10     SUBJECTIVE:  See eval    OBJECTIVE: See eval   Observation:    Test measurements:      RESTRICTIONS/PRECAUTIONS: L ankle fusion Oct 2020    Exercises/Interventions:     Therapeutic Ex Sets/reps Notes        Seated HS stretch Reviewed for HEP HEP   Seated calf stretch Reviewed for HEP HEP   Seated QS BLEs Reviewed for HEP HEP   SLR FLX supine Reviewed for HEP HEP   Seated heelslide with strap Reviewed for HEP HEP   LAQ  Reviewed for HEP HEP                  Pt/spouse ed 15' HEP, aquatic therapy to facilitate wt loss, not a surgical candidate at this time per Dr. Mallika Hickey guidelines and from a therapy standpoint d/t balance deficits and severe risk for falls  Pt frustrated by education stating he just needs the joint replaced, educated to reach out to Dr. Javier Zambrano with further questions                             Manual Intervention                                   NMR re-education                                                      Therapeutic Exercise and NMR EXR  [x] (06923) Provided verbal/tactile cueing for activities related to strengthening, flexibility, endurance, ROM for improvements in LE, proximal hip, and core control with self care, mobility, lifting, ambulation.  [] (36296) Provided verbal/tactile cueing for activities related to improving balance, coordination, kinesthetic sense, posture, motor skill, proprioception  to assist with LE, proximal hip, and core control in self care, mobility, lifting, ambulation and eccentric single leg control.      NMR and Therapeutic Activities:    [] (00878 or 85980) Provided verbal/tactile cueing for activities related to improving balance, coordination, kinesthetic sense, posture, motor skill, proprioception and motor activation to allow for proper function of core, proximal hip and LE with self care and ADLs  [] (89202) Gait Re-education- Provided training and instruction to the patient for proper LE, core and proximal hip recruitment and positioning and eccentric body weight control with ambulation re-education including up and down stairs     Home Exercise Program:    [x] (66640) Reviewed/Progressed HEP activities related to strengthening, flexibility, endurance, ROM of core, proximal hip and LE for functional self-care, mobility, lifting and ambulation/stair navigation   [] (95121)Reviewed/Progressed HEP activities related to improving balance, coordination, kinesthetic sense, posture, motor skill, proprioception of core, proximal hip and LE for self care, mobility, lifting, and ambulation/stair navigation      Manual Treatments:  PROM / STM / Oscillations-Mobs:  G-I, II, III, IV (PA's, Inf., Post.)  [] (83194) Provided manual therapy to mobilize LE, proximal hip and/or LS spine soft tissue/joints for the purpose of modulating pain, promoting relaxation,  increasing ROM, reducing/eliminating soft tissue swelling/inflammation/restriction, improving soft tissue extensibility and allowing for proper ROM for normal function with self care, mobility, lifting and ambulation. Modalities:      Charges:  Timed Code Treatment Minutes: 15   Total Treatment Minutes: 40     [] EVAL (LOW) 99972 (typically 20 minutes face-to-face)  [x] EVAL (MOD) 87706 (typically 30 minutes face-to-face)  [] EVAL (HIGH) 72498 (typically 45 minutes face-to-face)  [] RE-EVAL     [x] VN(21981) x1     [] IONTO  [] NMR (15454) x     [] VASO  [] Manual (33643) x      [] Other:  [] TA x      [] Mech Traction (61592)  [] ES(attended) (15321)      [] ES (un) (18010):     GOALS:   Patient stated goal: Be ready for surgery     Therapist goals for Patient:   Short Term Goals: To be achieved in:8  weeks  1. Independent in HEP and progression per patient tolerance, in order to prevent re-injury. []? Progressing: []? Met: []? Not Met: []? Adjusted  2. Patient will have a decrease in pain to facilitate improvement in movement, function, and ADLs as indicated by Functional Deficits. []? Progressing: []? Met: []? Not Met: []? Adjusted     Long term goals:  1. Pt will improve AROM at L knee to 0-120 deg in order to facilitate improvement in movement, function and ADLs  []? Progressing: []? Met: []? Not Met: []? Adjusted  2. Pt will improve TUG to <20 sec for decreased risk of falls  []? Progressing: []? Met: []? Not Met: []? Adjusted  3. Pt will ambulate at least 150 ft w/ LRAD independently  []? Progressing: []? Met: []? Not Met: []? Adjusted    Progression Towards Functional goals:  [] Patient is progressing as expected towards functional goals listed. [] Progression is slowed due to complexities listed. [] Progression has been slowed due to co-morbidities.   [x] Plan just implemented, too soon to assess goals progression  [] Other:     ASSESSMENT:  See

## 2021-01-05 NOTE — PLAN OF CARE
[facility], [address]  Phone: [phone]  Fax [phone]     Physical Therapy Certification    Dear Referring Practitioner: Dr. Bryon Richard,    We had the pleasure of evaluating the following patient for physical therapy services at [address] [place] [address]. A summary of our findings can be found in the initial assessment below. This includes our plan of care. If you have any questions or concerns regarding these findings, please do not hesitate to contact me at the office phone number checked above. Thank you for the referral.       Physician Signature:_______________________________Date:__________________  By signing above (or electronic signature), therapists plan is approved by physician    Patient: Ela Dunn   : 1955   MRN: [id number]  Referring Physician: Referring Practitioner: Dr. Bryon Richard      Evaluation Date: 2021      Medical Diagnosis Information:  Diagnosis: Primary OA of R knee (M17.11), Chronic pain R knee (M25.561), Class III Morbid Obesity (E66.01)   Treatment Diagnosis: R knee pain (M25.561), Difficulty walking (R26.2), Weakness (R53.1)                                         Insurance information: PT Insurance Information: [facility]/     Precautions/ Contra-indications: morbid obesity, HTN, L TSA  and L ankle fusion   Latex Allergy:  [x]NO      []YES  Preferred Language for Healthcare:   [x]English       []other:    SUBJECTIVE: Patient stated complaint: Pt reports he had an ankle fusion in October, left shoulder surgery in July. Pt reports his R knee has been bothering him for a while, after his surgery he was doing a stand pivot transfer and his right knee gave out on him. Reports he has popping and cracking in the knee with activity. Reports he has not done any PT for his ankle, 8 wks NWB.       Relevant Medical History: L should replacement 2020, L ankle fusion Oct 2020  Functional Disability Index: LEFS 15/80 (81%)    Pain Scale: 0-10/10  Easing factors: cortisone injection  Provocative factors: Standing, positional changes, stairs, walking, self care    Type: []Constant   [x]Intermittent  []Radiating []Localized []other:     Numbness/Tingling: denies    Occupation/School:     Living Status/Prior Level of Function: modified independent to mod A for ADLs    OBJECTIVE:              Reflexes/Myotomes:   [x]Dermatomes/Myotomes intact    []Reflexes equal and normal bilaterally   []Other:    Joint mobility:    []Normal    [x]Hypo   []Hyper    Palpation: tender around the knee    Functional Mobility/Transfers: slow and very unsteady with transfers- requires SBA up to modA for stability. Poor control for sit <>stand, limited ambulation    Posture: poor posture d/t body habitus    Gait: (include devices/WB status) Pt attempted ambulation with hemiwalker- very unsteady and unsafe. Pt instructed to stop and only use a standard walker at home. W/ standard walker ambulates with step to pattern, walker advanced too far forward. Orthopedic Special Tests: see above                       [x] Patient history, allergies, meds reviewed. Medical chart reviewed. See intake form. Review Of Systems (ROS):  [x]Performed Review of systems (Integumentary, CardioPulmonary, Neurological) by intake and observation. Intake form has been scanned into medical record. Patient has been instructed to contact their primary care physician regarding ROS issues if not already being addressed at this time.       Co-morbidities/Complexities (which will affect course of rehabilitation):  []None           Arthritic conditions   []Rheumatoid arthritis (M05.9)  [x]Osteoarthritis (M19.91)   Cardiovascular conditions   [x]Hypertension (I10)  []Hyperlipidemia (E78.5)  []Angina pectoris (I20)  []Atherosclerosis (I70)   Musculoskeletal conditions   []Disc pathology   []Congenital spine work   [x]Reduced ability to perform lifting, carrying tasks   [x]Reduced ability to squat   [x]Reduced ability to forward bend   [x]Reduced ability to ambulate prolonged functional periods/distances/surfaces   [x]Reduced ability to ascend/descend stairs   [x]Reduced ability to run, hop, cut or jump   []other:    Participation Restrictions   [x]Reduced participation in self care activities   [x]Reduced participation in home management activities   [x]Reduced participation in work activities   [x]Reduced participation in social activities. [x]Reduced participation in sport/recreation activities. Classification :    []Signs/symptoms consistent with post-surgical status including decreased ROM, strength and function.    []Signs/symptoms consistent with joint sprain/strain   []Signs/symptoms consistent with patella-femoral syndrome   [x]Signs/symptoms consistent with knee OA/hip OA   []Signs/symptoms consistent with internal derangement of knee/Hip   []Signs/symptoms consistent with functional hip weakness/NMR control      []Signs/symptoms consistent with tendinitis/tendinosis    []signs/symptoms consistent with pathology which may benefit from Dry needling      []other:      Prognosis/Rehab Potential:      []Excellent   []Good    []Fair   [x]Poor    Tolerance of evaluation/treatment:    []Excellent   []Good    []Fair   [x]Poor  Physical Therapy Evaluation Complexity Justification  [x] A history of present problem with:  [] no personal factors and/or comorbidities that impact the plan of care;  []1-2 personal factors and/or comorbidities that impact the plan of care  [x]3 personal factors and/or comorbidities that impact the plan of care  [x] An examination of body systems using standardized tests and measures addressing any of the following: body structures and functions (impairments), activity limitations, and/or participation restrictions;:  [] a total of 1-2 or more elements   [x] a total of 3 or more elements [] a total of 4 or more elements   [x] A clinical presentation with:  [] stable and/or uncomplicated characteristics   [x] evolving clinical presentation with changing characteristics  [] unstable and unpredictable characteristics;   [x] Clinical decision making of [] low, [x] moderate, [] high complexity using standardized patient assessment instrument and/or measurable assessment of functional outcome. [] EVAL (LOW) 00826 (typically 20 minutes face-to-face)  [x] EVAL (MOD) 21590 (typically 30 minutes face-to-face)  [] EVAL (HIGH) 52055 (typically 45 minutes face-to-face)  [] RE-EVAL       PLAN:   Frequency/Duration:  1-2x/wk 6-8 wks (RECOMMENDING AQUATICS AT THIS TIME D/T POOR MOBILITY)  Interventions:  [x]  Therapeutic exercise including: strength training, ROM, for Lower extremity and core   [x]  NMR activation and proprioception for LE, Glutes and Core   [x]  Manual therapy as indicated for LE, Hip and spine to include: Dry Needling/IASTM, STM, PROM, Gr I-IV mobilizations, manipulation. [x] Modalities as needed that may include: thermal agents, E-stim, Biofeedback, US, iontophoresis as indicated  [x] Patient education on joint protection, postural re-education, activity modification, progression of HEP. [] Patient appears to be functionally prepared for surgery and will continue with a home exercise program until surgery date.   [] Patient will be ready for surgery with a short period of structured physical therapy  [x] Patient does not appear to be functionally ready for surgery and requires a prolonged  structure program    At this point, it appears patient's post-operative discharge status from hospital should be:   [] Home with home exercise program and outpatient PT  [x] Home with home health care and   [x] Skilled nursing facility/ Inpatient Rehab    HEP instruction: (see scanned forms)    GOALS:  Patient stated goal: Be ready for surgery    Therapist goals for Patient:   Short Term Goals: To be achieved in:8  weeks  1. Independent in HEP and progression per patient tolerance, in order to prevent re-injury. [] Progressing: [] Met: [] Not Met: [] Adjusted  2. Patient will have a decrease in pain to facilitate improvement in movement, function, and ADLs as indicated by Functional Deficits. [] Progressing: [] Met: [] Not Met: [] Adjusted    Long term goals:  1. Pt will improve AROM at L knee to 0-120 deg in order to facilitate improvement in movement, function and ADLs  [] Progressing: [] Met: [] Not Met: [] Adjusted  2. Pt will improve TUG to <20 sec for decreased risk of falls  [] Progressing: [] Met: [] Not Met: [] Adjusted  3.  Pt will ambulate at least 150 ft w/ LRAD independently  [] Progressing: [] Met: [] Not Met: [] Adjusted    Electronically signed by:  Queenie Hutchinson PT,DPT [id number]

## 2021-01-16 NOTE — H&P
Hospital Medicine History & Physical      PCP: Sanjeev Rico MD    Date of Admission: 1/15/2021    Date of Service: Pt seen/examined on 1/15/2021  Pt seen/examined face to face on and admitted as inpatient with expected LOS greater than two midnights due to medical therapy    Chief Complaint:    Chief Complaint   Patient presents with    Abdominal Pain     patient reports vomiting since this AM, reports that has pain at 8 on 1-10 scale. Hx of 4 bowel blockage in past         History Of Present Illness:      72 y.o. male who presented to [facility] with past medical history of hypertension, arthritis, sleep apnea doesn't tolerate CPAP, SBO multiple times status post expiratory surgery with no adhesions, negative colonoscopy 2 years ago, and no prior history of hernias. Patient reports that he came to the ED due to having abdominal pain that is continue progressively worsening with nausea and vomiting associated that started at 10 AM this morning after eating and banana and drinking coffee. Patient reported yesterday he did eat [place] food. Patient reported her otherwise haven't been any changes in his diet and doesn't know why he keeps getting the SBO's for 5 time now. Patient admitted to having his father having colon cancer age [de-identified]. Patient denied ever having a biopsy or an EGD previously. No known alleviating or exacerbating factor for the SBO. Patient does admit of abdominal pain worsening with food intake with nonspecific fluid on mind that he could identify.   No smoking drinking or drugs    Past Medical History:          Diagnosis Date    Arthritis     Hypertension     Knee pain     PONV (postoperative nausea and vomiting)     Sleep apnea     did not tolerate CPAP       Past Surgical History:          Procedure Laterality Date    ARTHRODESIS ANKLE Left 10/5/2020    LEFT TIBIOTALOCALCANEAL FUSION AND FIBULAR OSTECTOMY -BLOCK- -SLEEP APNEA- performed by Ryan Herrera MD at [facility] Disease Mother     Seizures Mother     High Blood Pressure Father     Kidney Disease Father     Cancer Father         colon,bone       REVIEW OF SYSTEMS:     Constitutional:  No Fever, No Chills, No Night Sweats  ENT/Mouth:  No Nasal Congestion,  No Hoarseness, No new mouth lesion  Eyes:  No Eye Pain, No Redness, No Discharge  Cardiovascular:  No Chest Pain, No Orthopnea, No Palpitations  Respiratory:  No Cough, No Sputum, No Dyspnea  Gastrointestinal: + Vomiting, No Diarrhea, + abdominal pain  Genitourinary: No Urinary Frequency, No Hematuria, No Urinary pain  Musculoskeletal:  No worsening Arthralgias, No worsening Myalgias  Skin:  No new Skin Lesions, No new skin rash  Neuro:  No new weakness, No new numbness. Psych:  No suicial ideation, No Violence ideation    PHYSICAL EXAM PERFORMED:    BP (!) 162/88   Pulse 88   Temp 97.4 °F (36.3 °C) (Oral)   Resp 18   Ht 6' 1\" (1.854 m)   Wt (!) 320 lb (145.2 kg)   SpO2 94%   BMI 42.22 kg/m²     General appearance:  mild acute distress, appears older than stated age  HEENT:   atraumatic, sclera anicteric, Conjunctivae clear. Neck: Supple,Trachea midline, no goiter  Respiratory:minimal accessory muscle usage, Normal respiratory effort. Clear to auscultation, bilaterally without wheezing  Cardiovascular:  Regular rate and rhythm, capillary refill 2 seconds  Abdomen: Soft, minimal-tender, non-distended with hypoactive bowel sounds. Musculoskeletal:  No clubbing, cyanosis. trace edema LE bilaterally. Skin: turgor normal.  No new rashes or lesions. Neurologic: Alert and oriented x4, no new focal sensory/motor deficits. Labs:     Recent Labs     01/15/21  2000   WBC 11.8*   HGB 16.0   HCT 48.5        Recent Labs     01/15/21  2000      K 4.6      CO2 28   BUN 18   CREATININE 0.8   CALCIUM 9.7     Recent Labs     01/15/21  2000   AST 23   ALT 32   BILITOT 0.7   ALKPHOS 128     No results for input(s): INR in the last 72 hours.   No results for input(s): Towana Jimbo in the last 72 hours. Urinalysis:      Lab Results   Component Value Date    NITRU Negative 05/26/2020    BLOODU Negative 05/26/2020    SPECGRAV >=1.030 05/26/2020    GLUCOSEU Negative 05/26/2020       Radiology:         EKG:  I have reviewed the EKG with the following interpretation:   Normal sinus rhythm QTc 419    XR ABDOMEN (KUB) (SINGLE AP VIEW)   Final Result   The enteric tube is in good position in the stomach. CT ABDOMEN PELVIS W IV CONTRAST Additional Contrast? None   Final Result   1. Small-bowel obstruction with transition point in the left lower quadrant   at the level of the mid to distal jejunum. 2. Borderline aneurysmal dilatation of the aortic root appears similar to   prior exam.  Outpatient follow-up recommended. 3. Other findings as described. ASSESSMENT AND PLAN:    Active Hospital Problems    Diagnosis Date Noted    [facility] [K56.609] 05/27/2020     1. Small bowel obstruction:  Etiology unclear at this time acute reported prior history of exploratory laparotomy with no evidence of adhesions, no history of hernias, colonoscopy 2 years ago was normal.  Patient does endorse positive family history of colon cancer with his father at age 8os. Patient doesn't report any association with food that he could identify as his meals earlier diet doesn't change. Ordered ESR, CRP check for inflammatory response also unclear if patient might be having anatomical abnormality or motility.   Patient did start taking stool softener per previous recommendations with no response  Surgery consulted    Chronic medical conditions:    Hypertension:Held home med., IV as needed  BELEN: Doesn't tolerate CPAP  Arthritis: Held home meds  Class III obesity: Education counseling    Diet: NPO except meds ordered    DVT Prophylaxis: lovenox    Dispo:   Expected LOS greater than two Maday 1153, DO

## 2021-01-16 NOTE — CARE COORDINATION
CASE MANAGEMENT INITIAL ASSESSMENT      Reviewed chart and completed assessment via telephone with:patient  Explained Case Management role/services. Primary contact information:[facility] Decision Maker :   Primary Decision Maker: Christina Berg Spouse - [phone]          Can this person be reached and be able to respond quickly, such as within a few minutes or hours? Yes      Admit date/status:1/15/21  Diagnosis:SBO   Is this a Readmission?:  No      Insurance:[place]   Precert required for SNF: Yes       3 night stay required: No    Living arrangements, Adls, care needs, prior to admission:lives in story home with son dtr and wife. Transportation:pvt     Durable Medical Equipment at home:  Walker_x_Cane__RTS__ BSC__Shower Chair__  02__ HHN__ CPAP__  BiPap__  Hospital Bed__ W/C___ Other__________    Services in the home and/or outpatient, prior to admission:none current [facility] in the past    Dialysis Facility (if applicable)   · Name:  · Address:  · Dialysis Schedule:  · Phone:  · Fax:    PT/OT recs:none    Hospital Exemption Notification (HEN):needed for SNF    Barriers to discharge:none    Plan/comments:spoke  With patient. Reported from home with family. Ambulatory with walker now due to knee issues. [facility] . Does not feel should need at this time. However was an ok company if needed. CM will follow for needs.  Lemuel Cueva RN       [facility] on chart for MD warren

## 2021-01-16 NOTE — PROGRESS NOTES
Hospitalist Progress Note      PCP: Lola Palencia MD    Date of Admission: 1/15/2021    Chief Complaint: Abdominal Pain     Subjective: no new c/o. Medications:  Reviewed    Infusion Medications    sodium chloride 75 mL/hr at 01/16/21 1208     Scheduled Medications    enoxaparin  40 mg Subcutaneous Daily    amLODIPine  5 mg Oral Daily    famotidine (PEPCID) injection  20 mg Intravenous BID    sodium chloride flush  10 mL Intravenous 2 times per day     PRN Meds: labetalol, morphine, sodium chloride flush, promethazine **OR** ondansetron, acetaminophen **OR** acetaminophen      Intake/Output Summary (Last 24 hours) at 1/16/2021 1232  Last data filed at 1/16/2021 0939  Gross per 24 hour   Intake 10 ml   Output 775 ml   Net -765 ml       Physical Exam Performed:    BP (!) 154/88   Pulse 86   Temp 98.2 °F (36.8 °C) (Oral)   Resp 18   Ht 6' 1\" (1.854 m)   Wt (!) 320 lb (145.2 kg)   SpO2 91%   BMI 42.22 kg/m²     General appearance: No apparent distress, appears stated age and cooperative. HEENT: Pupils equal, round, and reactive to light. Conjunctivae/corneas clear. Neck: Supple, with full range of motion. No jugular venous distention. Trachea midline. Respiratory:  Normal respiratory effort. Clear to auscultation, bilaterally without Rales/Wheezes/Rhonchi. Cardiovascular: Regular rate and rhythm with normal S1/S2 without murmurs, rubs or gallops. Abdomen: Soft, non-tender, non-distended with normal bowel sounds. Musculoskeletal: No clubbing, cyanosis or edema bilaterally. Full range of motion without deformity. Skin: Skin color, texture, turgor normal.  No rashes or lesions. Neurologic:  Neurovascularly intact without any focal sensory/motor deficits.  Cranial nerves: II-XII intact, grossly non-focal.  Psychiatric: Alert and oriented, thought content appropriate, normal insight  Capillary Refill: Brisk,< 3 seconds   Peripheral Pulses: +2 palpable, equal bilaterally       Labs:   Recent Labs     01/15/21  2000 01/16/21  1151   WBC 11.8* 13.0*   HGB 16.0 16.3   HCT 48.5 47.8    218     Recent Labs     01/15/21  2000      K 4.6      CO2 28   BUN 18   CREATININE 0.8   CALCIUM 9.7     Recent Labs     01/15/21  2000   AST 23   ALT 32   BILITOT 0.7   ALKPHOS 128     No results for input(s): INR in the last 72 hours. Recent Labs     01/15/21  2000   TROPONINI <0.01       Urinalysis:      Lab Results   Component Value Date    NITRU Negative 01/16/2021    BLOODU Negative 01/16/2021    SPECGRAV >=1.030 01/16/2021    GLUCOSEU Negative 01/16/2021       Consults:    IP CONSULT TO HOSPITALIST  IP CONSULT TO GENERAL SURGERY      Assessment/Plan:    Active Hospital Problems    Diagnosis    SBO (small bowel obstruction) ([facility] [address] [K56.609]       SBO - recurrent SBO multiple times s/p prior Ex-Lap but no adhesions. Admission CT scan w/ SBO and transition point in LLQ - independently reviewed. Negative colonoscopy 2 years ago, and no prior history of hernias. Family hx of Colon CA age 52's. General Surgery consulted and appreciated. Currently NPO w/ NGT in place. Serial abdominal exams. HTN - w/out known CAD and no evidence of active signs/sxs of ischemia/failure. Normally controlled on home meds w/ vitals reviewed and documented as above. Norvasc restarted 16 Jan w/ PRN BBlocker ordered. Morbid Obesity -  With Body mass index is 42.22 kg/m². Complicating assessment and treatment. Placing patient at risk for multiple co-morbidities as well as early death and contributing to the patient's presentation. Counseled on weight loss. BELEN - likely 2nd to obesity. Unable to tolerate home CPAP/BiPap   Diet: NPO except meds ordered       DVT Prophylaxis: LMWH  Diet: Diet NPO Effective Now Exceptions are: Ice Chips, Sips with Meds  Code Status: Full Code      PT/OT Eval Status: Not yet ordered. Dispo - Possibly Monday/Tues 18/19 Jan pending clinical course.      Anna Hall MD

## 2021-01-16 NOTE — PROGRESS NOTES
Pt resting in bed. Content with pain medication. Has some spittup intermittently today. Verbalizes nausea when pain is increased. NG tube at 72 upon my arrival at 54 now. Output has slowed. Will continue to monitor. Bowel sounds hypoactive all 4 quadrants.

## 2021-01-16 NOTE — PROGRESS NOTES
MD notified: /102, . Home meds held, pt NPO with NG. Will look for IV orders if you choose to place.

## 2021-01-16 NOTE — CONSULTS
[facility] SURGERY      Department of General Surgery Consult    PATIENT NAME: Magali Anderson OF BIRTH: 1955    ADMISSION DATE: 1/15/2021  7:43 PM      TODAY'S DATE: 1/16/2021    Reason for Consult:  SBO    Requesting Physician:  Hospitalist    HISTORY OF PRESENT ILLNESS:              The patient is a 72 y.o. male who presents with severe, sharp mid abdominal pain that started yesterday. He had 3 BMs yesterday. He has had recurrent PSBO. He had laparoscopy that showed normal SB in 2018 with Dr. Meena Staples. He had N/V yesterday. He feels about the same today.       Past Medical History:        Diagnosis Date    Arthritis     Hypertension     Knee pain     PONV (postoperative nausea and vomiting)     Sleep apnea     did not tolerate CPAP       Past Surgical History:        Procedure Laterality Date    ARTHRODESIS ANKLE Left 10/5/2020    LEFT TIBIOTALOCALCANEAL FUSION AND FIBULAR OSTECTOMY -BLOCK- -SLEEP APNEA- performed by Clinton Nevarez MD at [facility]      HIP SURGERY Left 01/01/1968    due to dislocation    MD LAP,DIAGNOSTIC ABDOMEN N/A 9/18/2018    DIAGNOSTIC LAPAROSCOPY performed by Willian Hastings MD at [place] [address]; for possible SBO    PROSTATE SURGERY      TURP    SHOULDER ARTHROSCOPY Left 11/13/2019    VIDEO ARTHROSCOPY LEFT SHOULDER, removal loose body, debridment -BLOCK- -SLEEP APNEA- performed by Dolores Cabrera MD at [address] [place] [address] Left 7/9/2020    LEFT REVERSE TOTAL SHOULDER ARTHROPLASTY performed by Dolores Cabrera MD at [place] [address].      age [place]       Current Medications:   Current Facility-Administered Medications: labetalol (NORMODYNE;TRANDATE) injection 5 mg, 5 mg, Intravenous, Q4H PRN  enoxaparin (LOVENOX) injection 40 mg, 40 mg, Subcutaneous, Daily  morphine (PF) injection 2 mg, 2 mg, Intravenous, Q3H PRN  amLODIPine (NORVASC) tablet 5 mg, 5 mg, Oral, Daily  0.9 % sodium chloride infusion, ,

## 2021-01-16 NOTE — ED PROVIDER NOTES
[facility]  ED  EMERGENCYDEPARTMENT ENCOUNTER      Pt Name: Tosha Bunn  MRN: [id number]  [place] 1955  Date of evaluation: 1/15/2021  Adan Ledesma MD    CHIEF COMPLAINT       Chief Complaint   Patient presents with    Abdominal Pain     patient reports vomiting since this AM, reports that has pain at 8 on 1-10 scale. Hx of 4 bowel blockage in past          HISTORY OF PRESENT ILLNESS   (Location/Symptom, Timing/Onset,Context/Setting, Quality, Duration, Modifying Factors, Severity)  Note limiting factors. Tosha Bunn is a 72 y.o. male with hx of HTN, SBO who presents to the emergency department for abdominal pain. Patient states it started at 10am this morning after eating banana and drinking coffee, Patient describes 'a gut pain' that continued to get worse as the day goes on with emesis. Denies diarrhea, was passing gas most of the day. Had 3 BM today. Reports this is the 5th time in the last 2 years and states its the same pain. Has history of gallbladder surgery, had exploratory surgery to see if the SBO was due to adhesions, however didn't find anything. HPI    Nursing Notes were reviewed. REVIEW OF SYSTEMS    (2-9 systems for level 4, 10 or more for level 5)     Review of Systems   Constitutional: Negative for activity change, appetite change, chills, diaphoresis and fever. Respiratory: Negative for cough, chest tightness and shortness of breath. Cardiovascular: Negative for chest pain and palpitations. Gastrointestinal: Positive for abdominal pain, nausea and vomiting. Negative for constipation and diarrhea. Genitourinary: Negative for dysuria, flank pain and hematuria. Musculoskeletal: Negative for back pain, gait problem and myalgias. Skin: Negative for rash and wound. Neurological: Negative for dizziness, weakness and numbness. Except as noted above the remainder of the review of systems was reviewedand negative.        PAST MEDICAL HISTORY  Highest education level: None   Occupational History    None   Social Needs    Financial resource strain: None    Food insecurity     Worry: None     Inability: None    Transportation needs     Medical: None     Non-medical: None   Tobacco Use    Smoking status: Former Smoker     Packs/day: 0.50     Years: 5.00     Pack years: 2.50     Types: Cigarettes     Start date:      Quit date: 1987     Years since quittin.6    Smokeless tobacco: Never Used   Substance and Sexual Activity    Alcohol use: No     Alcohol/week: 0.0 standard drinks     Comment: rarely    Drug use: No    Sexual activity: Yes     Partners: Female   Lifestyle    Physical activity     Days per week: None     Minutes per session: None    Stress: None   Relationships    Social connections     Talks on phone: None     Gets together: None     Attends [employer] service: None     Active member of club or organization: None     Attends meetings of clubs or organizations: None     Relationship status: None    Intimate partner violence     Fear of current or ex partner: None     Emotionally abused: None     Physically abused: None     Forced sexual activity: None   Other Topics Concern    None   Social History Narrative    None       SCREENINGS    Cammy Coma Scale  Eye Opening: Spontaneous  Best Verbal Response: Oriented  Best Motor Response: Obeys commands  Mills Coma Scale Score: 15        PHYSICAL EXAM    (up to 7 for level 4, 8 ormore for level 5)     ED Triage Vitals   BP Temp Temp Source Pulse Resp SpO2 Height Weight   01/15/21 1935 01/15/21 1935 01/15/21 1935 01/15/21 1935 01/15/21 1935 01/15/21 1935 01/15/21 1933 01/15/21 1933   (!) 164/83 98.2 °F (36.8 °C) Oral 81 16 96 % 6' 1\" (1.854 m) (!) 320 lb (145.2 kg)       Physical Exam  Constitutional:       General: He is not in acute distress. Appearance: Normal appearance. He is well-developed. He is not ill-appearing or toxic-appearing.       Comments: Sitting in bed comfortably, speaking in full sentences, following verbal commands appropriately. Not in acute distress     HENT:      Head: Normocephalic and atraumatic. Eyes:      Conjunctiva/sclera: Conjunctivae normal.      Pupils: Pupils are equal, round, and reactive to light. Neck:      Musculoskeletal: Normal range of motion and neck supple. Cardiovascular:      Rate and Rhythm: Normal rate and regular rhythm. Heart sounds: Normal heart sounds. No murmur. No friction rub. No gallop. Pulmonary:      Effort: Pulmonary effort is normal. No respiratory distress. Breath sounds: Normal breath sounds. No decreased breath sounds, wheezing, rhonchi or rales. Abdominal:      General: Bowel sounds are normal. There is no distension. Palpations: Abdomen is soft. Tenderness: There is abdominal tenderness in the right upper quadrant and left upper quadrant. There is no guarding or rebound. Musculoskeletal: Normal range of motion. General: No tenderness or deformity. Skin:     General: Skin is warm and dry. Findings: No rash. Neurological:      Mental Status: He is alert and oriented to person, place, and time. GCS: GCS eye subscore is 4. GCS verbal subscore is 5. GCS motor subscore is 6. Psychiatric:         Behavior: Behavior is cooperative. DIAGNOSTIC RESULTS     EKG: All EKG's are interpreted by the Emergency Department Physicianwho either signs or Co-signs this chart in the absence of a cardiologist.    The Ekg interpreted by me shows  normal sinus rhythm with a rate of 79  Axis is   Normal  QTc is  419  Intervals and Durations are unremarkable.       ST Segments: no acute change  No significant change from prior EKG dated 10/5/2020    RADIOLOGY:   Non-plain film images such as CT, Ultrasound and MRI are read by the radiologist. Plain radiographic images are visualized and preliminarily interpreted by the emergency physician with the below findings:      Interpretation per the Radiologist below, if available at the time of this note:    CT ABDOMEN PELVIS W IV CONTRAST Additional Contrast? None   Final Result   1. Small-bowel obstruction with transition point in the left lower quadrant   at the level of the mid to distal jejunum. 2. Borderline aneurysmal dilatation of the aortic root appears similar to   prior exam.  Outpatient follow-up recommended. 3. Other findings as described. ED BEDSIDE ULTRASOUND:   Performed by ED Physician - none    LABS:  Labs Reviewed   CBC WITH AUTO DIFFERENTIAL - Abnormal; Notable for the following components:       Result Value    WBC 11.8 (*)     Neutrophils Absolute 10.2 (*)     Lymphocytes Absolute 0.9 (*)     All other components within normal limits    Narrative:     Performed at:  [address], [employer]   Phone [phone]   COMPREHENSIVE METABOLIC PANEL - Abnormal; Notable for the following components:    Glucose 112 (*)     All other components within normal limits    Narrative:     Performed at:  [address]  [place], [employer]   Phone [phone]   LIPASE    Narrative:     Performed at:  [address], [employer]   Phone [phone]   URINE RT REFLEX TO CULTURE   COMPREHENSIVE METABOLIC PANEL W/ REFLEX TO MG FOR LOW K   CBC WITH AUTO DIFFERENTIAL       All other labs were within normal range ornot returned as of this dictation.     EMERGENCY DEPARTMENT COURSE and DIFFERENTIAL DIAGNOSIS/MDM:   Vitals:    Vitals:    01/15/21 1933 01/15/21 1935   BP:  (!) 164/83   Pulse:  81   Resp:  16   Temp:  98.2 °F (36.8 °C)   TempSrc:  Oral   SpO2:  96%   Weight: (!) 320 lb (145.2 kg)    Height: 6' 1\" (1.854 m)          MDM    ED COURSE/MDM    -Maria D Velásquez is a 72 y.o. male with a history of hypertension, previous history of small bowel obstruction who presents to ED for nausea vomiting abdominal pain that he states is similar to his prior episodes of small bowel obstruction. The symptoms started this morning and has progressively gotten worse. Upon arrival patient was afebrile with stable vitals. He was given a gram Zofran 4 mg morphine with minimal symptomatic improvement. He had no further episodes of emesis here in ED. Reports he had 3 normal bowel movements today.  -Per chart review patient has been admitted several times for small bowel obstruction as well as partial bowel obstruction with no etiology found. At that time the transition point was found to be in the right lower quadrant.  -Work was unremarkable, mild leukocytosis of 11.8 without bandemia. -CT abdomen pelvis shows small bowel obstruction with transition point in the left lower quadrant at the level of the mid to distal jejunum. Borderline aneurysmal dilatation of the aortic root similar to prior exam.  Outpatient recommended.  -Consulted general surgery, who agrees with plan for conservative management and NG tube placement.   -Labs and imaging reviewed and results discussed with patient. Plan for admission for further workup and treatment for small bowel obstruction discussed with patient and family. Patient and family in agreement with plan and have nofurther questions/concerns      REASSESSMENT      Well appearing, non toxic, alert, oriented speaking in full sentences and hemodynamically stable upon admission      CRITICAL CARE TIME   Total Critical Care time was 0 minutes, excluding separately reportableprocedures. There was a high probability of clinicallysignificant/life threatening deterioration in the patient's condition which required my urgent intervention. CONSULTS:  IP CONSULT TO HOSPITALIST  IP CONSULT TO GENERAL SURGERY    PROCEDURES:  Unless otherwise noted below, none     Procedures    FINAL IMPRESSION      1.  Small bowel obstruction [facility])          DISPOSITION/PLAN   DISPOSITION Admitted 01/15/2021 09:36:47 PM      PATIENT REFERREDTO:  No follow-up provider specified.     DISCHARGE MEDICATIONS:  New Prescriptions    No medications on file          (Please note that portions of this note were completed with a voice recognition program.  Efforts were made to edit the dictations but occasionally wordsare mis-transcribed.)    Manasa Thibodeaux MD (electronically signed)  Attending Emergency Physician            Manasa Thibodeaux MD  01/15/21 9483

## 2021-01-16 NOTE — PROGRESS NOTES
4 Eyes Skin Assessment     NAME:  Author Reading  YOB: 1955  MEDICAL RECORD NUMBER:  [id number]    The patient is being assess for  Admission    I agree that 2 RN's have performed a thorough Head to Toe Skin Assessment on the patient. ALL assessment sites listed below have been assessed. Areas assessed by both nurses:    Head, Face, Ears, Shoulders, Back, Chest, Arms, Elbows, Hands, Sacrum. Buttock, Coccyx, Ischium and Legs. Feet and Heels        Does the Patient have a Wound?  Other surgical incision- covered in dry dsg, KENIA       Alok Prevention initiated:  NA   Wound Care Orders initiated:  NA    Pressure Injury (Stage 3,4, Unstageable, DTI, NWPT, and Complex wounds) if present place consult order under [de-identified] No    New and Established Ostomies if present place consult order under : No      Nurse 1 eSignature: Electronically signed by Yessi Montilla RN on 1/16/21 at 4:48 AM EST    **SHARE this note so that the co-signing nurse is able to place an eSignature**    Nurse 2 eSignature: {Esignature:[id number]}

## 2021-01-17 NOTE — FLOWSHEET NOTE
01/17/21 1030   NG/OG/NJ/NE Tube Nasogastric 16 fr Right nostril   Placement Date/Time: 01/15/21 2210   Inserted by: Latonya Cornell RN  Tube Type: Nasogastric  Tube Size (fr): 16 fr  Tube Location: Right nostril   Surrounding Skin Dry; Intact   Securement device Yes   Status Suction-low continuous   NG/OG/NJ/NE External Measurement (cm) 60 cm   Drainage Appearance Green   NG ADVANCED 5 CM PER XRAY RECOMMENDATION AND PLACEMENT VERIFIED AGAIN USING AUSCULATION. 5 CC GREEN BILE RETURNED WITH PISTON SYRINGE. NG HOOKED UP TO SUCTION PER ORDER. TUBING AND STOP COCK CHANGED.

## 2021-01-17 NOTE — PROGRESS NOTES
Hospitalist Progress Note      PCP: Loann Fleischer, MD    Date of Admission: 1/15/2021    Chief Complaint: Abdominal Pain     Subjective: no new c/o. Had BM this AM after AXR. Medications:  Reviewed    Infusion Medications    sodium chloride 75 mL/hr at 01/16/21 1208     Scheduled Medications    enoxaparin  40 mg Subcutaneous Daily    amLODIPine  5 mg Oral Daily    famotidine (PEPCID) injection  20 mg Intravenous BID    sodium chloride flush  10 mL Intravenous 2 times per day     PRN Meds: labetalol, morphine, sodium chloride flush, promethazine **OR** ondansetron, acetaminophen **OR** acetaminophen      Intake/Output Summary (Last 24 hours) at 1/17/2021 0810  Last data filed at 1/16/2021 2356  Gross per 24 hour   Intake 1911 ml   Output 675 ml   Net 1236 ml       Physical Exam Performed:    BP (!) 169/99   Pulse 107   Temp 97.4 °F (36.3 °C) (Oral)   Resp 14   Ht 6' 1\" (1.854 m)   Wt (!) 345 lb 3.2 oz (156.6 kg)   SpO2 93%   BMI 45.54 kg/m²     General appearance: No apparent distress, appears stated age and cooperative. HEENT: Pupils equal, round, and reactive to light. Conjunctivae/corneas clear. Neck: Supple, with full range of motion. No jugular venous distention. Trachea midline. Respiratory:  Normal respiratory effort. Clear to auscultation, bilaterally without Rales/Wheezes/Rhonchi. Cardiovascular: Regular rate and rhythm with normal S1/S2 without murmurs, rubs or gallops. Abdomen: Soft, non-tender, non-distended with normal bowel sounds. Musculoskeletal: No clubbing, cyanosis or edema bilaterally. Full range of motion without deformity. Skin: Skin color, texture, turgor normal.  No rashes or lesions. Neurologic:  Neurovascularly intact without any focal sensory/motor deficits.  Cranial nerves: II-XII intact, grossly non-focal.  Psychiatric: Alert and oriented, thought content appropriate, normal insight  Capillary Refill: Brisk,< 3 seconds   Peripheral Pulses: +2 palpable, equal bilaterally       Labs:   Recent Labs     01/15/21  2000 01/16/21  1151   WBC 11.8* 13.0*   HGB 16.0 16.3   HCT 48.5 47.8    218     Recent Labs     01/15/21  2000 01/16/21  1151    138   K 4.6 4.2    103   CO2 28 26   BUN 18 16   CREATININE 0.8 0.7*   CALCIUM 9.7 8.9     Recent Labs     01/15/21  2000 01/16/21  1151   AST 23 23   ALT 32 34   BILITOT 0.7 1.1*   ALKPHOS 128 111     No results for input(s): INR in the last 72 hours. Recent Labs     01/15/21  2000 01/16/21  1151   TROPONINI <0.01 <0.01       Urinalysis:      Lab Results   Component Value Date    NITRU Negative 01/16/2021    BLOODU Negative 01/16/2021    SPECGRAV >=1.030 01/16/2021    GLUCOSEU Negative 01/16/2021       Consults:    IP CONSULT TO HOSPITALIST  IP CONSULT TO GENERAL SURGERY      Assessment/Plan:    Active Hospital Problems    Diagnosis    SBO (small bowel obstruction) ([facility] [address] [K56.609]       SBO - recurrent SBO multiple times s/p prior Ex-Lap but no adhesions. Admission CT scan w/ SBO and transition point in LLQ - independently reviewed. Negative colonoscopy 2 years ago, and no prior history of hernias. Family hx of Colon CA age 52's. General Surgery consulted and appreciated. Currently NPO w/ NGT in place. Serial abdominal exams. AXR ordered and pending 17 Jan.     HTN - w/out known CAD and no evidence of active signs/sxs of ischemia/failure. Normally controlled on home meds w/ vitals reviewed and documented as above. Norvasc restarted 16 Jan w/ PRN BBlocker ordered. Morbid Obesity -  With Body mass index is 45.54 kg/m². Complicating assessment and treatment. Placing patient at risk for multiple co-morbidities as well as early death and contributing to the patient's presentation. Counseled on weight loss. BELEN - likely 2nd to obesity.   Unable to tolerate home CPAP/BiPap       DVT Prophylaxis: LMWH  Diet: Diet NPO Effective Now Exceptions are: Ice Chips, Sips with Meds  Code Status: Full Code      PT/OT Eval Status: Not yet ordered. Dispo - Possibly Monday/Tues 18/19 Jan pending clinical course.      Aidee Moreira MD

## 2021-01-17 NOTE — PROGRESS NOTES
[facility] GENERAL SURGERY DAILY PROGRESS NOTE    SUBJECTIVE: Awake, alert. Had BM. OBJECTIVE: CURRENT VITALS:  BP (!) 163/89   Pulse 98   Temp 97.6 °F (36.4 °C) (Oral)   Resp 16   Ht 6' 1\" (1.854 m)   Wt (!) 345 lb 3.2 oz (156.6 kg)   SpO2 92%   BMI 45.54 kg/m²          ABD: Soft. Non distended. Minimal tenderness.     LABS:    CBC:   Recent Labs     01/15/21  2000 01/16/21  1151 01/17/21  0931   WBC 11.8* 13.0* 10.1   RBC 5.45 5.37 5.17   HGB 16.0 16.3 15.4   HCT 48.5 47.8 46.6   MCV 89.1 88.9 90.1   RDW 13.8 13.8 14.2    218 222     BMP:   Recent Labs     01/15/21  2000 01/16/21  1151 01/17/21  0931    138 140   K 4.6 4.2 4.2  4.2    103 104   CO2 28 26 26   PHOS  --   --  2.6   BUN 18 16 15   CREATININE 0.8 0.7* 0.7*         XRAYS: XR with increased SB dilation      ASSESSMENT:  Recurrent SBO     PLAN:  IVF  NG  SBFT in AM to evaluate given recurrent nature and previous laparoscopy in 2018 without etiology for imaging that suggested SBO    Case discussed with Dr. Perez Memos

## 2021-01-18 NOTE — PROGRESS NOTES
[facility] GENERAL SURGERY - [facility]    SURGERY   1/18/2021     Pt off the floor in radiology for SBFT - will follow results and evaluate pt this afternoon.       Nagi Estevez

## 2021-01-18 NOTE — PROGRESS NOTES
Hospitalist Progress Note      PCP: Cherelle Edmond MD    Date of Admission: 1/15/2021    Chief Complaint: Abdominal Pain     Subjective: no new c/o. Medications:  Reviewed    Infusion Medications    sodium chloride 75 mL/hr at 01/17/21 2220     Scheduled Medications    amLODIPine  2.5 mg Oral BID    labetalol  200 mg Oral BID    Lip Balm        enoxaparin  40 mg Subcutaneous Daily    famotidine (PEPCID) injection  20 mg Intravenous BID    sodium chloride flush  10 mL Intravenous 2 times per day     PRN Meds: labetalol, morphine, sodium chloride flush, promethazine **OR** ondansetron, acetaminophen **OR** acetaminophen      Intake/Output Summary (Last 24 hours) at 1/18/2021 0841  Last data filed at 1/18/2021 0556  Gross per 24 hour   Intake 900 ml   Output 2900 ml   Net -2000 ml       Physical Exam Performed:    /86   Pulse 91   Temp 98.5 °F (36.9 °C) (Oral)   Resp 16   Ht 6' 1\" (1.854 m)   Wt (!) 345 lb 3.2 oz (156.6 kg)   SpO2 94%   BMI 45.54 kg/m²     General appearance: No apparent distress, appears stated age and cooperative. HEENT: Pupils equal, round, and reactive to light. Conjunctivae/corneas clear. Neck: Supple, with full range of motion. No jugular venous distention. Trachea midline. Respiratory:  Normal respiratory effort. Clear to auscultation, bilaterally without Rales/Wheezes/Rhonchi. Cardiovascular: Regular rate and rhythm with normal S1/S2 without murmurs, rubs or gallops. Abdomen: Soft, non-tender, non-distended with normal bowel sounds. Musculoskeletal: No clubbing, cyanosis or edema bilaterally. Full range of motion without deformity. Skin: Skin color, texture, turgor normal.  No rashes or lesions. Neurologic:  Neurovascularly intact without any focal sensory/motor deficits.  Cranial nerves: II-XII intact, grossly non-focal.  Psychiatric: Alert and oriented, thought content appropriate, normal insight  Capillary Refill: Brisk,< 3 seconds   Peripheral Pulses: +2 palpable, equal bilaterally       Labs:   Recent Labs     01/15/21  2000 01/16/21  1151 01/17/21  0931   WBC 11.8* 13.0* 10.1   HGB 16.0 16.3 15.4   HCT 48.5 47.8 46.6    218 222     Recent Labs     01/15/21  2000 01/16/21  1151 01/17/21  0931    138 140   K 4.6 4.2 4.2  4.2    103 104   CO2 28 26 26   BUN 18 16 15   CREATININE 0.8 0.7* 0.7*   CALCIUM 9.7 8.9 9.0   PHOS  --   --  2.6     Recent Labs     01/15/21  2000 01/16/21  1151 01/17/21  0931   AST 23 23 27   ALT 32 34 40   BILITOT 0.7 1.1* 1.2*   ALKPHOS 128 111 105     No results for input(s): INR in the last 72 hours. Recent Labs     01/15/21  2000 01/16/21  1151   TROPONINI <0.01 <0.01       Urinalysis:      Lab Results   Component Value Date    NITRU Negative 01/16/2021    BLOODU Negative 01/16/2021    SPECGRAV >=1.030 01/16/2021    GLUCOSEU Negative 01/16/2021       Consults:    IP CONSULT TO HOSPITALIST  IP CONSULT TO GENERAL SURGERY      Assessment/Plan:    Active Hospital Problems    Diagnosis    SBO (small bowel obstruction) ([facility] [address] [K56.609]       SBO - recurrent SBO multiple times s/p prior Ex-Lap but no adhesions. Admission CT scan w/ SBO and transition point in LLQ - independently reviewed. Negative colonoscopy 2 years ago, and no prior history of hernias. Family hx of Colon CA age 52's. General Surgery consulted and appreciated. Currently NPO w/ NGT in place. Serial abdominal exams. AXR 17 Jan w/out improvement - possible SBFT per General Surgery per face to face discussion 17 Jan.     HTN - w/out known CAD and no evidence of active signs/sxs of ischemia/failure. Normally controlled on home meds w/ vitals reviewed and documented as above. Norvasc restarted 16 Jan w/ PRN BBlocker ordered. Morbid Obesity -  With Body mass index is 45.54 kg/m². Complicating assessment and treatment. Placing patient at risk for multiple co-morbidities as well as early death and contributing to the patient's presentation. Counseled on weight loss. BELEN - likely 2nd to obesity. Unable to tolerate home CPAP/BiPap       DVT Prophylaxis: LMWH  Diet: Diet NPO Effective Now Exceptions are: Ice Chips, Sips with Meds  Code Status: Full Code      PT/OT Eval Status: Not yet ordered. Dispo - Possibly Tues 23 Jan at the earliest pending clinical course and subspecialty recs.  Darya Francisco MD

## 2021-01-18 NOTE — PROGRESS NOTES
[facility] SURGERY    PATIENT NAME: Edd Records     TODAY'S DATE: 1/18/2021    CHIEF COMPLAINT: abd pain    INTERVAL HISTORY/HPI:    Pt with some improvement in pain, no nausea, no fevers or chills. REVIEW OF SYSTEMS:  Pertinent positives and negatives as per interval history section    OBJECTIVE:  VITALS:  /86   Pulse 91   Temp 98.5 °F (36.9 °C) (Oral)   Resp 16   Ht 6' 1\" (1.854 m)   Wt (!) 345 lb 3.2 oz (156.6 kg)   SpO2 94%   BMI 45.54 kg/m²     INTAKE/OUTPUT:    I/O last 3 completed shifts: In: 900 [I.V.:900]  Out: 2900 [Urine:1400; Emesis/NG output:1500]  No intake/output data recorded. CONSTITUTIONAL:  awake and alert  LUNGS:  Respirations easy and unlabored, no crackles or wheezing  CARD:  regular rate  ABDOMEN:  hypoactive bowel sounds, soft, non-distended, tender     Data:  CBC:   Recent Labs     01/15/21  2000 01/16/21  1151 01/17/21  0931   WBC 11.8* 13.0* 10.1   HGB 16.0 16.3 15.4   HCT 48.5 47.8 46.6    218 222     BMP:    Recent Labs     01/15/21  2000 01/16/21  1151 01/17/21  0931    138 140   K 4.6 4.2 4.2  4.2    103 104   CO2 28 26 26   BUN 18 16 15   CREATININE 0.8 0.7* 0.7*   GLUCOSE 112* 96 92     Hepatic:   Recent Labs     01/15/21  2000 01/16/21  1151 01/17/21  0931   AST 23 23 27   ALT 32 34 40   BILITOT 0.7 1.1* 1.2*   ALKPHOS 128 111 105     Mag:    No results for input(s): MG in the last 72 hours. Phos:     Recent Labs     01/17/21  0931   PHOS 2.6      INR: No results for input(s): INR in the last 72 hours. Radiology Review:  *Imaging personally reviewed by me. NA      ASSESSMENT AND PLAN:  71 yo with sbo vs ileus  1. SBFT today. Further recommendations pending results.      Electronically signed by Gideon Shah, [id number]

## 2021-01-18 NOTE — PROGRESS NOTES
Ng tube re-connected to continuous low wall suction at this time. Pt c/o mild nausea and 6/10 pain. Rec'ed prn morphine, declined need for nausea prn at this time. Will monitor.

## 2021-01-18 NOTE — PROGRESS NOTES
Pt's NG tube disconnected from low wall suction, for small bowel follow through imaging; will remain disconnected from suction until receipt of instructions from radiology to reconnect when all imaging is completed.

## 2021-01-19 NOTE — PROGRESS NOTES
Hospitalist Progress Note      PCP: Dnona Cortés MD    Date of Admission: 1/15/2021    Chief Complaint: Abdominal Pain     Subjective: no new c/o. Medications:  Reviewed    Infusion Medications    sodium chloride 75 mL/hr at 01/18/21 1022     Scheduled Medications    benzocaine   Mouth/Throat 4x Daily    amLODIPine  2.5 mg Oral BID    labetalol  200 mg Oral BID    enoxaparin  40 mg Subcutaneous Daily    famotidine (PEPCID) injection  20 mg Intravenous BID    sodium chloride flush  10 mL Intravenous 2 times per day     PRN Meds: phenol, labetalol, morphine, sodium chloride flush, promethazine **OR** ondansetron, acetaminophen **OR** acetaminophen      Intake/Output Summary (Last 24 hours) at 1/19/2021 0914  Last data filed at 1/19/2021 0537  Gross per 24 hour   Intake 0 ml   Output 975 ml   Net -975 ml       Physical Exam Performed:    BP (!) 169/81   Pulse 77   Temp 98.7 °F (37.1 °C) (Oral)   Resp 20   Ht 6' 1\" (1.854 m)   Wt (!) 356 lb 11.3 oz (161.8 kg)   SpO2 94%   BMI 47.06 kg/m²     General appearance: No apparent distress, appears stated age and cooperative. HEENT: Pupils equal, round, and reactive to light. Conjunctivae/corneas clear. Neck: Supple, with full range of motion. No jugular venous distention. Trachea midline. Respiratory:  Normal respiratory effort. Clear to auscultation, bilaterally without Rales/Wheezes/Rhonchi. Cardiovascular: Regular rate and rhythm with normal S1/S2 without murmurs, rubs or gallops. Abdomen: Soft, non-tender, non-distended with normal bowel sounds. Musculoskeletal: No clubbing, cyanosis or edema bilaterally. Full range of motion without deformity. Skin: Skin color, texture, turgor normal.  No rashes or lesions. Neurologic:  Neurovascularly intact without any focal sensory/motor deficits.  Cranial nerves: II-XII intact, grossly non-focal.  Psychiatric: Alert and oriented, thought content appropriate, normal insight  Capillary Refill: Brisk,< 3 seconds   Peripheral Pulses: +2 palpable, equal bilaterally       Labs:   Recent Labs     01/16/21  1151 01/17/21  0931 01/18/21  1026   WBC 13.0* 10.1 10.4   HGB 16.3 15.4 16.0   HCT 47.8 46.6 48.0    222 225     Recent Labs     01/16/21  1151 01/17/21  0931 01/18/21  1026    140 140   K 4.2 4.2  4.2 4.1    104 102   CO2 26 26 28   BUN 16 15 14   CREATININE 0.7* 0.7* 0.8   CALCIUM 8.9 9.0 9.3   PHOS  --  2.6 2.6     Recent Labs     01/16/21  1151 01/17/21  0931   AST 23 27   ALT 34 40   BILITOT 1.1* 1.2*   ALKPHOS 111 105     No results for input(s): INR in the last 72 hours. Recent Labs     01/16/21  1151   TROPONINI <0.01       Urinalysis:      Lab Results   Component Value Date    NITRU Negative 01/16/2021    BLOODU Negative 01/16/2021    SPECGRAV >=1.030 01/16/2021    GLUCOSEU Negative 01/16/2021       Consults:    IP CONSULT TO HOSPITALIST  IP CONSULT TO GENERAL SURGERY      Assessment/Plan:    Active Hospital Problems    Diagnosis    SBO (small bowel obstruction) ([facility] [address] [K56.609]       SBO - recurrent SBO multiple times s/p prior Ex-Lap but no adhesions. Admission CT scan w/ SBO and transition point in LLQ - independently reviewed. Negative colonoscopy 2 years ago, and no prior history of hernias. Family hx of Colon CA age 52's. General Surgery consulted and appreciated. Currently NPO w/ NGT in place. Serial abdominal exams. AXR 17 Jan w/out improvement - SBFT 18 Jan per General Surgery independently reviwed c/w partial SBO w/ contrast reaching colon    HTN - w/out known CAD and no evidence of active signs/sxs of ischemia/failure. Normally controlled on home meds w/ vitals reviewed and documented as above. Norvasc restarted 16 Jan w/ PRN BBlocker ordered. Morbid Obesity -  With Body mass index is 47.06 kg/m². Complicating assessment and treatment. Placing patient at risk for multiple co-morbidities as well as early death and contributing to the patient's presentation. Counseled on weight loss. BELEN - likely 2nd to obesity. Unable to tolerate home CPAP/BiPap       DVT Prophylaxis: LMWH  Diet: Diet NPO Effective Now Exceptions are: Ice Chips, Sips with Meds  Code Status: Full Code      PT/OT Eval Status: Not yet ordered. Dispo - Possibly Wed 20 Jan at the earliest pending clinical course and subspecialty recs.  Odalis Watkins MD

## 2021-01-19 NOTE — PROGRESS NOTES
HISTORY: SBO   TECHNOLOGIST PROVIDED HISTORY:   Reason for exam:->SBO   Reason for Exam: SBO   Acuity: Acute   Type of Exam: Initial     FINDINGS:   Scalp images demonstrate tip of NG tube in left upper quadrant     Dilated small bowel loops are again identified, in the left upper quadrant   and left mid abdomen, measuring up to 5.8 cm     Small bowel loops in the right lower quadrant are normal in caliber     Contrast reaches colon by 2-1/2 hours. Degenerative changes are seen in the spine and hips    Impression:     Proximal small bowel dilatation, with relative normal caliber of distal small   bowel loops, compatible with partial small bowel obstruction.  Contrast   reaches colon by 2-1/2 hours.  Transition point is seen in the region of the   left lower quadrant. EXAMINATION:   TWO XRAY VIEWS OF THE ABDOMEN AND SINGLE  XRAY VIEW OF THE CHEST     1/19/2021 11:05 am     COMPARISON:   Small-bowel follow-through 01/18/2021     HISTORY:   ORDERING SYSTEM PROVIDED HISTORY: f/u SBO   TECHNOLOGIST PROVIDED HISTORY:   Reason for exam:->f/u SBO   Reason for Exam: f/u sbo   Acuity: Acute   Type of Exam: Subsequent/Follow-up     FINDINGS:   Contrast has progressed throughout the colon.  There is no significant   residual contrast in the small bowel.  There are a few residual mildly   dilated loops of small bowel. Impression:     Contrast has progressed throughout the colon, there is no definite evidence   of small bowel obstruction. ASSESSMENT AND PLAN:  71 yo with sbo vs ileus  ABFT and f/u AXR as above - pt with good GI activity following study. Will trial ngt clamping and remove later today if tolerates. Clear liquid diet this afternoon. Morbid Obesity: BMI : 33.77 Complicating assessment and treatment. Placing patient at risk for multiple co-morbidities and complications.         Electronically signed by Nayely Pillai, APRN - [facility]    Surgery Staff    I have examined this patient and read and agree with the note by Edgard Puentes CNP from today. Hopefully can advance diet over next 1-2 days.     HETAL Hatsize KC

## 2021-01-20 NOTE — PROGRESS NOTES
inpatient    Goals: Tolerate oral diet progression and consume greater than 50% of meals and ONS this admission       Nutrition Monitoring and Evaluation:   Food/Nutrient Intake Outcomes:  Food and Nutrient Intake, Supplement Intake, Diet Advancement/Tolerance  Physical Signs/Symptoms Outcomes:  Biochemical Data     Discharge Planning: Too soon to determine     Electronically signed by Luciano Luong.  Diamond Benites RD, LD on 1/20/21 at 12:44 PM EST    Contact: [phone]

## 2021-01-20 NOTE — TELEPHONE ENCOUNTER
Pts wife called back again this morning saying  threw up black & bloody vomit - his NG Tube fell out - he was moved to [address] [place] [address] w/ a new Nurse Miles Wiley) she is wanting pts G-Tube replaced  I explained again that I do not work at [employer] - but I will talk w/ Dr Isaiah Ross

## 2021-01-20 NOTE — PLAN OF CARE
Problem: Falls - Risk of:  Goal: Will remain free from falls  Description: Will remain free from falls  1/20/2021 0053 by Salomon Sun RN  Outcome: Ongoing  1/19/2021 1458 by Annabel Crawley RN  Outcome: Ongoing  Goal: Absence of physical injury  Description: Absence of physical injury  1/20/2021 0053 by Salomon Sun RN  Outcome: Ongoing  1/19/2021 1458 by Annabel Crawley RN  Outcome: Ongoing     Problem: Pain:  Goal: Pain level will decrease  Description: Pain level will decrease  1/20/2021 0053 by Salomon Sun RN  Outcome: Ongoing  1/19/2021 1458 by Annabel Crawley RN  Outcome: Ongoing  Goal: Control of acute pain  Description: Control of acute pain  1/20/2021 0053 by Salomon Sun RN  Outcome: Ongoing  1/19/2021 1458 by Annabel Crawley RN  Outcome: Ongoing  Goal: Control of chronic pain  Description: Control of chronic pain  1/20/2021 0053 by Salomon Sun RN  Outcome: Ongoing  1/19/2021 1458 by Annabel Crawley RN  Outcome: Ongoing     Problem: Skin Integrity:  Goal: Will show no infection signs and symptoms  Description: Will show no infection signs and symptoms  Outcome: Ongoing  Goal: Absence of new skin breakdown  Description: Absence of new skin breakdown  Outcome: Ongoing

## 2021-01-20 NOTE — PROGRESS NOTES
[facility]    PATIENT NAME: Author Reading     TODAY'S DATE: 1/20/2021    CHIEF COMPLAINT: abd discomfort    INTERVAL HISTORY/HPI:    Pt with emesis last night - no further BMs. States he feels is abd isn't right. Denies nausea     REVIEW OF SYSTEMS:  Pertinent positives and negatives as per interval history section    OBJECTIVE:  VITALS:  BP (!) 150/86   Pulse 75   Temp 98.2 °F (36.8 °C) (Oral)   Resp 18   Ht 6' 1\" (1.854 m)   Wt (!) 348 lb 8.8 oz (158.1 kg)   SpO2 93%   BMI 45.99 kg/m²     INTAKE/OUTPUT:    I/O last 3 completed shifts: In: 61 [P.O.:60]  Out: 500 [Urine:500]  I/O this shift:  In: -   Out: 350 [Urine:350]    CONSTITUTIONAL:  awake and alert  LUNGS:  Respirations easy and unlabored, no crackles or wheezing  CARD:  regular rate  ABDOMEN:  Obese, + bowel sounds, soft, non-distended, non tender     Data:  CBC:   Recent Labs     01/18/21  1026 01/19/21  0905 01/20/21  0637   WBC 10.4 9.7 8.2   HGB 16.0 14.5 14.0   HCT 48.0 43.2 41.6    194 181     BMP:    Recent Labs     01/18/21  1026 01/19/21  0905 01/20/21  0637    142 141   K 4.1 3.9 4.0    104 104   CO2 28 26 27   BUN 14 11 10   CREATININE 0.8 0.7* 0.7*   GLUCOSE 93 76 79     Hepatic:   No results for input(s): AST, ALT, ALB, BILITOT, ALKPHOS in the last 72 hours. Mag:    No results for input(s): MG in the last 72 hours. Phos:     Recent Labs     01/18/21  1026 01/19/21  0905 01/20/21  0637   PHOS 2.6 3.1 3.6        ASSESSMENT AND PLAN:  73 yo with sbo vs ileus    Trial clear liquids today      Morbid Obesity: BMI : 36.52 Complicating assessment and treatment. Placing patient at risk for multiple co-morbidities and complications. Electronically signed by LEANN Capone - CNP     [id number]    Patient seen and agree with above. No pain, some nausea/emesis last night. No fevers. Imaging had improved but emesis last night. Try liquids today.   If issues with diet advancement then would consider laparoscopy.     Galilea Holman MD

## 2021-01-20 NOTE — TELEPHONE ENCOUNTER
Wife called about  over in [place] [address] room 56 - said pt is not getting the care he needs w/ his feeding tube - asked her to have pt buzz the nurse and she said he did  I sent Dr Beatriz Asif [facility] mess

## 2021-01-20 NOTE — PROGRESS NOTES
Pt tolerated clamping trial from 9824-7879 very well. When RN unclamped it @ 0500 it was found to be at the 25cm lotus and was mostly out.  Per Surgery's note the plan was to possibly removed NG and advance diet to clears today anyway- will leave NG out for now, monitor for N/V, and defer to dayshift team.

## 2021-01-20 NOTE — PROGRESS NOTES
Pt was transferred to unit at approx 2245 via bed in stable condition w/ NGT clamped. Pt was oriented to his room and his belongings were accounted for and out into his locker. Pt is a/o x4. VSS. Assessment as charted. - Pt has distended abd w/ hypoactive bowel sounds- NGT in place w/ clamping trial Q3 hours- pt isn't tolerating very well as he had emesis earlier in the shift- pt is still getting a moderate amount of dark brown output from NGT. Pt complains of intermittent abd pain- PRN Morphine is being given per MD order. Pt is passing flatus & BMs.   - Pts SpO2 was teetering from 89%-92%- 2L O2 NC was applied and SpO2 sustained >90%- clear lung sounds- IS was given, educated on, and encouraged. Pt is currently resting in her bed that is locked and in its lowest position w/ her call light within reach, non-skid socks, and bed alarm on. Pt denies any other needs at this time. Will continue to monitor.

## 2021-01-20 NOTE — CARE COORDINATION
Chart reviewed day 5. Care managed per surgery and IM. sbo vs ileus. NGT removal yesterday. Trial clear liquids today. From home with wife and daughter. Ambulatory with walker. [facility] if needed.  Kristopher Bauer RN

## 2021-01-21 NOTE — PROGRESS NOTES
Pt assessment completed and charted. VSS. Pt a/o. Pt is passing flatus and having bowel movements. Denies nausea. Bed in lowest position and wheels locked. Call light within reach. Bedside table within reach. Non-skid footwear in place. Pt denies any other needs at this time. Pt calls out appropriately. Will continue to monitor.     Disaster charting utilized

## 2021-01-21 NOTE — PROGRESS NOTES
Pt axo. Assessment completed as charted. Pt states is passing flatus. Denies nausea at this time. Will continue to monitor. Call light in reach.

## 2021-01-21 NOTE — PROGRESS NOTES
Hospitalist Progress Note      PCP: Derrek Amaral MD    Date of Admission: 1/15/2021    Chief Complaint: Abdominal Pain     Subjective: no new c/o. Medications:  Reviewed    Infusion Medications    sodium chloride 100 mL/hr at 01/21/21 0450     Scheduled Medications    benzocaine   Mouth/Throat 4x Daily    amLODIPine  2.5 mg Oral BID    labetalol  200 mg Oral BID    enoxaparin  40 mg Subcutaneous Daily    famotidine (PEPCID) injection  20 mg Intravenous BID    sodium chloride flush  10 mL Intravenous 2 times per day     PRN Meds: phenol, tetrahydrozoline, phenol, labetalol, morphine, sodium chloride flush, promethazine **OR** ondansetron, acetaminophen **OR** acetaminophen      Intake/Output Summary (Last 24 hours) at 1/21/2021 0936  Last data filed at 1/21/2021 8043  Gross per 24 hour   Intake 3546 ml   Output 1250 ml   Net 2296 ml       Physical Exam Performed:    BP (!) 160/88   Pulse 74   Temp 98.3 °F (36.8 °C) (Oral)   Resp 20   Ht 6' 1\" (1.854 m)   Wt (!) 347 lb 7.1 oz (157.6 kg)   SpO2 93%   BMI 45.84 kg/m²     General appearance: No apparent distress, appears stated age and cooperative. HEENT: Pupils equal, round, and reactive to light. Conjunctivae/corneas clear. Neck: Supple, with full range of motion. No jugular venous distention. Trachea midline. Respiratory:  Normal respiratory effort. Clear to auscultation, bilaterally without Rales/Wheezes/Rhonchi. Cardiovascular: Regular rate and rhythm with normal S1/S2 without murmurs, rubs or gallops. Abdomen: Soft, non-tender, non-distended with normal bowel sounds. Musculoskeletal: No clubbing, cyanosis or edema bilaterally. Full range of motion without deformity. Skin: Skin color, texture, turgor normal.  No rashes or lesions. Neurologic:  Neurovascularly intact without any focal sensory/motor deficits.  Cranial nerves: II-XII intact, grossly non-focal.  Psychiatric: Alert and oriented, thought content appropriate, normal insight  Capillary Refill: Brisk,< 3 seconds   Peripheral Pulses: +2 palpable, equal bilaterally       Labs:   Recent Labs     01/18/21  1026 01/19/21  0905 01/20/21  0637   WBC 10.4 9.7 8.2   HGB 16.0 14.5 14.0   HCT 48.0 43.2 41.6    194 181     Recent Labs     01/18/21  1026 01/19/21  0905 01/20/21  0637    142 141   K 4.1 3.9 4.0    104 104   CO2 28 26 27   BUN 14 11 10   CREATININE 0.8 0.7* 0.7*   CALCIUM 9.3 8.8 8.5   PHOS 2.6 3.1 3.6     No results for input(s): AST, ALT, BILIDIR, BILITOT, ALKPHOS in the last 72 hours. No results for input(s): INR in the last 72 hours. No results for input(s): Lewanda Bene in the last 72 hours. Urinalysis:      Lab Results   Component Value Date    NITRU Negative 01/16/2021    BLOODU Negative 01/16/2021    SPECGRAV >=1.030 01/16/2021    GLUCOSEU Negative 01/16/2021       Consults:    IP CONSULT TO HOSPITALIST  IP CONSULT TO GENERAL SURGERY      Assessment/Plan:    Active Hospital Problems    Diagnosis    SBO (small bowel obstruction) ([facility] [address] [K56.609]       SBO - recurrent SBO multiple times s/p prior Ex-Lap but no adhesions. Admission CT scan w/ SBO and transition point in LLQ - independently reviewed. Negative colonoscopy 2 years ago, and no prior history of hernias. Family hx of Colon CA age 52's. General Surgery consulted and appreciated. Currently NPO w/ NGT in place. Serial abdominal exams. AXR 17 Jan w/out improvement - SBFT 18 Jan per General Surgery independently reviewed c/w partial SBO w/ contrast reaching colon    HTN - w/out known CAD and no evidence of active signs/sxs of ischemia/failure. Normally controlled on home meds w/ vitals reviewed and documented as above. Norvasc restarted 16 Jan w/ PRN BBlocker ordered. Morbid Obesity -  With Body mass index is 45.84 kg/m². Complicating assessment and treatment.  Placing patient at risk for multiple co-morbidities as well as early death and contributing to the patient's presentation. Counseled on weight loss. BELEN - likely 2nd to obesity. Unable to tolerate home CPAP/BiPap       DVT Prophylaxis: LMWH  Diet: DIET CLEAR LIQUID;  Code Status: Full Code      PT/OT Eval Status: Not yet ordered. Dispo - Possibly Thurs/Friday 21/22 Jan at the earliest pending clinical course and subspecialty recs.       Anna Hall MD

## 2021-01-21 NOTE — PROGRESS NOTES
[facility]    PATIENT NAME: Gaudencio Elizabeth     TODAY'S DATE: 1/21/2021    CHIEF COMPLAINT: feeling better    INTERVAL HISTORY/HPI:    Pt notes increased flatus, mult loose BMs. Appetite slowly improving but only took small amounts of clears this AM.  Denies pain. OBJECTIVE:  VITALS:  BP (!) 160/88   Pulse 74   Temp 98.3 °F (36.8 °C) (Oral)   Resp 20   Ht 6' 1\" (1.854 m)   Wt (!) 347 lb 7.1 oz (157.6 kg)   SpO2 93%   BMI 45.84 kg/m²     INTAKE/OUTPUT:    I/O last 3 completed shifts: In: 3066 [P.O.:840; I.V.:2226]  Out: 1350 [Urine:1350]  I/O this shift: In: 480 [P.O.:480]  Out: 250 [Urine:250]    CONSTITUTIONAL:  awake and alert  LUNGS:  clear to auscultation  ABDOMEN:  normal bowel sounds, soft, non-distended, non-tender     Data:  CBC:   Recent Labs     01/19/21  0905 01/20/21  0637   WBC 9.7 8.2   HGB 14.5 14.0   HCT 43.2 41.6    181     BMP:    Recent Labs     01/19/21  0905 01/20/21  0637    141   K 3.9 4.0    104   CO2 26 27   BUN 11 10   CREATININE 0.7* 0.7*   GLUCOSE 76 79     Hepatic: No results for input(s): AST, ALT, ALB, BILITOT, ALKPHOS in the last 72 hours. Mag:    No results for input(s): MG in the last 72 hours. Phos:     Recent Labs     01/19/21  0905 01/20/21  0637   PHOS 3.1 3.6      INR: No results for input(s): INR in the last 72 hours. Radiology Review:         ASSESSMENT AND PLAN:  Partial SBO, clinically improving again   - will check AXR to ensure improved status   - cont w/ clears today, advance when appetite increases   - up OOB    Electronically signed by Parris David MD     ADDENDUM:   AXR results and images reviewed    TWO XRAY VIEWS OF THE ABDOMEN     1/21/2021 12:32 pm     COMPARISON:   Multiple prior exams, most recent 01/19/2021     HISTORY:   ORDERING SYSTEM PROVIDED HISTORY: f/u partial SBO   TECHNOLOGIST PROVIDED HISTORY:   Reason for exam:->f/u partial SBO     FINDINGS:   There is residual contrast within the colon. There is significant interval increase in moderate to severely dilated loops   of gas-filled small bowel in the mid abdomen.  Distal small bowel loops are   relatively collapsed. Impression:     Despite progression of colonic contrast there is interval increase in   dilation of small bowel loops proximally.  Findings are concerning for   developing/worsening small bowel obstruction.  Ileus could have a similar   appearance. Based on AXR along with his poor ability to tolerate/advance PO intake I am concerned he still has some form of mechanical/obstructive process going on. I would recommend proceeding with surgical exploration to clarify whether true SBO is present or not. Will plan for laparoscopic, possibly open exploration with possible lysis of adhesions and/or bowel resection. Discussed the plan in detail w/ patient including above options. Patient appears to understand, asks appropriate questions, and agrees to proceed, with Dr Nallely Turk tomorrow.

## 2021-01-21 NOTE — CARE COORDINATION
Chart reviewed day 6. Care managed per surgery and IM. Patient clinically improving. Continues with clear liquids. Will need return of GI function prior to d/c. Patient from home, ambulatory with walker. [facility] if needed.  Rey Hester RN

## 2021-01-22 NOTE — ANESTHESIA PRE PROCEDURE
Department of Anesthesiology  Preprocedure Note       Name:  Ghada Son   Age:  72 y.o.  :  1955                                          MRN:  [id number]         Date:  2021      Surgeon: Bria Foss):  Flaco Snell MD    Procedure: Procedure(s):  EXPLORATORY LAPAROSCOPY, POSSIBLE OPEN LAPAROTOMY, POSSIBLE BOWEL RESECTION    Medications prior to admission:   Prior to Admission medications    Medication Sig Start Date End Date Taking?  Authorizing Provider   Naproxen Sodium (ALEVE PO) Take by mouth as needed   Yes Historical Provider, MD   amLODIPine (NORVASC) 2.5 MG tablet Take 2.5 mg by mouth 2 times daily    Yes Historical Provider, MD   labetalol (NORMODYNE) 200 MG tablet TAKE 1 TABLET BY MOUTH TWICE A DAY 19  Yes Historical Provider, MD   aspirin 325 MG EC tablet Take 1 tablet by mouth daily 10/7/20 12/1/20  HUE Montez   meloxicam (MOBIC) 7.5 MG tablet Take 1 tablet by mouth daily 10/7/20   HUE Montez   acetaminophen (TYLENOL) 325 MG tablet Take 2 tablets by mouth every 6 hours as needed for Pain 7/10/20   HUE Montez       Current medications:    Current Facility-Administered Medications   Medication Dose Route Frequency Provider Last Rate Last Admin    ceFAZolin (ANCEF) 3000 mg in dextrose 5 % 100 mL IVPB  3,000 mg Intravenous Once Annia Isaac MD        benzocaine (HURRICAINE) 20 % oral spray   Mouth/Throat 4x Daily Rosy Lomax MD   Stopped at 21 0742    phenol 1.4 % mouth spray 1 spray  1 spray Mouth/Throat Q2H PRN Annia Isaac MD        tetrahydrozoline 0.05 % ophthalmic solution 2 drop  2 drop Both Eyes TID PRN Wilver Carpio MD        phenol 1.4 % mouth spray 1 spray  1 spray Mouth/Throat Q2H PRN Wilver Carpio MD        amLODIPine (NORVASC) tablet 2.5 mg  2.5 mg Oral BID LEANN Germain - CNP   2.5 mg at 21 2625  labetalol (NORMODYNE) tablet 200 mg  200 mg Oral BID Radha Parkers, APRN - CNP   200 mg at 01/21/21 2059    labetalol (NORMODYNE;TRANDATE) injection 5 mg  5 mg Intravenous Q4H PRN Ahmad A Danuta, DO   5 mg at 01/18/21 1521    enoxaparin (LOVENOX) injection 40 mg  40 mg Subcutaneous Daily Ahmad A Danuta, DO   40 mg at 01/21/21 6697    morphine (PF) injection 2 mg  2 mg Intravenous Q3H PRN Vaucluse Parkers, APRN - CNP   2 mg at 01/19/21 2106    0.9 % sodium chloride infusion   Intravenous Continuous Amaya Bautista  mL/hr at 01/22/21 1031 New Bag at 01/22/21 1031    famotidine (PEPCID) injection 20 mg  20 mg Intravenous BID Li Foss MD   20 mg at 01/22/21 1033    sodium chloride flush 0.9 % injection 10 mL  10 mL Intravenous 2 times per day Eliza Sushila ANNA Danuta, DO   10 mL at 01/19/21 2057    sodium chloride flush 0.9 % injection 10 mL  10 mL Intravenous PRN Eliza Sushila ANNA Danuta, DO        promethazine (PHENERGAN) tablet 12.5 mg  12.5 mg Oral Q6H PRN Ahmad A Danuta, DO   12.5 mg at 01/19/21 1525    Or    ondansetron (ZOFRAN) injection 4 mg  4 mg Intravenous Q6H PRN Ahmad A Danuta, DO   4 mg at 01/22/21 0220    acetaminophen (TYLENOL) tablet 650 mg  650 mg Oral Q6H PRN Ahmad A Danuta, DO        Or    acetaminophen (TYLENOL) suppository 650 mg  650 mg Rectal Q6H PRN Ahmad A Danuta, DO           Allergies:     Allergies   Allergen Reactions    Ciprofloxacin Itching    Lisinopril Other (See Comments)     cough       Problem List:    Patient Active Problem List   Diagnosis Code    Left ankle pain M25.572    Arthritis of left ankle M19.072    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 K56.609    Essential hypertension I10    BELEN (obstructive sleep apnea) G47.33  Obesity, Class III, BMI 40-49.9 (morbid obesity) ([facility]) E66.01    Shoulder fracture, left, closed, initial encounter S42. 92XA    AVN (avascular necrosis of bone) ([facility]) M87.00    Equinovarus, acquired, left M21.542       Past Medical History:        Diagnosis Date    Arthritis     Hypertension     Knee pain     PONV (postoperative nausea and vomiting)     Sleep apnea     did not tolerate CPAP       Past Surgical History:        Procedure Laterality Date    ARTHRODESIS ANKLE Left 10/5/2020    LEFT TIBIOTALOCALCANEAL FUSION AND FIBULAR OSTECTOMY -BLOCK- -SLEEP APNEA- performed by Elizabeth Bach [facility] at [facility]      HIP SURGERY Left 1968    due to dislocation    NE LAP,DIAGNOSTIC ABDOMEN N/A 2018    DIAGNOSTIC LAPAROSCOPY performed by Keshawn Lynch MD at [place] [address]; for possible SBO    PROSTATE SURGERY      TURP    SHOULDER ARTHROSCOPY Left 2019    VIDEO ARTHROSCOPY LEFT SHOULDER, removal loose body, debridment -BLOCK- -SLEEP APNEA- performed by Jose Luis Lucero MD at [address] [place] [address] Left 2020    LEFT REVERSE TOTAL SHOULDER ARTHROPLASTY performed by Jose Luis Lucero MD at [place] [address].      age 8       Social History:    Social History     Tobacco Use    Smoking status: Former Smoker     Packs/day: 0.50     Years: 5.00     Pack years: 2.50     Types: Cigarettes     Start date:      Quit date: 1987     Years since quittin.6    Smokeless tobacco: Never Used   Substance Use Topics    Alcohol use: No     Alcohol/week: 0.0 standard drinks     Comment: rarely                                Counseling given: Not Answered      Vital Signs (Current):   Vitals:    21 2244 21 2246 21 0335 21 1036   BP:  133/78  (!) 149/81   Pulse: 70 67  68   Resp:  18  16   Temp:  97.5 °F (36.4 °C)  97.9 °F (36.6 °C)   TempSrc: Oral Oral  Oral   SpO2:  94%  94% Weight:   (!) 342 lb 11.2 oz (155.4 kg)    Height:                                                  BP Readings from Last 3 Encounters:   01/22/21 (!) 149/81   10/07/20 (!) 154/78   10/05/20 (!) 150/74       NPO Status:                                                                                 BMI:   Wt Readings from Last 3 Encounters:   01/22/21 (!) 342 lb 11.2 oz (155.4 kg)   01/12/21 (!) 324 lb 15.3 oz (147.4 kg)   12/15/20 (!) 324 lb 15.3 oz (147.4 kg)     Body mass index is 45.21 kg/m².     CBC:   Lab Results   Component Value Date    WBC 6.6 01/22/2021    RBC 4.58 01/22/2021    HGB 13.6 01/22/2021    HCT 40.9 01/22/2021    MCV 89.3 01/22/2021    RDW 13.7 01/22/2021     01/22/2021       CMP:   Lab Results   Component Value Date     01/22/2021     01/22/2021    K 3.4 01/22/2021    K 3.4 01/22/2021    K 4.2 01/17/2021     01/22/2021     01/22/2021    CO2 26 01/22/2021    CO2 27 01/22/2021    BUN 4 01/22/2021    BUN 4 01/22/2021    CREATININE 0.7 01/22/2021    CREATININE 0.7 01/22/2021    GFRAA >60 01/22/2021    GFRAA >60 01/22/2021    AGRATIO 1.4 01/17/2021    LABGLOM >60 01/22/2021    LABGLOM >60 01/22/2021    GLUCOSE 101 01/22/2021    GLUCOSE 100 01/22/2021    PROT 6.5 01/17/2021    CALCIUM 8.5 01/22/2021    CALCIUM 8.2 01/22/2021    BILITOT 1.2 01/17/2021    ALKPHOS 105 01/17/2021    AST 27 01/17/2021    ALT 40 01/17/2021       POC Tests:   Recent Labs     01/22/21  0748   POCGLU 90       Coags: No results found for: PROTIME, INR, APTT    HCG (If Applicable): No results found for: PREGTESTUR, PREGSERUM, HCG, HCGQUANT     ABGs: No results found for: PHART, PO2ART, NYP2CNC, LIK5IOB, BEART, F2BSQAKJ     Type & Screen (If Applicable):  No results found for: LABABO, LABRH    Drug/Infectious Status (If Applicable):  No results found for: HIV, HEPCAB    COVID-19 Screening (If Applicable):   Lab Results   Component Value Date    COVID19 NOT DETECTED 09/29/2020 Anesthesia Evaluation  Patient summary reviewed and Nursing notes reviewed  Airway: Mallampati: III  TM distance: <3 FB   Neck ROM: full  Mouth opening: < 3 FB Dental: normal exam         Pulmonary:normal exam  breath sounds clear to auscultation  (+) sleep apnea:                             Cardiovascular:    (+) hypertension:,         Rhythm: regular  Rate: normal                    Neuro/Psych:   Negative Neuro/Psych ROS              GI/Hepatic/Renal:   (+) morbid obesity          Endo/Other:    (+) : arthritis: OA., .                 Abdominal:   (+) obese,         Vascular: negative vascular ROS. Anesthesia Plan      general     ASA 3       Induction: intravenous. MIPS: Postoperative opioids intended and Prophylactic antiemetics administered. Anesthetic plan and risks discussed with patient. Plan discussed with CRNA.                   Kristie Kim MD   1/22/2021

## 2021-01-22 NOTE — PROGRESS NOTES
Pt brought to PACU. Report obtained from OR RN and anesthesia. Pt placed on monitor, SR with PAC's and O2 at 3L NC. RLL lap site with a small amount of blood.

## 2021-01-22 NOTE — PROGRESS NOTES
non-focal.  Psychiatric: Alert and oriented, thought content appropriate, normal insight  Capillary Refill: Brisk,< 3 seconds   Peripheral Pulses: +2 palpable, equal bilaterally       Labs:   Recent Labs     01/19/21  0905 01/20/21  0637 01/22/21  0710   WBC 9.7 8.2 6.6   HGB 14.5 14.0 13.6   HCT 43.2 41.6 40.9    181 189     Recent Labs     01/19/21  0905 01/20/21  0637 01/22/21  0710    141 139  139   K 3.9 4.0 3.4*  3.4*    104 104  104   CO2 26 27 26  27   BUN 11 10 4*  4*   CREATININE 0.7* 0.7* 0.7*  0.7*   CALCIUM 8.8 8.5 8.5  8.2*   PHOS 3.1 3.6 3.3     No results for input(s): AST, ALT, BILIDIR, BILITOT, ALKPHOS in the last 72 hours. No results for input(s): INR in the last 72 hours. No results for input(s): Ada Journey in the last 72 hours. Urinalysis:      Lab Results   Component Value Date    NITRU Negative 01/16/2021    BLOODU Negative 01/16/2021    SPECGRAV >=1.030 01/16/2021    GLUCOSEU Negative 01/16/2021       Consults:    IP CONSULT TO HOSPITALIST  IP CONSULT TO GENERAL SURGERY      Assessment/Plan:    Active Hospital Problems    Diagnosis    SBO (small bowel obstruction) ([facility] [K56.609]       SBO - recurrent SBO multiple times s/p prior Ex-Lap but no adhesions. Admission CT scan w/ SBO and transition point in LLQ - independently reviewed. Negative colonoscopy 2 years ago, and no prior history of hernias. Family hx of Colon CA age 52's. General Surgery consulted and appreciated. Currently NPO. Serial abdominal exams. AXR 17 Jan w/out improvement - SBFT 18 Jan per General Surgery independently reviewed c/w partial SBO w/ contrast reaching colon. Plans for exploratory surgery 22 Jan.     HTN - w/out known CAD and no evidence of active signs/sxs of ischemia/failure. Normally controlled on home meds w/ vitals reviewed and documented as above. Norvasc restarted 16 Jan w/ PRN BBlocker ordered. Morbid Obesity -  With Body mass index is 45.21 kg/m². Complicating assessment and treatment. Placing patient at risk for multiple co-morbidities as well as early death and contributing to the patient's presentation. Counseled on weight loss. BELEN - likely 2nd to obesity. Unable to tolerate home CPAP/BiPap       DVT Prophylaxis: LMWH  Diet: Diet NPO Time Specified  Code Status: Full Code      PT/OT Eval Status: Not yet ordered - may need post-op, normally ambulatory w/ walker. Dispo - Possibly Monday 25 Jan at the earliest pending post-op clinical course and subspecialty recs.       Kameron Paul MD

## 2021-01-22 NOTE — PROGRESS NOTES
Shift assessment updated as charted. Patient a/ox4. Patient aware he is NPO at midnight. Denies needs. Will monitor.

## 2021-01-22 NOTE — OP NOTE
Date of Surgery: 1/22/21    Preop Dx:  Partial small bowel obstruction    Postop Dx:  same    Procedure:  Laparoscopic lysis of adhesions    Surgeon:  Mick Almonte    Assistant:      Anesthesia:  GETA    EBL:   <50ml    Specimen:  none    Complications: none    Drains/Lines:  none    Indications:  71 yo with recurrent partial small bowel obstruction    Description:  Patient was given adequate description of the risks and rewards of the procedure, including bleeding, infection, open bowel resection and freely consented. He was given appropriate antibiotics and brought to the OR where GETA anesthesia was induced. He was placed in supine position. Prepped and draped in usual sterile fashion. Right upper incision made and 5mm optiview trocar inserted. Abdomen insufflated to 15mmHg pressure. To assist in exposure and dissection 3 additional 5mm trocars were inserted. Small intestine then examined multiple times from proximal to distal.  While slightly more dilated proximally, it was dilated distally as well. There were no areas of stenosis or ischemia. No internal hernias. The distal ileum was affixed behind a dilated, redundant sigmoid colon. The ileum here was mobilized off of some peritoneal attachments with sharp dissection to allow it to be situated above the sigmoid colon. However, this did not correlate with the point of transition mentioned on prior imaging. the intestine was inspected again and no point or area of transition was seen. The abdomen was desufflated and trocars were removed. Trocar incision sites were closed with 3-0 vicryl suture and 4-0 monocryl sutures. Sterile dressing placed. All suture, sponge and instrument count correct times two at end of case. Transferred to PACU in stable condition.     Leonel Jones MD

## 2021-01-22 NOTE — PROGRESS NOTES
Pt assessment completed and charted. VSS. Pt a/o. Pt denies pain/nausea. Pt states \"I just don't feel right. \". Pt ambulates SB assist w/ 4 pt walker. Bed in lowest position and wheels locked. Call light within reach. Bedside table within reach. Non-skid footwear in place. Pt denies any other needs at this time. Pt calls out appropriately. Will continue to monitor.

## 2021-01-22 NOTE — CARE COORDINATION
Chart reviewed day 7. Surgical intervention today. CM will continue to follow post op course for needs. Patient from home with family. Ambulatory with walker due to knee at baseline.  Tony Daniels RN

## 2021-01-23 NOTE — DISCHARGE SUMMARY
Surgery Discharge Summary    Patient Identification  Citlalli Drummond is a 72 y.o. male. :  1955  Admit Date:  1/15/2021    Discharge date:   No discharge date for patient encounter. Disposition: home    Discharge Diagnoses: Active Problems:    SBO (small bowel obstruction) (HCC)  Resolved Problems:    * No resolved hospital problems. *      Discharge condition: good    Discharge Medications:     Current Discharge Medication List      CONTINUE these medications which have NOT CHANGED    Details   Naproxen Sodium (ALEVE PO) Take by mouth as needed      amLODIPine (NORVASC) 2.5 MG tablet Take 2.5 mg by mouth 2 times daily       labetalol (NORMODYNE) 200 MG tablet TAKE 1 TABLET BY MOUTH TWICE A DAY      aspirin 325 MG EC tablet Take 1 tablet by mouth daily  Qty: 30 tablet, Refills: 0      meloxicam (MOBIC) 7.5 MG tablet Take 1 tablet by mouth daily  Qty: 30 tablet, Refills: 0      acetaminophen (TYLENOL) 325 MG tablet Take 2 tablets by mouth every 6 hours as needed for Pain  Qty: 120 tablet, Refills: 0                Current Discharge Medication List      START taking these medications    Details   oxyCODONE-acetaminophen (ENDOCET) 5-325 MG per tablet Take 1 tablet by mouth every 6 hours as needed for Pain for up to 5 days. Intended supply: 5 days. Take lowest dose possible to manage pain  Qty: 20 tablet, Refills: 0    Comments: Reduce doses taken as pain becomes manageable  Associated Diagnoses: Small bowel obstruction (HCC)               Most Recent Labs:    CBC:   Recent Labs     21  0710 21  0701   WBC 6.6 8.8   HGB 13.6 13.7   HCT 40.9 41.1    205     BMP:    Recent Labs     21  0710 21  0701     139 140   K 3.4*  3.4* 3.7     104 104   CO2 26  27 27   BUN 4*  4* 6*   CREATININE 0.7*  0.7* 0.7*   GLUCOSE 101*  100* 104*     Hepatic: No results for input(s): AST, ALT, ALB, BILITOT, ALKPHOS in the last 72 hours.   PT/INR: No results for input(s): INR in the last 72 hours. Consults: none    Surgery: Laparoscopic PATRICIA    Patient Instructions: Activity: no heavy lifting, pushing, pulling for 3 weeks, no driving while on analgesics  Diet: As tolerated  Follow-up with Dr. Casandra Padron in 2 weeks. The patient and/or family/patient representatives, were provided education regarding discharge instructions, ongoing treatment and follow-up. Details of information given to the patient may be found in the discharge instructions located in the EMR. HPI and Hospital Course:   Admitted with SBO. Had laparoscopic PATRICIA. Tolerating PO and bowels working.  [address] [address] General Surgery

## 2021-01-23 NOTE — PROGRESS NOTES
Hospitalist Progress Note      PCP: Gianluca Harper MD    Date of Admission: 1/15/2021    Chief Complaint: Abdominal Pain      Subjective: no new c/o, had exploratory surgery and doing well today, had BM, seems to be tolerating diet    Medications:  Reviewed    Infusion Medications    lactated ringers      sodium chloride 100 mL/hr at 01/22/21 1538     Scheduled Medications    sodium chloride flush  10 mL Intravenous 2 times per day    famotidine (PEPCID) injection  20 mg Intravenous Once    amLODIPine  2.5 mg Oral BID    labetalol  200 mg Oral BID    enoxaparin  40 mg Subcutaneous Daily    famotidine (PEPCID) injection  20 mg Intravenous BID    sodium chloride flush  10 mL Intravenous 2 times per day     PRN Meds: sodium chloride flush, phenol, tetrahydrozoline, phenol, labetalol, morphine, sodium chloride flush, promethazine **OR** ondansetron, acetaminophen **OR** acetaminophen      Intake/Output Summary (Last 24 hours) at 1/23/2021 1114  Last data filed at 1/23/2021 0408  Gross per 24 hour   Intake 1864.77 ml   Output 1575 ml   Net 289.77 ml       Physical Exam Performed:    BP (!) 168/94   Pulse 76   Temp 98.3 °F (36.8 °C) (Oral)   Resp 20   Ht 6' 1\" (1.854 m)   Wt (!) 344 lb 12.8 oz (156.4 kg)   SpO2 91%   BMI 45.49 kg/m²     General appearance: No apparent distress, appears stated age and cooperative. obese  HEENT: Pupils equal, round, and reactive to light. Conjunctivae/corneas clear. Neck: Supple, with full range of motion. No jugular venous distention. Trachea midline. Respiratory:  Normal respiratory effort. Clear to auscultation, bilaterally without Rales/Wheezes/Rhonchi. Cardiovascular: Regular rate and rhythm with normal S1/S2 without murmurs, rubs or gallops. Abdomen: Soft, non-tender, non-distended with dec'd bowel sounds. Multiple lap incisions c/d/i  Musculoskeletal: No clubbing, cyanosis or edema bilaterally. Full range of motion without deformity.   Skin: Skin color, texture, turgor normal.  No rashes or lesions. Neurologic:  Neurovascularly intact without any focal sensory/motor deficits. Cranial nerves: II-XII intact, grossly non-focal.  Psychiatric: Alert and oriented, thought content appropriate, normal insight  Capillary Refill: Brisk,< 3 seconds   Peripheral Pulses: +2 palpable, equal bilaterally       Labs:   Recent Labs     01/22/21  0710 01/23/21  0701   WBC 6.6 8.8   HGB 13.6 13.7   HCT 40.9 41.1    205     Recent Labs     01/22/21  0710 01/23/21  0701     139 140   K 3.4*  3.4* 3.7     104 104   CO2 26  27 27   BUN 4*  4* 6*   CREATININE 0.7*  0.7* 0.7*   CALCIUM 8.5  8.2* 8.7   PHOS 3.3 2.9     No results for input(s): AST, ALT, BILIDIR, BILITOT, ALKPHOS in the last 72 hours. No results for input(s): INR in the last 72 hours. No results for input(s): Genia Elie in the last 72 hours. Urinalysis:      Lab Results   Component Value Date    NITRU Negative 01/16/2021    BLOODU Negative 01/16/2021    SPECGRAV >=1.030 01/16/2021    GLUCOSEU Negative 01/16/2021       Radiology:  XR ABDOMEN (2 VIEWS)   Final Result   Despite progression of colonic contrast there is interval increase in   dilation of small bowel loops proximally. Findings are concerning for   developing/worsening small bowel obstruction. Ileus could have a similar   appearance. XR ACUTE ABD SERIES CHEST 1 VW   Final Result   Contrast has progressed throughout the colon, there is no definite evidence   of small bowel obstruction. FL SMALL BOWEL FOLLOW THROUGH ONLY   Final Result   Proximal small bowel dilatation, with relative normal caliber of distal small   bowel loops, compatible with partial small bowel obstruction. Contrast   reaches colon by 2-1/2 hours. Transition point is seen in the region of the   left lower quadrant. XR ACUTE ABD SERIES CHEST 1 VW   Final Result   Distal small bowel obstruction.   Increased small bowel distension since the   earlier CT. No free air. Enteric catheter proximal port at the GE junction. Suggest advancement 5 cm. Mild bibasilar atelectasis. XR ABDOMEN (KUB) (SINGLE AP VIEW)   Final Result   The enteric tube is in good position in the stomach. CT ABDOMEN PELVIS W IV CONTRAST Additional Contrast? None   Final Result   1. Small-bowel obstruction with transition point in the left lower quadrant   at the level of the mid to distal jejunum. 2. Borderline aneurysmal dilatation of the aortic root appears similar to   prior exam.  Outpatient follow-up recommended. 3. Other findings as described. Assessment/Plan:    Active Hospital Problems    Diagnosis    SBO (small bowel obstruction) ([facility] [address] [K56.609]     SBO - recurrent SBO multiple times s/p prior Ex-Lap but no adhesions. Admission CT scan w/ SBO and transition point in LLQ - independently reviewed. Negative colonoscopy 2 years ago, and no prior history of hernias. Family hx of Colon CA age 52's. General Surgery consulted and appreciated. Currently NPO. Serial abdominal exams. AXR 17 Jan w/out improvement - SBFT 18 Jan per General Surgery independently reviewed c/w partial SBO w/ contrast reaching colon.    -had exploratory surgery 22 Jan with PATRICIA     HTN - w/out known CAD and no evidence of active signs/sxs of ischemia/failure. Normally controlled on home meds w/ vitals reviewed and documented as above. Norvasc restarted 16 [address] w/ PRN BBlocker ordered.      Morbid Obesity -  With Body mass index is 45.21 kg/m². Complicating assessment and treatment. Placing patient at risk for multiple co-morbidities as well as early death and contributing to the patient's presentation. Counseled on weight loss.      BELEN - likely 2nd to obesity.   Unable to tolerate home CPAP/BiPap         DVT Prophylaxis: Lovenox  Diet: DIET GENERAL;  Code Status: Full Code    PT/OT Eval Status: ambulatory per pt    Dispo - per surgery    Yue Keenan Jeanne Mcleod MD

## 2021-01-23 NOTE — CARE COORDINATION
Called pt room to inquire whether they'd like any d/c services. No response. CM hx of [address] in past and no PT/OT note to review. Please call DCP [phone] id needs arise.

## 2021-01-25 NOTE — PROGRESS NOTES
Pt assessment completed and charted. VSS. Pt a/o. Pt denies pain/nausea. Tolerating diet advancement. Pt had x2 bm - soft. Bed in lowest position and wheels locked. Call light within reach. Bedside table within reach. Non-skid footwear in place. Pt denies any other needs at this time. Pt calls out appropriately. Will continue to monitor.

## 2021-01-29 NOTE — PROGRESS NOTES
Telephone consultation in the morning of January 26. We have discussed the follow-up of his reverse shoulder arthroplasty. He reports doing quite well. He still has some feelings of fullness and moderate stiffness above 90 degrees. Minimal to no pain. He is making a great recovery from his ankle surgery. We discussed the challenges with resuming very physical work. Appropriate expectations. We discussed the importance of his ongoing stretching exercise program at home. We discussed appropriate expectations for return to work. He is off work for the time being. We have answered all of his questions warnings ongoing care. Look for to seeing him back in a month with follow-up x-rays. Over 15 minutes of conversation.

## 2021-02-02 NOTE — TELEPHONE ENCOUNTER
Per Dr. Lr Seek- Try imodium and schedule follow up appt     The patient has been notified of this information and all questions answered.

## 2021-02-06 PROBLEM — U07.1 COVID-19: Status: ACTIVE | Noted: 2021-01-01

## 2021-02-06 NOTE — ED NOTES
Report given to luh HINTON from . Patient left with transport to  in stable condition.      Blank Leal RN  02/06/21 8819

## 2021-02-06 NOTE — ED NOTES
[email or web address]  Re: admit.  COVID hypoxia per NP-Jacki Parikh accepted pt @1800     Kathleene Netters Naegele  02/06/21 5773

## 2021-02-06 NOTE — ED PROVIDER NOTES
I independently performed a history and physical on [address] [place] [address]. All diagnostic, treatment, and disposition decisions were made by myself in conjunction with the advanced practice provider. I have participated in the medical decision making and directed the treatment plan and disposition of the patient. For further details of [facility] emergency department encounter, please see the advanced practice provider's documentation. CHIEF COMPLAINT  Chief Complaint   Patient presents with    Abdominal Pain     started over night    Shortness of Breath     all family members who live with him tested positve for covid       [facility] 77 y.o. male  who presents to the ED complaining of cough, shortness of breath and abdominal pain. 2 weeks status post laparoscopic lysis of adhesions. Multiple family members with COVID-19    FOCUSED PHYSICAL EXAMINATION  BP (!) 166/92   Pulse 97   Temp 99.2 °F (37.3 °C) (Oral)   Resp 16   SpO2 97%      Focused physical examination:  General appearance:  Cooperative. No acute distress. Skin:  Warm. Dry. Eye:  Extraocular movements intact. Ears, nose, mouth and throat:  Oral mucosa moist,  Neck:  Trachea midline. Heart:  Regular rate and rhythm  Perfusion:  intact  Respiratory: Crackles in the bilateral lung fields. Frequent coughing. Slight increased work of breathing  Abdominal: Obese slightly distended abdomen with well-healing surgical incisions. Mild generalized tenderness throughout  Neurological:  Alert and oriented x 3. Moves all extremities spontaneously  Musculoskeletal:   Normal ROM, no deformities          Psychiatric:  Normal mood      EKG: Sinus rhythm rate of 93 bpm.  No ST elevation or arrhythmia. No change    MDM: Patient presents emergency department today with reported abdominal pain but has noted an increased cough. Found to be hypoxic satting 88% on room air requiring nasal cannula oxygen.   Ultimately tested positive for COVID-19. Because of his abdominal pain with recent surgery CT scan will be performed however I feel that this is likely just secondary to increased cough and the discomfort caused by his Covid infection. CRITICAL CARE TIME   Total Critical Care time was 30 minutes, excluding separately reportable procedures. There was a high probability of clinically significant/life threatening deterioration in the patient's condition which required my urgent intervention. This time was spent reviewing the patient chart, interpreting diagnostic/laboratory data, administration of supplemental oxygen for hypoxic respiratory failure      During the patient's ED course, the patient was given:  Medications   ondansetron (ZOFRAN) injection 4 mg (4 mg Intravenous Given 2/6/21 1711)   dexamethasone (DECADRON) injection 10 mg (10 mg Intravenous Given 2/6/21 1705)   oxyCODONE-acetaminophen (PERCOCET) 5-325 MG per tablet 1 tablet (1 tablet Oral Given 2/6/21 1711)        CLINICAL IMPRESSION  1. Acute respiratory failure with hypoxia ([address]    2. COVID-19    3. Shortness of breath    4. Elevated transaminase level    5. Lower abdominal pain        DISPOSITION  Admission      This chart was created using Dragon dictation software. Efforts were made by me to ensure accuracy, however some errors may be present due to limitations of this technology.             Carol Hand MD  02/06/21 6868

## 2021-02-06 NOTE — ED PROVIDER NOTES
Evaluated by Advanced Practice Provider    EMERGENCY DEPARTMENT ENCOUNTER      CHIEFCOMPLAINT  Abdominal Pain (started over night) and Shortness of Breath (all family members who live with him tested positve for covid)    INGRID Velásquez is a 77 y.o. male who presents to the emergency department with complaints of shortness of breath. When EM arrived patient was 82% on RA. SOB started a couple days ago. Reports it is not getting worse. He reports being very SOB on arrival to the ED with the activity of getting in the bed and out of the wheelchair. Does not use oxygen at home. Former smoker. Denies chronic lung disease history. Does not wear oxygen at home. Denies fever, sweats, chills. Cough is dry. He will get a scratchy feeling in the back of his throat and this causes the cough. Denies CP. Denies body aches or HA. Abdominal pain is right around his waist. He had laparoscopic surgery, Dr. Nallely Turk. During the night last night it started aching. He does have a cough, when the cough is deep it feels like his abdomen is going to explode. He had a bowel obstruction and this is why the surgery was performed. He has had a couple times where he has had fluid come up in his throat but not a tremendous amount of vomiting. Had diarrhea for 2 weeks after discharge from hospital. He called his doctor and not much was done. Is still having diarrhea. Thepatient is currently rating their pain as 8/10 and describes it as an aching type of pain. Treatments tried prior to arrival in the ED include: none. The patient arrived to the ED via EMS transport.     PAST MEDICAL HISTORY    Past Medical History:   Diagnosis Date    Arthritis     Hypertension     Knee pain     PONV (postoperative nausea and vomiting)     Sleep apnea     did not tolerate CPAP       SURGICAL HISTORY    Past Surgical History:   Procedure Laterality Date    ARTHRODESIS ANKLE Left 10/5/2020    LEFT TIBIOTALOCALCANEAL FUSION AND FIBULAR OSTECTOMY -BLOCK- -SLEEP APNEA- performed by Narciso Fontanez MD at [place] [address] SURGERY Left 01/01/1968    due to dislocation    LAPAROSCOPY N/A 1/22/2021    EXPLORATORY LAPAROSCOPY, LYSIS OF ADHESIONS performed by Diana Anderson MD at [address] [place] [address] N/A 9/18/2018    DIAGNOSTIC LAPAROSCOPY performed by Diana Anderson MD at [place] [address]; for possible SBO    PROSTATE SURGERY      TURP    SHOULDER ARTHROSCOPY Left 11/13/2019    VIDEO ARTHROSCOPY LEFT SHOULDER, removal loose body, debridment -BLOCK- -SLEEP APNEA- performed by Beth Connors MD at [address] [place] [address] Left 7/9/2020    LEFT REVERSE TOTAL SHOULDER ARTHROPLASTY performed by Beth Connors MD at [place] [address].      age 8       CURRENT MEDICATIONS    Current Outpatient Rx   Medication Sig Dispense Refill    aspirin 325 MG EC tablet Take 1 tablet by mouth daily 30 tablet 0    acetaminophen (TYLENOL) 325 MG tablet Take 2 tablets by mouth every 6 hours as needed for Pain 120 tablet 0    Naproxen Sodium (ALEVE PO) Take by mouth as needed      amLODIPine (NORVASC) 2.5 MG tablet Take 2.5 mg by mouth 2 times daily       labetalol (NORMODYNE) 200 MG tablet TAKE 1 TABLET BY MOUTH TWICE A DAY         ALLERGIES    Allergies   Allergen Reactions    Ciprofloxacin Itching    Lisinopril Other (See Comments)     cough       FAMILY HISTORY    Family History   Problem Relation Age of Onset    Heart Disease Mother     Seizures Mother     High Blood Pressure Father     Kidney Disease Father     Cancer Father         colon,bone       SOCIAL HISTORY    Social History     Socioeconomic History    Marital status:      Spouse name: Not on file    Number of children: Not on file    Years of education: Not on file    Highest education level: Not on file   Occupational History    Not on file   Social Needs    Financial resource strain: Not on file wheezing, rales, or rhonchi. CADIOVASCULAR:  Regular rate and rhythm. Normal S1-S2 sounds. No murmurs, rubs, or gallops. Capillary refill is brisk in all 4extremities. Bilateral lower extremities are equal in size, there is no swelling observed. There is no tenderness to palpation. There is no erythema observed or warmth palpated. GI: Soft, lower abdominal tenderness to palpation, nondistended with positive bowelsounds. No rebound tenderness, guarding or any peritoneal signs. No masses or hepatosplenomegaly   MUSCULOSKELETAL:  No gross deformities or trauma noted. Moving allextremities equally and appropriately. Normal ROM. SKIN: Warm/dry. Skin is intact. Norashes/lesions noted. PSYCHIATRIC: Mood and affect appropriate. Speech is clear andarticulate. NEUROLOGIC: Alert and oriented. No focal motor or sensory deficits. LABS  I havereviewed all labs for this visit.    Results for orders placed or performed during the hospital encounter of 02/06/21   CBC auto differential   Result Value Ref Range    WBC 7.0 4.0 - 11.0 K/uL    RBC 5.28 4.20 - 5.90 M/uL    Hemoglobin 15.6 13.5 - 17.5 g/dL    Hematocrit 45.8 40.5 - 52.5 %    MCV 86.8 80.0 - 100.0 fL    MCH 29.5 26.0 - 34.0 pg    MCHC 34.0 31.0 - 36.0 g/dL    RDW 14.6 12.4 - 15.4 %    Platelets 370 956 - 298 K/uL    MPV 9.7 5.0 - 10.5 fL    Neutrophils % 77.8 %    Lymphocytes % 11.5 %    Monocytes % 9.8 %    Eosinophils % 0.7 %    Basophils % 0.2 %    Neutrophils Absolute 5.4 1.7 - 7.7 K/uL    Lymphocytes Absolute 0.8 (L) 1.0 - 5.1 K/uL    Monocytes Absolute 0.7 0.0 - 1.3 K/uL    Eosinophils Absolute 0.0 0.0 - 0.6 K/uL    Basophils Absolute 0.0 0.0 - 0.2 K/uL   Lactic acid, plasma   Result Value Ref Range    Lactic Acid 1.5 0.4 - 2.0 mmol/L   Comprehensive metabolic panel   Result Value Ref Range    Sodium 137 136 - 145 mmol/L    Potassium 3.9 3.5 - 5.1 mmol/L    Chloride 100 99 - 110 mmol/L    CO2 23 21 - 32 mmol/L    Anion Gap 14 3 - 16    Glucose 102 (H) 70 - 99 mg/dL    BUN 13 7 - 20 mg/dL    CREATININE 0.9 0.8 - 1.3 mg/dL    GFR Non-African American >60 >60    GFR African American >60 >60    Calcium 9.0 8.3 - 10.6 mg/dL    Total Protein 7.2 6.4 - 8.2 g/dL    Albumin 4.0 3.4 - 5.0 g/dL    Albumin/Globulin Ratio 1.3 1.1 - 2.2    Total Bilirubin 0.9 0.0 - 1.0 mg/dL    Alkaline Phosphatase 102 40 - 129 U/L    ALT 41 (H) 10 - 40 U/L    AST 40 (H) 15 - 37 U/L    Globulin 3.2 g/dL   Blood gas, venous   Result Value Ref Range    pH, Pavel 7.383 7.350 - 7.450    pCO2, Pavel 40.8 40.0 - 50.0 mmHg    pO2, Pavel 48.9 (H) 25.0 - 40.0 mmHg    HCO3, Venous 23.8 23.0 - 29.0 mmol/L    Base Excess, Pavel -1.2 -3.0 - 3.0 mmol/L    O2 Sat, Pavel 84 Not Established %    Carboxyhemoglobin 1.6 (H) 0.0 - 1.5 %    MetHgb, Pavel 0.5 <1.5 %    TC02 (Calc), Pavel 25 Not Established mmol/L    O2 Content, Pavel 20 Not Established VOL %    O2 Therapy Unknown    Sample possible blood bank testing   Result Value Ref Range    Specimen Status EVA    COVID-19   Result Value Ref Range    SARS-CoV-2, NAAT DETECTED (AA) Not Detected   Lipase   Result Value Ref Range    Lipase 26.0 13.0 - 60.0 U/L   Troponin   Result Value Ref Range    Troponin 0.01 <0.01 ng/mL   EKG 12 Lead   Result Value Ref Range    Ventricular Rate 93 BPM    Atrial Rate 93 BPM    P-R Interval 160 ms    QRS Duration 86 ms    Q-T Interval 346 ms    QTc Calculation (Bazett) 430 ms    P Axis 57 degrees    R Axis 1 degrees    T Axis 50 degrees    Diagnosis       Normal sinus rhythmNormal ECGWhen compared with ECG of 15-DG-2021 19:53,No significant change was found       RADIOLOGY    Xr Chest Portable    Result Date: 2/6/2021  EXAMINATION: ONE XRAY VIEW OF THE CHEST 2/6/2021 3:30 pm COMPARISON: 07/20/2008 HISTORY: ORDERING SYSTEM PROVIDED HISTORY: SOB TECHNOLOGIST PROVIDED HISTORY: Reason for exam:->SOB Reason for Exam: sob; abd pain Acuity: Acute Type of Exam: Initial FINDINGS: Single frontal view of the chest was performed.   There is no acute skeletal abnormality. A left shoulder arthroplasty is noted. The heart size is mildly enlarged, though stable. The mediastinal contours are accentuated by low lung volumes and portable technique, and are felt to be within normal limits. There is mild volume related right basilar atelectasis. The lungs are otherwise clear. There is no evidence of a pneumothorax. There are gas-filled loops of bowel within the upper abdomen. 1. Low lung volumes, with mild volume related right basilar atelectasis. 2. Stable cardiomegaly. CT abdomen/pelvis ordered and pending. ED COURSE/MDM  Patient seen and evaluated. Old records reviewed. Diagnostic testing reviewed and results discussed. This patient was also seen and evaluated by Flip Ortiz MD. We thoroughly discussed thehistory, physical exam, diagnostic testing and emergency department course. Prince Cedillo presented to the ED with the above noted complaints. Physical exam reveals no adventitious breath sounds. Patient was found to be 82% by EMS when they arrived, on his arrival to the ED on room air he was 87%. Blood work was obtained and shows no leukocytosis, absolute lymphocytes are low at 0.8. No further differential shift. No anemia. Lactic acid level is normal at 1.5. There is no electrolyte abnormality. No evidence of acute kidney injury. There is a mild transaminitis his ALT and AST are elevated at 41/40. VBG without alteration in the pH. Lipase is normal at 26. Troponin is normal at 0.01. Chest x-ray shows low lung volumes with right basilar atelectasis. Stable cardiomegaly. EKG shows normal sinus rhythm without acute findings. Covid swab obtained and positive.     Pt was given the following medications ortreatments in the ED:   Medications   ondansetron (ZOFRAN) injection 4 mg (4 mg Intravenous Given 2/6/21 1711)   dexamethasone (DECADRON) injection 10 mg (10 mg Intravenous Given 2/6/21 1705)   oxyCODONE-acetaminophen system including errorsin grammar, punctuation, and spelling, as well as words and phrases that may be inappropriate. If there are any questions or concerns please feel free to contact the dictating provider for clarification.         LEANN Garcia - MATHIEU  02/06/21 8714

## 2021-02-06 NOTE — ED NOTES
upon entering the room for triage patient was visablly SOB, pateint appeared to have quick labored breathing and SPO2 was 87%.      Gertrude Liriano RN  02/06/21 0730

## 2021-02-06 NOTE — ED NOTES
Bed: 17  Expected date:   Expected time:   Means of arrival:   Comments:  Atfd m6     Kerrie Pham RN  02/06/21 8470

## 2021-02-06 NOTE — PROGRESS NOTES
Pt admitted to [facility]327 from ER in stable condition. A/o x4, on 4L//min via NC, SOB on exertion. Reports abd pain rated at 7 out 10. Up as tolerated, uses walker to ambulate. Denies any needs at this time. Will continue to monitor.

## 2021-02-06 NOTE — H&P
Hospital Medicine History & Physical      PCP: Donna Cortés MD    Date of Admission: 2/6/2021    Date of Service: Pt seen/examined on 02/06/21 and Admitted to Inpatient with expected LOS greater than two midnights due to medical therapy of acute COVID-19 infection. Chief Complaint: Shortness of breath      History Of Present Illness:       77 y.o. male who presented to [facility] with shortness of breath that started 2 days ago. Did not get better. Worsening shortness of breath especially with exertion was noted over past 24 hours and had to call 911 to be brought to the emergency department. Does not require oxygen at home. Former smoker. When the EMTs arrived, patient was found to be 82% on room air. Was started on oxygen. And accompanying symptom was diffuse abdominal pain. Had surgery for small bowel obstruction a couple weeks ago. Feels like abdominal pain is coming back. Has also some nausea but no vomiting. Diarrhea past 2 weeks. Abdominal pain is 8 out of 10. Aching and cramping. No other accompanying symptoms  Nothing onset makes the patient feel better or worse  Never had this kind of shortness of breath. Patient had abdominal pain and diarrhea in the past.  CT scan of the abdomen was performed in the emergency department, but results are not yet available. Comfortable, on oxygen nasal cannula, at the time of this visit.     Past Medical History:          Diagnosis Date    Arthritis     Hypertension     Knee pain     PONV (postoperative nausea and vomiting)     Sleep apnea     did not tolerate CPAP       Past Surgical History:          Procedure Laterality Date    ARTHRODESIS ANKLE Left 10/5/2020    LEFT TIBIOTALOCALCANEAL FUSION AND FIBULAR OSTECTOMY -BLOCK- -SLEEP APNEA- performed by Nila Marcelino MD at [facility] HIP SURGERY Left 01/01/1968    due to dislocation    LAPAROSCOPY N/A 1/22/2021    EXPLORATORY LAPAROSCOPY, LYSIS OF ADHESIONS performed by Saira Hinojosa MD at [address] [place] [address] N/A 9/18/2018    DIAGNOSTIC LAPAROSCOPY performed by Saira Hinojosa MD at [facility]; for possible SBO    PROSTATE SURGERY      TURP    SHOULDER ARTHROSCOPY Left 11/13/2019    VIDEO ARTHROSCOPY LEFT SHOULDER, removal loose body, debridment -BLOCK- -SLEEP APNEA- performed by Madyson Garcia MD at [facility] [place] Left 7/9/2020    LEFT REVERSE TOTAL SHOULDER ARTHROPLASTY performed by Madyson Garcia MD at [place] [address].      age 8       Medications Prior to Admission:      Prior to Admission medications    Medication Sig Start Date End Date Taking? Authorizing Provider   aspirin 325 MG EC tablet Take 1 tablet by mouth daily 10/7/20 12/1/20  HUE Kaplan   acetaminophen (TYLENOL) 325 MG tablet Take 2 tablets by mouth every 6 hours as needed for Pain 7/10/20   HUE Kaplan   Naproxen Sodium (ALEVE PO) Take by mouth as needed    Historical Provider, MD   amLODIPine (NORVASC) 2.5 MG tablet Take 2.5 mg by mouth 2 times daily     Historical Provider, MD   labetalol (NORMODYNE) 200 MG tablet TAKE 1 TABLET BY MOUTH TWICE A DAY 5/7/19   Historical Provider, MD       Allergies:  Ciprofloxacin and Lisinopril    Social History:      The patient currently lives at home    TOBACCO:   reports that he quit smoking about 33 years ago. His smoking use included cigarettes. He started smoking about 48 years ago. He has a 2.50 pack-year smoking history. He has never used smokeless tobacco.  ETOH:   reports no history of alcohol use. E-Cigarettes/Vaping Use     Questions Responses    E-Cigarette/Vaping Use Never User    Start Date     Passive Exposure     Quit Date     Counseling Given     Comments Unknown            Family History:      Reviewed in detail and negative for DM, CAD, Cancer, CVA.  Positive as follows:        Problem Relation Age of Onset    Heart Disease Mother     Seizures Mother  High Blood Pressure Father     Kidney Disease Father     Cancer Father         colon,bone       REVIEW OF SYSTEMS:   Pertinent positives as noted in the HPI. Shortness of breath, worse with exertion. All other systems reviewed and negative. PHYSICAL EXAM PERFORMED:    BP (!) 166/92   Pulse 97   Temp 99.2 °F (37.3 °C) (Oral)   Resp 16   SpO2 97%     I performed an audiovisual assessment, based on new provisions and guidance offered by [facility] on March 18, 2020 in setting of COVID-19 outbreak and in order to preserve personal protective equipment in accordance with the flexibilities announced by CMS on March 30, 2020. References:   https://med. [place].[facility]/Portals/0/Resources/COVID-19/3_18%20Telemed%20Guidance%20Updated%20March%2018. pdf?wgr=[email or web address]   http://[employer]/. pdf     Bedside physical examination deferred. However, I did visually inspect the patient and observed the following:     General appearance: No apparent distress, appears stated age and cooperative. Neck: Deferred. Respiratory:  Normal respiratory effort. Cardiovascular: Deferred. Abdomen: Deferred. Musculoskelatal: Deferred. Neurologic:  Deferred. Psychiatric: Alert and oriented, thought content appropriate, normal insight  Skin: Deferred. Labs:     Recent Labs     02/06/21  1528   WBC 7.0   HGB 15.6   HCT 45.8        Recent Labs     02/06/21  1528      K 3.9      CO2 23   BUN 13   CREATININE 0.9   CALCIUM 9.0     Recent Labs     02/06/21  1528   AST 40*   ALT 41*   BILITOT 0.9   ALKPHOS 102     No results for input(s): INR in the last 72 hours.   Recent Labs     02/06/21  1528   TROPONINI 0.01       Urinalysis:      Lab Results   Component Value Date    NITRU Negative 01/16/2021    BLOODU Negative 01/16/2021    SPECGRAV >=1.030 01/16/2021    GLUCOSEU Negative 01/16/2021       Radiology:     CXR: I have reviewed the CXR with the following interpretation: Low lung volumes. EKG:  I have reviewed the EKG with the following interpretation: Normal sinus rhythm, normal EKG    XR CHEST PORTABLE   Preliminary Result   1. Low lung volumes, with mild volume related right basilar atelectasis. 2. Stable cardiomegaly. CT ABDOMEN PELVIS W IV CONTRAST    (Results Pending)       ASSESSMENT:    Active Hospital Problems    Diagnosis Date Noted    COVID-19 [U07.1] 02/06/2021         PLAN:    Acute COVID-19 infection  Will be admitted with oxygen supplementation, remdesivir and steroids. Consult pulmonology if hypoxemia gets worse. Hypoxemia  No tachypnea, no air hunger, acute hypoxemic respiratory failure ruled out. Hypoxemia only  Continue oxygen supplementation    Abdominal pain  Had surgery for small bowel obstruction relatively recently. CT scan of the abdomen ordered, results are not back yet. I will check CT scan results and proceed accordingly. May need to consider consulting general surgery depending on CT scan results. Hypertension  Blood pressure is slightly above normal, most likely secondary to acute illness. I will not change antihypertensive treatment at this time. Continue to monitor. Elevated transaminase  Most likely secondary to acute COVID-19 virus infection. Does not appear significant  Monitor transaminases. DVT Prophylaxis: Lovenox  Diet: Clear liquids for now, pending CT scan results  Code Status: Full code    PT/OT Eval Status: Consider when stable    Dispo -inpatient stay, uncertain duration due to complicated acute medical condition with COVID-19 hypoxemia as well as acute abdominal pain. Todd Key MD    Thank you Charise Aase, MD for the opportunity to be involved in this patient's care. If you have any questions or concerns please feel free to contact me at [phone].

## 2021-02-07 NOTE — PROGRESS NOTES
Shift assessment completed per documentation. Pt A&Ox4. VSS. Pt awake in bed. Pt c/o acute abdominal pain 7/10. Bilateral lung sounds diminished. Pt report dyspnea on exertion. Abdomen soft, rounded, and distended. Bowel sounds active in all four quadrants. Pt reports nausea. Bed locked and in lowest position. Bed alarm on. Nonskid slippers on. Side rails up 2/4. Call light and personal belongings within reach. Will continue to monitor.

## 2021-02-07 NOTE — PROGRESS NOTES
Hospitalist Progress Note      PCP: Maynor Guillen MD    Date of Admission: 2/6/2021    Chief Complaint: Shortness of breath, abdominal pain    Hospital Course: Admitted with COVID-19 infection. Noted previous abdominal surgery for SBO. CT scan of the abdomen shows a transition point with suspected subacute small bowel obstruction. Subjective: No chest pain, no shortness of breath, no nausea or vomiting. Tolerates clear liquids. Medications:  Reviewed    Infusion Medications   Scheduled Medications    enoxaparin  1 mg/kg Subcutaneous BID    amLODIPine  2.5 mg Oral BID    labetalol  200 mg Oral BID    sodium chloride flush  10 mL Intravenous 2 times per day    dexamethasone  6 mg Oral Daily    Vitamin D  2,000 Units Oral Daily    remdesivir IVPB  100 mg Intravenous Q24H     PRN Meds: sodium chloride flush, promethazine **OR** ondansetron, polyethylene glycol, acetaminophen **OR** acetaminophen, guaiFENesin-dextromethorphan, sodium chloride, oxyCODONE-acetaminophen      Intake/Output Summary (Last 24 hours) at 2/7/2021 1414  Last data filed at 2/7/2021 0958  Gross per 24 hour   Intake 821 ml   Output 825 ml   Net -4 ml       Physical Exam Performed:    /82   Pulse 76   Temp 97.8 °F (36.6 °C) (Oral)   Resp 18   Ht 6' 1\" (1.854 m)   Wt (!) 315 lb 11.2 oz (143.2 kg)   SpO2 93%   BMI 41.65 kg/m²     I performed an audiovisual assessment, based on new provisions and guidance offered by [facility] on March 18, 2020 in setting of COVID-19 outbreak and in order to preserve personal protective equipment in accordance with the flexibilities announced by CMS on March 30, 2020. References:   https://med. [place].gov/Portals/0/Resources/COVID-19/3_18%20Telemed%20Guidance%20Updated%20March%2018. pdf?jzn=[email or web address]   http://[employer]/. pdf     Bedside physical examination deferred.  However, I did visually inspect the patient and observed the following:     General appearance: No apparent distress, appears stated age and cooperative. Neck: Deferred. Respiratory:  Normal respiratory effort. Cardiovascular: Deferred. Abdomen: Deferred. Musculoskelatal: Deferred. Neurologic:  Deferred. Psychiatric: Alert and oriented, thought content appropriate, normal insight  Skin: Deferred. Labs:   Recent Labs     02/06/21  1528 02/07/21  0607   WBC 7.0 3.6*   HGB 15.6 15.1   HCT 45.8 44.6    129*     Recent Labs     02/06/21  1528      K 3.9      CO2 23   BUN 13   CREATININE 0.9   CALCIUM 9.0     Recent Labs     02/06/21  1528 02/07/21  0607   AST 40* 51*   ALT 41* 55*   BILIDIR  --  0.4*   BILITOT 0.9 0.7   ALKPHOS 102 103     Recent Labs     02/07/21  0607   INR 1.29*     Recent Labs     02/06/21  1528 02/06/21  1934 02/06/21  2312   TROPONINI 0.01 0.02* <0.01       Urinalysis:      Lab Results   Component Value Date    NITRU Negative 01/16/2021    BLOODU Negative 01/16/2021    SPECGRAV >=1.030 01/16/2021    GLUCOSEU Negative 01/16/2021       Radiology:  CT ABDOMEN PELVIS W IV CONTRAST   Final Result   Persistent abnormally dilated proximal small bowel loops with suspected   transition point in the left lower quadrant, suggesting partial small bowel   obstruction given that gas and stool is seen throughout the colon. XR CHEST PORTABLE   Final Result   1. Low lung volumes, with mild volume related right basilar atelectasis. 2. Stable cardiomegaly. Assessment/Plan:    Active Hospital Problems    Diagnosis    COVID-19 [U07.1]     PLAN:      Acute COVID-19 infection  Admitted with oxygen supplementation, remdesivir and steroids. Consult pulmonology if hypoxemia gets worse. Today's oxygen requirement is slightly less than yesterday  I am changing Lovenox to full therapeutic dose because of extremely elevated D-dimers.   Likely to require anticoagulation when discharged.     Hypoxemia  No tachypnea, no air hunger, acute hypoxemic respiratory failure ruled out. Hypoxemia only  Continue oxygen supplementation. Slightly less than admission.     Abdominal pain  Had surgery for small bowel obstruction relatively recently. CT scan of the abdomen ordered, shows a slight transition point. Continue clear liquid diet for now. Surgery will be consulted.     Hypertension  Blood pressure is controlled today. Continue same medications     Elevated transaminase  Marginal, most likely secondary to acute COVID-19 virus infection. Does not appear significant  Monitor transaminases.       DVT Prophylaxis: Therapeutic Lovenox  Diet: DIET CLEAR LIQUID;  Code Status: Full Code    PT/OT Eval Status: Consider when stable    Dispo -inpatient stay, unclear duration    Ami Alvarez MD

## 2021-02-07 NOTE — PLAN OF CARE
Problem: Falls - Risk of:  Goal: Will remain free from falls  Description: Will remain free from falls  Outcome: Ongoing    Pt A&Ox4. Pt appropriately calls out for assistance. Nonskid slippers in use. Bed locked and in lowest position. Call light and personal belongings within reach. Pt has remained free from falls this shift. Will continue to monitor.

## 2021-02-07 NOTE — PROGRESS NOTES
Shift assessments completed and charted. Pt a/ox4, VSS, O2 supplement via NC, able to wean down O2 from 5 to 3L/min, saturations kept above 90%. SOB on exertion, B/L lung sounds clear to auscultation. Denies any needs at this time, will continue to monitor.

## 2021-02-08 NOTE — CONSULTS
Gastroenterology Consult Note    Patient:   Gaudencio Elizabeth   YOB: 1955   Facility:   [facility]   Referring/PCP: Ngozi Wiley MD  Date:     2/8/2021  Consultant:   No Breen MD    Subjective: This 77 y.o. male was admitted 2/6/2021 with a diagnosis of \"COVID-19 [U07.1]\" and is seen in consultation regarding \"abnl CT\". Information was obtained from interview of  the patient, examination of the patient, and review of records. I did  update the past medical, surgical, social and / or family history. Abnormal CT mild in SI for yrs associated w mild cramping    Current status  Present  Diet Order: DIET CLEAR LIQUID; and he is tolerating diet. Recently, he has experienced little, maximum 3/10 generalized abdominal  Pain and he has not required Intravenous narcotic analgesics. The patient has also experienced no constipation, diarrhea, hematochezia, melena, nausea and vomiting      Prior to Admission medications    Medication Sig Start Date End Date Taking?  Authorizing Provider   aspirin 325 MG EC tablet Take 1 tablet by mouth daily 10/7/20 12/1/20  HUE Banks   acetaminophen (TYLENOL) 325 MG tablet Take 2 tablets by mouth every 6 hours as needed for Pain 7/10/20   HUE Banks   amLODIPine (NORVASC) 2.5 MG tablet Take 2.5 mg by mouth 2 times daily     Historical Provider, MD   labetalol (NORMODYNE) 200 MG tablet TAKE 1 TABLET BY MOUTH TWICE A DAY 5/7/19   Historical Provider, MD      Scheduled Medications:    enoxaparin  1 mg/kg Subcutaneous BID    amLODIPine  2.5 mg Oral BID    labetalol  200 mg Oral BID    sodium chloride flush  10 mL Intravenous 2 times per day    dexamethasone  6 mg Oral Daily    Vitamin D  2,000 Units Oral Daily    remdesivir IVPB  100 mg Intravenous Q24H     Infusions:   PRN Medications: benzonatate, benzocaine-menthol, sodium chloride flush, promethazine **OR** ondansetron, polyethylene glycol, acetaminophen **OR** HPI.    Objective:   Vital Signs:  Temp (24hrs), Av.8 °F (36.6 °C), Min:97.6 °F (36.4 °C), Max:98.1 °F (08.0 °C)     Systolic (81NJA), MOF:423 , Min:144 , OED:109      Diastolic (97XJB), UII:49, Min:82, Max:84     Pulse  Av.3  Min: 69  Max: 72  BP (!) 149/84   Pulse 69   Temp 97.8 °F (36.6 °C) (Oral)   Resp 18   Ht 6' 1\" (1.854 m)   Wt (!) 315 lb 11.2 oz (143.2 kg)   SpO2 94%   BMI 41.65 kg/m²      Physical Exam:   BP (!) 149/84   Pulse 69   Temp 97.8 °F (36.6 °C) (Oral)   Resp 18   Ht 6' 1\" (1.854 m)   Wt (!) 315 lb 11.2 oz (143.2 kg)   SpO2 94%   BMI 41.65 kg/m²   General appearance: alert, appears stated age and cooperative    Lab and Imaging Review   Recent Labs     21  1528 21  0607 21  0529   WBC 7.0 3.6* 8.6   HGB 15.6 15.1 13.9   MCV 86.8 87.3 85.9    129* 151   INR  --  1.29*  --      --   --    K 3.9  --   --      --   --    CO2 23  --   --    BUN 13  --   --    CREATININE 0.9  --   --    GLUCOSE 102*  --   --    CALCIUM 9.0  --   --    PROT 7.2 6.7  --    LABALBU 4.0 3.9  --    AST 40* 51*  --    ALT 41* 55*  --    ALKPHOS 102 103  --    BILITOT 0.9 0.7  --    BILIDIR  --  0.4*  --    LIPASE 26.0  --   --        Assessment:     Patient Active Problem List    Diagnosis Date Noted    COVID-19 2021    Equinovarus, acquired, left 10/05/2020    AVN (avascular necrosis of bone) ([facility] 2020    Shoulder fracture, left, closed, initial encounter 2020    SBO (small bowel obstruction) [facility] 2020    Essential hypertension 2020    BELEN (obstructive sleep apnea) 2020    Obesity, Class III, BMI 40-49.9 (morbid obesity) ([facility] 2020    Small bowel obstruction ([facility] 2018    Diverticulosis 2018    Partial intestinal obstruction (HCC)     Partial small bowel obstruction ([facility] 2018    Arthritis of left ankle 2018    Osteoarthritis of left subtalar joint 2018    Left ankle pain 06/10/2015     76 yo w HTN admitted Covid-19 hypoxemia and recurrent mild abd pain but no N/V (with normal soft bm's) and ? pSBO on CT similar to prior studies which is most likely ileus/SI dysmotility vs intermittent pSBO due to ?adhesions, w neg expl lap in 1/2021 (still having some incisional pain), and previous neg. W/U including SBFT,  MR-E, colonoscopy in 10/2018 and Capsule Endoscopy. 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     Plan:   1. Supportive care  2. Will start PO Urecholine for GI motility  3. Advance diet as tolerated  4.  Will follow expectantly    Jordin Carrera MD       (O) [phone]

## 2021-02-08 NOTE — ACP (ADVANCE CARE PLANNING)
Advance Care Planning     General Advance Care Planning (ACP) Conversation    Date of Conversation: 2/6/2021  Conducted with: Patient with Decision Making Capacity    Healthcare Decision Maker:      Primary Decision Maker: Misael Moncada - Spouse - [phone]    Today we documented Decision Maker(s) consistent with Legal Next of Kin hierarchy.     Content/Action Overview:  DECLINED ACP Conversation - will revisit periodically  Reviewed DNR/DNI and patient elects Full Code (Attempt Resuscitation)    Length of Voluntary ACP Conversation in minutes:  <16 minutes (Non-Billable)    Governor [place]

## 2021-02-08 NOTE — PROGRESS NOTES
Hospitalist Progress Note      PCP: Too Andrea MD    Date of Admission: 2/6/2021    Chief Complaint: Shortness of breath, abdominal pain    Hospital Course: Admitted with COVID-19 infection. Oxygen requirement has improved. 1 L today. Noted previous abdominal surgery for SBO. CT scan of the abdomen shows a transition point with suspected subacute small bowel obstruction. Evaluated by general surgery. Determined to be more functional than structural, as the patient is currently asymptomatic. Subjective: No chest pain, no shortness of breath, no nausea or vomiting. Tolerates clear liquids. Medications:  Reviewed    Infusion Medications   Scheduled Medications    enoxaparin  1 mg/kg Subcutaneous BID    amLODIPine  2.5 mg Oral BID    labetalol  200 mg Oral BID    sodium chloride flush  10 mL Intravenous 2 times per day    dexamethasone  6 mg Oral Daily    Vitamin D  2,000 Units Oral Daily    remdesivir IVPB  100 mg Intravenous Q24H     PRN Meds: benzonatate, benzocaine-menthol, sodium chloride flush, promethazine **OR** ondansetron, polyethylene glycol, acetaminophen **OR** acetaminophen, guaiFENesin-dextromethorphan, sodium chloride, oxyCODONE-acetaminophen      Intake/Output Summary (Last 24 hours) at 2/8/2021 1308  Last data filed at 2/8/2021 0550  Gross per 24 hour   Intake 1032 ml   Output 675 ml   Net 357 ml       Physical Exam Performed:    BP (!) 149/84   Pulse 69   Temp 97.8 °F (36.6 °C) (Oral)   Resp 18   Ht 6' 1\" (1.854 m)   Wt (!) 315 lb 11.2 oz (143.2 kg)   SpO2 94%   BMI 41.65 kg/m²     I performed an audiovisual assessment, based on new provisions and guidance offered by [facility] on March 18, 2020 in setting of COVID-19 outbreak and in order to preserve personal protective equipment in accordance with the flexibilities announced by CMS on March 30, 2020.    References: https://[facility]. [facility]/Portals/0/Resources/COVID-19/3_18%20Telemed%20Guidance%20Updated%20March%2018. pdf?sot=[email or web address]   http://[employer]/. pdf     Bedside physical examination deferred. However, I did visually inspect the patient and observed the following:     General appearance: No apparent distress, appears stated age and cooperative. Neck: Deferred. Respiratory:  Normal respiratory effort. No tachypnea  Cardiovascular: Deferred. Abdomen: Deferred. Musculoskelatal: Deferred. Neurologic:  Deferred. Psychiatric: Alert and oriented, thought content appropriate, normal insight  Skin: Deferred. Similar to yesterday    Labs:   Recent Labs     02/06/21  1528 02/07/21  0607 02/08/21  0529   WBC 7.0 3.6* 8.6   HGB 15.6 15.1 13.9   HCT 45.8 44.6 41.1    129* 151     Recent Labs     02/06/21  1528      K 3.9      CO2 23   BUN 13   CREATININE 0.9   CALCIUM 9.0     Recent Labs     02/06/21  1528 02/07/21  0607   AST 40* 51*   ALT 41* 55*   BILIDIR  --  0.4*   BILITOT 0.9 0.7   ALKPHOS 102 103     Recent Labs     02/07/21  0607   INR 1.29*     Recent Labs     02/06/21  1528 02/06/21  1934 02/06/21  2312   TROPONINI 0.01 0.02* <0.01       Urinalysis:      Lab Results   Component Value Date    NITRU Negative 01/16/2021    BLOODU Negative 01/16/2021    SPECGRAV >=1.030 01/16/2021    GLUCOSEU Negative 01/16/2021       Radiology:  CT ABDOMEN PELVIS W IV CONTRAST   Final Result   Persistent abnormally dilated proximal small bowel loops with suspected   transition point in the left lower quadrant, suggesting partial small bowel   obstruction given that gas and stool is seen throughout the colon. XR CHEST PORTABLE   Final Result   1. Low lung volumes, with mild volume related right basilar atelectasis. 2. Stable cardiomegaly.                  Assessment/Plan:    Active Hospital Problems    Diagnosis    COVID-19 [U07.1] PLAN:      Acute COVID-19 infection  Admitted with oxygen supplementation, remdesivir and steroids. Oxygen requirement is improving  Lovenox changed to full therapeutic dose because of extremely elevated D-dimer levels. Likely to require anticoagulation when discharged.      Hypoxemia  No tachypnea, no air hunger, acute hypoxemic respiratory failure ruled out. Hypoxemia only  Continue oxygen supplementation. Improvement noted     Abdominal pain  Had surgery for small bowel obstruction relatively recently. CT scan of the abdomen ordered, shows a slight transition point. Surgery is evaluated and believes this is more functional than anything else. No surgical intervention required. Gastroenterology consulted  Keep on clear liquids until seen by gastroenterology, then advance as tolerated.     Hypertension  Blood pressure is controlled today. Continue same medications     Elevated transaminase  Marginal, most likely secondary to acute COVID-19 virus infection.   Does not appear significant      Discussed with nursing      DVT Prophylaxis: Therapeutic Lovenox  Diet: DIET CLEAR LIQUID;  Code Status: Full Code    PT/OT Eval Status: Requested today    Dispo -inpatient stay, unclear duration    Erma Michelle MD

## 2021-02-08 NOTE — PROGRESS NOTES
Shift assessment completed per documentation. Pt A&Ox4. VSS. Pt awake in bed. Pt c/o acute abdominal pain 6/10. Abdomen soft and rotund. Bowel sounds active in all four quadrants. Bilateral lung sounds clear. Pt reports dyspnea on exertion. Bed locked and in lowest position. Nonskid slippers on. Side rails up 2/4. Call light and personal belongings within reach. Will continue to monitor.

## 2021-02-08 NOTE — PROGRESS NOTES
Comprehensive Nutrition Assessment    Type and Reason for Visit:  Initial, Positive Nutrition Screen    Nutrition Recommendations/Plan:   1. Clear liquid diet for now per MD. Oral diet progression toward general diet as tolerated per GI  2. Monitor nutrition adequacy, pertinent labs, bowel habits, wt changes, and clinical progress    Nutrition Assessment:  MST 2. Pt is a 78 y/o male admitted with SOB and abd pain. Recently had surgery for SBO. On admission, pt was found to have COVID-19 infection. Gen surg consulted to r/o repeat SBO. GI also consulted. Pt is ordered on a clear liquid diet for now and tolerating. Malnutrition Assessment:  Malnutrition Status:  No malnutrition      Estimated Daily Nutrient Needs:  Energy (kcal):  6240-0927; Weight Used for Energy Requirements:  Ideal(84 kg)     Protein (g):  ; Weight Used for Protein Requirements:  Ideal(1.2-1.4)      Fluid (ml/day):  1 mL/kcal; Method Used for Fluid Requirements:  1 ml/kcal      Nutrition Related Findings:  loose BM on 2/7      Wounds:  None       Current Nutrition Therapies:    DIET CLEAR LIQUID; Anthropometric Measures:  · Height: 6' 1\" (185.4 cm)  · Current Body Weight: 315 lb (142.9 kg)   · Ideal Body Weight: 184 lbs  · BMI: 41.6  · BMI Categories: Obese Class 3 (BMI 40.0 or greater)       Nutrition Diagnosis:   · Inadequate oral intake related to altered GI function as evidenced by NPO or clear liquid status due to medical condition      Nutrition Interventions:   Food and/or Nutrient Delivery:  Continue Current Diet  Nutrition Education/Counseling:  No recommendation at this time   Coordination of Nutrition Care:  Continue to monitor while inpatient    Goals: Tolerate oral diet progression per MD and consume greater than 50% of meals       Nutrition Monitoring and Evaluation:   Food/Nutrient Intake Outcomes:  Diet Advancement/Tolerance  Physical Signs/Symptoms Outcomes:  GI Status     Discharge Planning:     Too soon to

## 2021-02-08 NOTE — PROGRESS NOTES
Occupational Therapy   Occupational Therapy Initial Assessment/Treatment  Date: 2021   Patient Name: Edd Kirk  MRN: [id number]     : 1955    Date of Service: 2021    Discharge Recommendations:  24 hour supervision or assist, Home with Home health OT       Assessment   Performance deficits / Impairments: Decreased functional mobility ; Decreased ADL status; Decreased strength;Decreased endurance;Decreased balance  Patient admitted from home with COVID-19 and possible SBO. Patient using david-walker and I with ADLs PTA. Today patient CGA for transfers and mobility with SW. Pt sat d/t poor endurance for oral care at sink. After evaluation, pt found to be presenting with the above mentioned occupational performance deficits which are affecting participation in daily living skills. Pt would benefit from continued skilled occupational therapy to address ADLs, functional mobility, and safety while in acute care. Prognosis: Good  Decision Making: Medium Complexity  OT Education: OT Role;Transfer Training;Plan of Care;Precautions; ADL Adaptive Strategies  Patient Education: Disease specific:  REQUIRES OT FOLLOW UP: Yes  Activity Tolerance  Activity Tolerance: Patient Tolerated treatment well  Activity Tolerance: Vitals after mobility O2 on RA 89-90% HR 78 BPM; SpO2 88% post gait training seated in chair on RA. HR 78. Increased to 63% with reapplication of 1L via NC  /82  Safety Devices  Safety Devices in place: Yes  Type of devices: Gait belt;Call light within reach; Left in chair;Chair alarm in place;Nurse notified           Patient Diagnosis(es): The primary encounter diagnosis was Acute respiratory failure with hypoxia ([facility] Diagnoses of COVID-19, Shortness of breath, Elevated transaminase level, and Lower abdominal pain were also pertinent to this visit. has a past medical history of Arthritis, Hypertension, Knee pain, PONV (postoperative nausea and vomiting), and Sleep apnea.    has a past surgical history that includes Cholecystectomy; Prostate surgery; hip surgery (Left, 01/01/1968); pr lap,diagnostic abdomen (N/A, 9/18/2018); Colonoscopy; Shoulder arthroscopy (Left, 11/13/2019); shoulder surgery (Left, 7/9/2020); Tonsillectomy; ARTHRODESIS ANKLE (Left, 10/5/2020); and laparoscopy (N/A, 1/22/2021). Restrictions  Restrictions/Precautions  Restrictions/Precautions: Fall Risk, Isolation  Position Activity Restriction  Other position/activity restrictions: COVID-19 +, up with assistance    Subjective   General  Chart Reviewed: Yes, Orders, Progress Notes, Labs  Patient assessed for rehabilitation services?: Yes  Additional Pertinent Hx: shoulder replacement July 2020, L ankle fusion Oct 2020; SBO January 2021  Family / Caregiver Present: No  Referring Practitioner: Joes Mobley MD 2/8/21  Diagnosis: Shortness of breath, abdominal pain, COVID-19 +  Subjective  Subjective: Pt pleasant and agreeable to OT evaluation states would love to get out of bed.  \"I'm ready to get out of this bed\"  Patient Currently in Pain: No  Vital Signs  Pulse: 70  BP: (!) 157/82  BP Location: Left upper arm  Patient Position: Semi fowlers  Patient Currently in Pain: No  Oxygen Therapy  SpO2: 94 %  Pulse Oximeter Device Mode: Continuous  Pulse Oximeter Device Location: Left  O2 Device: Nasal cannula  Skin Assessment: Clean, dry, & intact  O2 Flow Rate (L/min): 1 L/min  Social/Functional History  Social/Functional History  Lives With: Family(spouse and daughter)  Type of Home: House  Home Layout: Multi-level, Able to Live on Main level with bedroom/bathroom  Home Access: Ramped entrance, Stairs to enter without rails  Entrance Stairs - Number of Steps: 6 with L rail  Bathroom Shower/Tub: Tub/Shower unit, Shower chair with back  [employer]: Handicap height  Bathroom Equipment: Grab bars in shower  Home Equipment: Wheelchair-electric, Wheelchair-manual, Standard walker(david-walker)  ADL Assistance: Independent  Homemaking Assistance: Independent  Homemaking Responsibilities: Yes  Ambulation Assistance: Independent  Transfer Assistance: Independent  Active : Yes  Occupation: Full time employment  Type of occupation: maintance, 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       Objective   Vision: Impaired  Vision Exceptions: Wears glasses for reading  Hearing: Within functional limits    Orientation  Overall Orientation Status: Within Functional Limits     Balance  Sitting Balance: Supervision  Standing Balance: Contact guard assistance(with SW)  Functional Mobility  Functional - Mobility Device: Standard Walker  Activity: To/from bathroom  Assist Level: Contact guard assistance     ADL  Grooming: Setup(seated at sink on [facility] d/t fatigue and poor standing balance tolerance)  LE Dressing: Stand by assistance(to darlyn shoes)  Toileting: Maximum assistance     Tone RUE  RUE Tone: Normotonic  Tone LUE  LUE Tone: Normotonic  Coordination  Movements Are Fluid And Coordinated: Yes     Bed mobility  Supine to Sit: Stand by assistance(to R)  Sit to Supine: Unable to assess(up to chair at end of session)  Transfers  Sit to stand: Contact guard assistance(up to chair at end of sesion)  Stand to sit: Contact guard assistance     Cognition  Overall Cognitive Status: WFL      LUE AROM (degrees)  LUE AROM : WFL  RUE AROM (degrees)  RUE AROM : WFL  LUE Strength  Gross LUE Strength: WFL  RUE Strength  Gross RUE Strength: WFL         Plan   Plan  Times per week: 2-3x's a week while in acute care    AM-PAC Score        AM-[facility] Inpatient Daily Activity Raw Score: 17 (02/08/21 1631)  AM-PAC Inpatient ADL T-Scale Score : 37.26 (02/08/21 1631)  ADL Inpatient CMS 0-100% Score: 50.11 (02/08/21 1631)  ADL Inpatient CMS G-Code Modifier : CK (02/08/21 1631)    Goals  Short term goals  Time Frame for Short term goals: 1 week unless otherwise specified 2/15  Short term goal 1: Pt will complete LE dressing donning pants/briefs with CGA  Short term goal 2: Pt will complete toilet transfers with SBA by 2/11  Short term goal 3: Pt will complete 2 mins of standing level ADLs with SBA for balance  Patient Goals   Patient goals : \"to go home\"       Therapy Time   Individual Concurrent Group Co-treatment   Time In 9028         Time Out 1515         Minutes 38         Timed Code Treatment Minutes: 28 Minutes       Uvaldo Nyhan, OTR/L  If pt is unable to be seen after this session, please let this note serve as discharge summary. Please see case management note for discharge disposition. Thank you.

## 2021-02-08 NOTE — PROGRESS NOTES
Pt axo. Assessment completed as charted. Pt states abdominal cramping. Denies needs for interventions. o2 titrated down to 1L. Will continue to monitor. Call light in reach.

## 2021-02-08 NOTE — CONSULTS
Department of General Surgery Consult    PATIENT NAME: Jeremias Chun OF BIRTH: 1955    ADMISSION DATE: 2/6/2021  3:22 PM      TODAY'S DATE: 2/8/2021    Reason for Consult:  Possible SBO    Chief Complaint: SOB and abdominal pain    Requesting Physician:  Jody Moncada    HISTORY OF PRESENT ILLNESS:              The patient is a 77 y.o. male who presents with SOB and abdominal pain. Family has covid. Pain started Friday and feels like his usual abdominal pain. It is a crampy gas-type pain. Has some associated nausea but no vomiting. Nothing seems to make it better or worse.     Past Medical History:        Diagnosis Date    Arthritis     Hypertension     Knee pain     PONV (postoperative nausea and vomiting)     Sleep apnea     did not tolerate CPAP       Past Surgical History:        Procedure Laterality Date    ARTHRODESIS ANKLE Left 10/5/2020    LEFT TIBIOTALOCALCANEAL FUSION AND FIBULAR OSTECTOMY -BLOCK- -SLEEP APNEA- performed by Jessica Freeman MD at [facility]      HIP SURGERY Left 01/01/1968    due to dislocation    LAPAROSCOPY N/A 1/22/2021    EXPLORATORY LAPAROSCOPY, LYSIS OF ADHESIONS performed by Gideon Shah MD at [address] [place] [address] N/A 9/18/2018    DIAGNOSTIC LAPAROSCOPY performed by Gideon Shah MD at [place] [address]; for possible SBO    PROSTATE SURGERY      TURP    SHOULDER ARTHROSCOPY Left 11/13/2019    VIDEO ARTHROSCOPY LEFT SHOULDER, removal loose body, debridment -BLOCK- -SLEEP APNEA- performed by Fifi Mcclain MD at [address] [place] [address] Left 7/9/2020    LEFT REVERSE TOTAL SHOULDER ARTHROPLASTY performed by Fifi Mcclain MD at [address] [place] [address]      age 8       Current Medications:   Current Facility-Administered Medications: enoxaparin (LOVENOX) injection 150 mg, 1 mg/kg, Subcutaneous, BID  benzonatate (TESSALON) capsule 100 mg, 100 mg, Oral, TID PRN  benzocaine-menthol (CEPACOL SORE THROAT) lozenge 1 lozenge, 1 lozenge, Oral, Q2H PRN  amLODIPine (NORVASC) tablet 2.5 mg, 2.5 mg, Oral, BID  labetalol (NORMODYNE) tablet 200 mg, 200 mg, Oral, BID  sodium chloride flush 0.9 % injection 10 mL, 10 mL, Intravenous, 2 times per day  sodium chloride flush 0.9 % injection 10 mL, 10 mL, Intravenous, PRN  promethazine (PHENERGAN) tablet 12.5 mg, 12.5 mg, Oral, Q6H PRN **OR** ondansetron (ZOFRAN) injection 4 mg, 4 mg, Intravenous, Q6H PRN  polyethylene glycol (GLYCOLAX) packet 17 g, 17 g, Oral, Daily PRN  acetaminophen (TYLENOL) tablet 650 mg, 650 mg, Oral, Q6H PRN **OR** acetaminophen (TYLENOL) suppository 650 mg, 650 mg, Rectal, Q6H PRN  dexamethasone (DECADRON) tablet 6 mg, 6 mg, Oral, Daily  guaiFENesin-dextromethorphan (ROBITUSSIN DM) 100-10 MG/5ML syrup 5 mL, 5 mL, Oral, Q4H PRN  Vitamin D (CHOLECALCIFEROL) tablet 2,000 Units, 2,000 Units, Oral, Daily  [COMPLETED] remdesivir 200 mg in sodium chloride 0.9 % 250 mL IVPB, 200 mg, Intravenous, Once **FOLLOWED BY** remdesivir 100 mg in sodium chloride 0.9 % 250 mL IVPB, 100 mg, Intravenous, Q24H  0.9 % sodium chloride bolus, 30 mL, Intravenous, PRN  oxyCODONE-acetaminophen (PERCOCET) 5-325 MG per tablet 1 tablet, 1 tablet, Oral, Q4H PRN  Prior to Admission medications    Medication Sig Start Date End Date Taking? Authorizing Provider   aspirin 325 MG EC tablet Take 1 tablet by mouth daily 10/7/20 12/1/20  HUE Manzanares   acetaminophen (TYLENOL) 325 MG tablet Take 2 tablets by mouth every 6 hours as needed for Pain 7/10/20   HUE Manzanares   amLODIPine (NORVASC) 2.5 MG tablet Take 2.5 mg by mouth 2 times daily     Historical Provider, MD   labetalol (NORMODYNE) 200 MG tablet TAKE 1 TABLET BY MOUTH TWICE A DAY 5/7/19   Historical Provider, MD        Allergies:  Ciprofloxacin and Lisinopril    Social History:   TOBACCO:   reports that he quit smoking about 33 years ago. His smoking use included cigarettes.  He started smoking about 48 years ago. He has a 2.50 pack-year smoking history. He has never used smokeless tobacco.  ETOH:   reports no history of alcohol use. DRUGS:   reports no history of drug use. Family History:        Problem Relation Age of Onset    Heart Disease Mother     Seizures Mother     High Blood Pressure Father     Kidney Disease Father     Cancer Father         colon,bone       REVIEW OF SYSTEMS:  CONSTITUTIONAL:  positive for  fatigue  HEENT:  negative  RESPIRATORY:  Negative except for SOB  CARDIOVASCULAR:  negative  GASTROINTESTINAL:  negative except for nausea and abdominal pain  GENITOURINARY:  negative  HEMATOLOGIC/LYMPHATIC:  negative  NEUROLOGICAL:  Negative  * All other ROS reviewed and negative. PHYSICAL EXAM:  VITALS:  BP (!) 149/84   Pulse 69   Temp 97.8 °F (36.6 °C) (Oral)   Resp 18   Ht 6' 1\" (1.854 m)   Wt (!) 315 lb 11.2 oz (143.2 kg)   SpO2 94%   BMI 41.65 kg/m²   24HR INTAKE/OUTPUT:    I/O last 3 completed shifts: In: 1555 [P.O.:960; I.V.:322]  Out: 675 [Urine:675]  No intake/output data recorded.     CONSTITUTIONAL:  alert, no apparent distress and morbidly obese  EYES:  PERRL, sclera clear  ENT:  Normocephalic,atraumatic, without obvious abnormality  NECK:  supple, symmetrical, trachea midline  LUNGS: Resp effort easy and unlabored  CARDIOVASCULAR:  NO JVD, regular rate and rhythm and no murmur noted  ABDOMEN:  normal bowel sounds, soft, obese, mild tenderness noted diffusely, voluntary guarding absent, no masses palpated and hernia absent  MUSCULOSKELETAL: No clubbing or cyanosis, 1+ pitting edema lower extremities  NEUROLOGIC:  Mental Status Exam:  Level of Alertness:   awake  PSYCHIATRIC:   person, place, time  SKIN:  no bruising or bleeding, normal skin color, texture, turgor and no redness, warmth, or swelling    DATA:    CBC:   Recent Labs     02/06/21  1528 02/07/21  0607 02/08/21  0529   WBC 7.0 3.6* 8.6   HGB 15.6 15.1 13.9   HCT 45.8 44.6 41.1    129* 151     BMP:

## 2021-02-08 NOTE — CARE COORDINATION
CASE MANAGEMENT INITIAL ASSESSMENT    Reviewed chart and completed assessment via telephone with: Patient called via phone re [employer]   Explained Case Management role/services. Primary contact information: Yesica Mckinleyocent- spouse     Health Care Decision Maker :   Primary Decision Maker: Aneta Campuzano - Spouse - [phone]          Can this person be reached and be able to respond quickly, such as within a few minutes or hours? Yes    Admit date/status:02/06/2021 Inpatient   Diagnosis:COVID-19  Is this a Readmission?:  Yes      Insurance:Medicare    Precert required for SNF: No       3 night stay required: No    Living arrangements, Adls, care needs, prior to admission: Home with wife, has walker, bedside commode     Transportation: Wife supports      Durable Medical Equipment at home:  Walker_x_Cane__RTS__ BSC__Shower Chair_x_  02__ HHN__ CPAP__  BiPap__  Hospital Bed__ W/C___ Other_ramp entry _________    Services in the home and/or outpatient, prior to admission: Patient reports prior [facility]     PT/OT recs: Not ordered at this time    [address] Notification (HEN): Only for SNF     Barriers to discharge: Watch for 02    Plan/comments: Patient reports plans to return home with wife. Patient with ramp entry and one level living once inside. Patient reports having walker, bedside commode, and p. ox at home. SW will follow for possible 02 needs. DACIA Acevedo    ECOC on chart for MD signature

## 2021-02-08 NOTE — PROGRESS NOTES
past surgical history that includes Cholecystectomy; Prostate surgery; hip surgery (Left, 01/01/1968); pr lap,diagnostic abdomen (N/A, 9/18/2018); Colonoscopy; Shoulder arthroscopy (Left, 11/13/2019); shoulder surgery (Left, 7/9/2020); Tonsillectomy; ARTHRODESIS ANKLE (Left, 10/5/2020); and laparoscopy (N/A, 1/22/2021).     Restrictions  Restrictions/Precautions  Restrictions/Precautions: Fall Risk, Isolation  Position Activity Restriction  Other position/activity restrictions: COVID-19 +, up with assistance  Vision/Hearing  Vision: Impaired  Vision Exceptions: Wears glasses for reading  Hearing: Within functional limits     Subjective  General  Chart Reviewed: Yes  Patient assessed for rehabilitation services?: Yes  Additional Pertinent Hx: L shoulder replacement July 2020, L ankle fusion Oct 2020; SBO January 2021  Response To Previous Treatment: Not applicable  Family / Caregiver Present: No  Referring Practitioner: Dr. Kelly Resendiz MD  Referral Date : 02/08/21  Diagnosis: COVID-19  Follows Commands: Within Functional Limits  General Comment  Comments: Pt resting in bed on approach; RN cleared pt for therapy  Subjective  Subjective: Pt agreeable to therapy  Pain Screening  Patient Currently in Pain: No    Orientation  Orientation  Overall Orientation Status: Within Functional Limits  Social/Functional History  Social/Functional History  Lives With: Family(spouse and daughter)  Type of Home: House  Home Layout: Multi-level, Able to Live on Main level with bedroom/bathroom  Home Access: Ramped entrance, Stairs to enter without rails  Entrance Stairs - Number of Steps: 6 with L rail  Bathroom Shower/Tub: Tub/Shower unit, Shower chair with back  [employer]: Handicap height  Bathroom Equipment: Grab bars in [address] [place] [address] Zenia: Rosemary Canavan, Standard walker(david-walker)  ADL Assistance: [facility] [address]: Independent  Homemaking Responsibilities: Yes  Ambulation Assistance: Independent  Transfer Assistance: Independent  Active : Yes  Occupation: Full time employment  Type of occupation: maintance, 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    Objective     AROM RLE (degrees)  RLE AROM: WFL  AROM LLE (degrees)  LLE General AROM: hip and knee WFL; ankle no ROM d/t prior fusion  Strength RLE  Strength RLE: WFL  Strength LLE  Comment: hip and knee WFL; ankle fused        Bed mobility  Supine to Sit: Stand by assistance(HOB elevated, use of rails)  Sit to Supine: Unable to assess(pt up in chair at end of session)     Transfers  Sit to Stand: Contact guard assistance(cues for hand placement with SW)  Stand to sit: Contact guard assistance     Ambulation  Ambulation?: Yes  Ambulation 1  Surface: level tile  Device: Standard Walker  Assistance: Contact guard assistance;Stand by assistance  Quality of Gait: Cues for posture and seqencing. no overt LOB. Limited d/t left ankle fused and right knee OA (wears knee brace)  Gait Deviations: Shuffles; Slow Silvia; Increased LESLEY; Decreased step length;Decreased step height  Distance: 15 ft + 30 ft     Balance  Sitting - Static: Good  Sitting - Dynamic: Good  Standing - Static: Fair;+  Standing - Dynamic: Fair     Exercises  Hip Flexion: Seated marches x 10 BLE  Hip Abduction: Seated clamshells x 10 BLE  Knee Long Arc Quad: x 10 BLE  Ankle Pumps: x 10 RLE  Comments: Cues for technique     Plan   Plan  Times per week: 2-3x/wk  Times per day: Daily  Specific instructions for Next Treatment: progress mobility as tolerated  Current Treatment Recommendations: Strengthening, Neuromuscular Re-education, Home Exercise Program, ROM, Safety Education & Training, Balance Training, Endurance Training, Functional Mobility Training, Transfer Training, Gait Training, Equipment Evaluation, Education, & procurement, Patient/Caregiver Education & Training  Safety Devices  Type of devices:  All fall risk precautions in place, Call light within reach, Chair alarm in place, Gait belt, Patient at risk for falls, Nurse notified, Left in chair                        AM-PAC Score     AM-PAC Inpatient Mobility without Stair Climbing Raw Score : 16 (02/08/21 1526)  AM-PAC Inpatient without Stair Climbing T-Scale Score : 45.54 (02/08/21 1526)  Mobility Inpatient CMS 0-100% Score: 40.64 (02/08/21 1526)  Mobility Inpatient without Stair CMS G-Code Modifier : CK (02/08/21 1526)       Goals  Short term goals  Time Frame for Short term goals: 1 week (2/15) unless otherwise specified  Short term goal 1: Pt will be mod I with bed mobility. Short term goal 2: Pt will be supervision with transfers with LRAD. Short term goal 3: Pt will ambulate 50 ft with LRAD and supervision. Short term goal 4: 2/11: Pt will participate in 12-15 reps of BLE exercises to promote strength and activity tolerance. Patient Goals   Patient goals : \"to go home\"       Therapy Time   Individual Concurrent Group Co-treatment   Time In 3268         Time Out 1515         Minutes 38         Timed Code Treatment Minutes: [facility], PT, DPT  If pt is unable to be seen after this session, please let this note serve as discharge summary. Please see case management note for discharge disposition. Thank you.

## 2021-02-09 NOTE — PROGRESS NOTES
Pt c/o \"12 out of 10\" right sided abdominal pain. PRN pain medication given per orders. Pt sitting at side of bed, belching and dry heaving. States he feels cold and clammy and cannot get relief. PRN antemetic given. Surgery team updated. Will continue to monitor.

## 2021-02-09 NOTE — CARE COORDINATION
[facility]    Referral received from  to follow for home care services. I will follow for needs, and speak with patient to verify demos.     Carmella Suárez RN, BSN CTN  [facility] [phone]

## 2021-02-09 NOTE — PROGRESS NOTES
[facility]    PATIENT NAME: Clare Oakes     TODAY'S DATE: 2/9/2021    CHIEF COMPLAINT: abd pain    INTERVAL HISTORY/HPI:    Pt with lower abdominal pain, nausea, no emesis, minimal flatus, denies sob     REVIEW OF SYSTEMS:  Pertinent positives and negatives as per interval history section    OBJECTIVE:  VITALS:  /80   Pulse 74   Temp 97.6 °F (36.4 °C) (Oral)   Resp 20   Ht 6' 1\" (1.854 m)   Wt (!) 315 lb 11.2 oz (143.2 kg)   SpO2 93%   BMI 41.65 kg/m²     INTAKE/OUTPUT:    I/O last 3 completed shifts: In: 1680 [P.O.:1680]  Out: 350 [Urine:350]  I/O this shift:  In: -   Out: 100 [Urine:100]    CONSTITUTIONAL:  awake and alert  LUNGS:  Respirations easy and unlabored,  CARD:  regular rate   ABDOMEN:  hypoactive bowel sounds, soft, mild distention, tender lower abdomen     Data:  CBC:   Recent Labs     02/07/21  0607 02/08/21  0529 02/09/21  0555   WBC 3.6* 8.6 6.8   HGB 15.1 13.9 13.9   HCT 44.6 41.1 40.5   * 151 151     BMP:    Recent Labs     02/06/21  1528 02/09/21  0555    138   K 3.9 4.0    102   CO2 23 29   BUN 13 14   CREATININE 0.9 0.7*   GLUCOSE 102* 104*     Hepatic:   Recent Labs     02/06/21  1528 02/07/21  0607 02/09/21  0555   AST 40* 51* 31   ALT 41* 55* 47*   BILITOT 0.9 0.7 0.6   ALKPHOS 102 103 86     Mag:    No results for input(s): MG in the last 72 hours. Phos:   No results for input(s): PHOS in the last 72 hours. INR:   Recent Labs     02/07/21  0607   INR 1.29*       Radiology Review:  *Imaging personally reviewed by me. AXR - Severe distention of colon and small bowel.  There appears to be a caliber   change in the colon as the rectum is decompressed.  An ileus or colonic   obstruction needs to be considered. ASSESSMENT AND PLAN:  76 yo with recurrent ileus  1.   With two negative laparoscopies in the past three years for physical obstruction his symptoms and image findings are much more likely to be related to a functional problem.   2.  No operative indications at this time     Electronically signed by Gia Kirk MD     [id number]

## 2021-02-09 NOTE — PROGRESS NOTES
Hospitalist Progress Note      PCP: Loann Fleischer, MD    Date of Admission: 2/6/2021    Hospital Course: 73yo M who was admitted with COVID-19 infection. He had hypoxia requiring supplemental oxygen. He had CT abdomen/pelvis that showed a transition point with suspected subacute small bowel obstruction. He had previous abdominal surgery for SBO. He was evaluated by general surgery. SBO was determined to be more functional than structural, as the patient is currently asymptomatic. Subjective:   No overnight events. Pt complained of Right sided abdominal pain. He is nauseous. Medications:  Reviewed    Infusion Medications   Scheduled Medications    bethanechol  25 mg Oral TID AC    enoxaparin  1 mg/kg Subcutaneous BID    amLODIPine  2.5 mg Oral BID    labetalol  200 mg Oral BID    sodium chloride flush  10 mL Intravenous 2 times per day    dexamethasone  6 mg Oral Daily    Vitamin D  2,000 Units Oral Daily    remdesivir IVPB  100 mg Intravenous Q24H     PRN Meds: oxyCODONE-acetaminophen, morphine, benzonatate, benzocaine-menthol, sodium chloride flush, promethazine **OR** ondansetron, polyethylene glycol, acetaminophen **OR** acetaminophen, guaiFENesin-dextromethorphan, sodium chloride      Intake/Output Summary (Last 24 hours) at 2/9/2021 1402  Last data filed at 2/9/2021 0834  Gross per 24 hour   Intake 720 ml   Output 450 ml   Net 270 ml       Physical Exam Performed:    /80   Pulse 74   Temp 97.6 °F (36.4 °C) (Oral)   Resp 20   Ht 6' 1\" (1.854 m)   Wt (!) 315 lb 11.2 oz (143.2 kg)   SpO2 93%   BMI 41.65 kg/m²     General appearance: No apparent distress, appears stated age and cooperative. HEENT:MMM  Respiratory:  CTABL  Cardiovascular: S1,S2,RRR,no murmur appreciated  Abdomen: BS+,distended,tender in Right abdomina.   Musculoskelatal: Full range of motion  Neurologic: No focal deficits  Psychiatric: Alert and oriented, thought content appropriate, normal insight      Labs: Recent Labs     02/07/21  0607 02/08/21  0529 02/09/21  0555   WBC 3.6* 8.6 6.8   HGB 15.1 13.9 13.9   HCT 44.6 41.1 40.5   * 151 151     Recent Labs     02/06/21  1528 02/09/21  0555    138   K 3.9 4.0    102   CO2 23 29   BUN 13 14   CREATININE 0.9 0.7*   CALCIUM 9.0 8.6     Recent Labs     02/06/21  1528 02/07/21  0607 02/09/21  0555   AST 40* 51* 31   ALT 41* 55* 47*   BILIDIR  --  0.4*  --    BILITOT 0.9 0.7 0.6   ALKPHOS 102 103 86     Recent Labs     02/07/21  0607   INR 1.29*     Recent Labs     02/06/21  1528 02/06/21  1934 02/06/21  2312   TROPONINI 0.01 0.02* <0.01       Urinalysis:      Lab Results   Component Value Date    NITRU Negative 01/16/2021    BLOODU Negative 01/16/2021    SPECGRAV >=1.030 01/16/2021    GLUCOSEU Negative 01/16/2021       Radiology:  XR ABDOMEN (2 VIEWS)   Final Result   Severe distention of colon and small bowel. There appears to be a caliber   change in the colon as the rectum is decompressed. An ileus or colonic   obstruction needs to be considered. CT ABDOMEN PELVIS W IV CONTRAST   Final Result   Persistent abnormally dilated proximal small bowel loops with suspected   transition point in the left lower quadrant, suggesting partial small bowel   obstruction given that gas and stool is seen throughout the colon. XR CHEST PORTABLE   Final Result   1. Low lung volumes, with mild volume related right basilar atelectasis. 2. Stable cardiomegaly. Assessment/Plan:    Active Hospital Problems    Diagnosis    Ileus ([address] [K56.7]    COVID-19 [U07.1]     Acute respiratory failure with hypoxia-due to covid 19 PNA  -Currently on 1L NC. Wean for 02 sats >90%    COVID-19 pneumonia  -Continue decadron  -Continue remdesivir  -Lovenox therapeutic dose because of extremely elevated D-dimer levels. Likely to require anticoagulation when discharged.      Partial small bowel obstruction  Hx surgery for small bowel obstruction recently  -Worsening abdominal pain today. AXR shows severe distention of colon and small bowel. General Surgery is following and believes this is more functional given 2 negative laparoscopies. No surgical intervention required. -Gastroenterology following. Patient has had colonoscopy,capsule endoscopy,MR-E and SBFT which have been non revealing. Patient was given tap water enema.  -Pain control     Hypertension  -BP controlled     Elevated transaminase- likely secondary to acute COVID-19 virus infection.  -Trend LFTs    Wife was on the phone. She had multiple concerns about patient's GI issue. She was asking if C.diff or any bacterial pathogens were tested for. Patient states he doesn't have diarrhea and therefore we agreed no testing is needed. She states her daughter has C.diff and she was concerned her  had it. I explained that Covid can cause diarrhea. I spoke to Dr Michele Jurado and he may plan sigmoidoscopy. I will touch base with Dr Mary Parmar. DVT Prophylaxis: Therapeutic Lovenox  Diet: DIET CLEAR LIQUID;  Code Status: Full Code    PT/OT Eval Status: ordered    Dispo -Home with PT/OT when stable. Hopefully in next 2days    Kristen Hester MD

## 2021-02-09 NOTE — PLAN OF CARE
Problem: Gas Exchange - Impaired  Goal: Absence of hypoxia  Outcome: Ongoing   Pt currently on 1L NC o2, stating 94%, pt educated to cough and deep breath, pt has IS at bedside and educated on use, pt normally RA at baseline.

## 2021-02-09 NOTE — DISCHARGE INSTR - COC
Continuity of Care Form    Patient Name: Wilfred Allen   :  1955  MRN:  [id number]    Admit date:  2021  Discharge date:  ***    Code Status Order: Full Code   Advance Directives:   Advance Care Flowsheet Documentation     Date/Time Healthcare Directive Type of Healthcare Directive Copy in [address] Agent's Name Healthcare Agent's Phone Number    21 1853  No, patient does not have an advance directive for healthcare treatment -- -- -- -- --          Admitting Physician:  Morenita Edwards MD  PCP: Liberty Daniels MD    Discharging Nurse: [facility] Unit/Room#: 0327/0327-01  Discharging Unit Phone Number: ***    Emergency Contact:   Extended Emergency Contact Information  Primary Emergency Contact: Roseann Murrieta  Address: [place] [id number] Phone: [phone]  Mobile Phone: [phone]  Relation: Spouse    Past Surgical History:  Past Surgical History:   Procedure Laterality Date    ARTHRODESIS ANKLE Left 10/5/2020    LEFT TIBIOTALOCALCANEAL FUSION AND FIBULAR OSTECTOMY -BLOCK- -SLEEP APNEA- performed by Devi Guzmán MD at [facility] Left 1968    due to dislocation    LAPAROSCOPY N/A 2021    EXPLORATORY LAPAROSCOPY, LYSIS OF ADHESIONS performed by Nikkie Camarillo MD at [address] [place] [address] 165 2018    DIAGNOSTIC LAPAROSCOPY performed by Nikkie Camarillo MD at [place] [address]; for possible SBO    PROSTATE SURGERY      TURP    SHOULDER ARTHROSCOPY Left 2019    VIDEO ARTHROSCOPY LEFT SHOULDER, removal loose body, debridment -BLOCK- -SLEEP APNEA- performed by James Wilson MD at [address] [place] [address] Left 2020    LEFT REVERSE TOTAL SHOULDER ARTHROPLASTY performed by James Wilson MD at [place] [address].      age 8       Immunization History:      There is no immunization history on file for this patient. 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 92XA    AVN (avascular necrosis of bone) ([facility]) M87.00    Equinovarus, acquired, left M21.542    COVID-19 U07.1       Isolation/Infection:   Isolation          Droplet Plus        Patient Infection Status     Infection Onset Added Last Indicated Last Indicated By Review Planned Expiration Resolved Resolved By    COVID-19 02/06/21 02/06/21 02/06/21 COVID-19 02/13/21 02/20/21      Resolved    COVID-19 Rule Out 02/06/21 02/06/21 02/06/21 COVID-19 (Ordered)   02/06/21 Rule-Out Test Resulted    COVID-19 Rule Out 07/09/20 07/09/20 07/09/20 COVID-19 (Ordered)   07/09/20 Rule-Out Test Resulted          Nurse Assessment:  Last Vital Signs: BP (!) 132/95   Pulse 80   Temp 97.8 °F (36.6 °C) (Oral)   Resp 20   Ht 6' 1\" (1.854 m)   Wt (!) 315 lb 11.2 oz (143.2 kg)   SpO2 92%   BMI 41.65 kg/m²     Last documented pain score (0-10 scale): Pain Level: 10  Last Weight:   Wt Readings from Last 1 Encounters:   02/06/21 (!) 315 lb 11.2 oz (143.2 kg)     Mental Status:  {IP PT MENTAL STATUS:05707}    IV Access:  {[facility] ACCESS:911918682}    Nursing Mobility/ADLs:  Walking   {[facility] DME EDGZ:614907484}  Transfer  {[facility] DME OIOF:066191236}  Bathing  {[facility] DME HRTM:951980207}  Dressing  {[facility] DME FRJE:792203757}  Toileting  {[facility] DME RBVF:554842988}  Feeding  {[facility] DME FHKZ:642430120}  Med Admin  {[facility] DME FMOL:718789341}  Med Delivery   [address] [facility] MED Delivery:401290248}    Wound Care Documentation and Therapy:        Elimination:  Continence:   · Bowel: {YES / GC:49415}  · Bladder: {YES / CM:83315}  Urinary Catheter: {Urinary Catheter:373272746}   Colostomy/Ileostomy/Ileal Conduit: {YES / ZN:54704}       Date of Last BM: ***    Intake/Output Summary (Last 24 hours) at 2021 1046  Last data filed at 2021 0834  Gross per 24 hour   Intake 1200 ml   Output 450 ml   Net 750 ml     I/O last 3 completed shifts:   In: 1680 [P.O.:1680]  Out: 350 [Urine:350]    Safety Concerns:     [employer] Safety Concerns:905791000}    Impairments/Disabilities:      [employer] Impairments/Disabilities:714572011}    Nutrition Therapy:  Current Nutrition Therapy:   [employer] Diet List:447549595}    Routes of Feeding: {[facility]P DME Other Feedings:984058528}  Liquids: {Slp liquid thickness:48765}  Daily Fluid Restriction: {CHP DME Yes amt example:904233400}  Last Modified Barium Swallow with Video (Video Swallowing Test): {Done Not Done NYCD:000024168}    Treatments at the Time of Hospital Discharge:   Respiratory Treatments: ***  Oxygen Therapy:  {Therapy; copd oxygen:51526}  Ventilator:    {MH CC Vent LRRC:886224969}    Rehab Therapies: {THERAPEUTIC INTERVENTION:1891625505}  Weight Bearing Status/Restrictions: [facility] Weight Bearin}  Other Medical Equipment (for information only, NOT a DME order):  {EQUIPMENT:995513589}  Other Treatments: ***    Patient's personal belongings (please select all that are sent with patient):  {P DME Belongings:344640338}    RN SIGNATURE:  {Esignature:623245391}    CASE MANAGEMENT/SOCIAL WORK SECTION    Inpatient Status Date: 2021    Readmission Risk Assessment Score:  Readmission Risk              Risk of Unplanned Readmission:        16         Discharging to Facility/ [address]   [address]   [address] [place] [address] Manager/ signature: {Esignature:755244782}    PHYSICIAN SECTION    Prognosis: {Prognosis:2749670905}    Condition at Discharge: [address] Patient Condition:518250396}    Rehab Potential (if transferring to Rehab): {Prognosis:7192309820}    Recommended Labs or Other Treatments After Discharge: ***    Physician Certification: I certify the above information and transfer of Author Reading  is necessary for the continuing treatment of the diagnosis listed and that he requires {Admit to Appropriate Level of Care:22089} for {GREATER/LESS:587636236} 30 days.      Update Admission H&P: {CHP DME Changes in VZESJ:827738487}    PHYSICIAN SIGNATURE:  {Esignature:743060834}

## 2021-02-09 NOTE — PROGRESS NOTES
Pt assessment completed and charted. VSS. Pt a/ox4. Pt reports abd pain 3/10 and increases when coughing,pt educated on prn pain meds, pt given prn pain meds per mar. Pt has xp5 lap incisions from previous hospital stay, sx glue, REBECCA, C/D/I. Pt denies any other needs at this time. Pt calls out appropriately. Pt is a fall risk;  -Bed in lowest position and wheels locked.    -Call light within reach.   -Bedside table within reach.   -Non-skid footwear in place.  -bed check in place

## 2021-02-09 NOTE — PROGRESS NOTES
Pt called out stating pain unchanged. Sx updated, see orders for x-ray. hospitalist page, waiting for reply.

## 2021-02-09 NOTE — PROGRESS NOTES
Progress Note  Date:2021       Room:[facility]0327-  Patient Jean-Claude Beltran     YOB: 1955     Age:66 y.o. Subjective    Subjective:  Symptoms:  Stable. Pain:  He complains of pain that is moderate. Review of Systems  Objective         Vitals Last 24 Hours:  TEMPERATURE:  Temp  Av.9 °F (36.6 °C)  Min: 97.8 °F (36.6 °C)  Max: 97.9 °F (36.6 °C)  RESPIRATIONS RANGE: Resp  Av  Min: 16  Max: 18  PULSE OXIMETRY RANGE: SpO2  Av.5 %  Min: 92 %  Max: 94 %  PULSE RANGE: Pulse  Av.5  Min: 69  Max: 72  BLOOD PRESSURE RANGE: Systolic (35TBT), DALE:240 , Min:133 , BSV:780   ; Diastolic (57HHX), FHH:65, Min:78, Max:84    I/O (24Hr): Intake/Output Summary (Last 24 hours) at 2021 0557  Last data filed at 2021 0355  Gross per 24 hour   Intake 1680 ml   Output 350 ml   Net 1330 ml     Objective  Labs/Imaging/Diagnostics    Labs:  CBC:  Recent Labs     21  1528 21  0607 21  0529   WBC 7.0 3.6* 8.6   RBC 5.28 5.11 4.78   HGB 15.6 15.1 13.9   HCT 45.8 44.6 41.1   MCV 86.8 87.3 85.9   RDW 14.6 14.1 14.1    129* 151     CHEMISTRIES:  Recent Labs     21  1528      K 3.9      CO2 23   BUN 13   CREATININE 0.9   GLUCOSE 102*     PT/INR:  Recent Labs     21  0607   PROTIME 15.0*   INR 1.29*     APTT:  Recent Labs     21  0607   APTT 27.2     LIVER PROFILE:  Recent Labs     21  1528 21  0607   AST 40* 51*   ALT 41* 55*   BILIDIR  --  0.4*   BILITOT 0.9 0.7   ALKPHOS 102 103       Imaging Last 24 Hours:  No results found. Assessment//Plan           Hospital Problems           Last Modified POA    COVID-19 2021 Yes        Assessment & Plan  78 yo w HTN admitted Covid-19 hypoxemia and recurrent mild abd pain but no N/V (with normal soft bm's) and ? pSBO on CT similar to prior studies which is most likely ileus/SI dysmotility vs intermittent pSBO due to ?adhesions, w neg expl lap in 2021 (still having some ?surgical pain), and previous neg. W/U including SBFT,  MR-E, colonoscopy in 10/2018 and Capsule Endoscopy in 2020. 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      Plan:   1. Supportive care  2. Started PO Urecholine for GI motility  3. Advance diet as tolerated  4.  Will follow expectantly     Lucas De La Torre MD       Roger Gess) [phone]    Electronically signed by Lucas De La Torre MD on 2/9/21 at 5:57 AM EST

## 2021-02-09 NOTE — PROGRESS NOTES
2L tap water enema given per orders. Flatus passed. Brown water with minimal solids expelled. Pt with near syncopal episode while on commode. Pt returned safely to bed. VSS.

## 2021-02-10 PROBLEM — N17.9 AKI (ACUTE KIDNEY INJURY) (HCC): Status: ACTIVE | Noted: 2021-01-01

## 2021-02-10 PROBLEM — D72.829 LEUKOCYTOSIS: Status: ACTIVE | Noted: 2021-01-01

## 2021-02-10 PROBLEM — R65.21 SEPTIC SHOCK (HCC): Status: ACTIVE | Noted: 2021-01-01

## 2021-02-10 PROBLEM — J96.01 ACUTE HYPOXEMIC RESPIRATORY FAILURE DUE TO SEVERE ACUTE RESPIRATORY SYNDROME CORONAVIRUS 2 (SARS-COV-2) DISEASE (HCC): Status: ACTIVE | Noted: 2021-01-01

## 2021-02-10 PROBLEM — A41.9 SEPTIC SHOCK (HCC): Status: ACTIVE | Noted: 2021-01-01

## 2021-02-10 PROBLEM — I49.01 VENTRICULAR FIBRILLATION (HCC): Status: ACTIVE | Noted: 2021-01-01

## 2021-02-10 PROBLEM — R91.8 PULMONARY INFILTRATES: Status: ACTIVE | Noted: 2021-01-01

## 2021-02-10 NOTE — PROGRESS NOTES
Attempted to get patient up to [facility]. Pt stated that \"I can't do this, I don't feel well. \" Pt became very nauseous and was dry heaving. PRN zofran given per mar. Will continue to monitor.

## 2021-02-10 NOTE — PROGRESS NOTES
Pediatric FLEET enema administered to pt. Pt did not tolerate well. No stool at this time. Pt reporting SOB and very anxious. One time dose of atarax given per mar. Will continue to monitor.

## 2021-02-10 NOTE — FLOWSHEET NOTE
Code Blue, TOD/EOL care. Staff member, Janna Betancourt, present at code, tearful and talking with patient spouse, .  provided spiritual support and presence throughout code and after patient passing. Fellow surgery staff member present for comfort. Will follow up with bereavement materials. Camelia Padilla  [phone]       02/10/21 1432   Encounter Summary   Services provided to: Family   Referral/Consult From: Multi-disciplinary team   Support System Spouse; Children  (Staff member, Bienvenido BALLESTEROS, present at code)   Continue Visiting   (2/10-Code Blue, DIL present (staff), EOL/TOD care)   Complexity of Encounter Moderate   Length of Encounter 1 hour;15 minutes   Spiritual Assessment Completed Yes   Spiritual/Roman Catholic   Type Spiritual support   Assessment Tearful;Grieving; Anxious   Intervention Active listening;Prayer;Explored feelings, thoughts, concerns   Outcome Expressed gratitude;Coping;Tearful

## 2021-02-10 NOTE — H&P
Pre-sedation Assessment    History and Physical / Pre-Sedation Assessment  Patient:  Reed Monzon   :   1955     Intended Procedure: flex sig      HPI: 68 yo w HTN admitted Covid-19 hypoxemia and recurrent mild abd pain but no N/V (with normal soft bm's) and ? pSBO on CT similar to prior studies which is most likely ileus/SI dysmotility vs intermittent pSBO due to ?adhesions, w neg expl lap in 2021 (still having some ?surgical pain), and previous neg. W/U including SBFT, MR-E, colonoscopy in 10/2018 and Capsule Endoscopy in . Developed acute worsening in abd cramping/distention w KUB revealing dilated SI and colon down to the rectum.   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      Current Facility-Administered Medications   Medication Dose Route Frequency Provider Last Rate Last Admin    oxyCODONE-acetaminophen (PERCOCET) 5-325 MG per tablet 1 tablet  1 tablet Oral Q4H PRN LEANN Bradford CNP   1 tablet at 02/10/21 0238    morphine (PF) injection 2 mg  2 mg Intravenous Q4H PRN Jake Currie MD   2 mg at 02/10/21 0056    sodium phosphate (FLEET) pediatric enema  1 enema Rectal Once Dann Cranker, MD        bethanechol (URECHOLINE) tablet 25 mg  25 mg Oral TID AC Dann Cranker, MD   25 mg at 21 1556    enoxaparin (LOVENOX) injection 150 mg  1 mg/kg Subcutaneous BID Melany Barrios MD   150 mg at 21 2145    benzonatate (TESSALON) capsule 100 mg  100 mg Oral TID PRN LEANN Bradford CNP   100 mg at 21 0853    benzocaine-menthol (CEPACOL SORE THROAT) lozenge 1 lozenge  1 lozenge Oral Q2H PRN LEANN Bradford CNP   1 lozenge at 21 0619    amLODIPine (NORVASC) tablet 2.5 mg  2.5 mg Oral BID Melany Barrios MD   Stopped at 21    labetalol (NORMODYNE) tablet 200 mg  200 mg Oral BID Melany Barrios MD   Stopped at 21 0837    sodium chloride flush 0.9 % injection 10 mL  10 mL Intravenous 2 times per day Jose Mobley MD   10 mL at 02/09/21 2145    sodium chloride flush 0.9 % injection 10 mL  10 mL Intravenous PRN Jose Mobley MD        promethazine (PHENERGAN) tablet 12.5 mg  12.5 mg Oral Q6H PRN Jose Mobley MD        Or    ondansetron (ZOFRAN) injection 4 mg  4 mg Intravenous Q6H PRN Jose Mobley MD   4 mg at 02/09/21 2347    polyethylene glycol (GLYCOLAX) packet 17 g  17 g Oral Daily PRN Jose Mobley MD        acetaminophen (TYLENOL) tablet 650 mg  650 mg Oral Q6H PRN Jose Mobley MD   650 mg at 02/09/21 0252    Or    acetaminophen (TYLENOL) suppository 650 mg  650 mg Rectal Q6H PRN Jose Mobley MD        dexamethasone (DECADRON) tablet 6 mg  6 mg Oral Daily Jose Mobley MD   6 mg at 02/09/21 0853    guaiFENesin-dextromethorphan (ROBITUSSIN DM) 100-10 MG/5ML syrup 5 mL  5 mL Oral Q4H PRN Jose Mobley MD   5 mL at 02/07/21 1915    Vitamin D (CHOLECALCIFEROL) tablet 2,000 Units  2,000 Units Oral Daily Jose Mobley MD   2,000 Units at 02/09/21 0853    remdesivir 100 mg in sodium chloride 0.9 % 250 mL IVPB  100 mg Intravenous Q24H Jose Mobley MD   Stopped at 02/09/21 2348    0.9 % sodium chloride bolus  30 mL Intravenous PRN Jose Mobley MD   Stopped at 02/09/21 2348     Past Medical History:   Diagnosis Date    Arthritis     Hypertension     Knee pain     PONV (postoperative nausea and vomiting)     Sleep apnea     did not tolerate CPAP     Past Surgical History:   Procedure Laterality Date    ARTHRODESIS ANKLE Left 10/5/2020    LEFT TIBIOTALOCALCANEAL FUSION AND FIBULAR OSTECTOMY -BLOCK- -SLEEP APNEA- performed by Indra Dunbar MD at [facility] [facility] Left 01/01/1968    due to dislocation    LAPAROSCOPY N/A 1/22/2021    EXPLORATORY LAPAROSCOPY, LYSIS OF ADHESIONS performed by Lu Dewey MD at [address] [place] [address] 9/18/2018    DIAGNOSTIC LAPAROSCOPY performed by Amber Ribeiro MD at [facility] 1; for possible SBO    PROSTATE SURGERY      TURP    SHOULDER ARTHROSCOPY Left 11/13/2019    VIDEO ARTHROSCOPY LEFT SHOULDER, removal loose body, debridment -BLOCK- -SLEEP APNEA- performed by Junior Ravi MD at [address] [place] [address] Left 7/9/2020    LEFT REVERSE TOTAL SHOULDER ARTHROPLASTY performed by Junior Ravi MD at [address] [place] [address]      age 8       Nurses notes reviewed and agreed. Medications reviewed  Allergies: Allergies   Allergen Reactions    Ciprofloxacin Itching    Lisinopril Other (See Comments)     cough           Physical Exam:  Vital Signs: /80   Pulse 84   Temp 97.6 °F (36.4 °C) (Axillary)   Resp 22   Ht 6' 1\" (1.854 m)   Wt (!) 315 lb 11.2 oz (143.2 kg)   SpO2 95%   BMI 41.65 kg/m²  Body mass index is 41.65 kg/m². Airway:Normal  Cardiac:Normal  Pulmonary:Normal  Abdomen:Normal  Specific to procedure: none      Pre-Procedure Assessment/Plan:  ASA 3 - Patient with moderate systemic disease with functional limitations  Mallampati II  Level of Sedation Plan: Moderate sedation    Post Procedure plan: Return to same level of care    I assessed the patient and find that the patient is in satisfactory condition to proceed with the planned procedure and sedation plan. I have explained the risk, benefits, and alternatives to the procedure. The patient understands and agrees to proceed.   Yes    Michelle Schneider MD       (O) [phone]          Michelle Schneider  5:17 AM 2/10/2021

## 2021-02-10 NOTE — PROGRESS NOTES
Intubation   1245-2mg versed given  1250- 1 mg versed  1251- 2 mg versed  1252- 10 etomidate  1254- 10 etomidate    Color change noted, #8ET tube-25 at the lip

## 2021-02-10 NOTE — PROGRESS NOTES
Pt called out to this RN. RN walked into room after proper gowning up to find pt diaphoretic and cold to touch. This RN proceeded to take pts vital signs, this RN was unable to get b/p with machine. HR was strong and palatable at that time. After multiple attempts in finding manual b/p cuff to take. This RN proceed to call charge and manager for assistance. After assessing pt, Rapid Response was called. Pt was A&OX4 at all times. Rapid Response/Code Team arrived at that time.

## 2021-02-10 NOTE — PROCEDURES
Central Line Placement Procedure Note    Indication: poor peripheral access and centrally administered medications    Consent: The patient provided verbal consent for this procedure. Procedure: The patient was positioned appropriately and the skin over the right internal jugular vein was prepped with Chloraprep and draped in a sterile fashion. Local anesthesia was obtained by infiltration using 1% Lidocaine without epinephrine. A large bore needle was used to identify the vein under ultrasound guidance. A guide wire was then inserted into the vein through the needle. A triple lumen catheter was then inserted into the vessel over the guide wire using the Seldinger technique. All ports showed good, free flowing blood return and were flushed with saline solution. The catheter was then securely fastened to the skin with sutures and with an adhesive dressing and covered with a sterile dressing. A post procedure X-ray showed malpositioned catheter extending toward the right axillary vein, will attempt repositioning. The patient tolerated the procedure well. Complications: None. EBL less than 3cc. Laura Duffy, APRN-CNP

## 2021-02-10 NOTE — CODE DOCUMENTATION
Code completed per wife final request. Daughter in law at bedside, speaking with patients wife on phone.

## 2021-02-10 NOTE — PROGRESS NOTES
Pt assessment completed and charted. VSS. Pt a/o. Pt on 1L of O2, SpO2 99%. Did not wean, pt reporting SOB with rest. Pt very anxious about his condition, pt was reassured. Pt NPO since midnight for flex sig today. Consent is signed and in chart. Pt had poor PO intake before midnight. PRN medications given per mar. Bed in lowest position and wheels locked. Call light within reach. Bedside table within reach. Non-skid footwear in place. Pt denies any other needs at this time. Pt calls out appropriately. Bed alarm in place for safety. Will continue to monitor.

## 2021-02-10 NOTE — PROGRESS NOTES
This note also relates to the following rows which could not be included:  Resp - Cannot attach notes to unvalidated device data       02/10/21 1209   Oxygen Therapy/Pulse Ox   O2 Therapy Oxygen   O2 Device Non-rebreather mask   O2 Flow Rate (L/min) 15 L/min   SpO2   (SPO2 MULTIPLE POINTS CANT . RN MD AWARE)   Patient still with pallor uncomfortable.  Cant get Spo2's

## 2021-02-10 NOTE — PROGRESS NOTES
02/10/21 1259   Vent Information   SpO2 (!) 64 %   Additional Respiratory  Assessments   Resp 27   ETT (adult)   Placement Date/Time: 02/10/21 1255   Technique: Video laryngoscopy  Tube Size: 8 mm  Location: Oral  Placement Verified By[de-identified] Capnometry; Auscultation  Secured at: 25 cm  Placed By: Licensed provider  Measured From: Lips   $ Intubation emergent  $ Yes   Secured at 25 cm   Measured From Lips   ET Placement Right   Secured By Commercial tube ortiz   Site Condition Dry   Cuff Pressure 30 cm H2O   This RT assisted with airway placement. HR decreasing still unable to  Spo2.  CPR was started after loss of palpable puse and continued decreasing HR. BVM/ETT with Fio2 100%

## 2021-02-10 NOTE — PROGRESS NOTES
Perfect serve message sent to Abdirahman Multani, \"Pt is very anxious stating that he cannot breath. SpO2 94% on 3L O2, pt tachypneic. Can we get something for anxiety? \", awaiting response.

## 2021-02-10 NOTE — PROGRESS NOTES
Progress Note  Date:2/10/2021       Room:[facility]0240-  Patient Ana Camarillo     YOB: 1955     Age:66 y.o. Subjective    Subjective:  Symptoms:  Worsening. Pain:  He complains of pain that is mild. Review of Systems  Objective         Vitals Last 24 Hours:  TEMPERATURE:  Temp  Av.6 °F (36.4 °C)  Min: 97.6 °F (36.4 °C)  Max: 97.6 °F (36.4 °C)  RESPIRATIONS RANGE: Resp  Av.3  Min: 20  Max: 30  PULSE OXIMETRY RANGE: SpO2  Av.4 %  Min: 93 %  Max: 98 %  PULSE RANGE: Pulse  Av.7  Min: 63  Max: 86  BLOOD PRESSURE RANGE: Systolic (57STQ), XDY:329 , Min:70 , TTS:285   ; Diastolic (26ZSF), JVE:92, Min:31, Max:120    I/O (24Hr): Intake/Output Summary (Last 24 hours) at 2/10/2021 1053  Last data filed at 2/10/2021 0352  Gross per 24 hour   Intake 667 ml   Output 100 ml   Net 567 ml     Objective:  General Appearance:  Not in pain. Vital signs: (most recent): Blood pressure (!) 73/50, pulse 79, temperature 97.6 °F (36.4 °C), temperature source Axillary, resp. rate 24, height 6' 1\" (1.854 m), weight (!) 315 lb 11.2 oz (143.2 kg), SpO2 94 %. Heart: Normal rate. Regular rhythm. S1 normal and S2 normal.    Abdomen: Abdomen is soft and distended. Bowel sounds are normal.   There is suprapubic tenderness. Labs/Imaging/Diagnostics    Labs:  CBC:  Recent Labs     21  0529 21  0555 02/10/21  0523 02/10/21  0932   WBC 8.6 6.8 18.5*  --    RBC 4.78 4.69 4.13*  --    HGB 13.9 13.9 11.8* 11.2*   HCT 41.1 40.5 36.2*  --    MCV 85.9 86.4 87.6  --    RDW 14.1 14.0 13.9  --     151 245  --      CHEMISTRIES:  Recent Labs     21  0555 02/10/21  0903    137   K 4.0 4.7    99   CO2 29 18*   BUN 14 40*   CREATININE 0.7* 3.3*   GLUCOSE 104* 121*     PT/INR:No results for input(s): PROTIME, INR in the last 72 hours. APTT:No results for input(s): APTT in the last 72 hours.   LIVER PROFILE:  Recent Labs     21  0555   AST 31   ALT 47* BILITOT 0.6   ALKPHOS 86       Imaging Last 24 Hours:  Xr Abdomen (kub) (single Ap View)    Result Date: 2/10/2021  EXAMINATION: ONE SUPINE XRAY VIEW(S) OF THE ABDOMEN 2/10/2021 9:18 am COMPARISON: 02/09/2021 HISTORY: ORDERING SYSTEM PROVIDED HISTORY: abd pain TECHNOLOGIST PROVIDED HISTORY: Reason for exam:->abd pain Reason for Exam: abd pain Acuity: Acute Type of Exam: Initial FINDINGS: There is persistent gaseous distension of small and large bowel. Paucity of gas at the expected location of the rectum. Diaphragm is not imaged. Right upper quadrant clips. Degenerative change of the hips and spine. Perfused distant diffuse gaseous distension of bowel throughout the abdomen, which can reflect obstruction or ileus. Xr Abdomen (2 Views)    Result Date: 2/9/2021  EXAMINATION: TWO XRAY VIEWS OF THE ABDOMEN 2/9/2021 10:54 am COMPARISON: 01/21/2021 HISTORY: ORDERING SYSTEM PROVIDED HISTORY: PSBO TECHNOLOGIST PROVIDED HISTORY: Reason for exam:->PSBO FINDINGS: Severe distention of the colon and small bowel. The rectum appears decompressed. No extraluminal gas. Severe distention of colon and small bowel. There appears to be a caliber change in the colon as the rectum is decompressed. An ileus or colonic obstruction needs to be considered. Assessment//Plan           Hospital Problems           Last Modified POA    COVID-19 2/6/2021 Yes    Ileus ([address] 2/9/2021 Yes        Assessment & Plan  67 yo w HTN admitted Covid-19 hypoxemia and recurrent mild abd pain but no N/V (with normal soft bm's) and ? pSBO on CT similar to prior studies which is most likely ileus/SI dysmotility vs intermittent pSBO due to ?adhesions, w neg expl lap in 1/2021 (still having some ?surgical pain), and previous neg. W/U including SBFT,  MR-E, colonoscopy in 10/2018 and Capsule Endoscopy in 2020.  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   KUB on 2/9 revealed SI and colon down to the rectum. A flex sig scheduled the morning o 2/10 was cancelled because the patient developed sudden diaphoresis, hypotension, CP/SOB. Abdomen was still distended but soft.     Plan:   1. Supportive care  2. Started PO Urecholine for GI motility  3. Advance diet as tolerated  4. Once patient's condition is stabilized, will do a flex sig (so not today for sure, possibly tomorrow)  5.  Will follow w MD Dannielle Campbell [phone]    Electronically signed by Dann Cranker, MD on 2/10/21 at 10:53 AM EST

## 2021-02-10 NOTE — PROGRESS NOTES
Hospitalist Progress Note      PCP: Pennie Gallegos MD    Date of Admission: 2/6/2021    Chief complaint: Abdominal pain    Hospital Course: Patient presented with abdominal pain. Tested positive for COVID-19 infection. Abdominal pain was mild and stable first couple days. Earlier in the morning today, got worse. Of note, patient had a relatively recent laparoscopy for SBO which did not show any physical changes. Admission CT scan of the abdomen also showed a transition point, with partial SBO suspicion. General surgery was consulted and it was determined this was a functional condition with no connection to physical or structural issues. Gastroenterology was consulted. Flexible sigmoidoscopy was being planned, when earlier in the morning patient was reporting abdominal pain. Rapid response was called for low blood pressure. Pulse could not be found for a very short time, required IV epinephrine but return of spontaneous circulation was quickly achieved. Unclear at this time if formal CODE BLUE was ever called. Patient was given IV fluid bolus, started on IV norepinephrine drip and transferred to ICU. Subjective:   Worsening abdominal pain and AM rapid response noted      Medications:  Reviewed    Infusion Medications    norepinephrine 20 mcg/min (02/10/21 1007)     Scheduled Medications    EPINEPHrine  0.5 mg Intravenous Once    EPINEPHrine  0.5 mg Intravenous Once    mupirocin   Nasal BID    bethanechol  25 mg Oral TID AC    enoxaparin  1 mg/kg Subcutaneous BID    amLODIPine  2.5 mg Oral BID    labetalol  200 mg Oral BID    sodium chloride flush  10 mL Intravenous 2 times per day    dexamethasone  6 mg Oral Daily    Vitamin D  2,000 Units Oral Daily    remdesivir IVPB  100 mg Intravenous Q24H     PRN Meds: oxyCODONE-acetaminophen, morphine, benzonatate, benzocaine-menthol, sodium chloride flush, promethazine **OR** ondansetron, polyethylene glycol, acetaminophen **OR** acetaminophen, guaiFENesin-dextromethorphan, sodium chloride      Intake/Output Summary (Last 24 hours) at 2/10/2021 1244  Last data filed at 2/10/2021 0352  Gross per 24 hour   Intake 667 ml   Output 100 ml   Net 567 ml       Physical Exam Performed:    BP (!) 90/41   Pulse 72   Temp 97.6 °F (36.4 °C) (Axillary)   Resp 22   Ht 6' 1\" (1.854 m)   Wt (!) 315 lb 11.2 oz (143.2 kg)   SpO2 94%   BMI 41.65 kg/m²     I performed an audiovisual assessment, based on new provisions and guidance offered by [facility] on March 18, 2020 in setting of COVID-19 outbreak and in order to preserve personal protective equipment in accordance with the flexibilities announced by CMS on March 30, 2020. References:   https://med. [email or web address]/Resources/COVID-19/3_18%20Telemed%20Guidance%20Updated%20March%2018. pdf?wln=[email or web address]   http://[employer]/. pdf     Bedside physical examination deferred. However, I did visually inspect the patient and observed the following:     General appearance: In mild distress, appears stated age. Neck: Deferred. Respiratory: Tachypnea noted  Cardiovascular: Deferred. Abdomen: Deferred. Musculoskelatal: Deferred. Neurologic:  Deferred. Psychiatric: Alert   Skin: Deferred. Labs:   Recent Labs     02/08/21  0529 02/09/21  0555 02/10/21  0523 02/10/21  0932   WBC 8.6 6.8 18.5*  --    HGB 13.9 13.9 11.8* 11.2*   HCT 41.1 40.5 36.2*  --     151 245  --      Recent Labs     02/09/21  0555 02/10/21  0903    137   K 4.0 4.7    99   CO2 29 18*   BUN 14 40*   CREATININE 0.7* 3.3*   CALCIUM 8.6 8.4     Recent Labs     02/09/21  0555   AST 31   ALT 47*   BILITOT 0.6   ALKPHOS 86     No results for input(s): INR in the last 72 hours.   Recent Labs     02/10/21  0903   TROPONINI 0.02*       Urinalysis:      Lab Results   Component Value Date    NITRU Negative 01/16/2021    BLOODU Negative 01/16/2021 SPECGRAV >=1.030 01/16/2021    GLUCOSEU Negative 01/16/2021       Radiology:  XR CHEST PORTABLE   Preliminary Result   Right internal jugular central venous catheter as above is malpositioned. Consider repositioning. RECOMMENDATION:   The findings were sent to the Radiology Results [address] at 11:14   am on 2/10/2021to be communicated to a licensed caregiver. XR ABDOMEN (KUB) (SINGLE AP VIEW)   Final Result   Exact position of the tube tip is somewhat difficult to confirm however it is   overlying the expected location of the gastric fundus. XR ABDOMEN (KUB) (SINGLE AP VIEW)   Final Result   Perfused distant diffuse gaseous distension of bowel throughout the abdomen,   which can reflect obstruction or ileus. XR ABDOMEN (2 VIEWS)   Final Result   Severe distention of colon and small bowel. There appears to be a caliber   change in the colon as the rectum is decompressed. An ileus or colonic   obstruction needs to be considered. CT ABDOMEN PELVIS W IV CONTRAST   Final Result   Persistent abnormally dilated proximal small bowel loops with suspected   transition point in the left lower quadrant, suggesting partial small bowel   obstruction given that gas and stool is seen throughout the colon. XR CHEST PORTABLE   Final Result   1. Low lung volumes, with mild volume related right basilar atelectasis. 2. Stable cardiomegaly. Assessment/Plan:    Active Hospital Problems    Diagnosis    Ileus ([address] [K56.7]    COVID-19 [U07.1]     PLAN:    Shock, unclear reason, probably septic  Continue norepinephrine. Defer antibiotic commendations to intensivist  IV fluids administered  Blood pressure is on the low side, but stabilized.     Acute hypoxemic respiratory failure  Oxygen requirement was improving until today, when patient started requiring high flow nasal cannula oxygen pre supplementation  Patient is positive for COVID-19, unclear if COVID-19 is the only reason. COVID-19  Started Decadron on remdesivir on arrival  Therapeutic dose Lovenox for D-Dimer above 3000 on arrival    SBO  Determined to be functional on arrival  Could be perforated at this time  General surgery following    Poor prognosis based on recent decompensation    DVT Prophylaxis: Therapeutic Lovenox  Diet: Diet NPO, After Midnight  Code Status: Full Code    PT/OT Eval Status: Not possible    Dispo - Poor prognosis.  Uncertain outcome    Berniece Gowers, MD

## 2021-02-10 NOTE — PROGRESS NOTES
Comprehensive Nutrition Assessment    Type and Reason for Visit:  Reassess    Nutrition Recommendations/Plan:   1. If unable to advanced diet soon, consider PN support given prolonged NPO status and impaired GI function. See recommendations below if medically necessary. 2. Recommend check TG, Mg, Phos, CMP now if not done in last 24 hours. 3. Bag 1: Clinimix 5/20e starting at 42 mL/hr  4. Physician/LIP to monitor closely and correct lytes (Phos,Mg,K+) d/t risk of refeeding syndrome  5. Bag 2: As long as electrolytes WNL, advance Clinimix 5/20 to goal rate of 83.33 mL/hr  6. Recommend 250 mL 20% lipids 5 times per week  7. Recommend FSBS, monitor glucose, need for insulin  8. Pharmacy to adjust MVI and Trace Elements as needed   9. When PN to be discontinued, cut rate by 50% and let current bag run out. Nutrition Assessment:  Follow up: code called this morning on floor d/t hypotension. Transferred to ICU. Upward trending Lactic Acid, 7.9 this morning per labs. Pt remains NPO since admission. Will continue to monitor in case of need for TPN. Malnutrition Assessment:  Malnutrition Status:   At risk for malnutrition (Comment)    Context:  Acute Illness     Findings of the 6 clinical characteristics of malnutrition:  Energy Intake:  7 - 50% or less of estimated energy requirements for 5 or more days    Estimated Daily Nutrient Needs:  Energy (kcal):  0803-0740; Weight Used for Energy Requirements:  Ideal(84 kg)     Protein (g):  ; Weight Used for Protein Requirements:  Ideal(1.2-1.4)        Fluid (ml/day):  1 mL/kcal; Method Used for Fluid Requirements:  1 ml/kcal      Nutrition Related Findings:  loose BM on 2/7      Wounds:  None       Current Nutrition Therapies:    Diet NPO, After Midnight    Anthropometric Measures:  · Height: 6' 1\" (185.4 cm)  · Current Body Weight: 315 lb (142.9 kg)     · Ideal Body Weight: 184 lbs; % Ideal Body Weight     · BMI: 41.6   · BMI Categories: Obese Class 3 (BMI 40.0 or greater)       Nutrition Diagnosis:   · Inadequate oral intake related to altered GI function as evidenced by NPO or clear liquid status due to medical condition      Nutrition Interventions:   Food and/or Nutrient Delivery:  Continue NPO  Nutrition Education/Counseling:  No recommendation at this time   Coordination of Nutrition Care:  Continue to monitor while inpatient    Goals: Tolerate oral diet progression per MD and consume greater than 50% of meals       Nutrition Monitoring and Evaluation:    Food/Nutrient Intake Outcomes:  Diet Advancement/Tolerance  Physical Signs/Symptoms Outcomes:  GI Status     Discharge Planning:     Too soon to determine     Electronically signed by Marisol Sotelo Dietetic Intern on 2/10/21 at 10:09 AM EST    Contact: [phone]

## 2021-02-10 NOTE — PROCEDURES
ENDOTRACHEAL INTUBATION    ASA-5,Mallampati-3    INDICATION: Life threatening respiratory failure    TIME OUT: taken    SEDATION: etomidate and versed    PROCEDURE: Using video laryngoscopy, the vocal cords were well visualized and a 8 mm endotracheal tube was place directly through the cords. Good breath sounds auscultated bilaterally without sounds over abdomen. Good return of CO2 on the monitor. CXR is pending.     EBL-0    Mariano Swanson MD

## 2021-02-10 NOTE — CONSULTS
INPATIENT PULMONARY CRITICAL CARE CONSULT NOTE      Chief Complaint/Referring Provider:  Patient is being seen at the request of Dr. Haleigh Reyes for a consultation for shock/critical care management      Presenting HPI: Patient admitted with shortness of breath    As per admitting provider-   77 y.o. male who presented to [facility] with shortness of breath that started 2 days ago. Did not get better. Worsening shortness of breath especially with exertion was noted over past 24 hours and had to call 911 to be brought to the emergency department. Does not require oxygen at home. Former smoker. When the EMTs arrived, patient was found to be 82% on room air. Was started on oxygen. And accompanying symptom was diffuse abdominal pain. Had surgery for small bowel obstruction a couple weeks ago. Feels like abdominal pain is coming back. Has also some nausea but no vomiting. Diarrhea past 2 weeks. Abdominal pain is 8 out of 10. Aching and cramping. No other accompanying symptoms  Nothing onset makes the patient feel better or worse  Never had this kind of shortness of breath. Patient had abdominal pain and diarrhea in the past.  CT scan of the abdomen was performed in the emergency department, but results are not yet available.   Patient's clinical status worsened this morning and patient was found to have significant hypoxemia with significant hypotension with systolic blood pressure in 40s and patient was started on IV fluids along with that patient was transferred to the ICU for further management, patient was found to be significantly hypoxemic along with hypotensive and patient was put on a nonrebreather facemask along with that patient was given bicarb push and patient was started on IV pressors, patient's respiratory status as well as metabolic acidosis with was worsening and patient x-ray abdomen shows significant dilated bowel loops, patient has had laparoscopic examination 3 weeks back without any TIBIOTALOCALCANEAL FUSION AND FIBULAR OSTECTOMY -BLOCK- -SLEEP APNEA- performed by Eden Vazquez MD at [place] [address] SURGERY Left 1968    due to dislocation    LAPAROSCOPY N/A 2021    EXPLORATORY LAPAROSCOPY, LYSIS OF ADHESIONS performed by Caren Fernandes MD at [facility] [address] N/A 2018    DIAGNOSTIC LAPAROSCOPY performed by Caren Fernandes MD at [facility] 1; for possible SBO    PROSTATE SURGERY      TURP    SHOULDER ARTHROSCOPY Left 2019    VIDEO ARTHROSCOPY LEFT SHOULDER, removal loose body, debridment -BLOCK- -SLEEP APNEA- performed by Carolina Peguero MD at [address] [place] [address] Left 2020    LEFT REVERSE TOTAL SHOULDER ARTHROPLASTY performed by Carolina Peguero MD at [place] [address].      age 8        Family History   Problem Relation Age of Onset    Heart Disease Mother     Seizures Mother     High Blood Pressure Father     Kidney Disease Father     Cancer Father         colon,bone        Social History     Tobacco Use    Smoking status: Former Smoker     Packs/day: 0.50     Years: 5.00     Pack years: 2.50     Types: Cigarettes     Start date:      Quit date: 1987     Years since quittin.7    Smokeless tobacco: Never Used   Substance Use Topics    Alcohol use: No     Alcohol/week: 0.0 standard drinks     Comment: rarely        Allergies   Allergen Reactions    Ciprofloxacin Itching    Lisinopril Other (See Comments)     cough               Physical Exam:  Blood pressure (!) 90/41, pulse 72, temperature 97.6 °F (36.4 °C), temperature source Axillary, resp. rate 22, height 6' 1\" (1.854 m), weight (!) 315 lb 11.2 oz (143.2 kg), SpO2 94 %.'     Constitutional: In significant respiratory stress on nonrebreather facemask  HENT:  Oropharynx is clear and moist. No thyromegaly. Eyes:  Conjunctivae are normal. Pupils equal, round, and reactive to light.  No scleral icterus. Neck: . No tracheal deviation present. No obvious thyroid mass. Short enlarged neck  Cardiovascular: Normal rate, regular rhythm, normal heart sounds. No right ventricular heave. No lower extremity edema. Pulmonary/Chest: No wheezes. Bilateral rales. Chest wall is not dull to percussion. No accessory muscle usage or stridor. Abdominal: Soft. Bowel sounds present. No distension or hernia. No tenderness. Distended and obese  Musculoskeletal: No cyanosis. No clubbing. No obvious joint deformity. Lymphadenopathy: No cervical or supraclavicular adenopathy. Skin: Skin is warm and dry. No rash or nodules on the exposed extremities. Psychiatric: Restless  Neurologic: Alert, awake and oriented. PERRL. Speech fluent        Results:  CBC:   Recent Labs     02/08/21  0529 02/09/21  0555 02/10/21  0523 02/10/21  0932   WBC 8.6 6.8 18.5*  --    HGB 13.9 13.9 11.8* 11.2*   HCT 41.1 40.5 36.2*  --    MCV 85.9 86.4 87.6  --     151 245  --      BMP:   Recent Labs     02/09/21  0555 02/10/21  0903    137   K 4.0 4.7    99   CO2 29 18*   BUN 14 40*   CREATININE 0.7* 3.3*     LIVER PROFILE:   Recent Labs     02/09/21  0555   AST 31   ALT 47*   BILITOT 0.6   ALKPHOS 86       Imaging:  I have reviewed radiology images personally. XR CHEST PORTABLE   Preliminary Result   Right internal jugular central venous catheter as above is malpositioned. Consider repositioning. RECOMMENDATION:   The findings were sent to the Radiology Results [address] at 11:14   am on 2/10/2021to be communicated to a licensed caregiver. XR ABDOMEN (KUB) (SINGLE AP VIEW)   Final Result   Exact position of the tube tip is somewhat difficult to confirm however it is   overlying the expected location of the gastric fundus.          XR ABDOMEN (KUB) (SINGLE AP VIEW)   Final Result   Perfused distant diffuse gaseous distension of bowel throughout the abdomen,   which can reflect obstruction or 2/10/2021 09:03   CULTURE, BLOOD 1 Unknown     Rpt   Urine Reflex to Culture Unknown   Not Indicated     SARS-CoV-2, ERVIN Latest Ref Range: NOT DETECTED   NOT DETECTED      COVID-19 Unknown Rpt Rpt  Rpt (A)    SARS-CoV-2, NAAT Latest Ref Range: Not Detected  Not Detected   DETECTED (AA)      Results for Jocelyn Manriquez (MRN [id number]) as of 2/10/2021 13:17   Ref. Range 2/6/2021 15:28 2/10/2021 09:32   O2 Therapy Unknown Unknown    pH, Arterial Latest Ref Range: 7.350 - 7.450   7.230 (L)   pCO2, Arterial Latest Ref Range: 35.0 - 45.0 mm Hg  35.4   pO2, Arterial Latest Ref Range: 75.0 - 108.0 mm Hg  168.0 (H)   HCO3, Arterial Latest Ref Range: 21.0 - 29.0 mmol/L  14.8 (L)   TCO2 (calc), Art Latest Ref Range: Not Established mmol/L  16   Base Excess, Arterial Latest Ref Range: -3 - 3   -13 (L)   O2 Sat, Arterial Latest Ref Range: 93 - 100 %  99   pH, Pavel Latest Ref Range: 7.350 - 7.450  7.383    pCO2, Pavel Latest Ref Range: 40.0 - 50.0 mmHg 40.8    pO2, Pavel Latest Ref Range: 25.0 - 40.0 mmHg 48.9 (H)    HCO3, Venous Latest Ref Range: 23.0 - 29.0 mmol/L 23.8    TC02 (Calc), Pavel Latest Ref Range: Not Established mmol/L 25    Base Excess, Pavel Latest Ref Range: -3.0 - 3.0 mmol/L -1.2    O2 Content, Pavel Latest Ref Range: Not Established VOL % 20    MetHgb, Pavel Latest Ref Range: <1.5 % 0.5    O2 Sat, Pavel Latest Ref Range: Not Established % 84    Sample Type Unknown  ART     Echocardiogram:None in Epic      Results for Jocelyn Manriquez (MRN [id number]) as of 2/10/2021 13:17   Ref.  Range 2/6/2021 15:28 2/7/2021 06:07 2/9/2021 05:55 2/10/2021 08:19 2/10/2021 09:03   Sodium Latest Ref Range: 136 - 145 mmol/L 137  138  137   Potassium Latest Ref Range: 3.5 - 5.1 mmol/L 3.9  4.0  4.7   Chloride Latest Ref Range: 99 - 110 mmol/L 100  102  99   CO2 Latest Ref Range: 21 - 32 mmol/L 23  29  18 (L)   BUN Latest Ref Range: 7 - 20 mg/dL 13  14  40 (H)   Creatinine Latest Ref Range: 0.8 - 1.3 mg/dL 0.9  0.7 (L)  3.3 (H)   Anion Gap Latest Ref Range: 3 - 16  14  7  20 (H)   GFR Non- Latest Ref Range: >60  >60  >60  19 (A)   GFR  Latest Ref Range: >60  >60  >60  23 (A)   Lactic Acid Latest Ref Range: 0.4 - 2.0 mmol/L 1.5 1.0   7.9 (HH)   Glucose Latest Ref Range: 70 - 99 mg/dL 102 (H)  104 (H)  121 (H)   POC Glucose Latest Ref Range: 70 - 99 mg/dl    110 (H)    Calcium Latest Ref Range: 8.3 - 10.6 mg/dL 9.0  8.6  8.4   Total Protein Latest Ref Range: 6.4 - 8.2 g/dL 7.2 6.7 6.1 (L)         Assessment:  Active Problems:    Small bowel obstruction (HCC)    Obesity, Class III, BMI 40-49.9 (morbid obesity) (HCC)    Acute hypoxemic respiratory failure due to severe acute respiratory syndrome coronavirus 2 (SARS-CoV-2) disease (HCC)    Ileus (HCC)    Pulmonary infiltrates    Septic shock (HCC)    Ventricular fibrillation (HCC)    ASHKAN (acute kidney injury) ([facility] [address]    Leukocytosis  Resolved Problems:    * No resolved hospital problems.  *          Plan:   · Oxygen supplementation to keep saturation between 90 to 94% only  · Please titrate the oxygen as per above parameters  · Patient's respiratory status and overall clinical status was worsening  · Patient needs to be intubated and put on mechanical vent to support  · Cardiopulmonary resuscitation as per ACLS protocol being initiated  · Patient was given sodium bicarb this morning  · Patient was started on Levophed infusion  · NG tube to suction  · Patient has been given a dose of Decadron along with remdesivir  · Patient appears to be in ASHKAN with oliguria along with that patient has worsening lactic acidosis  · If patient has successful resuscitation patient will require CRRT  · May consider IV antibiotics  · Droplet plus precautions to continue  · Patient has been started on Lovenox in therapeutic doses with admitting provider  · Monitor for any bleeding  · Titration of Lovenox dose as per antiten a prophylaxis  · PUD prophylaxis    Patient's clinical status is precarious and has deteriorated there is a chance of nonsurvival    Critical care time spent with the patient was 45 minutes exclusive of any procedures            Electronically signed by:  German Lowery MD    2/10/2021    1:21 PM.

## 2021-02-10 NOTE — DISCHARGE SUMMARY
Hospital Medicine Discharge Summary    Patient ID: Iris Kirk      Patient's PCP: Roosevelt Quesada MD    Admitting Physician: Nikki Natarajan MD  Discharge Physician: Lizbet Altamirano MD     Admit Date: 2021     Disposition:     Discharge Diagnoses: Active Hospital Problems    Diagnosis    Pulmonary infiltrates [R91.8]    Septic shock ([address] [A41.9, R65.21]    Ventricular fibrillation ([address] [I49.01]    ASHKAN (acute kidney injury) ([address] [N17.9]    Leukocytosis [D72.829]    Acute respiratory failure with hypoxia ([address] [J96.01]    Ileus ([address] [K56.7]    Acute hypoxemic respiratory failure due to severe acute respiratory syndrome coronavirus 2 (SARS-CoV-2) disease ([facility]) [U07.1, J96.01]    Obesity, Class III, BMI 40-49.9 (morbid obesity) ([address] [E66.01]    Small bowel obstruction ([address] [K56.609]       Date of Death:02/10/21   Time of Death:1321    Immediate Cause of Death: Shock, unknown reason  Underlying Conditions: SBO  Other Contributing Conditions:COVID-19      Hospital Course:     Patient presented with abdominal pain. Tested positive for COVID-19 infection. Abdominal pain was mild and stable first couple days. Earlier in the morning today, got worse. Of note, patient had a relatively recent laparoscopy for SBO which did not show any physical changes. Admission CT scan of the abdomen also showed a transition point, with partial SBO suspicion. General surgery was consulted and it was determined this was a functional condition with no connection to physical or structural issues. Gastroenterology was consulted. Flexible sigmoidoscopy was being planned, when earlier in the morning patient was reporting abdominal pain. Rapid response was called for low blood pressure. Pulse could not be found for a very short time, required IV epinephrine but return of spontaneous circulation was quickly achieved.     Patient was given IV fluid bolus, started on IV norepinephrine drip and transferred to ICU. After transfer to ICU, CODE BLUE was called. This time, after 30 minutes of CPR and administration of multiple ACLS medications, patient was pronounced dead on 02/10/21 at 1321. Family was informed and they agreed with stopping all measures. Consults:  IP CONSULT TO PHARMACY  IP CONSULT TO GENERAL SURGERY  IP CONSULT TO GI  IP CONSULT TO CRITICAL CARE  IP CONSULT TO NEPHROLOGY    Signed:  Latasha Correia MD   2/10/2021    Thank you Marleny Brooks MD for the opportunity to be involved in this patient's care. If you have any questions or concerns please feel free to contact me at [phone].

## 2021-02-10 NOTE — PROGRESS NOTES
Occupational/Physical Therapy    Therapy attempted to see pt for OT/PT follow up session. Pt transferred to ICU. Therapy will sign off due to change in status. Pt will need new therapy orders when medically appropriate.     Thank you    Richard Snider OTR/RIVER Pollard, PT

## 2021-02-11 LAB
EKG ATRIAL RATE: 77 BPM
EKG DIAGNOSIS: NORMAL
EKG P AXIS: 58 DEGREES
EKG P-R INTERVAL: 154 MS
EKG Q-T INTERVAL: 394 MS
EKG QRS DURATION: 84 MS
EKG QTC CALCULATION (BAZETT): 445 MS
EKG R AXIS: 9 DEGREES
EKG T AXIS: 22 DEGREES
EKG VENTRICULAR RATE: 77 BPM

## 2021-02-14 LAB
BLOOD CULTURE, ROUTINE: NORMAL
CULTURE, BLOOD 2: NORMAL

## 2021-04-07 NOTE — OP NOTE
Operative Note      Patient: Dolores Delong  YOB: 1955  MRN: [id number]    Date of Procedure: 7/9/2020    Pre-Op Diagnosis: TRAUMATIC COMPLETE TEAR OF LEFT ROTATOR CUFF, SUBSEQUENT ENCOUNTER  M12.812    Post-Op Diagnosis: Developing rotator cuff arthropathy of the left shoulder with complete deficiency of the superior and posterior rotator cuff significant synovitis with fluid       Procedure(s):  LEFT REVERSE TOTAL SHOULDER ARTHROPLASTY    Surgeon(s):  Tracey Sommers MD    Assistant:   Surgical Assistant: Eduardo Swanson    Anesthesia: General    Estimated Blood Loss (mL): 316     Complications: None    Specimens:   * No specimens in log *    Implants:  Implant Name Type Inv.  Item Serial No.  Lot No. LRB No. Used Action   IMPL SHOULDER BASE STD PERFM REV 25MM Shoulder/Arm/Wrist/Hand IMPL SHOULDER BASE STD PERFM REV 25MM  [employer] [id number] Left 1 Implanted   IMPL SHOULDER GLENOSPHERE 39MM +3MM Shoulder/Arm/Wrist/Hand IMPL SHOULDER GLENOSPHERE 39MM +3MM  [employer] [id number] Left 1 Implanted   SCREW PERFORM REV CENTRAL 6.5X35MM Screw/Plate/Nail/Pee SCREW PERFORM REV CENTRAL 6.5X35MM  [employer]  Left 1 Implanted   SCREW PERFORM REV PERIPHERAL 30MM Screw/Plate/Nail/Pee SCREW PERFORM REV PERIPHERAL 30MM  [employer]  Left 1 Implanted   SCREW PERF REVRSED PERIPHERAL 22MM Screw/Plate/Nail/Pee SCREW PERF REVRSED PERIPHERAL 22MM  [employer]  Left 1 Implanted   SCREW PERFORM REV PERIPHERAL 34MM Screw/Plate/Nail/Pee SCREW PERFORM REV PERIPHERAL 34MM  [employer]  Left 1 Implanted   IMPL SYSTEM SHLD GUIDE REVERSE 12.5X39MM Shoulder/Arm/Wrist/Hand IMPL SYSTEM SHLD GUIDE REVERSE 12.5X39MM  [employer] [id number] Left 1 Implanted   IMPL STEM HUM ASCEND FLEX STD PTC 78MM Shoulder/Arm/Wrist/Hand IMPL STEM HUM ASCEND FLEX STD PTC 78MM  [employer] [id number] Left 1 Implanted   IMPL TRAY REVERSED ASCEND FLX ECCENT 0MM Shoulder/Arm/Wrist/Hand IMPL TRAY REVERSED ASCEND FLX ECCENT 0MM roughly 20-30° of retroversion and in line with the anatomic neck. Peripheral osteophytes were resected. We then moved directly to the glenoid. A circumferential labral ectomy was performed. We very carefully appreciated the anatomy and orientation of the glenoid vault. Please note that we used our preoperative templating off the blueprint protocol to maximize our orientation of pin and glenoid vault placement. .  We then sequentially prepared the glenoid with placement of the central pin and reaming. The baseplate and central screw were impacted with firm fist tightening. 3 peripheral locking screws were placed. The selected glenoid sphere was then impacted very carefully and the set screw engaged and checked. We subsequently moved to the humerus. We had good exposure and carefully protected soft tissues. Axial and rotational stability was achieved after reaming and broaching. With the trial stem we then began trialing of metaphyseal and polyethylene components. We carefully evaluated soft tissue tension, impingement free range of motion movement, and stability throughout a full arc of movement. The final implants were selected and impacted. We are pleased that the impingement free range of movement instability. Please note that we selected a constrained polyethylene liner based upon his risk factors of large BMI, previous surgery, and the use of axial weightbearing supports. .  The components were carefully irrigated with antibiotic infused saline which was left to sit for 90 seconds. We then placed a deep spinal needle for the final injection of tobramycin upon wound closure. The wound was then closed in layers including the deltopectoral, subcutaneous layer and a running subcuticular stitch with glue. The arm was placed into a sling carefully. Anesthesia reversed. Stable to recovery room.     Complications: None noted    Implants: [facility]    Postoperative plan: Patient will be admitted to the floor for observation. Encouraged early mobilization and pulmonary function. Physical therapy in the morning for the reverse total shoulder protocol. Encouraged home exercises and restrictions will be reviewed. Plan to discharge home if in stable condition. [facility] Surgery          This dictation was performed with a verbal recognition program (DRAGON) and it was checked for errors. It is possible that there are still dictated errors within this office note. If so, please bring any errors to my attention for an addendum. All efforts were made to ensure that this office note is accurate.     Electronically signed by Noy Bertrand MD on 7/9/2020 at 1:51 PM normal

## 2022-02-08 NOTE — TELEPHONE ENCOUNTER
Left message for patient to call once he gets out of the hospital and is feeling up to it.
Patient scheduled for surgery 2/1 RT TKR. Admitted to the hospital over the weekend.
Surgery should be postponed for 6-8 wks
How Severe Are They?: moderate
Is This A New Presentation, Or A Follow-Up?: Skin Lesions

## 2024-02-28 NOTE — CARE COORDINATION
Chart review day 3: Patient on C3 re COVID 19 followed by IM, General surgery, and GI. Patient on clear liquid diet. Patient with remdeisiver thru 2/10, currently on room air. Patient with therapy recs for home with [place] [address] referral placed to [facility] as has used in the past, following. Patient lives with wife. SW will ct to follow for DCP needs. DACIA Briseno [facility] [place]

## (undated) DEVICE — CATHETER IV 20GA L1.25IN PNK FEP SFTY STR HUB RADPQ DISP

## (undated) DEVICE — DRESSING,GAUZE,XEROFORM,CURAD,1"X8",ST: Brand: CURAD

## (undated) DEVICE — Z DUP USE 2715602 GUIDEWIRE SURG L220MM DIA25MM SHLDR FOR GLEN KEELED AEQUALIS

## (undated) DEVICE — SUTURE VCRL + SZ 3-0 L18IN ABSRB UD SH 1/2 CIR TAPERCUT NDL VCP864D

## (undated) DEVICE — SMARTGOWN SURGICAL GOWN, XL: Brand: CONVERTORS

## (undated) DEVICE — TROCAR: Brand: KII SLEEVE

## (undated) DEVICE — NEEDLE SUT PASS FOR ROT CUF LABRAL REP MULTFI SCORPION

## (undated) DEVICE — 3M™ TEGADERM™ TRANSPARENT FILM DRESSING FRAME STYLE, 1624W, 2-3/8 IN X 2-3/4 IN (6 CM X 7 CM), 100/CT 4CT/CASE: Brand: 3M™ TEGADERM™

## (undated) DEVICE — GLOVE ORANGE PI 7   MSG9070

## (undated) DEVICE — SET ADMIN PRIMING 7ML L30IN 7.35LB 20 GTT 2ND RLER CLMP

## (undated) DEVICE — [HIGH FLOW INSUFFLATOR,  DO NOT USE IF PACKAGE IS DAMAGED,  KEEP DRY,  KEEP AWAY FROM SUNLIGHT,  PROTECT FROM HEAT AND RADIOACTIVE SOURCES.]: Brand: PNEUMOSURE

## (undated) DEVICE — STERILE POLYISOPRENE POWDER-FREE SURGICAL GLOVES: Brand: PROTEXIS

## (undated) DEVICE — SHEET,DRAPE,53X77,STERILE: Brand: MEDLINE

## (undated) DEVICE — 1016 S-DRAPE IRRIG POUCH 10/BOX: Brand: STERI-DRAPE™

## (undated) DEVICE — ABDOMINAL PAD: Brand: DERMACEA

## (undated) DEVICE — TUBE IRRIG L8IN LNG PT W/ CONN FOR PMP SYS REDEUCE

## (undated) DEVICE — SUTURE MCRYL SZ 4-0 L18IN ABSRB UD L19MM PS-2 3/8 CIR PRIM Y496G

## (undated) DEVICE — BOWL ASSY BM210 DUAL BLADE DISPOSABLE: Brand: MIDAS REX™

## (undated) DEVICE — SUTURE VCRL + SZ 3-0 L27IN ABSRB UD L26MM SH 1/2 CIR VCP416H

## (undated) DEVICE — SOLUTION IV 1000ML LAC RINGERS PH 6.5 INJ USP VIAFLX PLAS

## (undated) DEVICE — AMBIENT SUPER TURBOVAC 90: Brand: COBLATION

## (undated) DEVICE — 3M™ STERI-DRAPE™ INSTRUMENT POUCH 1018: Brand: STERI-DRAPE™

## (undated) DEVICE — STOCKINETTE,IMPERVIOUS,12X48,STERILE: Brand: MEDLINE

## (undated) DEVICE — SPLINT ORTH W4XL30IN LAYERED FBRGLS FOAM PD BRTH BK MOLD

## (undated) DEVICE — BIT DRL DIA4.1MM TIB FOR ARTH NAIL SYS PANTA 2

## (undated) DEVICE — 3M™ TEGADERM™ TRANSPARENT FILM DRESSING FRAME STYLE, 1626W, 4 IN X 4-3/4 IN (10 CM X 12 CM), 50/CT 4CT/CASE: Brand: 3M™ TEGADERM™

## (undated) DEVICE — 40763 BEACH CHAIR HEAD RESTRAINT: Brand: 40763 BEACH CHAIR HEAD RESTRAINT

## (undated) DEVICE — GOWN SIRUS NONREIN XL W/TWL: Brand: MEDLINE INDUSTRIES, INC.

## (undated) DEVICE — DRAPE C ARM W46XL120IN XLN

## (undated) DEVICE — GUIDEWIRE VASC L600MM DIA3.2MM FOR IMPL NAIL PANTA 2

## (undated) DEVICE — SOLUTION IRRIG 5L LAC R BG

## (undated) DEVICE — OCCLUSIVE GAUZE STRIP,3% BISMUTH TRIBROMOPHENATE IN PETROLATUM BLEND: Brand: XEROFORM

## (undated) DEVICE — GLOVE SURG SZ 75 L12IN FNGR THK94MIL STD WHT LTX FREE

## (undated) DEVICE — SOLUTION IV IRRIG 500ML 0.9% SODIUM CHL 2F7123

## (undated) DEVICE — SYRINGE MED 30ML STD CLR PLAS LUERLOCK TIP N CTRL DISP

## (undated) DEVICE — SPONGE LAP W18XL18IN WHT COT 4 PLY FLD STRUNG RADPQ DISP ST

## (undated) DEVICE — SUTURE VCRL SZ 2-0 L18IN ABSRB UD CT-1 L36MM 1/2 CIR J839D

## (undated) DEVICE — PADDING CAST W6INXL4YD NONSTERILE COT RAYON MICROPLEATED

## (undated) DEVICE — 35 ML SYRINGE LUER-LOCK TIP: Brand: MONOJECT

## (undated) DEVICE — SET GRAV VENT NVENT CK VLV 3 NDL FREE PRT 10 GTT

## (undated) DEVICE — Device

## (undated) DEVICE — SURGICAL PROCEDURE PACK IV U-BAR

## (undated) DEVICE — TOWEL,OR,DSP,ST,BLUE,STD,8/PK,10PK/CS: Brand: MEDLINE

## (undated) DEVICE — PENCIL SMK EVAC ALL IN 1 DSGN ENH VISIBILITY IMPROVED AIR

## (undated) DEVICE — TROCAR: Brand: KII FIOS FIRST ENTRY

## (undated) DEVICE — Z CONVERTED USE 2271043 CONTAINER SPEC COLL 4OZ SCR ON LID PEEL PCH

## (undated) DEVICE — GLOVE SURG SZ 8 L12IN FNGR THK94MIL STD WHT LTX FREE

## (undated) DEVICE — CANNULA ARTHSCP L3CM ID8MM DBL DAM 1 PC MOLD LO PROF FLNG

## (undated) DEVICE — DYONICS 5.5 MM BONECUTTER PLATINUM                                    BLADE, ORANGE, 10000 MAXIMUM RPM,                                    PACKAGED 6 PER BOX, STERILE

## (undated) DEVICE — HOOD, PEEL-AWAY: Brand: FLYTE

## (undated) DEVICE — DRAPE,U/ SHT,SPLIT,PLAS,STERIL: Brand: MEDLINE

## (undated) DEVICE — LONG CALCNL DRL 4.1MM

## (undated) DEVICE — BONE MARROW ASPIRATE CONCENTRATE (BMAC2) PROCEDURE PACK, BMAC2-60-01 PROCEDURE PACK: Brand: HARVEST® BMAC® 2

## (undated) DEVICE — Z DUP USE 2715828 BIT DRL DIA3.2MM PERIPH SCR FOR AEQUALIS PERFORM REVERSED

## (undated) DEVICE — SYSTEM SKIN CLSR 22CM DERMBND PRINEO

## (undated) DEVICE — PACK PROCEDURE SURG GEN LAPAROSCOPY CDS

## (undated) DEVICE — SUTURE NONABSORBABLE MONOFILAMENT 3-0 PS-1 18 IN BLK ETHILON 1663H

## (undated) DEVICE — GLOVE ORANGE PI 8   MSG9080

## (undated) DEVICE — MASK ADLT DISP

## (undated) DEVICE — PACK PROCEDURE SURG ARTHSCP CUST

## (undated) DEVICE — TOWEL,OR,DSP,ST,BLUE,STD,4/PK,20PK/CS: Brand: MEDLINE

## (undated) DEVICE — MEDI-VAC NON-CONDUCTIVE SUCTION TUBING: Brand: CARDINAL HEALTH

## (undated) DEVICE — 4-PORT MANIFOLD: Brand: NEPTUNE 2

## (undated) DEVICE — MICRO SAGITTAL BLADE, COARSE, 9.5 X 25.5 X 0.4 MM: Brand: CONMED

## (undated) DEVICE — COVER,TABLE,HEAVY DUTY,50"X90",STRL: Brand: MEDLINE

## (undated) DEVICE — ZIMMER® STERILE DISPOSABLE TOURNIQUET CUFF WITH PLC, DUAL PORT, SINGLE BLADDER, 34 IN. (86 CM)

## (undated) DEVICE — DRESSING,GAUZE,PETROLATUM,CURAD,1"X8",ST: Brand: CURAD

## (undated) DEVICE — TIBURON BEACH CHAIR SHOULDER DRAPE: Brand: CONVERTORS

## (undated) DEVICE — PADDING CAST W4INXL4YD NONSTERILE COT RAYON MICROPLEATED

## (undated) DEVICE — GOWN,SIRUS,NON REINFRCD,LARGE,SET IN SL: Brand: MEDLINE

## (undated) DEVICE — NEEDLE SPNL 20GA L3.5IN YEL HUB S STL REG WALL FIT STYL W/

## (undated) DEVICE — SUTURE VCRL + SZ 2-0 L18IN ABSRB UD CT1 L36MM 1/2 CIR VCP839D

## (undated) DEVICE — 1200CC HIFLOW SUCTION CANISTER WITH AEROSTAT FILTER, FLOAT VALVE SHUTOFF WITH GREEN LID: Brand: BEMIS

## (undated) DEVICE — SCISSOR SWCH BLDE TRAD MPLR POST INSRT TIP LAP MINI 5MM METZ

## (undated) DEVICE — SHORT CALCNL DRL 4.1MM

## (undated) DEVICE — PACK EXTREMITY XR

## (undated) DEVICE — GAUZE,SPONGE,4"X4",16PLY,STRL,LF,10/TRAY: Brand: MEDLINE

## (undated) DEVICE — SKIN MARKER,REGULAR TIP WITH RULER AND LABELS: Brand: DEVON

## (undated) DEVICE — CHLORAPREP 26ML ORANGE

## (undated) DEVICE — SUTURE MCRYL SZ 4 0 L18IN ABSRB UD P 3 3 8 CIR REV CUT NDL MCP494G

## (undated) DEVICE — ARM SLING: Brand: DEROYAL

## (undated) DEVICE — SHOULDER STABILIZATION KIT,                                    DISPOSABLE 12 PER BOX

## (undated) DEVICE — SKIN AFFIX SURG ADHESIVE 72/CS 0.55ML: Brand: MEDLINE

## (undated) DEVICE — YANKAUER,BULB TIP,W/O VENT,RIGID,STERILE: Brand: MEDLINE

## (undated) DEVICE — CANNULA SMOOTH FLEX 8.0 X 72MM: Brand: CLEAR-TRAC

## (undated) DEVICE — GLOVE ORANGE PI 7 1/2   MSG9075

## (undated) DEVICE — INTENDED FOR TISSUE SEPARATION, AND OTHER PROCEDURES THAT REQUIRE A SHARP SURGICAL BLADE TO PUNCTURE OR CUT.: Brand: BARD-PARKER ® STAINLESS STEEL BLADES

## (undated) DEVICE — SUTURE VCRL SZ 3-0 L18IN ABSRB UD L26MM SH 1/2 CIR J864D

## (undated) DEVICE — GLOVE,SURG,SENSICARE,ALOE,LF,PF,7: Brand: MEDLINE

## (undated) DEVICE — 3M™ STERI-DRAPE™ U-DRAPE, LONG 1019: Brand: STERI-DRAPE™